# Patient Record
Sex: FEMALE | Race: WHITE | Employment: UNEMPLOYED | URBAN - METROPOLITAN AREA
[De-identification: names, ages, dates, MRNs, and addresses within clinical notes are randomized per-mention and may not be internally consistent; named-entity substitution may affect disease eponyms.]

---

## 2017-02-17 ENCOUNTER — HOSPITAL ENCOUNTER (EMERGENCY)
Age: 37
Discharge: HOME | End: 2017-02-18
Payer: COMMERCIAL

## 2017-02-17 DIAGNOSIS — F10.920 ACUTE ALCOHOLIC INTOXICATION, UNCOMPLICATED (HCC): Primary | ICD-10-CM

## 2017-02-17 LAB
ALBUMIN SERPL-MCNC: 4.8 G/DL (ref 3.9–4.9)
ALP BLD-CCNC: 68 U/L (ref 40–130)
ALT SERPL-CCNC: 64 U/L (ref 0–33)
AMPHETAMINE SCREEN, URINE: NORMAL
ANION GAP SERPL CALCULATED.3IONS-SCNC: 17 MEQ/L (ref 7–13)
AST SERPL-CCNC: 70 U/L (ref 0–35)
BACTERIA: ABNORMAL /HPF
BARBITURATE SCREEN URINE: NORMAL
BASOPHILS ABSOLUTE: 0.1 K/UL (ref 0–0.2)
BASOPHILS RELATIVE PERCENT: 1 %
BENZODIAZEPINE SCREEN, URINE: NORMAL
BILIRUB SERPL-MCNC: 0.4 MG/DL (ref 0–1.2)
BILIRUBIN URINE: NEGATIVE
BLOOD, URINE: ABNORMAL
BUN BLDV-MCNC: 6 MG/DL (ref 6–20)
CALCIUM SERPL-MCNC: 9 MG/DL (ref 8.6–10.2)
CANNABINOID SCREEN URINE: NORMAL
CHLORIDE BLD-SCNC: 98 MEQ/L (ref 98–107)
CLARITY: ABNORMAL
CO2: 24 MEQ/L (ref 22–29)
COCAINE METABOLITE SCREEN URINE: NORMAL
COLOR: YELLOW
CREAT SERPL-MCNC: 0.61 MG/DL (ref 0.5–0.9)
EOSINOPHILS ABSOLUTE: 0.1 K/UL (ref 0–0.7)
EOSINOPHILS RELATIVE PERCENT: 1.2 %
EPITHELIAL CELLS, UA: ABNORMAL /HPF
ETHANOL PERCENT: 0.31 G/DL
ETHANOL: 358 MG/DL (ref 0–0.08)
GFR AFRICAN AMERICAN: >60
GFR NON-AFRICAN AMERICAN: >60
GLOBULIN: 2.8 G/DL (ref 2.3–3.5)
GLUCOSE BLD-MCNC: 90 MG/DL (ref 74–109)
GLUCOSE URINE: NEGATIVE MG/DL
HCT VFR BLD CALC: 47.5 % (ref 37–47)
HEMOGLOBIN: 16.6 G/DL (ref 12–16)
KETONES, URINE: NEGATIVE MG/DL
LEUKOCYTE ESTERASE, URINE: ABNORMAL
LYMPHOCYTES ABSOLUTE: 2.2 K/UL (ref 1–4.8)
LYMPHOCYTES RELATIVE PERCENT: 32 %
Lab: NORMAL
MCH RBC QN AUTO: 32.4 PG (ref 27–31.3)
MCHC RBC AUTO-ENTMCNC: 34.9 % (ref 33–37)
MCV RBC AUTO: 93 FL (ref 82–100)
MONOCYTES ABSOLUTE: 0.4 K/UL (ref 0.2–0.8)
MONOCYTES RELATIVE PERCENT: 5.9 %
NEUTROPHILS ABSOLUTE: 4.1 K/UL (ref 1.4–6.5)
NEUTROPHILS RELATIVE PERCENT: 59.9 %
NITRITE, URINE: NEGATIVE
OPIATE SCREEN URINE: NORMAL
PDW BLD-RTO: 13.6 % (ref 11.5–14.5)
PH UA: 6.5 (ref 5–9)
PHENCYCLIDINE SCREEN URINE: NORMAL
PLATELET # BLD: 163 K/UL (ref 130–400)
POTASSIUM SERPL-SCNC: 4.6 MEQ/L (ref 3.5–5.1)
PROTEIN UA: 30 MG/DL
RBC # BLD: 5.11 M/UL (ref 4.2–5.4)
RBC UA: ABNORMAL /HPF (ref 0–2)
SODIUM BLD-SCNC: 139 MEQ/L (ref 132–144)
SPECIFIC GRAVITY UA: 1.01 (ref 1–1.03)
TOTAL CK: 102 U/L (ref 0–170)
TOTAL PROTEIN: 7.6 G/DL (ref 6.4–8.1)
TSH SERPL DL<=0.05 MIU/L-ACNC: 1.65 UIU/ML (ref 0.27–4.2)
URINE REFLEX TO CULTURE: YES
UROBILINOGEN, URINE: 1 E.U./DL
WBC # BLD: 6.8 K/UL (ref 4.8–10.8)
WBC UA: ABNORMAL /HPF (ref 0–5)

## 2017-02-17 PROCEDURE — 81001 URINALYSIS AUTO W/SCOPE: CPT

## 2017-02-17 PROCEDURE — 6360000002 HC RX W HCPCS: Performed by: PHYSICIAN ASSISTANT

## 2017-02-17 PROCEDURE — 85025 COMPLETE CBC W/AUTO DIFF WBC: CPT

## 2017-02-17 PROCEDURE — 99285 EMERGENCY DEPT VISIT HI MDM: CPT

## 2017-02-17 PROCEDURE — 96372 THER/PROPH/DIAG INJ SC/IM: CPT

## 2017-02-17 PROCEDURE — 80053 COMPREHEN METABOLIC PANEL: CPT

## 2017-02-17 PROCEDURE — 80307 DRUG TEST PRSMV CHEM ANLYZR: CPT

## 2017-02-17 PROCEDURE — 84443 ASSAY THYROID STIM HORMONE: CPT

## 2017-02-17 PROCEDURE — 6370000000 HC RX 637 (ALT 250 FOR IP): Performed by: PHYSICIAN ASSISTANT

## 2017-02-17 PROCEDURE — 36415 COLL VENOUS BLD VENIPUNCTURE: CPT

## 2017-02-17 PROCEDURE — G0480 DRUG TEST DEF 1-7 CLASSES: HCPCS

## 2017-02-17 PROCEDURE — 82550 ASSAY OF CK (CPK): CPT

## 2017-02-17 PROCEDURE — 80164 ASSAY DIPROPYLACETIC ACD TOT: CPT

## 2017-02-17 PROCEDURE — 87086 URINE CULTURE/COLONY COUNT: CPT

## 2017-02-17 RX ORDER — LORAZEPAM 2 MG/ML
2 INJECTION INTRAMUSCULAR ONCE
Status: COMPLETED | OUTPATIENT
Start: 2017-02-17 | End: 2017-02-17

## 2017-02-17 RX ORDER — ZIPRASIDONE HYDROCHLORIDE 20 MG/1
20 CAPSULE ORAL ONCE
Status: COMPLETED | OUTPATIENT
Start: 2017-02-17 | End: 2017-02-17

## 2017-02-17 RX ORDER — HALOPERIDOL 5 MG/ML
5 INJECTION INTRAMUSCULAR ONCE
Status: COMPLETED | OUTPATIENT
Start: 2017-02-17 | End: 2017-02-17

## 2017-02-17 RX ORDER — ONDANSETRON 4 MG/1
4 TABLET, ORALLY DISINTEGRATING ORAL ONCE
Status: COMPLETED | OUTPATIENT
Start: 2017-02-17 | End: 2017-02-17

## 2017-02-17 RX ORDER — LORAZEPAM 2 MG/ML
2 INJECTION INTRAMUSCULAR ONCE
Status: DISCONTINUED | OUTPATIENT
Start: 2017-02-17 | End: 2017-02-17

## 2017-02-17 RX ORDER — QUETIAPINE FUMARATE 50 MG/1
400 TABLET, EXTENDED RELEASE ORAL NIGHTLY
Status: ON HOLD | COMMUNITY
End: 2017-06-15 | Stop reason: HOSPADM

## 2017-02-17 RX ORDER — HYDROXYZINE PAMOATE 25 MG/1
25 CAPSULE ORAL ONCE
Status: COMPLETED | OUTPATIENT
Start: 2017-02-17 | End: 2017-02-17

## 2017-02-17 RX ADMIN — HYDROXYZINE PAMOATE 25 MG: 25 CAPSULE ORAL at 21:26

## 2017-02-17 RX ADMIN — LORAZEPAM 2 MG: 2 INJECTION INTRAMUSCULAR; INTRAVENOUS at 22:42

## 2017-02-17 RX ADMIN — ZIPRASIDONE HYDROCHLORIDE 20 MG: 20 CAPSULE ORAL at 21:26

## 2017-02-17 RX ADMIN — HALOPERIDOL LACTATE 5 MG: 5 INJECTION, SOLUTION INTRAMUSCULAR at 22:42

## 2017-02-17 RX ADMIN — ONDANSETRON 4 MG: 4 TABLET, ORALLY DISINTEGRATING ORAL at 21:26

## 2017-02-17 ASSESSMENT — ENCOUNTER SYMPTOMS
VOMITING: 0
PHOTOPHOBIA: 0
EYE DISCHARGE: 0
EYE PAIN: 0
ANAL BLEEDING: 0
ABDOMINAL PAIN: 0
APNEA: 0
ABDOMINAL DISTENTION: 0
VOICE CHANGE: 0
NAUSEA: 0
BACK PAIN: 0

## 2017-02-18 VITALS
BODY MASS INDEX: 23.92 KG/M2 | TEMPERATURE: 98.3 F | HEIGHT: 62 IN | WEIGHT: 130 LBS | HEART RATE: 98 BPM | OXYGEN SATURATION: 93 % | RESPIRATION RATE: 20 BRPM | DIASTOLIC BLOOD PRESSURE: 77 MMHG | SYSTOLIC BLOOD PRESSURE: 122 MMHG

## 2017-02-18 LAB
ETHANOL PERCENT: 0.05 G/DL
ETHANOL: 62 MG/DL (ref 0–0.08)
VALPROIC ACID LEVEL: <2.8 UG/ML (ref 50–100)

## 2017-02-18 PROCEDURE — 6370000000 HC RX 637 (ALT 250 FOR IP): Performed by: PHYSICIAN ASSISTANT

## 2017-02-18 PROCEDURE — G0480 DRUG TEST DEF 1-7 CLASSES: HCPCS

## 2017-02-18 PROCEDURE — 36415 COLL VENOUS BLD VENIPUNCTURE: CPT

## 2017-02-18 PROCEDURE — 6370000000 HC RX 637 (ALT 250 FOR IP): Performed by: EMERGENCY MEDICINE

## 2017-02-18 RX ORDER — SULFAMETHOXAZOLE AND TRIMETHOPRIM 800; 160 MG/1; MG/1
1 TABLET ORAL ONCE
Status: COMPLETED | OUTPATIENT
Start: 2017-02-18 | End: 2017-02-18

## 2017-02-18 RX ORDER — CHLORDIAZEPOXIDE HYDROCHLORIDE 25 MG/1
50 CAPSULE, GELATIN COATED ORAL ONCE
Status: COMPLETED | OUTPATIENT
Start: 2017-02-18 | End: 2017-02-18

## 2017-02-18 RX ORDER — CHLORDIAZEPOXIDE HYDROCHLORIDE 25 MG/1
25 CAPSULE, GELATIN COATED ORAL ONCE
Status: COMPLETED | OUTPATIENT
Start: 2017-02-18 | End: 2017-02-18

## 2017-02-18 RX ORDER — CHLORDIAZEPOXIDE HYDROCHLORIDE 25 MG/1
25 CAPSULE, GELATIN COATED ORAL 3 TIMES DAILY PRN
Qty: 6 CAPSULE | Refills: 0 | Status: SHIPPED | OUTPATIENT
Start: 2017-02-18 | End: 2017-03-20

## 2017-02-18 RX ADMIN — SULFAMETHOXAZOLE AND TRIMETHOPRIM 1 TABLET: 800; 160 TABLET ORAL at 16:01

## 2017-02-18 RX ADMIN — CHLORDIAZEPOXIDE HYDROCHLORIDE 25 MG: 25 CAPSULE ORAL at 16:00

## 2017-02-18 RX ADMIN — SULFAMETHOXAZOLE AND TRIMETHOPRIM 1 TABLET: 800; 160 TABLET ORAL at 01:38

## 2017-02-18 RX ADMIN — CHLORDIAZEPOXIDE HYDROCHLORIDE 50 MG: 25 CAPSULE ORAL at 10:06

## 2017-02-19 LAB — URINE CULTURE, ROUTINE: NORMAL

## 2017-04-30 ENCOUNTER — HOSPITAL ENCOUNTER (EMERGENCY)
Age: 37
Discharge: HOME | End: 2017-05-01
Attending: EMERGENCY MEDICINE
Payer: MEDICAID

## 2017-04-30 DIAGNOSIS — F10.920 ACUTE ALCOHOLIC INTOXICATION, UNCOMPLICATED (HCC): Primary | ICD-10-CM

## 2017-04-30 LAB
ANION GAP SERPL CALCULATED.3IONS-SCNC: 30 MEQ/L (ref 7–13)
BASOPHILS ABSOLUTE: 0 K/UL (ref 0–0.2)
BASOPHILS RELATIVE PERCENT: 0.4 %
BUN BLDV-MCNC: 6 MG/DL (ref 6–20)
CALCIUM SERPL-MCNC: 9.4 MG/DL (ref 8.6–10.2)
CHLORIDE BLD-SCNC: 93 MEQ/L (ref 98–107)
CO2: 18 MEQ/L (ref 22–29)
CREAT SERPL-MCNC: 0.51 MG/DL (ref 0.5–0.9)
EOSINOPHILS ABSOLUTE: 0 K/UL (ref 0–0.7)
EOSINOPHILS RELATIVE PERCENT: 0.7 %
ETHANOL PERCENT: 0.34 G/DL
ETHANOL: 392 MG/DL (ref 0–0.08)
GFR AFRICAN AMERICAN: >60
GFR NON-AFRICAN AMERICAN: >60
GLUCOSE BLD-MCNC: 72 MG/DL (ref 74–109)
HCT VFR BLD CALC: 47.6 % (ref 37–47)
HEMOGLOBIN: 16.4 G/DL (ref 12–16)
LYMPHOCYTES ABSOLUTE: 1.8 K/UL (ref 1–4.8)
LYMPHOCYTES RELATIVE PERCENT: 39.3 %
MCH RBC QN AUTO: 33 PG (ref 27–31.3)
MCHC RBC AUTO-ENTMCNC: 34.4 % (ref 33–37)
MCV RBC AUTO: 95.8 FL (ref 82–100)
MONOCYTES ABSOLUTE: 0.3 K/UL (ref 0.2–0.8)
MONOCYTES RELATIVE PERCENT: 5.7 %
NEUTROPHILS ABSOLUTE: 2.4 K/UL (ref 1.4–6.5)
NEUTROPHILS RELATIVE PERCENT: 53.9 %
PDW BLD-RTO: 15.3 % (ref 11.5–14.5)
PLATELET # BLD: 68 K/UL (ref 130–400)
POTASSIUM SERPL-SCNC: 4.1 MEQ/L (ref 3.5–5.1)
RBC # BLD: 4.97 M/UL (ref 4.2–5.4)
SODIUM BLD-SCNC: 141 MEQ/L (ref 132–144)
WBC # BLD: 4.5 K/UL (ref 4.8–10.8)

## 2017-04-30 PROCEDURE — 99284 EMERGENCY DEPT VISIT MOD MDM: CPT

## 2017-04-30 PROCEDURE — 85025 COMPLETE CBC W/AUTO DIFF WBC: CPT

## 2017-04-30 PROCEDURE — 6360000002 HC RX W HCPCS: Performed by: EMERGENCY MEDICINE

## 2017-04-30 PROCEDURE — 80048 BASIC METABOLIC PNL TOTAL CA: CPT

## 2017-04-30 PROCEDURE — 36415 COLL VENOUS BLD VENIPUNCTURE: CPT

## 2017-04-30 PROCEDURE — 96374 THER/PROPH/DIAG INJ IV PUSH: CPT

## 2017-04-30 PROCEDURE — 2580000003 HC RX 258: Performed by: EMERGENCY MEDICINE

## 2017-04-30 PROCEDURE — G0480 DRUG TEST DEF 1-7 CLASSES: HCPCS

## 2017-04-30 RX ORDER — 0.9 % SODIUM CHLORIDE 0.9 %
1000 INTRAVENOUS SOLUTION INTRAVENOUS ONCE
Status: COMPLETED | OUTPATIENT
Start: 2017-04-30 | End: 2017-05-01

## 2017-04-30 RX ORDER — ONDANSETRON 2 MG/ML
4 INJECTION INTRAMUSCULAR; INTRAVENOUS ONCE
Status: COMPLETED | OUTPATIENT
Start: 2017-04-30 | End: 2017-04-30

## 2017-04-30 RX ADMIN — SODIUM CHLORIDE 1000 ML: 9 INJECTION, SOLUTION INTRAVENOUS at 21:41

## 2017-04-30 RX ADMIN — ONDANSETRON 4 MG: 2 INJECTION INTRAMUSCULAR; INTRAVENOUS at 21:41

## 2017-04-30 ASSESSMENT — ENCOUNTER SYMPTOMS
ABDOMINAL DISTENTION: 0
SINUS PRESSURE: 0
COLOR CHANGE: 0
COUGH: 0
DIARRHEA: 0
NAUSEA: 0
WHEEZING: 0
SHORTNESS OF BREATH: 0
EYE PAIN: 0
SORE THROAT: 0
RHINORRHEA: 0
CONSTIPATION: 0
PHOTOPHOBIA: 0
ABDOMINAL PAIN: 0
BACK PAIN: 0
VOMITING: 0
APNEA: 0

## 2017-05-01 VITALS
RESPIRATION RATE: 18 BRPM | WEIGHT: 125 LBS | BODY MASS INDEX: 23 KG/M2 | TEMPERATURE: 98.6 F | HEIGHT: 62 IN | OXYGEN SATURATION: 94 % | DIASTOLIC BLOOD PRESSURE: 68 MMHG | SYSTOLIC BLOOD PRESSURE: 116 MMHG | HEART RATE: 79 BPM

## 2017-05-01 LAB
ETHANOL PERCENT: 0.13 G/DL
ETHANOL: 149 MG/DL (ref 0–0.08)

## 2017-05-01 PROCEDURE — 6360000002 HC RX W HCPCS: Performed by: EMERGENCY MEDICINE

## 2017-05-01 PROCEDURE — 36415 COLL VENOUS BLD VENIPUNCTURE: CPT

## 2017-05-01 PROCEDURE — G0480 DRUG TEST DEF 1-7 CLASSES: HCPCS

## 2017-05-01 RX ORDER — ONDANSETRON 4 MG/1
4 TABLET, ORALLY DISINTEGRATING ORAL ONCE
Status: COMPLETED | OUTPATIENT
Start: 2017-05-01 | End: 2017-05-01

## 2017-05-01 RX ADMIN — ONDANSETRON 4 MG: 4 TABLET, ORALLY DISINTEGRATING ORAL at 05:55

## 2017-05-19 ENCOUNTER — HOSPITAL ENCOUNTER (EMERGENCY)
Age: 37
Discharge: HOME | End: 2017-05-19
Attending: STUDENT IN AN ORGANIZED HEALTH CARE EDUCATION/TRAINING PROGRAM

## 2017-05-19 VITALS
TEMPERATURE: 98.5 F | OXYGEN SATURATION: 95 % | WEIGHT: 125 LBS | SYSTOLIC BLOOD PRESSURE: 136 MMHG | BODY MASS INDEX: 23 KG/M2 | RESPIRATION RATE: 18 BRPM | HEART RATE: 73 BPM | HEIGHT: 62 IN | DIASTOLIC BLOOD PRESSURE: 72 MMHG

## 2017-05-19 DIAGNOSIS — F10.20 UNCOMPLICATED ALCOHOL DEPENDENCE (HCC): Primary | ICD-10-CM

## 2017-05-19 DIAGNOSIS — N30.00 ACUTE CYSTITIS WITHOUT HEMATURIA: ICD-10-CM

## 2017-05-19 LAB
ALBUMIN SERPL-MCNC: 4.4 G/DL (ref 3.9–4.9)
ALP BLD-CCNC: 55 U/L (ref 40–130)
ALT SERPL-CCNC: 61 U/L (ref 0–33)
AMPHETAMINE SCREEN, URINE: NORMAL
ANION GAP SERPL CALCULATED.3IONS-SCNC: 27 MEQ/L (ref 7–13)
AST SERPL-CCNC: 74 U/L (ref 0–35)
BACTERIA: NORMAL /HPF
BARBITURATE SCREEN URINE: NORMAL
BASOPHILS ABSOLUTE: 0.2 K/UL (ref 0–0.2)
BASOPHILS RELATIVE PERCENT: 3.4 %
BENZODIAZEPINE SCREEN, URINE: NORMAL
BILIRUB SERPL-MCNC: 0.4 MG/DL (ref 0–1.2)
BILIRUBIN URINE: NEGATIVE
BLOOD, URINE: NEGATIVE
BUN BLDV-MCNC: 9 MG/DL (ref 6–20)
CALCIUM SERPL-MCNC: 8.7 MG/DL (ref 8.6–10.2)
CANNABINOID SCREEN URINE: NORMAL
CHLORIDE BLD-SCNC: 95 MEQ/L (ref 98–107)
CLARITY: ABNORMAL
CO2: 18 MEQ/L (ref 22–29)
COCAINE METABOLITE SCREEN URINE: NORMAL
COLOR: YELLOW
CREAT SERPL-MCNC: 0.43 MG/DL (ref 0.5–0.9)
EOSINOPHILS ABSOLUTE: 0.1 K/UL (ref 0–0.7)
EOSINOPHILS RELATIVE PERCENT: 1.8 %
EPITHELIAL CELLS, UA: NORMAL /HPF
ETHANOL PERCENT: 0.02 G/DL
ETHANOL PERCENT: 0.27 G/DL
ETHANOL: 26 MG/DL (ref 0–0.08)
ETHANOL: 302 MG/DL (ref 0–0.08)
GFR AFRICAN AMERICAN: >60
GFR NON-AFRICAN AMERICAN: >60
GLOBULIN: 2.7 G/DL (ref 2.3–3.5)
GLUCOSE BLD-MCNC: 67 MG/DL (ref 74–109)
GLUCOSE URINE: NEGATIVE MG/DL
HCG(URINE) PREGNANCY TEST: NEGATIVE
HCT VFR BLD CALC: 47.7 % (ref 37–47)
HEMOGLOBIN: 16.5 G/DL (ref 12–16)
KETONES, URINE: 40 MG/DL
LEUKOCYTE ESTERASE, URINE: ABNORMAL
LYMPHOCYTES ABSOLUTE: 2.8 K/UL (ref 1–4.8)
LYMPHOCYTES RELATIVE PERCENT: 44.6 %
Lab: NORMAL
MCH RBC QN AUTO: 33.2 PG (ref 27–31.3)
MCHC RBC AUTO-ENTMCNC: 34.5 % (ref 33–37)
MCV RBC AUTO: 96.2 FL (ref 82–100)
MONOCYTES ABSOLUTE: 0.6 K/UL (ref 0.2–0.8)
MONOCYTES RELATIVE PERCENT: 9.2 %
NEUTROPHILS ABSOLUTE: 2.6 K/UL (ref 1.4–6.5)
NEUTROPHILS RELATIVE PERCENT: 41 %
NITRITE, URINE: POSITIVE
OPIATE SCREEN URINE: NORMAL
PDW BLD-RTO: 14.9 % (ref 11.5–14.5)
PH UA: 6 (ref 5–9)
PHENCYCLIDINE SCREEN URINE: NORMAL
PLATELET # BLD: 254 K/UL (ref 130–400)
POTASSIUM SERPL-SCNC: 3.6 MEQ/L (ref 3.5–5.1)
PROTEIN UA: ABNORMAL MG/DL
RBC # BLD: 4.96 M/UL (ref 4.2–5.4)
RBC UA: NORMAL /HPF (ref 0–2)
SODIUM BLD-SCNC: 140 MEQ/L (ref 132–144)
SPECIFIC GRAVITY UA: 1.01 (ref 1–1.03)
TOTAL CK: 102 U/L (ref 0–170)
TOTAL PROTEIN: 7.1 G/DL (ref 6.4–8.1)
TSH SERPL DL<=0.05 MIU/L-ACNC: 2.68 UIU/ML (ref 0.27–4.2)
UROBILINOGEN, URINE: 1 E.U./DL
VALPROIC ACID LEVEL: <2.8 UG/ML (ref 50–100)
WBC # BLD: 6.2 K/UL (ref 4.8–10.8)
WBC UA: NORMAL /HPF (ref 0–5)

## 2017-05-19 PROCEDURE — 6370000000 HC RX 637 (ALT 250 FOR IP): Performed by: PHYSICIAN ASSISTANT

## 2017-05-19 PROCEDURE — 2580000003 HC RX 258: Performed by: STUDENT IN AN ORGANIZED HEALTH CARE EDUCATION/TRAINING PROGRAM

## 2017-05-19 PROCEDURE — 36415 COLL VENOUS BLD VENIPUNCTURE: CPT

## 2017-05-19 PROCEDURE — 6360000002 HC RX W HCPCS: Performed by: STUDENT IN AN ORGANIZED HEALTH CARE EDUCATION/TRAINING PROGRAM

## 2017-05-19 PROCEDURE — 85025 COMPLETE CBC W/AUTO DIFF WBC: CPT

## 2017-05-19 PROCEDURE — 80164 ASSAY DIPROPYLACETIC ACD TOT: CPT

## 2017-05-19 PROCEDURE — 81001 URINALYSIS AUTO W/SCOPE: CPT

## 2017-05-19 PROCEDURE — 2500000003 HC RX 250 WO HCPCS: Performed by: STUDENT IN AN ORGANIZED HEALTH CARE EDUCATION/TRAINING PROGRAM

## 2017-05-19 PROCEDURE — G0480 DRUG TEST DEF 1-7 CLASSES: HCPCS

## 2017-05-19 PROCEDURE — 80307 DRUG TEST PRSMV CHEM ANLYZR: CPT

## 2017-05-19 PROCEDURE — 84443 ASSAY THYROID STIM HORMONE: CPT

## 2017-05-19 PROCEDURE — 6360000002 HC RX W HCPCS

## 2017-05-19 PROCEDURE — 96374 THER/PROPH/DIAG INJ IV PUSH: CPT

## 2017-05-19 PROCEDURE — 84703 CHORIONIC GONADOTROPIN ASSAY: CPT

## 2017-05-19 PROCEDURE — 80053 COMPREHEN METABOLIC PANEL: CPT

## 2017-05-19 PROCEDURE — 99285 EMERGENCY DEPT VISIT HI MDM: CPT

## 2017-05-19 PROCEDURE — 6370000000 HC RX 637 (ALT 250 FOR IP): Performed by: STUDENT IN AN ORGANIZED HEALTH CARE EDUCATION/TRAINING PROGRAM

## 2017-05-19 PROCEDURE — 82550 ASSAY OF CK (CPK): CPT

## 2017-05-19 RX ORDER — DIPHENHYDRAMINE HYDROCHLORIDE 50 MG/ML
50 INJECTION INTRAMUSCULAR; INTRAVENOUS ONCE
Status: COMPLETED | OUTPATIENT
Start: 2017-05-19 | End: 2017-05-19

## 2017-05-19 RX ORDER — SULFAMETHOXAZOLE AND TRIMETHOPRIM 800; 160 MG/1; MG/1
1 TABLET ORAL ONCE
Status: COMPLETED | OUTPATIENT
Start: 2017-05-19 | End: 2017-05-19

## 2017-05-19 RX ORDER — SULFAMETHOXAZOLE AND TRIMETHOPRIM 800; 160 MG/1; MG/1
1 TABLET ORAL 2 TIMES DAILY
Qty: 14 TABLET | Refills: 0 | Status: SHIPPED | OUTPATIENT
Start: 2017-05-19 | End: 2017-05-26

## 2017-05-19 RX ORDER — DIPHENHYDRAMINE HYDROCHLORIDE 50 MG/ML
INJECTION INTRAMUSCULAR; INTRAVENOUS
Status: COMPLETED
Start: 2017-05-19 | End: 2017-05-19

## 2017-05-19 RX ORDER — LORAZEPAM 1 MG/1
1 TABLET ORAL ONCE
Status: COMPLETED | OUTPATIENT
Start: 2017-05-19 | End: 2017-05-19

## 2017-05-19 RX ADMIN — THIAMINE HYDROCHLORIDE: 100 INJECTION, SOLUTION INTRAMUSCULAR; INTRAVENOUS at 13:03

## 2017-05-19 RX ADMIN — LORAZEPAM 1 MG: 1 TABLET ORAL at 16:38

## 2017-05-19 RX ADMIN — DIPHENHYDRAMINE HYDROCHLORIDE 50 MG: 50 INJECTION INTRAMUSCULAR; INTRAVENOUS at 13:07

## 2017-05-19 RX ADMIN — SULFAMETHOXAZOLE AND TRIMETHOPRIM 1 TABLET: 800; 160 TABLET ORAL at 12:43

## 2017-05-19 ASSESSMENT — ENCOUNTER SYMPTOMS
CHEST TIGHTNESS: 0
VOMITING: 0
SINUS PRESSURE: 0
SHORTNESS OF BREATH: 0
COUGH: 0
ABDOMINAL PAIN: 0
TROUBLE SWALLOWING: 0
DIARRHEA: 0
BACK PAIN: 0

## 2017-05-30 ENCOUNTER — HOSPITAL ENCOUNTER (EMERGENCY)
Age: 37
Discharge: HOME | End: 2017-05-30
Attending: EMERGENCY MEDICINE

## 2017-05-30 VITALS
HEART RATE: 100 BPM | DIASTOLIC BLOOD PRESSURE: 68 MMHG | RESPIRATION RATE: 20 BRPM | WEIGHT: 120 LBS | TEMPERATURE: 97.8 F | OXYGEN SATURATION: 99 % | SYSTOLIC BLOOD PRESSURE: 110 MMHG | HEIGHT: 62 IN | BODY MASS INDEX: 22.08 KG/M2

## 2017-05-30 DIAGNOSIS — F10.20 ALCOHOLISM (HCC): Primary | ICD-10-CM

## 2017-05-30 PROCEDURE — 6360000002 HC RX W HCPCS: Performed by: EMERGENCY MEDICINE

## 2017-05-30 PROCEDURE — 96374 THER/PROPH/DIAG INJ IV PUSH: CPT

## 2017-05-30 PROCEDURE — 99284 EMERGENCY DEPT VISIT MOD MDM: CPT

## 2017-05-30 PROCEDURE — 2580000003 HC RX 258: Performed by: EMERGENCY MEDICINE

## 2017-05-30 PROCEDURE — 96361 HYDRATE IV INFUSION ADD-ON: CPT

## 2017-05-30 RX ORDER — LORAZEPAM 2 MG/ML
2 INJECTION INTRAMUSCULAR ONCE
Status: COMPLETED | OUTPATIENT
Start: 2017-05-30 | End: 2017-05-30

## 2017-05-30 RX ORDER — CHLORDIAZEPOXIDE HYDROCHLORIDE 25 MG/1
25 CAPSULE, GELATIN COATED ORAL 3 TIMES DAILY PRN
Qty: 12 CAPSULE | Refills: 0 | Status: ON HOLD | OUTPATIENT
Start: 2017-05-30 | End: 2017-06-15 | Stop reason: HOSPADM

## 2017-05-30 RX ORDER — 0.9 % SODIUM CHLORIDE 0.9 %
1000 INTRAVENOUS SOLUTION INTRAVENOUS ONCE
Status: COMPLETED | OUTPATIENT
Start: 2017-05-30 | End: 2017-05-30

## 2017-05-30 RX ADMIN — SODIUM CHLORIDE 1000 ML: 9 INJECTION, SOLUTION INTRAVENOUS at 20:51

## 2017-05-30 RX ADMIN — LORAZEPAM 2 MG: 2 INJECTION INTRAMUSCULAR; INTRAVENOUS at 20:52

## 2017-05-30 ASSESSMENT — ENCOUNTER SYMPTOMS
CHEST TIGHTNESS: 0
VOMITING: 0
SORE THROAT: 0
PHOTOPHOBIA: 0
WHEEZING: 0
ABDOMINAL DISTENTION: 0
COUGH: 0
ABDOMINAL PAIN: 0
EYE DISCHARGE: 0
SHORTNESS OF BREATH: 0

## 2017-05-30 ASSESSMENT — PAIN DESCRIPTION - FREQUENCY: FREQUENCY: CONTINUOUS

## 2017-05-30 ASSESSMENT — PAIN SCALES - GENERAL: PAINLEVEL_OUTOF10: 8

## 2017-05-30 ASSESSMENT — PAIN DESCRIPTION - LOCATION: LOCATION: ARM;SHOULDER

## 2017-05-30 ASSESSMENT — PAIN DESCRIPTION - ORIENTATION: ORIENTATION: LEFT

## 2017-06-08 ENCOUNTER — HOSPITAL ENCOUNTER (INPATIENT)
Age: 37
LOS: 6 days | Discharge: HOME | DRG: 885 | End: 2017-06-15
Attending: STUDENT IN AN ORGANIZED HEALTH CARE EDUCATION/TRAINING PROGRAM | Admitting: PSYCHIATRY & NEUROLOGY
Payer: COMMERCIAL

## 2017-06-08 ENCOUNTER — APPOINTMENT (OUTPATIENT)
Dept: GENERAL RADIOLOGY | Age: 37
DRG: 885 | End: 2017-06-08
Payer: COMMERCIAL

## 2017-06-08 DIAGNOSIS — R45.89 SUICIDAL BEHAVIOR: ICD-10-CM

## 2017-06-08 DIAGNOSIS — F32.A DEPRESSION, UNSPECIFIED DEPRESSION TYPE: Primary | ICD-10-CM

## 2017-06-08 DIAGNOSIS — M67.919 DISORDER OF BURSAE AND TENDONS IN SHOULDER REGION: ICD-10-CM

## 2017-06-08 DIAGNOSIS — F31.9 BIPOLAR 1 DISORDER (HCC): ICD-10-CM

## 2017-06-08 DIAGNOSIS — F10.920 ACUTE ALCOHOLIC INTOXICATION, UNCOMPLICATED (HCC): ICD-10-CM

## 2017-06-08 DIAGNOSIS — M71.9 DISORDER OF BURSAE AND TENDONS IN SHOULDER REGION: ICD-10-CM

## 2017-06-08 LAB
ACANTHOCYTES: 0
ALBUMIN SERPL-MCNC: 4.6 G/DL (ref 3.9–4.9)
ALP BLD-CCNC: 69 U/L (ref 40–130)
ALT SERPL-CCNC: 61 U/L (ref 0–33)
AMPHETAMINE SCREEN, URINE: NORMAL
ANION GAP SERPL CALCULATED.3IONS-SCNC: 19 MEQ/L (ref 7–13)
ANISOCYTOSIS: 0
AST SERPL-CCNC: 63 U/L (ref 0–35)
AUER RODS: 0
BACTERIA: NORMAL /HPF
BARBITURATE SCREEN URINE: NORMAL
BASOPHILIC STIPPLING: 0
BASOPHILS ABSOLUTE: 0.2 K/UL (ref 0–0.2)
BASOPHILS RELATIVE PERCENT: 6 %
BENZODIAZEPINE SCREEN, URINE: NORMAL
BILIRUB SERPL-MCNC: 0.4 MG/DL (ref 0–1.2)
BILIRUBIN URINE: NEGATIVE
BLOOD, URINE: NEGATIVE
BUN BLDV-MCNC: 9 MG/DL (ref 6–20)
BURR CELLS: 0
CABOT RINGS: 0
CALCIUM SERPL-MCNC: 8.7 MG/DL (ref 8.6–10.2)
CANNABINOID SCREEN URINE: NORMAL
CHLORIDE BLD-SCNC: 93 MEQ/L (ref 98–107)
CLARITY: ABNORMAL
CO2: 22 MEQ/L (ref 22–29)
COCAINE METABOLITE SCREEN URINE: NORMAL
COLOR: YELLOW
CREAT SERPL-MCNC: 0.45 MG/DL (ref 0.5–0.9)
DOHLE BODIES: 0
EOSINOPHILS ABSOLUTE: 0 K/UL (ref 0–0.7)
EOSINOPHILS RELATIVE PERCENT: 1 %
EPITHELIAL CELLS, UA: NORMAL /HPF
ETHANOL PERCENT: 0.01 G/DL
ETHANOL PERCENT: 0.25 G/DL
ETHANOL: 17 MG/DL (ref 0–0.08)
ETHANOL: 281 MG/DL (ref 0–0.08)
GFR AFRICAN AMERICAN: >60
GFR NON-AFRICAN AMERICAN: >60
GLOBULIN: 2.5 G/DL (ref 2.3–3.5)
GLUCOSE BLD-MCNC: 199 MG/DL (ref 74–109)
GLUCOSE URINE: 500 MG/DL
HAIRY CELLS: 0
HCG(URINE) PREGNANCY TEST: NEGATIVE
HCT VFR BLD CALC: 44.7 % (ref 37–47)
HEMOGLOBIN: 15.5 G/DL (ref 12–16)
HOWELL-JOLLY BODIES: 0
HYPERSEGMENTED NEUTROPHILS: 0
HYPOCHROMIA: 0
KETONES, URINE: 15 MG/DL
LEUKOCYTE ESTERASE, URINE: ABNORMAL
LYMPHOCYTES ABSOLUTE: 1.1 K/UL (ref 1–4.8)
LYMPHOCYTES RELATIVE PERCENT: 30 %
Lab: NORMAL
MACROCYTES: 0
MCH RBC QN AUTO: 33.3 PG (ref 27–31.3)
MCHC RBC AUTO-ENTMCNC: 34.6 % (ref 33–37)
MCV RBC AUTO: 96.3 FL (ref 82–100)
MICROCYTES: 0
MONOCYTES ABSOLUTE: 0.2 K/UL (ref 0.2–0.8)
MONOCYTES RELATIVE PERCENT: 6.3 %
NEUTROPHILS ABSOLUTE: 2.2 K/UL (ref 1.4–6.5)
NEUTROPHILS RELATIVE PERCENT: 57 %
NITRITE, URINE: NEGATIVE
OPIATE SCREEN URINE: NORMAL
OVALOCYTES: 0
PAPPENHEIMER BODIES: 0
PDW BLD-RTO: 14.4 % (ref 11.5–14.5)
PELGER HUET CELLS: 0 %
PH UA: 7 (ref 5–9)
PHENCYCLIDINE SCREEN URINE: NORMAL
PLATELET # BLD: 141 K/UL (ref 130–400)
PLATELET SLIDE REVIEW: ADEQUATE
POIKILOCYTES: 0
POLYCHROMASIA: 0
POTASSIUM SERPL-SCNC: 4.2 MEQ/L (ref 3.5–5.1)
PROTEIN UA: 30 MG/DL
RBC # BLD: 4.64 M/UL (ref 4.2–5.4)
RBC # BLD: NORMAL 10*6/UL
RBC UA: NORMAL /HPF (ref 0–2)
SCHISTOCYTES: 0
SICKLE CELLS: 0
SMUDGE CELLS: 1.8
SODIUM BLD-SCNC: 134 MEQ/L (ref 132–144)
SPECIFIC GRAVITY UA: 1.02 (ref 1–1.03)
SPHEROCYTES: 0
STOMATOCYTES: 0
TARGET CELLS: 0
TEAR DROP CELLS: 0
TOTAL CK: 71 U/L (ref 0–170)
TOTAL PROTEIN: 7.1 G/DL (ref 6.4–8.1)
TOXIC GRANULATION: 0
TSH SERPL DL<=0.05 MIU/L-ACNC: 3.36 UIU/ML (ref 0.27–4.2)
UROBILINOGEN, URINE: 1 E.U./DL
VACUOLATED NEUTROPHILS: 0
VALPROIC ACID LEVEL: 4.1 UG/ML (ref 50–100)
WBC # BLD: 3.8 K/UL (ref 4.8–10.8)
WBC UA: NORMAL /HPF (ref 0–5)

## 2017-06-08 PROCEDURE — 6370000000 HC RX 637 (ALT 250 FOR IP): Performed by: PHYSICIAN ASSISTANT

## 2017-06-08 PROCEDURE — G0480 DRUG TEST DEF 1-7 CLASSES: HCPCS

## 2017-06-08 PROCEDURE — 85025 COMPLETE CBC W/AUTO DIFF WBC: CPT

## 2017-06-08 PROCEDURE — 84703 CHORIONIC GONADOTROPIN ASSAY: CPT

## 2017-06-08 PROCEDURE — 99285 EMERGENCY DEPT VISIT HI MDM: CPT

## 2017-06-08 PROCEDURE — 36415 COLL VENOUS BLD VENIPUNCTURE: CPT

## 2017-06-08 PROCEDURE — 71100 X-RAY EXAM RIBS UNI 2 VIEWS: CPT

## 2017-06-08 PROCEDURE — 84443 ASSAY THYROID STIM HORMONE: CPT

## 2017-06-08 PROCEDURE — 82550 ASSAY OF CK (CPK): CPT

## 2017-06-08 PROCEDURE — 73030 X-RAY EXAM OF SHOULDER: CPT

## 2017-06-08 PROCEDURE — 80053 COMPREHEN METABOLIC PANEL: CPT

## 2017-06-08 PROCEDURE — 81001 URINALYSIS AUTO W/SCOPE: CPT

## 2017-06-08 PROCEDURE — 6370000000 HC RX 637 (ALT 250 FOR IP): Performed by: PERSONAL EMERGENCY RESPONSE ATTENDANT

## 2017-06-08 PROCEDURE — 80307 DRUG TEST PRSMV CHEM ANLYZR: CPT

## 2017-06-08 PROCEDURE — 80164 ASSAY DIPROPYLACETIC ACD TOT: CPT

## 2017-06-08 RX ORDER — IBUPROFEN 800 MG/1
800 TABLET ORAL ONCE
Status: COMPLETED | OUTPATIENT
Start: 2017-06-08 | End: 2017-06-08

## 2017-06-08 RX ORDER — LORAZEPAM 1 MG/1
2 TABLET ORAL ONCE
Status: COMPLETED | OUTPATIENT
Start: 2017-06-08 | End: 2017-06-08

## 2017-06-08 RX ORDER — NICOTINE 21 MG/24HR
1 PATCH, TRANSDERMAL 24 HOURS TRANSDERMAL ONCE
Status: COMPLETED | OUTPATIENT
Start: 2017-06-08 | End: 2017-06-09

## 2017-06-08 RX ADMIN — LORAZEPAM 2 MG: 1 TABLET ORAL at 14:25

## 2017-06-08 RX ADMIN — LORAZEPAM 2 MG: 1 TABLET ORAL at 21:06

## 2017-06-08 RX ADMIN — IBUPROFEN 800 MG: 800 TABLET, FILM COATED ORAL at 14:25

## 2017-06-08 ASSESSMENT — ENCOUNTER SYMPTOMS
STRIDOR: 0
SHORTNESS OF BREATH: 0
VOMITING: 0
EYE DISCHARGE: 0
ABDOMINAL PAIN: 0
BACK PAIN: 0
WHEEZING: 0
DIARRHEA: 0
CONSTIPATION: 0
ABDOMINAL DISTENTION: 0
COLOR CHANGE: 0
NAUSEA: 0
SORE THROAT: 0
RHINORRHEA: 0

## 2017-06-08 ASSESSMENT — PAIN DESCRIPTION - ORIENTATION: ORIENTATION: LEFT

## 2017-06-08 ASSESSMENT — PAIN DESCRIPTION - DESCRIPTORS: DESCRIPTORS: DISCOMFORT;SHARP

## 2017-06-08 ASSESSMENT — PAIN SCALES - GENERAL
PAINLEVEL_OUTOF10: 8
PAINLEVEL_OUTOF10: 8

## 2017-06-08 ASSESSMENT — PAIN DESCRIPTION - ONSET: ONSET: ON-GOING

## 2017-06-08 ASSESSMENT — PAIN DESCRIPTION - LOCATION: LOCATION: ARM

## 2017-06-08 ASSESSMENT — PATIENT HEALTH QUESTIONNAIRE - PHQ9: SUM OF ALL RESPONSES TO PHQ QUESTIONS 1-9: 20

## 2017-06-08 ASSESSMENT — PAIN DESCRIPTION - PROGRESSION: CLINICAL_PROGRESSION: NOT CHANGED

## 2017-06-08 ASSESSMENT — PAIN DESCRIPTION - PAIN TYPE: TYPE: ACUTE PAIN

## 2017-06-08 ASSESSMENT — PAIN DESCRIPTION - FREQUENCY: FREQUENCY: INTERMITTENT

## 2017-06-09 PROCEDURE — 1240000000 HC EMOTIONAL WELLNESS R&B

## 2017-06-09 PROCEDURE — 6370000000 HC RX 637 (ALT 250 FOR IP): Performed by: PHYSICIAN ASSISTANT

## 2017-06-09 PROCEDURE — 6370000000 HC RX 637 (ALT 250 FOR IP): Performed by: EMERGENCY MEDICINE

## 2017-06-09 PROCEDURE — 6370000000 HC RX 637 (ALT 250 FOR IP): Performed by: PSYCHIATRY & NEUROLOGY

## 2017-06-09 PROCEDURE — 6370000000 HC RX 637 (ALT 250 FOR IP): Performed by: PERSONAL EMERGENCY RESPONSE ATTENDANT

## 2017-06-09 PROCEDURE — 6360000002 HC RX W HCPCS: Performed by: PERSONAL EMERGENCY RESPONSE ATTENDANT

## 2017-06-09 RX ORDER — LORAZEPAM 1 MG/1
1 TABLET ORAL
Status: DISCONTINUED | OUTPATIENT
Start: 2017-06-09 | End: 2017-06-11

## 2017-06-09 RX ORDER — ACETAMINOPHEN 325 MG/1
650 TABLET ORAL EVERY 6 HOURS PRN
Status: DISCONTINUED | OUTPATIENT
Start: 2017-06-09 | End: 2017-06-15 | Stop reason: HOSPADM

## 2017-06-09 RX ORDER — MULTIVITAMIN WITH FOLIC ACID 400 MCG
1 TABLET ORAL DAILY
Status: DISCONTINUED | OUTPATIENT
Start: 2017-06-09 | End: 2017-06-15 | Stop reason: HOSPADM

## 2017-06-09 RX ORDER — SODIUM CHLORIDE 0.9 % (FLUSH) 0.9 %
10 SYRINGE (ML) INJECTION PRN
Status: DISCONTINUED | OUTPATIENT
Start: 2017-06-09 | End: 2017-06-15 | Stop reason: HOSPADM

## 2017-06-09 RX ORDER — NICOTINE 21 MG/24HR
1 PATCH, TRANSDERMAL 24 HOURS TRANSDERMAL DAILY
Status: DISCONTINUED | OUTPATIENT
Start: 2017-06-09 | End: 2017-06-15 | Stop reason: HOSPADM

## 2017-06-09 RX ORDER — LORAZEPAM 2 MG/ML
3 INJECTION INTRAMUSCULAR
Status: DISCONTINUED | OUTPATIENT
Start: 2017-06-09 | End: 2017-06-11

## 2017-06-09 RX ORDER — QUETIAPINE 200 MG/1
200 TABLET, FILM COATED, EXTENDED RELEASE ORAL NIGHTLY
Status: DISCONTINUED | OUTPATIENT
Start: 2017-06-09 | End: 2017-06-10

## 2017-06-09 RX ORDER — ONDANSETRON 4 MG/1
4 TABLET, ORALLY DISINTEGRATING ORAL ONCE
Status: COMPLETED | OUTPATIENT
Start: 2017-06-09 | End: 2017-06-09

## 2017-06-09 RX ORDER — CHLORDIAZEPOXIDE HYDROCHLORIDE 25 MG/1
50 CAPSULE, GELATIN COATED ORAL ONCE
Status: COMPLETED | OUTPATIENT
Start: 2017-06-09 | End: 2017-06-09

## 2017-06-09 RX ORDER — TRAZODONE HYDROCHLORIDE 50 MG/1
50 TABLET ORAL NIGHTLY PRN
Status: DISCONTINUED | OUTPATIENT
Start: 2017-06-09 | End: 2017-06-10

## 2017-06-09 RX ORDER — LORAZEPAM 1 MG/1
3 TABLET ORAL
Status: DISCONTINUED | OUTPATIENT
Start: 2017-06-09 | End: 2017-06-11

## 2017-06-09 RX ORDER — LEVOTHYROXINE SODIUM 0.05 MG/1
50 TABLET ORAL
Status: DISCONTINUED | OUTPATIENT
Start: 2017-06-10 | End: 2017-06-15 | Stop reason: HOSPADM

## 2017-06-09 RX ORDER — LORAZEPAM 2 MG/ML
4 INJECTION INTRAMUSCULAR
Status: DISCONTINUED | OUTPATIENT
Start: 2017-06-09 | End: 2017-06-11

## 2017-06-09 RX ORDER — FOLIC ACID 1 MG/1
1 TABLET ORAL DAILY
Status: DISCONTINUED | OUTPATIENT
Start: 2017-06-09 | End: 2017-06-15 | Stop reason: HOSPADM

## 2017-06-09 RX ORDER — LORAZEPAM 1 MG/1
4 TABLET ORAL
Status: DISCONTINUED | OUTPATIENT
Start: 2017-06-09 | End: 2017-06-11

## 2017-06-09 RX ORDER — ONDANSETRON 4 MG/1
4 TABLET, ORALLY DISINTEGRATING ORAL EVERY 6 HOURS PRN
Status: DISCONTINUED | OUTPATIENT
Start: 2017-06-09 | End: 2017-06-15 | Stop reason: HOSPADM

## 2017-06-09 RX ORDER — LORAZEPAM 2 MG/ML
1 INJECTION INTRAMUSCULAR
Status: DISCONTINUED | OUTPATIENT
Start: 2017-06-09 | End: 2017-06-11

## 2017-06-09 RX ORDER — LORAZEPAM 1 MG/1
2 TABLET ORAL ONCE
Status: COMPLETED | OUTPATIENT
Start: 2017-06-09 | End: 2017-06-09

## 2017-06-09 RX ORDER — LORAZEPAM 2 MG/ML
2 INJECTION INTRAMUSCULAR
Status: DISCONTINUED | OUTPATIENT
Start: 2017-06-09 | End: 2017-06-11

## 2017-06-09 RX ORDER — GABAPENTIN 300 MG/1
300 CAPSULE ORAL 3 TIMES DAILY
Status: DISCONTINUED | OUTPATIENT
Start: 2017-06-09 | End: 2017-06-15 | Stop reason: HOSPADM

## 2017-06-09 RX ORDER — ACETAMINOPHEN 500 MG
1000 TABLET ORAL ONCE
Status: COMPLETED | OUTPATIENT
Start: 2017-06-09 | End: 2017-06-09

## 2017-06-09 RX ORDER — LORAZEPAM 1 MG/1
2 TABLET ORAL
Status: DISCONTINUED | OUTPATIENT
Start: 2017-06-09 | End: 2017-06-11

## 2017-06-09 RX ADMIN — QUETIAPINE FUMARATE 200 MG: 200 TABLET, EXTENDED RELEASE ORAL at 21:44

## 2017-06-09 RX ADMIN — CHLORDIAZEPOXIDE HYDROCHLORIDE 50 MG: 25 CAPSULE ORAL at 01:03

## 2017-06-09 RX ADMIN — THERA TABS 1 TABLET: TAB at 13:42

## 2017-06-09 RX ADMIN — LORAZEPAM 1 MG: 1 TABLET ORAL at 11:30

## 2017-06-09 RX ADMIN — GABAPENTIN 300 MG: 300 CAPSULE ORAL at 13:42

## 2017-06-09 RX ADMIN — LORAZEPAM 2 MG: 1 TABLET ORAL at 06:56

## 2017-06-09 RX ADMIN — ACETAMINOPHEN 1000 MG: 500 TABLET ORAL at 06:56

## 2017-06-09 RX ADMIN — FOLIC ACID 1 MG: 1 TABLET ORAL at 11:30

## 2017-06-09 RX ADMIN — DIVALPROEX SODIUM 750 MG: 250 TABLET, FILM COATED, EXTENDED RELEASE ORAL at 21:43

## 2017-06-09 RX ADMIN — ONDANSETRON 4 MG: 4 TABLET, ORALLY DISINTEGRATING ORAL at 01:04

## 2017-06-09 RX ADMIN — GABAPENTIN 300 MG: 300 CAPSULE ORAL at 21:44

## 2017-06-09 RX ADMIN — LORAZEPAM 2 MG: 1 TABLET ORAL at 04:07

## 2017-06-09 RX ADMIN — TRAZODONE HYDROCHLORIDE 50 MG: 50 TABLET ORAL at 21:58

## 2017-06-09 ASSESSMENT — LIFESTYLE VARIABLES: HISTORY_ALCOHOL_USE: YES

## 2017-06-09 ASSESSMENT — PATIENT HEALTH QUESTIONNAIRE - PHQ9
SUM OF ALL RESPONSES TO PHQ QUESTIONS 1-9: 27
SUM OF ALL RESPONSES TO PHQ QUESTIONS 1-9: 21

## 2017-06-09 ASSESSMENT — SLEEP AND FATIGUE QUESTIONNAIRES
SLEEP PATTERN: DIFFICULTY FALLING ASLEEP;INSOMNIA
DIFFICULTY STAYING ASLEEP: NO
DO YOU USE A SLEEP AID: YES
DIFFICULTY ARISING: NO
RESTFUL SLEEP: NO
DIFFICULTY FALLING ASLEEP: YES
DO YOU HAVE DIFFICULTY SLEEPING: YES

## 2017-06-09 ASSESSMENT — PAIN SCALES - GENERAL
PAINLEVEL_OUTOF10: 7
PAINLEVEL_OUTOF10: 0

## 2017-06-10 PROCEDURE — 1240000000 HC EMOTIONAL WELLNESS R&B

## 2017-06-10 PROCEDURE — 6370000000 HC RX 637 (ALT 250 FOR IP): Performed by: PSYCHIATRY & NEUROLOGY

## 2017-06-10 RX ORDER — TOPIRAMATE 25 MG/1
25 TABLET ORAL DAILY
Status: DISCONTINUED | OUTPATIENT
Start: 2017-06-10 | End: 2017-06-15 | Stop reason: HOSPADM

## 2017-06-10 RX ORDER — TRAZODONE HYDROCHLORIDE 150 MG/1
150 TABLET ORAL NIGHTLY PRN
Status: DISCONTINUED | OUTPATIENT
Start: 2017-06-10 | End: 2017-06-15 | Stop reason: HOSPADM

## 2017-06-10 RX ORDER — QUETIAPINE 400 MG/1
400 TABLET, FILM COATED, EXTENDED RELEASE ORAL NIGHTLY
Status: DISCONTINUED | OUTPATIENT
Start: 2017-06-10 | End: 2017-06-15 | Stop reason: HOSPADM

## 2017-06-10 RX ADMIN — FOLIC ACID 1 MG: 1 TABLET ORAL at 08:26

## 2017-06-10 RX ADMIN — TRAZODONE HYDROCHLORIDE 150 MG: 150 TABLET ORAL at 20:44

## 2017-06-10 RX ADMIN — QUETIAPINE FUMARATE 400 MG: 400 TABLET, EXTENDED RELEASE ORAL at 20:42

## 2017-06-10 RX ADMIN — GABAPENTIN 300 MG: 300 CAPSULE ORAL at 12:48

## 2017-06-10 RX ADMIN — TOPIRAMATE 25 MG: 25 TABLET, FILM COATED ORAL at 12:48

## 2017-06-10 RX ADMIN — GABAPENTIN 300 MG: 300 CAPSULE ORAL at 08:26

## 2017-06-10 RX ADMIN — DIVALPROEX SODIUM 750 MG: 250 TABLET, FILM COATED, EXTENDED RELEASE ORAL at 20:42

## 2017-06-10 RX ADMIN — THERA TABS 1 TABLET: TAB at 08:26

## 2017-06-10 RX ADMIN — GABAPENTIN 300 MG: 300 CAPSULE ORAL at 20:42

## 2017-06-10 RX ADMIN — LEVOTHYROXINE SODIUM 50 MCG: 50 TABLET ORAL at 06:07

## 2017-06-11 PROBLEM — F31.30 BIPOLAR AFFECTIVE DISORDER, CURRENT EPISODE DEPRESSED (HCC): Status: ACTIVE | Noted: 2017-06-11

## 2017-06-11 PROBLEM — F10.20 ALCOHOL DEPENDENCE (HCC): Status: ACTIVE | Noted: 2017-06-11

## 2017-06-11 LAB
AMPHETAMINE SCREEN, URINE: ABNORMAL
BARBITURATE SCREEN URINE: ABNORMAL
BENZODIAZEPINE SCREEN, URINE: POSITIVE
CANNABINOID SCREEN URINE: ABNORMAL
COCAINE METABOLITE SCREEN URINE: ABNORMAL
Lab: ABNORMAL
OPIATE SCREEN URINE: ABNORMAL
PHENCYCLIDINE SCREEN URINE: ABNORMAL

## 2017-06-11 PROCEDURE — 1240000000 HC EMOTIONAL WELLNESS R&B

## 2017-06-11 PROCEDURE — 6370000000 HC RX 637 (ALT 250 FOR IP): Performed by: PSYCHIATRY & NEUROLOGY

## 2017-06-11 PROCEDURE — 80307 DRUG TEST PRSMV CHEM ANLYZR: CPT

## 2017-06-11 RX ADMIN — GABAPENTIN 300 MG: 300 CAPSULE ORAL at 09:47

## 2017-06-11 RX ADMIN — DIVALPROEX SODIUM 750 MG: 250 TABLET, FILM COATED, EXTENDED RELEASE ORAL at 20:59

## 2017-06-11 RX ADMIN — FOLIC ACID 1 MG: 1 TABLET ORAL at 09:47

## 2017-06-11 RX ADMIN — TOPIRAMATE 25 MG: 25 TABLET, FILM COATED ORAL at 09:46

## 2017-06-11 RX ADMIN — GABAPENTIN 300 MG: 300 CAPSULE ORAL at 13:56

## 2017-06-11 RX ADMIN — GABAPENTIN 300 MG: 300 CAPSULE ORAL at 20:59

## 2017-06-11 RX ADMIN — LEVOTHYROXINE SODIUM 50 MCG: 50 TABLET ORAL at 06:25

## 2017-06-11 RX ADMIN — QUETIAPINE FUMARATE 400 MG: 400 TABLET, EXTENDED RELEASE ORAL at 20:59

## 2017-06-11 RX ADMIN — TRAZODONE HYDROCHLORIDE 150 MG: 150 TABLET ORAL at 20:59

## 2017-06-11 RX ADMIN — THERA TABS 1 TABLET: TAB at 09:47

## 2017-06-12 LAB
ALBUMIN SERPL-MCNC: 4.3 G/DL (ref 3.9–4.9)
ALP BLD-CCNC: 54 U/L (ref 40–130)
ALT SERPL-CCNC: 93 U/L (ref 0–33)
AMMONIA: 51 UMOL/L (ref 11–51)
ANION GAP SERPL CALCULATED.3IONS-SCNC: 10 MEQ/L (ref 7–13)
AST SERPL-CCNC: 119 U/L (ref 0–35)
BILIRUB SERPL-MCNC: 0.7 MG/DL (ref 0–1.2)
BUN BLDV-MCNC: 13 MG/DL (ref 6–20)
CALCIUM SERPL-MCNC: 8.9 MG/DL (ref 8.6–10.2)
CHLORIDE BLD-SCNC: 103 MEQ/L (ref 98–107)
CO2: 24 MEQ/L (ref 22–29)
CREAT SERPL-MCNC: 0.58 MG/DL (ref 0.5–0.9)
GFR AFRICAN AMERICAN: >60
GFR NON-AFRICAN AMERICAN: >60
GLOBULIN: 2.2 G/DL (ref 2.3–3.5)
GLUCOSE BLD-MCNC: 73 MG/DL (ref 74–109)
POTASSIUM SERPL-SCNC: 3.9 MEQ/L (ref 3.5–5.1)
SODIUM BLD-SCNC: 137 MEQ/L (ref 132–144)
TOTAL PROTEIN: 6.5 G/DL (ref 6.4–8.1)

## 2017-06-12 PROCEDURE — 82140 ASSAY OF AMMONIA: CPT

## 2017-06-12 PROCEDURE — 1240000000 HC EMOTIONAL WELLNESS R&B

## 2017-06-12 PROCEDURE — 80053 COMPREHEN METABOLIC PANEL: CPT

## 2017-06-12 PROCEDURE — 99232 SBSQ HOSP IP/OBS MODERATE 35: CPT | Performed by: PSYCHIATRY & NEUROLOGY

## 2017-06-12 PROCEDURE — 6370000000 HC RX 637 (ALT 250 FOR IP): Performed by: PSYCHIATRY & NEUROLOGY

## 2017-06-12 PROCEDURE — 6370000000 HC RX 637 (ALT 250 FOR IP): Performed by: NURSE PRACTITIONER

## 2017-06-12 PROCEDURE — 36415 COLL VENOUS BLD VENIPUNCTURE: CPT

## 2017-06-12 RX ORDER — IBUPROFEN 400 MG/1
400 TABLET ORAL EVERY 6 HOURS PRN
Status: DISCONTINUED | OUTPATIENT
Start: 2017-06-12 | End: 2017-06-15 | Stop reason: HOSPADM

## 2017-06-12 RX ADMIN — FOLIC ACID 1 MG: 1 TABLET ORAL at 09:25

## 2017-06-12 RX ADMIN — GABAPENTIN 300 MG: 300 CAPSULE ORAL at 09:24

## 2017-06-12 RX ADMIN — DIVALPROEX SODIUM 750 MG: 250 TABLET, FILM COATED, EXTENDED RELEASE ORAL at 22:39

## 2017-06-12 RX ADMIN — ACETAMINOPHEN 650 MG: 325 TABLET ORAL at 18:06

## 2017-06-12 RX ADMIN — GABAPENTIN 300 MG: 300 CAPSULE ORAL at 13:40

## 2017-06-12 RX ADMIN — LEVOTHYROXINE SODIUM 50 MCG: 50 TABLET ORAL at 06:08

## 2017-06-12 RX ADMIN — IBUPROFEN 400 MG: 400 TABLET, FILM COATED ORAL at 22:40

## 2017-06-12 RX ADMIN — QUETIAPINE FUMARATE 400 MG: 400 TABLET, EXTENDED RELEASE ORAL at 22:39

## 2017-06-12 RX ADMIN — THERA TABS 1 TABLET: TAB at 09:24

## 2017-06-12 RX ADMIN — GABAPENTIN 300 MG: 300 CAPSULE ORAL at 22:41

## 2017-06-12 RX ADMIN — TRAZODONE HYDROCHLORIDE 150 MG: 150 TABLET ORAL at 22:39

## 2017-06-12 RX ADMIN — TOPIRAMATE 25 MG: 25 TABLET, FILM COATED ORAL at 09:25

## 2017-06-12 ASSESSMENT — PAIN SCALES - GENERAL
PAINLEVEL_OUTOF10: 7
PAINLEVEL_OUTOF10: 7

## 2017-06-13 LAB — VALPROIC ACID LEVEL: 35.1 UG/ML (ref 50–100)

## 2017-06-13 PROCEDURE — 36415 COLL VENOUS BLD VENIPUNCTURE: CPT

## 2017-06-13 PROCEDURE — 99232 SBSQ HOSP IP/OBS MODERATE 35: CPT | Performed by: PSYCHIATRY & NEUROLOGY

## 2017-06-13 PROCEDURE — 1240000000 HC EMOTIONAL WELLNESS R&B

## 2017-06-13 PROCEDURE — 6370000000 HC RX 637 (ALT 250 FOR IP): Performed by: PSYCHIATRY & NEUROLOGY

## 2017-06-13 PROCEDURE — 6370000000 HC RX 637 (ALT 250 FOR IP): Performed by: NURSE PRACTITIONER

## 2017-06-13 PROCEDURE — 80164 ASSAY DIPROPYLACETIC ACD TOT: CPT

## 2017-06-13 RX ORDER — HYDROXYZINE PAMOATE 50 MG/1
50 CAPSULE ORAL EVERY 6 HOURS PRN
Status: DISCONTINUED | OUTPATIENT
Start: 2017-06-13 | End: 2017-06-15 | Stop reason: HOSPADM

## 2017-06-13 RX ADMIN — GABAPENTIN 300 MG: 300 CAPSULE ORAL at 13:29

## 2017-06-13 RX ADMIN — THERA TABS 1 TABLET: TAB at 09:42

## 2017-06-13 RX ADMIN — IBUPROFEN 400 MG: 400 TABLET, FILM COATED ORAL at 22:28

## 2017-06-13 RX ADMIN — LEVOTHYROXINE SODIUM 50 MCG: 50 TABLET ORAL at 06:13

## 2017-06-13 RX ADMIN — FOLIC ACID 1 MG: 1 TABLET ORAL at 09:42

## 2017-06-13 RX ADMIN — TOPIRAMATE 25 MG: 25 TABLET, FILM COATED ORAL at 09:42

## 2017-06-13 RX ADMIN — DIVALPROEX SODIUM 750 MG: 250 TABLET, FILM COATED, EXTENDED RELEASE ORAL at 22:26

## 2017-06-13 RX ADMIN — GABAPENTIN 300 MG: 300 CAPSULE ORAL at 09:42

## 2017-06-13 RX ADMIN — QUETIAPINE FUMARATE 400 MG: 400 TABLET, EXTENDED RELEASE ORAL at 22:26

## 2017-06-13 RX ADMIN — GABAPENTIN 300 MG: 300 CAPSULE ORAL at 22:26

## 2017-06-13 RX ADMIN — TRAZODONE HYDROCHLORIDE 150 MG: 150 TABLET ORAL at 22:26

## 2017-06-13 ASSESSMENT — PAIN SCALES - GENERAL: PAINLEVEL_OUTOF10: 6

## 2017-06-14 PROCEDURE — 90833 PSYTX W PT W E/M 30 MIN: CPT | Performed by: PSYCHIATRY & NEUROLOGY

## 2017-06-14 PROCEDURE — 6370000000 HC RX 637 (ALT 250 FOR IP): Performed by: PSYCHIATRY & NEUROLOGY

## 2017-06-14 PROCEDURE — 6370000000 HC RX 637 (ALT 250 FOR IP): Performed by: NURSE PRACTITIONER

## 2017-06-14 PROCEDURE — 1240000000 HC EMOTIONAL WELLNESS R&B

## 2017-06-14 PROCEDURE — 99232 SBSQ HOSP IP/OBS MODERATE 35: CPT | Performed by: PSYCHIATRY & NEUROLOGY

## 2017-06-14 RX ADMIN — FOLIC ACID 1 MG: 1 TABLET ORAL at 08:28

## 2017-06-14 RX ADMIN — IBUPROFEN 400 MG: 400 TABLET, FILM COATED ORAL at 22:01

## 2017-06-14 RX ADMIN — GABAPENTIN 300 MG: 300 CAPSULE ORAL at 22:01

## 2017-06-14 RX ADMIN — LEVOTHYROXINE SODIUM 50 MCG: 50 TABLET ORAL at 06:17

## 2017-06-14 RX ADMIN — DIVALPROEX SODIUM 750 MG: 250 TABLET, FILM COATED, EXTENDED RELEASE ORAL at 22:01

## 2017-06-14 RX ADMIN — TRAZODONE HYDROCHLORIDE 150 MG: 150 TABLET ORAL at 22:01

## 2017-06-14 RX ADMIN — GABAPENTIN 300 MG: 300 CAPSULE ORAL at 14:23

## 2017-06-14 RX ADMIN — TOPIRAMATE 25 MG: 25 TABLET, FILM COATED ORAL at 08:28

## 2017-06-14 RX ADMIN — QUETIAPINE FUMARATE 400 MG: 400 TABLET, EXTENDED RELEASE ORAL at 22:01

## 2017-06-14 RX ADMIN — GABAPENTIN 300 MG: 300 CAPSULE ORAL at 08:28

## 2017-06-14 RX ADMIN — THERA TABS 1 TABLET: TAB at 08:28

## 2017-06-14 ASSESSMENT — PAIN SCALES - GENERAL: PAINLEVEL_OUTOF10: 6

## 2017-06-15 VITALS
BODY MASS INDEX: 23 KG/M2 | SYSTOLIC BLOOD PRESSURE: 99 MMHG | OXYGEN SATURATION: 99 % | HEART RATE: 97 BPM | TEMPERATURE: 98 F | DIASTOLIC BLOOD PRESSURE: 56 MMHG | HEIGHT: 62 IN | WEIGHT: 125 LBS | RESPIRATION RATE: 20 BRPM

## 2017-06-15 PROCEDURE — 99238 HOSP IP/OBS DSCHRG MGMT 30/<: CPT | Performed by: PSYCHIATRY & NEUROLOGY

## 2017-06-15 PROCEDURE — 6370000000 HC RX 637 (ALT 250 FOR IP): Performed by: PSYCHIATRY & NEUROLOGY

## 2017-06-15 RX ORDER — TRAZODONE HYDROCHLORIDE 150 MG/1
150 TABLET ORAL NIGHTLY PRN
Qty: 7 TABLET | Refills: 0 | Status: ON HOLD | OUTPATIENT
Start: 2017-06-15 | End: 2017-06-30 | Stop reason: HOSPADM

## 2017-06-15 RX ORDER — TRAZODONE HYDROCHLORIDE 150 MG/1
150 TABLET ORAL NIGHTLY PRN
Qty: 15 TABLET | Refills: 2 | Status: SHIPPED | OUTPATIENT
Start: 2017-06-15 | End: 2017-06-15

## 2017-06-15 RX ORDER — QUETIAPINE 400 MG/1
400 TABLET, FILM COATED, EXTENDED RELEASE ORAL NIGHTLY
Qty: 7 TABLET | Refills: 0 | Status: ON HOLD | OUTPATIENT
Start: 2017-06-15 | End: 2017-06-30 | Stop reason: HOSPADM

## 2017-06-15 RX ORDER — TOPIRAMATE 25 MG/1
25 TABLET ORAL DAILY
Qty: 7 TABLET | Refills: 0 | Status: ON HOLD | OUTPATIENT
Start: 2017-06-15 | End: 2017-06-23

## 2017-06-15 RX ORDER — DIVALPROEX SODIUM 250 MG/1
750 TABLET, EXTENDED RELEASE ORAL NIGHTLY
Qty: 21 TABLET | Refills: 0 | Status: ON HOLD | OUTPATIENT
Start: 2017-06-15 | End: 2017-06-23

## 2017-06-15 RX ORDER — QUETIAPINE 400 MG/1
400 TABLET, FILM COATED, EXTENDED RELEASE ORAL NIGHTLY
Qty: 15 TABLET | Refills: 2 | Status: SHIPPED | OUTPATIENT
Start: 2017-06-15 | End: 2017-06-15

## 2017-06-15 RX ORDER — TOPIRAMATE 25 MG/1
25 TABLET ORAL DAILY
Qty: 15 TABLET | Refills: 2 | Status: SHIPPED | OUTPATIENT
Start: 2017-06-15 | End: 2017-06-15

## 2017-06-15 RX ORDER — DIVALPROEX SODIUM 250 MG/1
750 TABLET, EXTENDED RELEASE ORAL NIGHTLY
Qty: 45 TABLET | Refills: 2 | Status: SHIPPED | OUTPATIENT
Start: 2017-06-15 | End: 2017-06-15

## 2017-06-15 RX ADMIN — FOLIC ACID 1 MG: 1 TABLET ORAL at 08:47

## 2017-06-15 RX ADMIN — GABAPENTIN 300 MG: 300 CAPSULE ORAL at 08:47

## 2017-06-15 RX ADMIN — THERA TABS 1 TABLET: TAB at 08:47

## 2017-06-15 RX ADMIN — LEVOTHYROXINE SODIUM 50 MCG: 50 TABLET ORAL at 06:38

## 2017-06-15 RX ADMIN — TOPIRAMATE 25 MG: 25 TABLET, FILM COATED ORAL at 08:47

## 2017-06-21 ENCOUNTER — HOSPITAL ENCOUNTER (EMERGENCY)
Age: 37
Discharge: HOME | End: 2017-06-21
Attending: EMERGENCY MEDICINE
Payer: MEDICAID

## 2017-06-21 VITALS
TEMPERATURE: 97.9 F | DIASTOLIC BLOOD PRESSURE: 64 MMHG | SYSTOLIC BLOOD PRESSURE: 97 MMHG | HEART RATE: 85 BPM | OXYGEN SATURATION: 96 % | RESPIRATION RATE: 16 BRPM

## 2017-06-21 DIAGNOSIS — F10.920 ACUTE ALCOHOLIC INTOXICATION, UNCOMPLICATED (HCC): Primary | ICD-10-CM

## 2017-06-21 LAB
ALBUMIN SERPL-MCNC: 4.2 G/DL (ref 3.9–4.9)
ALP BLD-CCNC: 79 U/L (ref 40–130)
ALT SERPL-CCNC: 37 U/L (ref 0–33)
AMMONIA: 46 UMOL/L (ref 11–51)
ANION GAP SERPL CALCULATED.3IONS-SCNC: 24 MEQ/L (ref 7–13)
AST SERPL-CCNC: 35 U/L (ref 0–35)
BASOPHILS ABSOLUTE: 0.1 K/UL (ref 0–0.2)
BASOPHILS RELATIVE PERCENT: 1.2 %
BILIRUB SERPL-MCNC: 0.1 MG/DL (ref 0–1.2)
BILIRUBIN DIRECT: 0 MG/DL (ref 0–0.3)
BILIRUBIN, INDIRECT: 0.1 MG/DL (ref 0–0.6)
BUN BLDV-MCNC: 9 MG/DL (ref 6–20)
CALCIUM SERPL-MCNC: 8.4 MG/DL (ref 8.6–10.2)
CHLORIDE BLD-SCNC: 103 MEQ/L (ref 98–107)
CO2: 17 MEQ/L (ref 22–29)
CREAT SERPL-MCNC: 0.36 MG/DL (ref 0.5–0.9)
EOSINOPHILS ABSOLUTE: 0.1 K/UL (ref 0–0.7)
EOSINOPHILS RELATIVE PERCENT: 1.1 %
ETHANOL PERCENT: 0.23 G/DL
ETHANOL PERCENT: 0.38 G/DL
ETHANOL: 266 MG/DL (ref 0–0.08)
ETHANOL: 432 MG/DL (ref 0–0.08)
GFR AFRICAN AMERICAN: >60
GFR NON-AFRICAN AMERICAN: >60
GLUCOSE BLD-MCNC: 70 MG/DL (ref 74–109)
HCT VFR BLD CALC: 40.7 % (ref 37–47)
HEMOGLOBIN: 13.8 G/DL (ref 12–16)
LYMPHOCYTES ABSOLUTE: 3.7 K/UL (ref 1–4.8)
LYMPHOCYTES RELATIVE PERCENT: 36 %
MAGNESIUM: 2.1 MG/DL (ref 1.7–2.3)
MCH RBC QN AUTO: 33.5 PG (ref 27–31.3)
MCHC RBC AUTO-ENTMCNC: 34 % (ref 33–37)
MCV RBC AUTO: 98.5 FL (ref 82–100)
MONOCYTES ABSOLUTE: 0.7 K/UL (ref 0.2–0.8)
MONOCYTES RELATIVE PERCENT: 6.6 %
NEUTROPHILS ABSOLUTE: 5.6 K/UL (ref 1.4–6.5)
NEUTROPHILS RELATIVE PERCENT: 55.1 %
PDW BLD-RTO: 12.7 % (ref 11.5–14.5)
PLATELET # BLD: 298 K/UL (ref 130–400)
POTASSIUM SERPL-SCNC: 3.6 MEQ/L (ref 3.5–5.1)
RBC # BLD: 4.13 M/UL (ref 4.2–5.4)
SODIUM BLD-SCNC: 144 MEQ/L (ref 132–144)
TOTAL PROTEIN: 7 G/DL (ref 6.4–8.1)
VALPROIC ACID LEVEL: 7.3 UG/ML (ref 50–100)
WBC # BLD: 10.2 K/UL (ref 4.8–10.8)

## 2017-06-21 PROCEDURE — 36415 COLL VENOUS BLD VENIPUNCTURE: CPT

## 2017-06-21 PROCEDURE — 80076 HEPATIC FUNCTION PANEL: CPT

## 2017-06-21 PROCEDURE — 85025 COMPLETE CBC W/AUTO DIFF WBC: CPT

## 2017-06-21 PROCEDURE — 80164 ASSAY DIPROPYLACETIC ACD TOT: CPT

## 2017-06-21 PROCEDURE — 80048 BASIC METABOLIC PNL TOTAL CA: CPT

## 2017-06-21 PROCEDURE — 6360000002 HC RX W HCPCS: Performed by: EMERGENCY MEDICINE

## 2017-06-21 PROCEDURE — 82140 ASSAY OF AMMONIA: CPT

## 2017-06-21 PROCEDURE — 96372 THER/PROPH/DIAG INJ SC/IM: CPT

## 2017-06-21 PROCEDURE — G0480 DRUG TEST DEF 1-7 CLASSES: HCPCS

## 2017-06-21 PROCEDURE — 83735 ASSAY OF MAGNESIUM: CPT

## 2017-06-21 PROCEDURE — 99284 EMERGENCY DEPT VISIT MOD MDM: CPT

## 2017-06-21 RX ORDER — LORAZEPAM 2 MG/ML
1 INJECTION INTRAMUSCULAR ONCE
Status: COMPLETED | OUTPATIENT
Start: 2017-06-21 | End: 2017-06-21

## 2017-06-21 RX ADMIN — LORAZEPAM 1 MG: 2 INJECTION INTRAMUSCULAR; INTRAVENOUS at 08:45

## 2017-06-21 ASSESSMENT — ENCOUNTER SYMPTOMS
ABDOMINAL PAIN: 0
WHEEZING: 0
ABDOMINAL DISTENTION: 0
SHORTNESS OF BREATH: 0
SORE THROAT: 0
CHEST TIGHTNESS: 0
VOMITING: 0
EYE DISCHARGE: 0
PHOTOPHOBIA: 0
COUGH: 0

## 2017-06-22 ENCOUNTER — HOSPITAL ENCOUNTER (OUTPATIENT)
Age: 37
Setting detail: OBSERVATION
Discharge: HOME | End: 2017-06-23
Attending: STUDENT IN AN ORGANIZED HEALTH CARE EDUCATION/TRAINING PROGRAM | Admitting: INTERNAL MEDICINE
Payer: MEDICAID

## 2017-06-22 DIAGNOSIS — R45.851 SUICIDAL IDEATION: ICD-10-CM

## 2017-06-22 DIAGNOSIS — F32.1 MODERATE SINGLE CURRENT EPISODE OF MAJOR DEPRESSIVE DISORDER (HCC): ICD-10-CM

## 2017-06-22 DIAGNOSIS — T50.902A: Primary | ICD-10-CM

## 2017-06-22 DIAGNOSIS — F10.929 ACUTE ALCOHOLIC INTOXICATION, WITH UNSPECIFIED COMPLICATION (HCC): ICD-10-CM

## 2017-06-22 LAB
ACETAMINOPHEN LEVEL: <15 UG/ML (ref 10–30)
ALBUMIN SERPL-MCNC: 4.1 G/DL (ref 3.9–4.9)
ALP BLD-CCNC: 81 U/L (ref 40–130)
ALT SERPL-CCNC: 36 U/L (ref 0–33)
ANION GAP SERPL CALCULATED.3IONS-SCNC: 21 MEQ/L (ref 7–13)
AST SERPL-CCNC: 39 U/L (ref 0–35)
BASOPHILS ABSOLUTE: 0.1 K/UL (ref 0–0.2)
BASOPHILS RELATIVE PERCENT: 1.3 %
BILIRUB SERPL-MCNC: 0.2 MG/DL (ref 0–1.2)
BUN BLDV-MCNC: 7 MG/DL (ref 6–20)
CALCIUM SERPL-MCNC: 8.6 MG/DL (ref 8.6–10.2)
CHLORIDE BLD-SCNC: 99 MEQ/L (ref 98–107)
CK MB: 1.4 NG/ML (ref 0–3.8)
CO2: 23 MEQ/L (ref 22–29)
CREAT SERPL-MCNC: 0.28 MG/DL (ref 0.5–0.9)
CREATINE KINASE-MB INDEX: 0.8 % (ref 0–3.5)
EOSINOPHILS ABSOLUTE: 0.1 K/UL (ref 0–0.7)
EOSINOPHILS RELATIVE PERCENT: 0.7 %
ETHANOL PERCENT: 0.26 G/DL
ETHANOL: 301 MG/DL (ref 0–0.08)
GFR AFRICAN AMERICAN: >60
GFR NON-AFRICAN AMERICAN: >60
GLOBULIN: 2.8 G/DL (ref 2.3–3.5)
GLUCOSE BLD-MCNC: 76 MG/DL (ref 74–109)
HCT VFR BLD CALC: 42.3 % (ref 37–47)
HEMOGLOBIN: 14.6 G/DL (ref 12–16)
LYMPHOCYTES ABSOLUTE: 2.2 K/UL (ref 1–4.8)
LYMPHOCYTES RELATIVE PERCENT: 28.7 %
MAGNESIUM: 1.9 MG/DL (ref 1.7–2.3)
MCH RBC QN AUTO: 33.6 PG (ref 27–31.3)
MCHC RBC AUTO-ENTMCNC: 34.5 % (ref 33–37)
MCV RBC AUTO: 97.4 FL (ref 82–100)
MONOCYTES ABSOLUTE: 0.5 K/UL (ref 0.2–0.8)
MONOCYTES RELATIVE PERCENT: 6.4 %
NEUTROPHILS ABSOLUTE: 4.8 K/UL (ref 1.4–6.5)
NEUTROPHILS RELATIVE PERCENT: 62.9 %
PDW BLD-RTO: 13.3 % (ref 11.5–14.5)
PLATELET # BLD: 318 K/UL (ref 130–400)
POTASSIUM SERPL-SCNC: 3.1 MEQ/L (ref 3.5–5.1)
RBC # BLD: 4.35 M/UL (ref 4.2–5.4)
SALICYLATE, SERUM: <0.3 MG/DL (ref 15–30)
SODIUM BLD-SCNC: 143 MEQ/L (ref 132–144)
TOTAL CK: 171 U/L (ref 0–170)
TOTAL PROTEIN: 6.9 G/DL (ref 6.4–8.1)
TSH SERPL DL<=0.05 MIU/L-ACNC: 2.43 UIU/ML (ref 0.27–4.2)
WBC # BLD: 7.5 K/UL (ref 4.8–10.8)

## 2017-06-22 PROCEDURE — 2580000003 HC RX 258: Performed by: INTERNAL MEDICINE

## 2017-06-22 PROCEDURE — 6370000000 HC RX 637 (ALT 250 FOR IP): Performed by: NURSE PRACTITIONER

## 2017-06-22 PROCEDURE — 6360000002 HC RX W HCPCS: Performed by: INTERNAL MEDICINE

## 2017-06-22 PROCEDURE — 93005 ELECTROCARDIOGRAM TRACING: CPT

## 2017-06-22 PROCEDURE — G0480 DRUG TEST DEF 1-7 CLASSES: HCPCS

## 2017-06-22 PROCEDURE — 2500000003 HC RX 250 WO HCPCS: Performed by: STUDENT IN AN ORGANIZED HEALTH CARE EDUCATION/TRAINING PROGRAM

## 2017-06-22 PROCEDURE — 96367 TX/PROPH/DG ADDL SEQ IV INF: CPT

## 2017-06-22 PROCEDURE — G0378 HOSPITAL OBSERVATION PER HR: HCPCS

## 2017-06-22 PROCEDURE — 83735 ASSAY OF MAGNESIUM: CPT

## 2017-06-22 PROCEDURE — 85025 COMPLETE CBC W/AUTO DIFF WBC: CPT

## 2017-06-22 PROCEDURE — 99285 EMERGENCY DEPT VISIT HI MDM: CPT

## 2017-06-22 PROCEDURE — 82550 ASSAY OF CK (CPK): CPT

## 2017-06-22 PROCEDURE — 2580000003 HC RX 258: Performed by: STUDENT IN AN ORGANIZED HEALTH CARE EDUCATION/TRAINING PROGRAM

## 2017-06-22 PROCEDURE — 84443 ASSAY THYROID STIM HORMONE: CPT

## 2017-06-22 PROCEDURE — 1210000000 HC MED SURG R&B

## 2017-06-22 PROCEDURE — 82553 CREATINE MB FRACTION: CPT

## 2017-06-22 PROCEDURE — 96365 THER/PROPH/DIAG IV INF INIT: CPT

## 2017-06-22 PROCEDURE — 36415 COLL VENOUS BLD VENIPUNCTURE: CPT

## 2017-06-22 PROCEDURE — 80053 COMPREHEN METABOLIC PANEL: CPT

## 2017-06-22 PROCEDURE — 6360000002 HC RX W HCPCS: Performed by: STUDENT IN AN ORGANIZED HEALTH CARE EDUCATION/TRAINING PROGRAM

## 2017-06-22 PROCEDURE — 2580000003 HC RX 258: Performed by: NURSE PRACTITIONER

## 2017-06-22 RX ORDER — ONDANSETRON 2 MG/ML
4 INJECTION INTRAMUSCULAR; INTRAVENOUS EVERY 6 HOURS PRN
Status: DISCONTINUED | OUTPATIENT
Start: 2017-06-22 | End: 2017-06-23 | Stop reason: HOSPADM

## 2017-06-22 RX ORDER — DIAZEPAM 5 MG/ML
5 INJECTION, SOLUTION INTRAMUSCULAR; INTRAVENOUS
Status: DISCONTINUED | OUTPATIENT
Start: 2017-06-22 | End: 2017-06-23 | Stop reason: HOSPADM

## 2017-06-22 RX ORDER — THIAMINE MONONITRATE (VIT B1) 100 MG
100 TABLET ORAL DAILY
Status: DISCONTINUED | OUTPATIENT
Start: 2017-06-25 | End: 2017-06-23 | Stop reason: HOSPADM

## 2017-06-22 RX ORDER — SODIUM CHLORIDE 0.9 % (FLUSH) 0.9 %
10 SYRINGE (ML) INJECTION EVERY 12 HOURS SCHEDULED
Status: DISCONTINUED | OUTPATIENT
Start: 2017-06-22 | End: 2017-06-23 | Stop reason: HOSPADM

## 2017-06-22 RX ORDER — DIAZEPAM 5 MG/1
5 TABLET ORAL
Status: DISCONTINUED | OUTPATIENT
Start: 2017-06-22 | End: 2017-06-23 | Stop reason: HOSPADM

## 2017-06-22 RX ORDER — NICOTINE 21 MG/24HR
1 PATCH, TRANSDERMAL 24 HOURS TRANSDERMAL DAILY
Status: DISCONTINUED | OUTPATIENT
Start: 2017-06-23 | End: 2017-06-23 | Stop reason: HOSPADM

## 2017-06-22 RX ORDER — POTASSIUM CHLORIDE 1.5 G/1.77G
20 POWDER, FOR SOLUTION ORAL ONCE
Status: COMPLETED | OUTPATIENT
Start: 2017-06-22 | End: 2017-06-22

## 2017-06-22 RX ORDER — FOLIC ACID 1 MG/1
1 TABLET ORAL DAILY
Status: DISCONTINUED | OUTPATIENT
Start: 2017-06-25 | End: 2017-06-23 | Stop reason: HOSPADM

## 2017-06-22 RX ORDER — ZIPRASIDONE MESYLATE 20 MG/ML
20 INJECTION, POWDER, LYOPHILIZED, FOR SOLUTION INTRAMUSCULAR ONCE
Status: DISCONTINUED | OUTPATIENT
Start: 2017-06-22 | End: 2017-06-22

## 2017-06-22 RX ORDER — SODIUM CHLORIDE 0.9 % (FLUSH) 0.9 %
10 SYRINGE (ML) INJECTION PRN
Status: DISCONTINUED | OUTPATIENT
Start: 2017-06-22 | End: 2017-06-23 | Stop reason: HOSPADM

## 2017-06-22 RX ADMIN — POTASSIUM CHLORIDE 20 MEQ: 1.5 POWDER, FOR SOLUTION ORAL at 21:54

## 2017-06-22 RX ADMIN — FOLIC ACID: 5 INJECTION, SOLUTION INTRAMUSCULAR; INTRAVENOUS; SUBCUTANEOUS at 19:01

## 2017-06-22 RX ADMIN — SODIUM CHLORIDE, PRESERVATIVE FREE 10 ML: 5 INJECTION INTRAVENOUS at 21:54

## 2017-06-22 RX ADMIN — MAGNESIUM SULFATE HEPTAHYDRATE 1 G: 500 INJECTION, SOLUTION INTRAMUSCULAR; INTRAVENOUS at 22:51

## 2017-06-22 ASSESSMENT — ENCOUNTER SYMPTOMS
ABDOMINAL PAIN: 0
BACK PAIN: 0
VOMITING: 0
SINUS PRESSURE: 0
TROUBLE SWALLOWING: 0
COUGH: 0
DIARRHEA: 0
CHEST TIGHTNESS: 0
SHORTNESS OF BREATH: 0

## 2017-06-22 ASSESSMENT — PAIN DESCRIPTION - PAIN TYPE
TYPE: ACUTE PAIN
TYPE: ACUTE PAIN

## 2017-06-22 ASSESSMENT — PAIN DESCRIPTION - ORIENTATION: ORIENTATION: LEFT

## 2017-06-22 ASSESSMENT — PAIN DESCRIPTION - LOCATION
LOCATION: ARM
LOCATION: HEAD

## 2017-06-22 ASSESSMENT — PAIN DESCRIPTION - DESCRIPTORS
DESCRIPTORS: ACHING
DESCRIPTORS: ACHING;RADIATING

## 2017-06-22 ASSESSMENT — PAIN DESCRIPTION - FREQUENCY: FREQUENCY: CONTINUOUS

## 2017-06-22 ASSESSMENT — PAIN SCALES - GENERAL
PAINLEVEL_OUTOF10: 6
PAINLEVEL_OUTOF10: 6

## 2017-06-23 VITALS
HEART RATE: 78 BPM | BODY MASS INDEX: 24.46 KG/M2 | DIASTOLIC BLOOD PRESSURE: 63 MMHG | SYSTOLIC BLOOD PRESSURE: 115 MMHG | HEIGHT: 62 IN | WEIGHT: 132.94 LBS | TEMPERATURE: 97.7 F | OXYGEN SATURATION: 95 % | RESPIRATION RATE: 18 BRPM

## 2017-06-23 PROBLEM — F31.9 BIPOLAR 1 DISORDER (HCC): Status: ACTIVE | Noted: 2017-06-23

## 2017-06-23 PROBLEM — T50.901A OVERDOSE: Status: ACTIVE | Noted: 2017-06-23

## 2017-06-23 LAB
ALBUMIN SERPL-MCNC: 3.4 G/DL (ref 3.9–4.9)
ALP BLD-CCNC: 58 U/L (ref 40–130)
ALT SERPL-CCNC: 30 U/L (ref 0–33)
AMPHETAMINE SCREEN, URINE: NORMAL
ANION GAP SERPL CALCULATED.3IONS-SCNC: 11 MEQ/L (ref 7–13)
AST SERPL-CCNC: 34 U/L (ref 0–35)
BARBITURATE SCREEN URINE: NORMAL
BASOPHILS ABSOLUTE: 0.1 K/UL (ref 0–0.2)
BASOPHILS RELATIVE PERCENT: 1.2 %
BENZODIAZEPINE SCREEN, URINE: NORMAL
BILIRUB SERPL-MCNC: 0.4 MG/DL (ref 0–1.2)
BILIRUBIN URINE: NEGATIVE
BLOOD, URINE: NEGATIVE
BUN BLDV-MCNC: 7 MG/DL (ref 6–20)
CALCIUM SERPL-MCNC: 8.4 MG/DL (ref 8.6–10.2)
CANNABINOID SCREEN URINE: NORMAL
CHLORIDE BLD-SCNC: 103 MEQ/L (ref 98–107)
CLARITY: CLEAR
CO2: 25 MEQ/L (ref 22–29)
COCAINE METABOLITE SCREEN URINE: NORMAL
COLOR: YELLOW
CREAT SERPL-MCNC: 0.33 MG/DL (ref 0.5–0.9)
EOSINOPHILS ABSOLUTE: 0.1 K/UL (ref 0–0.7)
EOSINOPHILS RELATIVE PERCENT: 1.3 %
ETHANOL PERCENT: NORMAL G/DL
ETHANOL: <10 MG/DL (ref 0–0.08)
GFR AFRICAN AMERICAN: >60
GFR NON-AFRICAN AMERICAN: >60
GLOBULIN: 2.4 G/DL (ref 2.3–3.5)
GLUCOSE BLD-MCNC: 77 MG/DL (ref 74–109)
GLUCOSE URINE: 500 MG/DL
HCG(URINE) PREGNANCY TEST: NEGATIVE
HCT VFR BLD CALC: 36.6 % (ref 37–47)
HEMOGLOBIN: 12.4 G/DL (ref 12–16)
KETONES, URINE: ABNORMAL MG/DL
LEUKOCYTE ESTERASE, URINE: NEGATIVE
LYMPHOCYTES ABSOLUTE: 1.9 K/UL (ref 1–4.8)
LYMPHOCYTES RELATIVE PERCENT: 23.6 %
Lab: NORMAL
MCH RBC QN AUTO: 32.8 PG (ref 27–31.3)
MCHC RBC AUTO-ENTMCNC: 33.9 % (ref 33–37)
MCV RBC AUTO: 96.6 FL (ref 82–100)
MONOCYTES ABSOLUTE: 1 K/UL (ref 0.2–0.8)
MONOCYTES RELATIVE PERCENT: 11.8 %
NEUTROPHILS ABSOLUTE: 5 K/UL (ref 1.4–6.5)
NEUTROPHILS RELATIVE PERCENT: 62.1 %
NITRITE, URINE: NEGATIVE
OPIATE SCREEN URINE: NORMAL
PDW BLD-RTO: 13 % (ref 11.5–14.5)
PH UA: 6.5 (ref 5–9)
PHENCYCLIDINE SCREEN URINE: NORMAL
PLATELET # BLD: 247 K/UL (ref 130–400)
POTASSIUM SERPL-SCNC: 3.7 MEQ/L (ref 3.5–5.1)
PROTEIN UA: NEGATIVE MG/DL
RBC # BLD: 3.79 M/UL (ref 4.2–5.4)
SODIUM BLD-SCNC: 139 MEQ/L (ref 132–144)
SPECIFIC GRAVITY UA: 1.02 (ref 1–1.03)
TOTAL PROTEIN: 5.8 G/DL (ref 6.4–8.1)
URINE REFLEX TO CULTURE: ABNORMAL
UROBILINOGEN, URINE: 1 E.U./DL
WBC # BLD: 8.1 K/UL (ref 4.8–10.8)

## 2017-06-23 PROCEDURE — 36415 COLL VENOUS BLD VENIPUNCTURE: CPT

## 2017-06-23 PROCEDURE — G0480 DRUG TEST DEF 1-7 CLASSES: HCPCS

## 2017-06-23 PROCEDURE — 85025 COMPLETE CBC W/AUTO DIFF WBC: CPT

## 2017-06-23 PROCEDURE — 80307 DRUG TEST PRSMV CHEM ANLYZR: CPT

## 2017-06-23 PROCEDURE — 6370000000 HC RX 637 (ALT 250 FOR IP): Performed by: NURSE PRACTITIONER

## 2017-06-23 PROCEDURE — 99253 IP/OBS CNSLTJ NEW/EST LOW 45: CPT | Performed by: CLINICAL NURSE SPECIALIST

## 2017-06-23 PROCEDURE — G0378 HOSPITAL OBSERVATION PER HR: HCPCS

## 2017-06-23 PROCEDURE — 81003 URINALYSIS AUTO W/O SCOPE: CPT

## 2017-06-23 PROCEDURE — 6360000002 HC RX W HCPCS: Performed by: NURSE PRACTITIONER

## 2017-06-23 PROCEDURE — 80053 COMPREHEN METABOLIC PANEL: CPT

## 2017-06-23 PROCEDURE — 84703 CHORIONIC GONADOTROPIN ASSAY: CPT

## 2017-06-23 RX ORDER — QUETIAPINE 200 MG/1
400 TABLET, FILM COATED, EXTENDED RELEASE ORAL NIGHTLY
Status: DISCONTINUED | OUTPATIENT
Start: 2017-06-23 | End: 2017-06-23 | Stop reason: HOSPADM

## 2017-06-23 RX ORDER — DIVALPROEX SODIUM 250 MG/1
750 TABLET, EXTENDED RELEASE ORAL NIGHTLY
Qty: 21 TABLET | Refills: 0 | Status: ON HOLD | OUTPATIENT
Start: 2017-06-23 | End: 2017-06-30 | Stop reason: HOSPADM

## 2017-06-23 RX ORDER — GABAPENTIN 300 MG/1
300 CAPSULE ORAL 3 TIMES DAILY
Status: DISCONTINUED | OUTPATIENT
Start: 2017-06-23 | End: 2017-06-23 | Stop reason: HOSPADM

## 2017-06-23 RX ORDER — TOPIRAMATE 25 MG/1
25 TABLET ORAL DAILY
Qty: 30 TABLET | Refills: 0 | Status: ON HOLD | OUTPATIENT
Start: 2017-06-23 | End: 2017-06-30 | Stop reason: HOSPADM

## 2017-06-23 RX ORDER — GABAPENTIN 300 MG/1
300 CAPSULE ORAL 3 TIMES DAILY
Qty: 90 CAPSULE | Refills: 0 | Status: ON HOLD | OUTPATIENT
Start: 2017-06-23 | End: 2017-06-30 | Stop reason: HOSPADM

## 2017-06-23 RX ORDER — LEVOTHYROXINE SODIUM 0.05 MG/1
50 TABLET ORAL DAILY
Qty: 30 TABLET | Refills: 3 | Status: ON HOLD | OUTPATIENT
Start: 2017-06-23 | End: 2018-06-06 | Stop reason: HOSPADM

## 2017-06-23 RX ORDER — ACAMPROSATE CALCIUM 333 MG/1
333 TABLET, DELAYED RELEASE ORAL 3 TIMES DAILY
Qty: 45 TABLET | Refills: 1 | Status: ON HOLD | OUTPATIENT
Start: 2017-06-23 | End: 2017-06-30 | Stop reason: HOSPADM

## 2017-06-23 RX ADMIN — DIAZEPAM 5 MG: 5 TABLET ORAL at 18:22

## 2017-06-23 RX ADMIN — DIAZEPAM 5 MG: 5 TABLET ORAL at 04:29

## 2017-06-23 RX ADMIN — DIAZEPAM 5 MG: 5 TABLET ORAL at 08:57

## 2017-06-23 ASSESSMENT — ENCOUNTER SYMPTOMS
VOMITING: 0
SHORTNESS OF BREATH: 0
COUGH: 0

## 2017-06-23 ASSESSMENT — PAIN SCALES - GENERAL: PAINLEVEL_OUTOF10: 0

## 2017-06-26 LAB
EKG ATRIAL RATE: 74 BPM
EKG P AXIS: 52 DEGREES
EKG P-R INTERVAL: 146 MS
EKG Q-T INTERVAL: 436 MS
EKG QRS DURATION: 78 MS
EKG QTC CALCULATION (BAZETT): 483 MS
EKG R AXIS: 58 DEGREES
EKG T AXIS: 57 DEGREES
EKG VENTRICULAR RATE: 74 BPM

## 2017-06-28 ENCOUNTER — HOSPITAL ENCOUNTER (INPATIENT)
Age: 37
LOS: 2 days | Discharge: PSYCHIATRIC OR SUBSTANCE USE FACILITY | DRG: 753 | End: 2017-06-30
Attending: EMERGENCY MEDICINE | Admitting: INTERNAL MEDICINE
Payer: MEDICAID

## 2017-06-28 DIAGNOSIS — F10.931 ALCOHOL WITHDRAWAL, WITH DELIRIUM (HCC): ICD-10-CM

## 2017-06-28 DIAGNOSIS — F10.931 DELIRIUM TREMENS (HCC): Primary | ICD-10-CM

## 2017-06-28 PROBLEM — T50.902A DELIBERATE MEDICATION OVERDOSE (HCC): Status: ACTIVE | Noted: 2017-06-23

## 2017-06-28 LAB
ALBUMIN SERPL-MCNC: 4.2 G/DL (ref 3.9–4.9)
ALP BLD-CCNC: 71 U/L (ref 40–130)
ALT SERPL-CCNC: 38 U/L (ref 0–33)
ANION GAP SERPL CALCULATED.3IONS-SCNC: 22 MEQ/L (ref 7–13)
AST SERPL-CCNC: 60 U/L (ref 0–35)
BASOPHILS ABSOLUTE: 0.1 K/UL (ref 0–0.2)
BASOPHILS RELATIVE PERCENT: 0.9 %
BILIRUB SERPL-MCNC: 0.4 MG/DL (ref 0–1.2)
BUN BLDV-MCNC: 7 MG/DL (ref 6–20)
CALCIUM SERPL-MCNC: 8.7 MG/DL (ref 8.6–10.2)
CHLORIDE BLD-SCNC: 94 MEQ/L (ref 98–107)
CO2: 20 MEQ/L (ref 22–29)
CREAT SERPL-MCNC: 0.34 MG/DL (ref 0.5–0.9)
EOSINOPHILS ABSOLUTE: 0 K/UL (ref 0–0.7)
EOSINOPHILS RELATIVE PERCENT: 0.2 %
ETHANOL PERCENT: 0.02 G/DL
ETHANOL: 19 MG/DL (ref 0–0.08)
GFR AFRICAN AMERICAN: >60
GFR NON-AFRICAN AMERICAN: >60
GLOBULIN: 3 G/DL (ref 2.3–3.5)
GLUCOSE BLD-MCNC: 93 MG/DL (ref 74–109)
HCG QUALITATIVE: NEGATIVE
HCT VFR BLD CALC: 40.9 % (ref 37–47)
HEMOGLOBIN: 14.1 G/DL (ref 12–16)
LYMPHOCYTES ABSOLUTE: 1.3 K/UL (ref 1–4.8)
LYMPHOCYTES RELATIVE PERCENT: 13 %
MCH RBC QN AUTO: 33.2 PG (ref 27–31.3)
MCHC RBC AUTO-ENTMCNC: 34.5 % (ref 33–37)
MCV RBC AUTO: 96.3 FL (ref 82–100)
MONOCYTES ABSOLUTE: 0.6 K/UL (ref 0.2–0.8)
MONOCYTES RELATIVE PERCENT: 5.6 %
NEUTROPHILS ABSOLUTE: 8 K/UL (ref 1.4–6.5)
NEUTROPHILS RELATIVE PERCENT: 80.3 %
PDW BLD-RTO: 13.5 % (ref 11.5–14.5)
PLATELET # BLD: 205 K/UL (ref 130–400)
POTASSIUM SERPL-SCNC: 3.5 MEQ/L (ref 3.5–5.1)
RBC # BLD: 4.25 M/UL (ref 4.2–5.4)
SODIUM BLD-SCNC: 136 MEQ/L (ref 132–144)
TOTAL PROTEIN: 7.2 G/DL (ref 6.4–8.1)
WBC # BLD: 10 K/UL (ref 4.8–10.8)

## 2017-06-28 PROCEDURE — 99285 EMERGENCY DEPT VISIT HI MDM: CPT

## 2017-06-28 PROCEDURE — 6370000000 HC RX 637 (ALT 250 FOR IP): Performed by: NURSE PRACTITIONER

## 2017-06-28 PROCEDURE — 36415 COLL VENOUS BLD VENIPUNCTURE: CPT

## 2017-06-28 PROCEDURE — 85025 COMPLETE CBC W/AUTO DIFF WBC: CPT

## 2017-06-28 PROCEDURE — 2500000003 HC RX 250 WO HCPCS: Performed by: PHYSICIAN ASSISTANT

## 2017-06-28 PROCEDURE — 6360000002 HC RX W HCPCS: Performed by: PHYSICIAN ASSISTANT

## 2017-06-28 PROCEDURE — 1210000000 HC MED SURG R&B

## 2017-06-28 PROCEDURE — 80053 COMPREHEN METABOLIC PANEL: CPT

## 2017-06-28 PROCEDURE — 84703 CHORIONIC GONADOTROPIN ASSAY: CPT

## 2017-06-28 PROCEDURE — G0480 DRUG TEST DEF 1-7 CLASSES: HCPCS

## 2017-06-28 PROCEDURE — 2580000003 HC RX 258: Performed by: NURSE PRACTITIONER

## 2017-06-28 PROCEDURE — 2580000003 HC RX 258: Performed by: PHYSICIAN ASSISTANT

## 2017-06-28 RX ORDER — QUETIAPINE 200 MG/1
400 TABLET, FILM COATED, EXTENDED RELEASE ORAL NIGHTLY
Status: DISCONTINUED | OUTPATIENT
Start: 2017-06-28 | End: 2017-06-30 | Stop reason: HOSPADM

## 2017-06-28 RX ORDER — LEVOTHYROXINE SODIUM 0.05 MG/1
50 TABLET ORAL DAILY
Status: DISCONTINUED | OUTPATIENT
Start: 2017-06-28 | End: 2017-06-30 | Stop reason: HOSPADM

## 2017-06-28 RX ORDER — DIAZEPAM 5 MG/ML
20 INJECTION, SOLUTION INTRAMUSCULAR; INTRAVENOUS
Status: DISCONTINUED | OUTPATIENT
Start: 2017-06-28 | End: 2017-06-30 | Stop reason: HOSPADM

## 2017-06-28 RX ORDER — DIAZEPAM 5 MG/ML
5 INJECTION, SOLUTION INTRAMUSCULAR; INTRAVENOUS ONCE
Status: COMPLETED | OUTPATIENT
Start: 2017-06-28 | End: 2017-06-28

## 2017-06-28 RX ORDER — DIAZEPAM 5 MG/1
5 TABLET ORAL
Status: DISCONTINUED | OUTPATIENT
Start: 2017-06-28 | End: 2017-06-30 | Stop reason: HOSPADM

## 2017-06-28 RX ORDER — GABAPENTIN 300 MG/1
300 CAPSULE ORAL 3 TIMES DAILY
Status: DISCONTINUED | OUTPATIENT
Start: 2017-06-28 | End: 2017-06-30 | Stop reason: HOSPADM

## 2017-06-28 RX ORDER — LORAZEPAM 2 MG/ML
1 INJECTION INTRAMUSCULAR ONCE
Status: COMPLETED | OUTPATIENT
Start: 2017-06-28 | End: 2017-06-28

## 2017-06-28 RX ORDER — NICOTINE 21 MG/24HR
1 PATCH, TRANSDERMAL 24 HOURS TRANSDERMAL DAILY
Status: DISCONTINUED | OUTPATIENT
Start: 2017-06-28 | End: 2017-06-30 | Stop reason: HOSPADM

## 2017-06-28 RX ORDER — DIAZEPAM 5 MG/ML
5 INJECTION, SOLUTION INTRAMUSCULAR; INTRAVENOUS
Status: DISCONTINUED | OUTPATIENT
Start: 2017-06-28 | End: 2017-06-30 | Stop reason: HOSPADM

## 2017-06-28 RX ORDER — DIAZEPAM 5 MG/ML
10 INJECTION, SOLUTION INTRAMUSCULAR; INTRAVENOUS
Status: DISCONTINUED | OUTPATIENT
Start: 2017-06-28 | End: 2017-06-30 | Stop reason: HOSPADM

## 2017-06-28 RX ORDER — DIAZEPAM 5 MG/1
15 TABLET ORAL
Status: DISCONTINUED | OUTPATIENT
Start: 2017-06-28 | End: 2017-06-30 | Stop reason: HOSPADM

## 2017-06-28 RX ORDER — ONDANSETRON 2 MG/ML
4 INJECTION INTRAMUSCULAR; INTRAVENOUS ONCE
Status: COMPLETED | OUTPATIENT
Start: 2017-06-28 | End: 2017-06-28

## 2017-06-28 RX ORDER — DIAZEPAM 5 MG/1
20 TABLET ORAL
Status: DISCONTINUED | OUTPATIENT
Start: 2017-06-28 | End: 2017-06-30 | Stop reason: HOSPADM

## 2017-06-28 RX ORDER — ONDANSETRON 2 MG/ML
4 INJECTION INTRAMUSCULAR; INTRAVENOUS EVERY 6 HOURS PRN
Status: DISCONTINUED | OUTPATIENT
Start: 2017-06-28 | End: 2017-06-30 | Stop reason: HOSPADM

## 2017-06-28 RX ORDER — TRAZODONE HYDROCHLORIDE 150 MG/1
150 TABLET ORAL NIGHTLY PRN
Status: DISCONTINUED | OUTPATIENT
Start: 2017-06-28 | End: 2017-06-30 | Stop reason: HOSPADM

## 2017-06-28 RX ORDER — SODIUM CHLORIDE 9 MG/ML
INJECTION, SOLUTION INTRAVENOUS CONTINUOUS
Status: DISCONTINUED | OUTPATIENT
Start: 2017-06-28 | End: 2017-06-30

## 2017-06-28 RX ORDER — SODIUM CHLORIDE 0.9 % (FLUSH) 0.9 %
10 SYRINGE (ML) INJECTION PRN
Status: DISCONTINUED | OUTPATIENT
Start: 2017-06-28 | End: 2017-06-30 | Stop reason: HOSPADM

## 2017-06-28 RX ORDER — DIAZEPAM 5 MG/1
10 TABLET ORAL
Status: DISCONTINUED | OUTPATIENT
Start: 2017-06-28 | End: 2017-06-30 | Stop reason: HOSPADM

## 2017-06-28 RX ORDER — DIAZEPAM 5 MG/ML
15 INJECTION, SOLUTION INTRAMUSCULAR; INTRAVENOUS
Status: DISCONTINUED | OUTPATIENT
Start: 2017-06-28 | End: 2017-06-30 | Stop reason: HOSPADM

## 2017-06-28 RX ORDER — TOPIRAMATE 25 MG/1
25 TABLET ORAL DAILY
Status: DISCONTINUED | OUTPATIENT
Start: 2017-06-28 | End: 2017-06-30 | Stop reason: HOSPADM

## 2017-06-28 RX ORDER — ACAMPROSATE CALCIUM 333 MG/1
666 TABLET, DELAYED RELEASE ORAL 3 TIMES DAILY
Status: DISCONTINUED | OUTPATIENT
Start: 2017-06-28 | End: 2017-06-30 | Stop reason: HOSPADM

## 2017-06-28 RX ORDER — SODIUM CHLORIDE 0.9 % (FLUSH) 0.9 %
10 SYRINGE (ML) INJECTION EVERY 12 HOURS SCHEDULED
Status: DISCONTINUED | OUTPATIENT
Start: 2017-06-28 | End: 2017-06-30 | Stop reason: HOSPADM

## 2017-06-28 RX ADMIN — SODIUM CHLORIDE: 9 INJECTION, SOLUTION INTRAVENOUS at 18:44

## 2017-06-28 RX ADMIN — LORAZEPAM 1 MG: 2 INJECTION INTRAMUSCULAR; INTRAVENOUS at 16:24

## 2017-06-28 RX ADMIN — TRAZODONE HYDROCHLORIDE 150 MG: 150 TABLET ORAL at 21:34

## 2017-06-28 RX ADMIN — DIAZEPAM 5 MG: 5 INJECTION, SOLUTION INTRAMUSCULAR; INTRAVENOUS at 15:18

## 2017-06-28 RX ADMIN — ONDANSETRON 4 MG: 2 INJECTION INTRAMUSCULAR; INTRAVENOUS at 15:17

## 2017-06-28 RX ADMIN — QUETIAPINE FUMARATE 400 MG: 200 TABLET, FILM COATED, EXTENDED RELEASE ORAL at 21:19

## 2017-06-28 RX ADMIN — ONDANSETRON 4 MG: 2 INJECTION INTRAMUSCULAR; INTRAVENOUS at 16:23

## 2017-06-28 RX ADMIN — GABAPENTIN 300 MG: 300 CAPSULE ORAL at 21:19

## 2017-06-28 RX ADMIN — DIVALPROEX SODIUM 750 MG: 500 TABLET, FILM COATED, EXTENDED RELEASE ORAL at 21:19

## 2017-06-28 RX ADMIN — SODIUM CHLORIDE, PRESERVATIVE FREE 10 ML: 5 INJECTION INTRAVENOUS at 21:51

## 2017-06-28 RX ADMIN — FOLIC ACID: 5 INJECTION, SOLUTION INTRAMUSCULAR; INTRAVENOUS; SUBCUTANEOUS at 15:38

## 2017-06-28 ASSESSMENT — PAIN DESCRIPTION - PAIN TYPE: TYPE: ACUTE PAIN

## 2017-06-28 ASSESSMENT — PAIN SCALES - GENERAL: PAINLEVEL_OUTOF10: 7

## 2017-06-28 ASSESSMENT — PAIN DESCRIPTION - LOCATION: LOCATION: ABDOMEN

## 2017-06-28 NOTE — H&P
Hospital Medicine History & Physical      PCP: Kasia Shaw MD    Date of Admission: 6/28/2017    Date of Service: Pt seen/examined on 6/28/17 and Admitted to Inpatient with expected LOS greater than two midnights due to medical therapy. Chief Complaint:  ETOH abuse and withdrawal      History Of Present Illness:   30 y.o. female who presented to [facility] ED after drinking ETOH rum 1/5. Last drink was 0100. Nausea without vomiting. History of depression, ETOH abuse and SI. Patient states no harm to self or others. She just wants to get help. Unable to afford medications for her mental health conditions. Denies CP, SOB, fever or chills, N/V    Past Medical History:          Diagnosis Date    Anxiety     Bipolar 1 disorder ([address]     Depression     Seizures (HCC)     x1 only related to alcohol     Thyroid disease        Past Surgical History:          Procedure Laterality Date    BREAST ENHANCEMENT SURGERY      BREAST SURGERY      TONSILLECTOMY         Medications Prior to Admission:      Prior to Admission medications    Medication Sig Start Date End Date Taking?  Authorizing Provider   levothyroxine (SYNTHROID) 50 MCG tablet Take 1 tablet by mouth Daily 6/23/17  Yes Andrei Taylor MD   gabapentin (NEURONTIN) 300 MG capsule Take 1 capsule by mouth 3 times daily 6/23/17  Yes Andrei Taylor MD   divalproex (DEPAKOTE ER) 250 MG extended release tablet Take 3 tablets by mouth nightly 6/23/17  Yes Andrei Taylor MD   traZODone (DESYREL) 150 MG tablet Take 1 tablet by mouth nightly as needed for Sleep 6/15/17  Yes Dianne Mendoza MD   QUEtiapine (SEROQUEL XR) 400 MG extended release tablet Take 1 tablet by mouth nightly 6/15/17  Yes Dianne Mendoza MD   topiramate (TOPAMAX) 25 MG tablet Take 1 tablet by mouth daily 6/23/17 7/23/17  Andrei Tayolr MD   acamprosate (CAMPRAL) 333 MG tablet Take 1 tablet by mouth 3 times daily 6/23/17   Andrei Taylor MD       Allergies:  Review of patient's allergies indicates no known allergies. Social History:      The patient currently lives home    TOBACCO:   reports that she has been smoking Cigarettes. She has a 15.00 pack-year smoking history. She has never used smokeless tobacco.  ETOH:   reports that she drinks alcohol. Family History:       Reviewed in detail and negative for DM, CAD, Cancer, CVA. Positive as follows:    History reviewed. No pertinent family history. REVIEW OF SYSTEMS:   Pertinent positives as noted in the HPI. All other systems reviewed and negative. PHYSICAL EXAM:    /80  Pulse 90  Temp 99.2 °F (37.3 °C)  Resp 14  Wt 125 lb (56.7 kg)  LMP 06/01/2017 (Approximate)  SpO2 96%  BMI 22.86 kg/m2    General appearance:  No apparent distress, appears stated age and cooperative. HEENT:  Normal cephalic, atraumatic without obvious deformity. Pupils equal, round, and reactive to light. Extra ocular muscles intact. Conjunctivae/corneas clear. Neck: Supple, with full range of motion. No jugular venous distention. Trachea midline. Respiratory:  Normal respiratory effort. Clear to auscultation, bilaterally without Rales/Wheezes/Rhonchi. Cardiovascular:  Regular rate and rhythm with normal S1/S2 without murmurs, rubs or gallops. Abdomen: Soft, non-tender, non-distended with normal bowel sounds. Musculoskeletal:  No clubbing, cyanosis or edema bilaterally. Full range of motion without deformity. Skin: Skin color, texture, turgor normal.  No rashes or lesions. Neurologic:  Neurovascularly intact without any focal sensory/motor deficits. Psychiatric:  Flat affect Alert and oriented, thought content appropriate, normal insight  Capillary Refill: Brisk,< 3 seconds   Peripheral Pulses: +2 palpable, equal bilaterally       Labs:     Recent Labs      06/28/17   1515   WBC  10.0   HGB  14.1   HCT  40.9   PLT  205     Recent Labs      06/28/17   1515   NA  136   K  3.5   CL  94*   CO2  20*   BUN  7   CREATININE  0.34*   CALCIUM  8.7 Recent Labs      06/28/17   1515   AST  60*   ALT  38*   BILITOT  0.4   ALKPHOS  71     ASSESSMENT:PLAN:    1. ETOH intoxication: CIWA withdrawal protocol with valium, IV NS continuous  2. Bipolar disorder/anixiety/depression/seizure: non-medication compliant, psych consult, resumed psych medications,  consult to help with getting financial assistance for medications  3. Hypothyroid: synthroid                DVT Prophylaxis: lovenox  Diet:    Code Status: Prior    Dispo - independent       Flaco Lester CNP    Thank you Ladan Mcmanus MD for the opportunity to be involved in this patient's care. If you have any questions or concerns please feel free to contact me.

## 2017-06-29 PROBLEM — F10.230 ALCOHOL DEPENDENCE WITH UNCOMPLICATED WITHDRAWAL (HCC): Status: ACTIVE | Noted: 2017-06-11

## 2017-06-29 PROBLEM — F31.9 BIPOLAR DEPRESSION (HCC): Status: ACTIVE | Noted: 2017-06-11

## 2017-06-29 LAB
ALBUMIN SERPL-MCNC: 3.4 G/DL (ref 3.9–4.9)
ALP BLD-CCNC: 57 U/L (ref 40–130)
ALT SERPL-CCNC: 30 U/L (ref 0–33)
ANION GAP SERPL CALCULATED.3IONS-SCNC: 16 MEQ/L (ref 7–13)
AST SERPL-CCNC: 41 U/L (ref 0–35)
BASOPHILS ABSOLUTE: 0.1 K/UL (ref 0–0.2)
BASOPHILS RELATIVE PERCENT: 1.8 %
BILIRUB SERPL-MCNC: 0.5 MG/DL (ref 0–1.2)
BUN BLDV-MCNC: 5 MG/DL (ref 6–20)
CALCIUM SERPL-MCNC: 7.8 MG/DL (ref 8.6–10.2)
CHLORIDE BLD-SCNC: 102 MEQ/L (ref 98–107)
CO2: 21 MEQ/L (ref 22–29)
CREAT SERPL-MCNC: 0.37 MG/DL (ref 0.5–0.9)
EOSINOPHILS ABSOLUTE: 0.1 K/UL (ref 0–0.7)
EOSINOPHILS RELATIVE PERCENT: 2.5 %
GFR AFRICAN AMERICAN: >60
GFR NON-AFRICAN AMERICAN: >60
GLOBULIN: 2.4 G/DL (ref 2.3–3.5)
GLUCOSE BLD-MCNC: 75 MG/DL (ref 74–109)
HCT VFR BLD CALC: 35.7 % (ref 37–47)
HEMOGLOBIN: 12.1 G/DL (ref 12–16)
LYMPHOCYTES ABSOLUTE: 1.8 K/UL (ref 1–4.8)
LYMPHOCYTES RELATIVE PERCENT: 41.4 %
MCH RBC QN AUTO: 32.9 PG (ref 27–31.3)
MCHC RBC AUTO-ENTMCNC: 34 % (ref 33–37)
MCV RBC AUTO: 96.7 FL (ref 82–100)
MONOCYTES ABSOLUTE: 0.5 K/UL (ref 0.2–0.8)
MONOCYTES RELATIVE PERCENT: 11.3 %
NEUTROPHILS ABSOLUTE: 1.9 K/UL (ref 1.4–6.5)
NEUTROPHILS RELATIVE PERCENT: 43 %
PDW BLD-RTO: 13.5 % (ref 11.5–14.5)
PLATELET # BLD: 150 K/UL (ref 130–400)
POTASSIUM SERPL-SCNC: 3.1 MEQ/L (ref 3.5–5.1)
RBC # BLD: 3.69 M/UL (ref 4.2–5.4)
SODIUM BLD-SCNC: 139 MEQ/L (ref 132–144)
TOTAL PROTEIN: 5.8 G/DL (ref 6.4–8.1)
WBC # BLD: 4.4 K/UL (ref 4.8–10.8)

## 2017-06-29 PROCEDURE — 2580000003 HC RX 258: Performed by: NURSE PRACTITIONER

## 2017-06-29 PROCEDURE — 6370000000 HC RX 637 (ALT 250 FOR IP): Performed by: NURSE PRACTITIONER

## 2017-06-29 PROCEDURE — 80053 COMPREHEN METABOLIC PANEL: CPT

## 2017-06-29 PROCEDURE — 1210000000 HC MED SURG R&B

## 2017-06-29 PROCEDURE — 85025 COMPLETE CBC W/AUTO DIFF WBC: CPT

## 2017-06-29 PROCEDURE — 36415 COLL VENOUS BLD VENIPUNCTURE: CPT

## 2017-06-29 PROCEDURE — 99253 IP/OBS CNSLTJ NEW/EST LOW 45: CPT | Performed by: CLINICAL NURSE SPECIALIST

## 2017-06-29 RX ADMIN — SODIUM CHLORIDE: 9 INJECTION, SOLUTION INTRAVENOUS at 12:55

## 2017-06-29 RX ADMIN — QUETIAPINE FUMARATE 400 MG: 200 TABLET, FILM COATED, EXTENDED RELEASE ORAL at 20:46

## 2017-06-29 RX ADMIN — ACAMPROSATE CALCIUM 666 MG: 333 TABLET, DELAYED RELEASE ORAL at 09:23

## 2017-06-29 RX ADMIN — TOPIRAMATE 25 MG: 25 TABLET, FILM COATED ORAL at 09:23

## 2017-06-29 RX ADMIN — DIVALPROEX SODIUM 750 MG: 500 TABLET, FILM COATED, EXTENDED RELEASE ORAL at 20:46

## 2017-06-29 RX ADMIN — TRAZODONE HYDROCHLORIDE 150 MG: 150 TABLET ORAL at 20:47

## 2017-06-29 RX ADMIN — GABAPENTIN 300 MG: 300 CAPSULE ORAL at 09:23

## 2017-06-29 RX ADMIN — SODIUM CHLORIDE: 9 INJECTION, SOLUTION INTRAVENOUS at 20:46

## 2017-06-29 RX ADMIN — LEVOTHYROXINE SODIUM 50 MCG: 50 TABLET ORAL at 06:04

## 2017-06-29 RX ADMIN — GABAPENTIN 300 MG: 300 CAPSULE ORAL at 20:46

## 2017-06-29 RX ADMIN — ACAMPROSATE CALCIUM 666 MG: 333 TABLET, DELAYED RELEASE ORAL at 14:07

## 2017-06-29 RX ADMIN — GABAPENTIN 300 MG: 300 CAPSULE ORAL at 14:07

## 2017-06-29 RX ADMIN — SODIUM CHLORIDE: 9 INJECTION, SOLUTION INTRAVENOUS at 02:49

## 2017-06-29 NOTE — CONSULTS
Hx: none  Access to weapons?:  No  Family History  Patient is uncertain of psychiatric family history    REVIEW OF SYSTEMS    Constitutional:  fever   chills   weight loss  weakness  Other:  Eyes:   photophobia   discharge  acuity change    Diplopia    Other:  HENT:   sore throat   ear pain  Tinnitus    Other  Respiratory:   Cough   Shortness of breath    Sputum    Other:   Cardiac: Chest pain   Palpitations Edema  PND   Other:  GI:  Abdominal pain   Nausea  Vomiting  Diarrhea   Other:  :   Dysuria   Frequency  Hematuria  Discharge   Other:  Possible Pregnancy: Yes   No   LMP:   Musculoskeletal:  Back pain  Neck pain  Recent Injury   Skin:  Rash   Itching   Other:  Neurologic:   Headache   Focal weakness   Sensory changes Other:  Endocrine:   Polyuria   Polydipsia   Hair Loss   Other:  Lymphatic:    Swollen glands   Psychiatric:  As per HPI    All other systems negative except as marked or mentioned/indicated in the HPI. Akilah Yadav      PHYSICAL EXAM:  Vitals:  /69  Pulse 98  Temp 98.6 °F (37 °C) (Oral)   Resp 16  Ht 5' 2\" (1.575 m)  Wt 132 lb 1.6 oz (59.9 kg)  LMP 06/01/2017 (Approximate)  SpO2 96%  BMI 24.16 kg/m2   Neuro Exam:   Muscle Strength & Tone: full ROM, normal  Gait: normal gait   Involuntary Movements: Zackery hand tremors noted    Mental Status Examination:    Level of consciousness:  awake   Appearance:  hospital attire and lying in bed  Behavior/Motor:  no abnormalities noted  Attitude toward examiner:  cooperative, attentive and poor eye contact  Speech:  spontaneous, normal rate, normal volume and well articulated   Mood: depressed  Affect:  mood congruent  Thought processes:  goal directed and coherent   Thought content:  Preoccupied with feelings of hopelessness/unalbe to go back to boyfriends house he is a trigger for her to drink  Cognition:  oriented to person, place, and time   Concentration intact  Memory intact  Mini Mental Status not completed   Insight poor regarding CD treatment Judgement poor   Fund of Knowledge adequate     DIAGNOSIS:  Bipolar Depression  Alcohol Use Disorder severe  Homeless? RECOMMENDATIONS Discussed case with Dr Murray Kohler. [facility]. Pt signed voluntary. Risk Management:  Pt is high risk for suicide/ Pt impulsive with current alcohol use/ homeless unless returns to boyfriend's house but he is trigger for alcohol use being an alcohol drinker himself. Pt feelings of hopelessness/depression/futurelessness    Medications:  Medications were reordered from 6/8/17 3 [facility] admission/ pt is unable to afford them/ defer medication management to [facility] has been off the medications since 7 day supply ran out/ this writer attempted to participate pt in program recommended by  on 6/23/17 psych consult and pt reports was unable to afford the medications at that attempt as well. Consider switching pt to alternate treatment that she can afford or that samples can be provided. Please contact [facility] to evaluate patient for indigent bed/pt has pending [employer].

## 2017-06-29 NOTE — PROGRESS NOTES
Department of Internal Medicine  General Internal Medicine  Attending Progress Note      SUBJECTIVE:  She says that she feels better. She wants to go to . She does not have insurance. SW working on it.     OBJECTIVE      Medications    Current Facility-Administered Medications: topiramate (TOPAMAX) tablet 25 mg, 25 mg, Oral, Daily  acamprosate (CAMPRAL) tablet 666 mg, 666 mg, Oral, TID  divalproex (DEPAKOTE ER) extended release tablet 750 mg, 750 mg, Oral, Nightly  gabapentin (NEURONTIN) capsule 300 mg, 300 mg, Oral, TID  levothyroxine (SYNTHROID) tablet 50 mcg, 50 mcg, Oral, Daily  QUEtiapine (SEROQUEL XR) extended release tablet 400 mg, 400 mg, Oral, Nightly  traZODone (DESYREL) tablet 150 mg, 150 mg, Oral, Nightly PRN  sodium chloride flush 0.9 % injection 10 mL, 10 mL, Intravenous, 2 times per day  sodium chloride flush 0.9 % injection 10 mL, 10 mL, Intravenous, PRN  ondansetron (ZOFRAN) injection 4 mg, 4 mg, Intravenous, Q6H PRN  enoxaparin (LOVENOX) injection 40 mg, 40 mg, Subcutaneous, Daily  0.9 % sodium chloride infusion, , Intravenous, Continuous  diazepam (VALIUM) tablet 5 mg, 5 mg, Oral, Q1H PRN **OR** diazepam (VALIUM) injection 5 mg, 5 mg, Intravenous, Q1H PRN **OR** diazepam (VALIUM) tablet 10 mg, 10 mg, Oral, Q1H PRN **OR** diazepam (VALIUM) injection 10 mg, 10 mg, Intravenous, Q1H PRN **OR** diazepam (VALIUM) tablet 15 mg, 15 mg, Oral, Q1H PRN **OR** diazepam (VALIUM) injection 15 mg, 15 mg, Intravenous, Q1H PRN **OR** diazepam (VALIUM) tablet 20 mg, 20 mg, Oral, Q1H PRN **OR** diazepam (VALIUM) injection 20 mg, 20 mg, Intravenous, Q1H PRN  nicotine (NICODERM CQ) 21 MG/24HR 1 patch, 1 patch, Transdermal, Daily  Physical    VITALS:  /69  Pulse 98  Temp 98.6 °F (37 °C) (Oral)   Resp 16  Ht 5' 2\" (1.575 m)  Wt 132 lb 1.6 oz (59.9 kg)  LMP 06/01/2017 (Approximate)  SpO2 96%  BMI 24.16 kg/m2    Gen - A & O x 3  HEENT - PERRLA, EOMI  CV  - RRR no M/G/R, normal s1, s2  Lungs - CTA

## 2017-06-29 NOTE — CARE COORDINATION
LSW met with pt who reports she is from home with her boyfriend. She states she could return there, but it is not a good environment for her due to alcohol use. Pt states she would like to go to inpatient [facility] if indicated. When she recently left the hospital HELP indicated 2 weeks of new meds. She was not able to obtain her Psych meds as they were not new and she is unable to afford them. Medicaid is pending. Pt states she has no Behavioral Health specialist to f/up with at this time. She was at [facility] earlier in the year where she was being treated. Discussed possible CD Rehab. Pt did not seem to feel this was very helpful, but she will discuss with CD. Pt stated she is also considering going to stay with her dad in [place] as he will not tolerate drinking and it may help her to get a new start. Psych and CD eval pending.

## 2017-06-29 NOTE — PLAN OF CARE
Problem: Health Behavior:  Goal: Ability to verbalize adaptive coping strategies to use when suicidal feelings occur will improve  Ability to verbalize adaptive coping strategies to use when suicidal feelings occur will improve   Outcome: Ongoing  Goal: Ability to verbalize adaptive coping strategies to use when the urge to self-mutilate occurs will improve  Ability to verbalize adaptive coping strategies to use when the urge to self-mutilate occurs will improve   Outcome: Ongoing    Problem: Safety:  Goal: Ability to contract for his/her safety will improve  Ability to contract for his/her safety will improve   Outcome: Ongoing    Problem: Pain:  Goal: Control of acute pain  Control of acute pain   Outcome: Ongoing  Goal: Control of chronic pain  Control of chronic pain   Outcome: Ongoing    Problem: Falls - Risk of  Goal: Absence of falls  Outcome: Ongoing

## 2017-06-30 ENCOUNTER — HOSPITAL ENCOUNTER (INPATIENT)
Age: 37
LOS: 7 days | Discharge: HOME | DRG: 753 | End: 2017-07-07
Attending: PSYCHIATRY & NEUROLOGY | Admitting: PSYCHIATRY & NEUROLOGY
Payer: MEDICAID

## 2017-06-30 VITALS
HEART RATE: 88 BPM | BODY MASS INDEX: 24.31 KG/M2 | OXYGEN SATURATION: 97 % | SYSTOLIC BLOOD PRESSURE: 125 MMHG | RESPIRATION RATE: 16 BRPM | TEMPERATURE: 97.9 F | WEIGHT: 132.1 LBS | HEIGHT: 62 IN | DIASTOLIC BLOOD PRESSURE: 68 MMHG

## 2017-06-30 LAB — POTASSIUM SERPL-SCNC: 5.4 MEQ/L (ref 3.5–5.1)

## 2017-06-30 PROCEDURE — 6370000000 HC RX 637 (ALT 250 FOR IP): Performed by: PSYCHIATRY & NEUROLOGY

## 2017-06-30 PROCEDURE — 93005 ELECTROCARDIOGRAM TRACING: CPT

## 2017-06-30 PROCEDURE — 84132 ASSAY OF SERUM POTASSIUM: CPT

## 2017-06-30 PROCEDURE — 2580000003 HC RX 258: Performed by: NURSE PRACTITIONER

## 2017-06-30 PROCEDURE — 1240000000 HC EMOTIONAL WELLNESS R&B

## 2017-06-30 PROCEDURE — 6370000000 HC RX 637 (ALT 250 FOR IP): Performed by: NURSE PRACTITIONER

## 2017-06-30 RX ORDER — ALBUTEROL SULFATE 2.5 MG/3ML
2.5 SOLUTION RESPIRATORY (INHALATION) EVERY 6 HOURS PRN
Status: DISCONTINUED | OUTPATIENT
Start: 2017-06-30 | End: 2017-07-07 | Stop reason: HOSPADM

## 2017-06-30 RX ORDER — ACAMPROSATE CALCIUM 333 MG/1
666 TABLET, DELAYED RELEASE ORAL 3 TIMES DAILY
Status: DISCONTINUED | OUTPATIENT
Start: 2017-06-30 | End: 2017-07-07 | Stop reason: HOSPADM

## 2017-06-30 RX ORDER — LEVOTHYROXINE SODIUM 0.05 MG/1
50 TABLET ORAL DAILY
Status: CANCELLED | OUTPATIENT
Start: 2017-07-01

## 2017-06-30 RX ORDER — CHLORDIAZEPOXIDE HYDROCHLORIDE 25 MG/1
75 CAPSULE, GELATIN COATED ORAL
Status: DISCONTINUED | OUTPATIENT
Start: 2017-06-30 | End: 2017-07-04

## 2017-06-30 RX ORDER — TOPIRAMATE 25 MG/1
25 TABLET ORAL DAILY
Status: DISCONTINUED | OUTPATIENT
Start: 2017-07-01 | End: 2017-07-02

## 2017-06-30 RX ORDER — QUETIAPINE 400 MG/1
400 TABLET, FILM COATED, EXTENDED RELEASE ORAL NIGHTLY
Status: DISCONTINUED | OUTPATIENT
Start: 2017-06-30 | End: 2017-07-07 | Stop reason: HOSPADM

## 2017-06-30 RX ORDER — ACETAMINOPHEN 325 MG/1
650 TABLET ORAL EVERY 4 HOURS PRN
Status: DISCONTINUED | OUTPATIENT
Start: 2017-06-30 | End: 2017-07-07 | Stop reason: HOSPADM

## 2017-06-30 RX ORDER — SODIUM POLYSTYRENE SULFONATE 15 G/60ML
15 SUSPENSION ORAL; RECTAL EVERY 6 HOURS
Status: COMPLETED | OUTPATIENT
Start: 2017-06-30 | End: 2017-07-01

## 2017-06-30 RX ORDER — BENZTROPINE MESYLATE 1 MG/ML
2 INJECTION INTRAMUSCULAR; INTRAVENOUS 2 TIMES DAILY PRN
Status: DISCONTINUED | OUTPATIENT
Start: 2017-06-30 | End: 2017-07-07 | Stop reason: HOSPADM

## 2017-06-30 RX ORDER — SODIUM POLYSTYRENE SULFONATE 15 G/60ML
15 SUSPENSION ORAL; RECTAL ONCE
Status: DISCONTINUED | OUTPATIENT
Start: 2017-06-30 | End: 2017-06-30

## 2017-06-30 RX ORDER — ONDANSETRON 4 MG/1
4 TABLET, FILM COATED ORAL EVERY 6 HOURS PRN
Status: DISCONTINUED | OUTPATIENT
Start: 2017-06-30 | End: 2017-07-05

## 2017-06-30 RX ORDER — CHLORDIAZEPOXIDE HYDROCHLORIDE 25 MG/1
50 CAPSULE, GELATIN COATED ORAL
Status: DISCONTINUED | OUTPATIENT
Start: 2017-06-30 | End: 2017-07-04

## 2017-06-30 RX ORDER — MAGNESIUM HYDROXIDE/ALUMINUM HYDROXICE/SIMETHICONE 120; 1200; 1200 MG/30ML; MG/30ML; MG/30ML
30 SUSPENSION ORAL PRN
Status: CANCELLED | OUTPATIENT
Start: 2017-06-30

## 2017-06-30 RX ORDER — ACETAMINOPHEN 325 MG/1
650 TABLET ORAL EVERY 4 HOURS PRN
Status: CANCELLED | OUTPATIENT
Start: 2017-06-30

## 2017-06-30 RX ORDER — HALOPERIDOL 5 MG/ML
5 INJECTION INTRAMUSCULAR EVERY 4 HOURS PRN
Status: DISCONTINUED | OUTPATIENT
Start: 2017-06-30 | End: 2017-07-07 | Stop reason: HOSPADM

## 2017-06-30 RX ORDER — DEXTROSE MONOHYDRATE 25 G/50ML
25 INJECTION, SOLUTION INTRAVENOUS ONCE
Status: DISCONTINUED | OUTPATIENT
Start: 2017-06-30 | End: 2017-06-30

## 2017-06-30 RX ORDER — CHLORDIAZEPOXIDE HYDROCHLORIDE 25 MG/1
25 CAPSULE, GELATIN COATED ORAL EVERY 4 HOURS PRN
Status: DISCONTINUED | OUTPATIENT
Start: 2017-06-30 | End: 2017-07-04

## 2017-06-30 RX ORDER — HYDROXYZINE PAMOATE 25 MG/1
50 CAPSULE ORAL EVERY 6 HOURS PRN
Status: CANCELLED | OUTPATIENT
Start: 2017-06-30

## 2017-06-30 RX ORDER — INSULIN GLARGINE 100 [IU]/ML
10 INJECTION, SOLUTION SUBCUTANEOUS NIGHTLY
Status: DISCONTINUED | OUTPATIENT
Start: 2017-06-30 | End: 2017-06-30

## 2017-06-30 RX ORDER — HALOPERIDOL 5 MG/ML
5 INJECTION INTRAMUSCULAR EVERY 4 HOURS PRN
Status: CANCELLED | OUTPATIENT
Start: 2017-06-30

## 2017-06-30 RX ORDER — CHLORDIAZEPOXIDE HYDROCHLORIDE 10 MG/1
10 CAPSULE, GELATIN COATED ORAL EVERY 4 HOURS PRN
Status: DISCONTINUED | OUTPATIENT
Start: 2017-06-30 | End: 2017-07-04

## 2017-06-30 RX ORDER — GABAPENTIN 300 MG/1
300 CAPSULE ORAL 3 TIMES DAILY
Status: DISCONTINUED | OUTPATIENT
Start: 2017-06-30 | End: 2017-07-07 | Stop reason: HOSPADM

## 2017-06-30 RX ORDER — MAGNESIUM HYDROXIDE/ALUMINUM HYDROXICE/SIMETHICONE 120; 1200; 1200 MG/30ML; MG/30ML; MG/30ML
30 SUSPENSION ORAL PRN
Status: DISCONTINUED | OUTPATIENT
Start: 2017-06-30 | End: 2017-07-07 | Stop reason: HOSPADM

## 2017-06-30 RX ORDER — HYDROXYZINE PAMOATE 50 MG/1
50 CAPSULE ORAL EVERY 6 HOURS PRN
Status: DISCONTINUED | OUTPATIENT
Start: 2017-06-30 | End: 2017-07-07 | Stop reason: HOSPADM

## 2017-06-30 RX ORDER — LEVOTHYROXINE SODIUM 0.05 MG/1
50 TABLET ORAL DAILY
Status: DISCONTINUED | OUTPATIENT
Start: 2017-07-01 | End: 2017-07-07 | Stop reason: HOSPADM

## 2017-06-30 RX ORDER — BENZTROPINE MESYLATE 1 MG/ML
2 INJECTION INTRAMUSCULAR; INTRAVENOUS 2 TIMES DAILY PRN
Status: CANCELLED | OUTPATIENT
Start: 2017-06-30

## 2017-06-30 RX ADMIN — GABAPENTIN 300 MG: 300 CAPSULE ORAL at 18:10

## 2017-06-30 RX ADMIN — GABAPENTIN 300 MG: 300 CAPSULE ORAL at 20:34

## 2017-06-30 RX ADMIN — GABAPENTIN 300 MG: 300 CAPSULE ORAL at 09:33

## 2017-06-30 RX ADMIN — DIAZEPAM 5 MG: 5 TABLET ORAL at 09:42

## 2017-06-30 RX ADMIN — QUETIAPINE FUMARATE 400 MG: 400 TABLET, EXTENDED RELEASE ORAL at 20:34

## 2017-06-30 RX ADMIN — CHLORDIAZEPOXIDE HYDROCHLORIDE 25 MG: 25 CAPSULE ORAL at 20:42

## 2017-06-30 RX ADMIN — TOPIRAMATE 25 MG: 25 TABLET, FILM COATED ORAL at 09:33

## 2017-06-30 RX ADMIN — ACAMPROSATE CALCIUM 666 MG: 333 TABLET, DELAYED RELEASE ORAL at 20:35

## 2017-06-30 RX ADMIN — LEVOTHYROXINE SODIUM 50 MCG: 50 TABLET ORAL at 06:14

## 2017-06-30 RX ADMIN — ACAMPROSATE CALCIUM 666 MG: 333 TABLET, DELAYED RELEASE ORAL at 09:33

## 2017-06-30 RX ADMIN — SODIUM CHLORIDE, PRESERVATIVE FREE 10 ML: 5 INJECTION INTRAVENOUS at 09:33

## 2017-06-30 RX ADMIN — SODIUM CHLORIDE: 9 INJECTION, SOLUTION INTRAVENOUS at 04:14

## 2017-06-30 RX ADMIN — DIVALPROEX SODIUM 750 MG: 500 TABLET, FILM COATED, EXTENDED RELEASE ORAL at 20:35

## 2017-06-30 ASSESSMENT — ENCOUNTER SYMPTOMS
SORE THROAT: 0
BACK PAIN: 0
DIARRHEA: 0
SHORTNESS OF BREATH: 0
COUGH: 0
VOMITING: 0
NAUSEA: 1
TROUBLE SWALLOWING: 0
PHOTOPHOBIA: 0
ABDOMINAL PAIN: 0

## 2017-06-30 ASSESSMENT — SLEEP AND FATIGUE QUESTIONNAIRES
DO YOU HAVE DIFFICULTY SLEEPING: NO
DIFFICULTY STAYING ASLEEP: NO
DO YOU USE A SLEEP AID: YES
DIFFICULTY FALLING ASLEEP: YES
SLEEP PATTERN: DIFFICULTY FALLING ASLEEP;INSOMNIA
RESTFUL SLEEP: NO
DIFFICULTY ARISING: NO
AVERAGE NUMBER OF SLEEP HOURS: 3

## 2017-06-30 ASSESSMENT — PATIENT HEALTH QUESTIONNAIRE - PHQ9: SUM OF ALL RESPONSES TO PHQ QUESTIONS 1-9: 27

## 2017-06-30 NOTE — ED PROVIDER NOTES
[facility] [address]  eMERGENCY dEPARTMENT eNCOUnter      Pt Name: Bentley Conte  MRN: [id number]  Alberto 1980  Date of evaluation: 6/28/2017  Provider: Bry Viera PA-C      HISTORY OF PRESENT ILLNESS    HPI  Bentley Conte is a 30 y.o. female, who resents emergency department for tremors after alcohol withdrawal.  She states her last drink was approximately 2 days ago, she's been drinking approximately 1 L of liquor daily. There is a friend in the room present and he states that she was confused last night. The patient reports a history of DTs, and seizures following alcohol withdrawal.  Friend and patient deny seizures in the past few days. She complains of nausea and generalized abdominal pain. REVIEW OF SYSTEMS       Review of Systems   Constitutional: Negative for chills and fever. HENT: Negative for congestion, ear pain, sore throat and trouble swallowing. Eyes: Negative for photophobia and visual disturbance. Respiratory: Negative for cough and shortness of breath. Cardiovascular: Negative for chest pain, palpitations and leg swelling. Gastrointestinal: Positive for nausea. Negative for abdominal pain, diarrhea and vomiting. Genitourinary: Negative for difficulty urinating, dysuria, flank pain and hematuria. Musculoskeletal: Negative for arthralgias, back pain and neck pain. Neurological: Positive for tremors. Negative for seizures, syncope, speech difficulty, numbness and headaches. Psychiatric/Behavioral: Negative for agitation, confusion and hallucinations.          PAST MEDICAL HISTORY     Past Medical History:   Diagnosis Date    Anxiety     Bipolar 1 disorder [address] [address]     Depression     Seizures ([facility] [address]     x1 only related to alcohol     Thyroid disease          SURGICAL HISTORY       Past Surgical History:   Procedure Laterality Date    BREAST ENHANCEMENT SURGERY      BREAST SURGERY      TONSILLECTOMY           CURRENT MEDICATIONS       Discharge Medication List as of 6/30/2017  2:46 PM      CONTINUE these medications which have NOT CHANGED    Details   levothyroxine (SYNTHROID) 50 MCG tablet Take 1 tablet by mouth Daily, Disp-30 tablet, R-3Print             ALLERGIES     Review of patient's allergies indicates no known allergies. FAMILY HISTORY     History reviewed. No pertinent family history. SOCIAL HISTORY       Social History     Social History    Marital status:      Spouse name: N/A    Number of children: N/A    Years of education: N/A     Social History Main Topics    Smoking status: Current Every Day Smoker     Packs/day: 1.00     Years: 15.00     Types: Cigarettes    Smokeless tobacco: Never Used    Alcohol use Yes      Comment: daily drinker, liquor daily    Drug use: No    Sexual activity: Yes     Partners: Male     Other Topics Concern    None     Social History Narrative         PHYSICAL EXAM     ED Triage Vitals   BP Temp Temp Source Pulse Resp SpO2 Height Weight   06/28/17 1451 06/28/17 1451 06/28/17 1700 06/28/17 1451 06/28/17 1451 06/28/17 1451 06/28/17 1700 06/28/17 1451   139/79 99.2 °F (37.3 °C) Oral 115 18 96 % 5' 2\" (1.575 m) 125 lb (56.7 kg)       Physical Exam   Constitutional: She is oriented to person, place, and time. No distress. HENT:   Head: Normocephalic and atraumatic. Eyes: EOM are normal. Pupils are equal, round, and reactive to light. Neck: Neck supple. Cardiovascular: Normal rate and regular rhythm. Pulmonary/Chest: Breath sounds normal. No respiratory distress. Abdominal: Soft. Bowel sounds are normal. There is no tenderness. Musculoskeletal: Normal range of motion. She exhibits no edema. Neurological: She is alert and oriented to person, place, and time. She is not disoriented. She displays tremor. GCS eye subscore is 4. GCS verbal subscore is 5. GCS motor subscore is 6. Skin: Skin is warm and dry. No rash noted. Nursing note and vitals reviewed.         LABS:  Labs Reviewed   COMPREHENSIVE

## 2017-06-30 NOTE — PROGRESS NOTES
Pt arrived to unit. Pt oriented to unit surroundings, policies and services. Pt shown to room and advised of the need to complete admission assessment and sign consents. Pt denies any needs at this time.

## 2017-06-30 NOTE — DISCHARGE SUMMARY
Physician Discharge Summary     Patient ID:  Jad Barroso  [id number]  30 y.o.  1980    Admit date: 6/28/2017    Discharge date and time: No discharge date for patient encounter. Admitting Physician: Maureen Canas DO     Discharge Physician: Dr. Argueta [place]    Admission Diagnoses: Alcohol withdrawal delirium, acute, mixed level of activity ([facility] [address] [F10.231]    Discharge Diagnoses: alcohol abuse    Admission Condition: good    Discharged Condition: good    Indication for Admission: alcohol withdrawal    Hospital Course:        Ms. Ana Guzman is a 31 yo F who presents to the hospital for drinking alcohol rum a fifth of rum. She had nausea without vomiting. She was admitted for depression and alcohol abuse. She was on a [facility] protocol. She was evaluated by [facility]. She will be discharged to [facility] floor.         Consults: psychiatry    Significant Diagnostic Studies: labs: none    Treatments: none    Discharge Exam:  /68  Pulse 88  Temp 97.9 °F (36.6 °C) (Oral)   Resp 16  Ht 5' 2\" (1.575 m)  Wt 132 lb 1.6 oz (59.9 kg)  LMP 06/01/2017 (Approximate)  SpO2 97%  BMI 24.16 kg/m2    General Appearance:    Alert, cooperative, no distress, appears stated age   Head:    Normocephalic, without obvious abnormality, atraumatic   Eyes:    PERRL, conjunctiva/corneas clear, EOM's intact, fundi     benign, both eyes   Ears:    Normal TM's and external ear canals, both ears   Nose:   Nares normal, septum midline, mucosa normal, no drainage    or sinus tenderness   Throat:   Lips, mucosa, and tongue normal; teeth and gums normal   Neck:   Supple, symmetrical, trachea midline, no adenopathy;     thyroid:  no enlargement/tenderness/nodules; no carotid    bruit or JVD   Back:     Symmetric, no curvature, ROM normal, no CVA tenderness   Lungs:     Clear to auscultation bilaterally, respirations unlabored   Chest Wall:    No tenderness or deformity    Heart:    Regular rate and rhythm, S1 and S2 normal, no murmur, rub   or gallop   Breast Exam:    No tenderness, masses, or nipple abnormality   Abdomen:     Soft, non-tender, bowel sounds active all four quadrants,     no masses, no organomegaly   Genitalia:    Normal female without lesion, discharge or tenderness   Rectal:    Normal tone ;guaiac negative stool   Extremities:   Extremities normal, atraumatic, no cyanosis or edema   Pulses:   2+ and symmetric all extremities   Skin:   Skin color, texture, turgor normal, no rashes or lesions   Lymph nodes:   Cervical, supraclavicular, and axillary nodes normal   Neurologic:   CNII-XII intact, normal strength, sensation and reflexes     throughout       Disposition: psychiatry    Patient Instructions:   [unfilled]  Activity: activity as tolerated  Diet: regular diet  Wound Care: none needed        Signed:  Guerline Miller  6/30/2017  10:43 AM

## 2017-06-30 NOTE — IP AVS SNAPSHOT
After Visit Summary    This summary was created for you. Thank you for entrusting your care to us. The following information includes details about your hospital/visit stay along with steps you should take to help with your recovery once you leave the hospital.  In this packet, you will find information about the topics listed below:    · Instructions about your medications including a list of your home medications  · A summary of your hospital visit  · Follow-up appointments once you have left the hospital  · Your care plan at home      You may receive a survey regarding the care you received during your stay. Your input is valuable to us. We encourage you to complete and return your survey in the envelope provided. We hope you will choose us in the future for your healthcare needs. Basic Information     Date Of Birth Sex Race Ethnicity Preferred Language    1980 Female White Non-/Non  English      Vital Signs     Blood Pressure Pulse Temperature Respirations Last Menstrual Period Oxygen Saturation    105/67 85 97 °F (36.1 °C) (Oral) 22 06/01/2017 (Approximate) 98%    Smoking Status                   Current Every Day Smoker           Care Provided at:     Name Address Phone       [address] [address] [phone]       Psychiatric Advance Directive          Most Recent Value    Psychiatric Advance Directive  No    Power of  for Health Care             Your Visit    Here you will find information about your visit, including the reason for your visit. Please take this sheet with you when you visit your doctor or other health care provider in the future. It will help determine the best possible medical care for you at that time. If you have any questions once you leave the hospital, please call the department phone number listed below.         Why you were here     Your primary diagnosis was:  Bipolar Depression (Hcc) purchased at [employer], and [employer]. ? If you have any questions about your diet or nutrition, call the hospital and ask for the dietitian. as tolerated           Activity Instructions     As tolerated             Discharge Instructions        Be sure to follow up and keep ALL appointments. Take all medications as instructed by provider. Avoid drugs and alcohol. Use positive support. If in crisis call [facility] hotline [phone], or 911 or go to the nearest emergency room. Important Information    If your condition worsens or if you have any concerns, call your doctor or seek emergency medical services (dial 9-1-1) as needed. If you have any of the following symptoms/conditions, call your doctor. Call your primary care physician to obtain results of outstanding lab tests, cultures, x-rays, or other tests. Important Information if you smoke or are exposed to smoking       SMOKING: QUIT SMOKING. THIS IS THE MOST IMPORTANT ACTION YOU CAN TAKE TO IMPROVE YOUR CURRENT AND FUTURE HEALTH. Call the [address] [place] [address] at [place] [facility] ([phone])    Smoking harms nonsmokers. When nonsmokers are around people who smoke, they absorb nicotine, carbon monoxide, and other ingredients of tobacco smoke. DO NOT SMOKE AROUND CHILDREN     Children exposed to secondhand smoke are at an increased risk of:  Sudden Infant Death Syndrome (SIDS), acute respiratory infections, inflammation of the middle ear, and severe asthma. Over a longer time, it causes heart disease and lung cancer. There is no safe level of exposure to secondhand smoke. The information on all pages of the After Visit Summary has been reviewed with me, the patient and/or responsible adult, by my health care provider(s). I had the opportunity to ask questions regarding this information.  I understand I should dispose of my armband safely at home to

## 2017-07-01 LAB
AMMONIA: 67 UMOL/L (ref 11–51)
ANION GAP SERPL CALCULATED.3IONS-SCNC: 13 MEQ/L (ref 7–13)
BUN BLDV-MCNC: 8 MG/DL (ref 6–20)
CALCIUM SERPL-MCNC: 9.1 MG/DL (ref 8.6–10.2)
CHLORIDE BLD-SCNC: 105 MEQ/L (ref 98–107)
CO2: 22 MEQ/L (ref 22–29)
CREAT SERPL-MCNC: 0.37 MG/DL (ref 0.5–0.9)
GFR AFRICAN AMERICAN: >60
GFR NON-AFRICAN AMERICAN: >60
GLUCOSE BLD-MCNC: 95 MG/DL (ref 74–109)
POTASSIUM SERPL-SCNC: 3.5 MEQ/L (ref 3.5–5.1)
SODIUM BLD-SCNC: 140 MEQ/L (ref 132–144)

## 2017-07-01 PROCEDURE — 1240000000 HC EMOTIONAL WELLNESS R&B

## 2017-07-01 PROCEDURE — 6370000000 HC RX 637 (ALT 250 FOR IP): Performed by: INTERNAL MEDICINE

## 2017-07-01 PROCEDURE — 6370000000 HC RX 637 (ALT 250 FOR IP): Performed by: PHYSICIAN ASSISTANT

## 2017-07-01 PROCEDURE — 36415 COLL VENOUS BLD VENIPUNCTURE: CPT

## 2017-07-01 PROCEDURE — 82140 ASSAY OF AMMONIA: CPT

## 2017-07-01 PROCEDURE — 6370000000 HC RX 637 (ALT 250 FOR IP): Performed by: PSYCHIATRY & NEUROLOGY

## 2017-07-01 PROCEDURE — 80048 BASIC METABOLIC PNL TOTAL CA: CPT

## 2017-07-01 PROCEDURE — 94664 DEMO&/EVAL PT USE INHALER: CPT

## 2017-07-01 RX ORDER — NICOTINE 21 MG/24HR
1 PATCH, TRANSDERMAL 24 HOURS TRANSDERMAL DAILY
Status: DISCONTINUED | OUTPATIENT
Start: 2017-07-01 | End: 2017-07-07 | Stop reason: HOSPADM

## 2017-07-01 RX ORDER — IBUPROFEN 400 MG/1
400 TABLET ORAL EVERY 6 HOURS PRN
Status: COMPLETED | OUTPATIENT
Start: 2017-07-01 | End: 2017-07-06

## 2017-07-01 RX ORDER — LACTULOSE 20 G/30ML
20 SOLUTION ORAL 3 TIMES DAILY
Status: DISCONTINUED | OUTPATIENT
Start: 2017-07-01 | End: 2017-07-05

## 2017-07-01 RX ADMIN — QUETIAPINE FUMARATE 400 MG: 400 TABLET, EXTENDED RELEASE ORAL at 20:49

## 2017-07-01 RX ADMIN — ACAMPROSATE CALCIUM 666 MG: 333 TABLET, DELAYED RELEASE ORAL at 20:49

## 2017-07-01 RX ADMIN — TOPIRAMATE 25 MG: 25 TABLET ORAL at 08:36

## 2017-07-01 RX ADMIN — ACAMPROSATE CALCIUM 666 MG: 333 TABLET, DELAYED RELEASE ORAL at 14:32

## 2017-07-01 RX ADMIN — DIVALPROEX SODIUM 750 MG: 500 TABLET, FILM COATED, EXTENDED RELEASE ORAL at 20:49

## 2017-07-01 RX ADMIN — HYDROXYZINE PAMOATE 50 MG: 50 CAPSULE ORAL at 21:04

## 2017-07-01 RX ADMIN — SODIUM POLYSTYRENE SULFONATE 15 G: 15 SUSPENSION ORAL; RECTAL at 06:11

## 2017-07-01 RX ADMIN — GABAPENTIN 300 MG: 300 CAPSULE ORAL at 14:33

## 2017-07-01 RX ADMIN — GABAPENTIN 300 MG: 300 CAPSULE ORAL at 08:35

## 2017-07-01 RX ADMIN — CHLORDIAZEPOXIDE HYDROCHLORIDE 10 MG: 10 CAPSULE ORAL at 17:10

## 2017-07-01 RX ADMIN — ACAMPROSATE CALCIUM 666 MG: 333 TABLET, DELAYED RELEASE ORAL at 08:36

## 2017-07-01 RX ADMIN — ACETAMINOPHEN 650 MG: 325 TABLET ORAL at 13:23

## 2017-07-01 RX ADMIN — LACTULOSE 20 G: 10 SOLUTION ORAL at 20:55

## 2017-07-01 RX ADMIN — CHLORDIAZEPOXIDE HYDROCHLORIDE 10 MG: 10 CAPSULE ORAL at 10:46

## 2017-07-01 RX ADMIN — GABAPENTIN 300 MG: 300 CAPSULE ORAL at 20:50

## 2017-07-01 RX ADMIN — IBUPROFEN 400 MG: 400 TABLET, FILM COATED ORAL at 18:21

## 2017-07-01 RX ADMIN — LEVOTHYROXINE SODIUM 50 MCG: 50 TABLET ORAL at 06:11

## 2017-07-01 RX ADMIN — SODIUM POLYSTYRENE SULFONATE 15 G: 15 SUSPENSION ORAL; RECTAL at 00:02

## 2017-07-01 ASSESSMENT — PAIN SCALES - GENERAL
PAINLEVEL_OUTOF10: 8
PAINLEVEL_OUTOF10: 8

## 2017-07-01 ASSESSMENT — LIFESTYLE VARIABLES: HISTORY_ALCOHOL_USE: YES

## 2017-07-01 NOTE — H&P
from out patient providers  Will consult the hospitalist for a physical exam to rule out any co-morbid physical condition. Medications:    See orders    Prn Haldol 5mg and Vistaril 50mg q6hr for extreme agitation. Discussed with the patient risk, benefit, alternative and common side effects for the  proposed medication treatment. Patient is consenting to the treatment. Psychotherapy:   Encourage participation in milieu and group therapy  Individual therapy as needed      GENERAL PATIENT/FAMILY EDUCATION    Goals:    Patient will understand basic signs and symptoms  Patient will understand their role in recovery          Behavioral Services  Medicare Certification      Admission Day 1  I certify that this patient's inpatient psychiatric hospital admission is medically necessary for:     (1) treatment which could reasonably be expected to improve this patient's condition, or     (2) diagnostic study or its equivalent.        Electronically signed by Gabriele Rogers MD on 7/1/2017 at 5:45 PM Admission note dictated # [id number]

## 2017-07-01 NOTE — PLAN OF CARE
Problem: Depressive Behavior with or without Suicide precautions  Goal: LTG-Able to verbalize acceptance of life and situations over which he or she has no control  Outcome: Ongoing  Goal: LTG-Able to verbalize and/or display a decrease in depressive symptoms  Outcome: Ongoing  Goal: STG-Knowledge of positive coping patterns  Outcome: Ongoing  Goal: STG-Participation in care planning  Outcome: Ongoing    Problem: Substance Abuse  Goal: LTG-Absence of acute withdrawl symptoms  Outcome: Ongoing  Pt on CIWA protocol  Goal: STG-Knowledge of community resources  Outcome: Ongoing

## 2017-07-01 NOTE — PROGRESS NOTES
Pt denies SI. HI and A/V hallucinations. Pt reports depression and anxiety r/t her alcoholism and the negative effects that it has had on her and her family. Pt discussed desire to stay sober and would like to move to [place] with her Father to stay sober. Pt visiting with her boyfriend throughout the shift. Pt states that her boyfriend is \"an enabler\" and does not want to stay at his apartment d/t his alcoholism. Pt reports good sleep and appetite. Pt tearful when discussing that her 25year old daughter is having a graduation party today and she wasn't invited. Pt given information on Sober Living houses and states she would \"keep that in mind if I cant go stay with my dad. \" Electronically signed by Olvin Green RN on 7/1/2017 at 3:37 PM

## 2017-07-01 NOTE — PROGRESS NOTES
[facility] Respiratory Therapy Evaluation   Current Order:  Alb 2.5 Q6 PRN      Home Regimen: none      Ordering Physician: Marquis Rivera  Re-evaluation Date:  7/4     Diagnosis: bipolar/ Alcohol withdrawal      Patient Status: Stable / Unstable + Physician notified    The following MDI Criteria must be met in order to convert aerosol to MDI with spacer. If unable to meet, MDI will be converted to aerosol:  []  Patient able to demonstrate the ability to use MDI effectively  []  Patient alert and cooperative  []  Patient able to take deep breath with 5-10 second hold  []  Medication(s) available in this delivery method   []  Peak flow greater than or equal to 200 ml/min            Current Order Substituted To  (same drug, same frequency)   Aerosol to MDI [] Albuterol Sulfate 0.083% unit dose by aerosol Albuterol Sulfate MDI 2 puffs by inhalation with spacer    [] Levalbuterol 1.25 mg unit dose by aerosol Levalbuterol MDI 2 puffs by inhalation with spacer    [] Levalbuterol 0.63 mg unit dose by aerosol Levalbuterol MDI 2 puffs by inhalation with spacer    [] Ipratropium Bromide 0.02% unit dose by aerosol Ipratropium Bromide MDI 2 puffs by inhalation with spacer    [] Duoneb (Ipratropium + Albuterol) unit dose by aerosol Ipratropium MDI + Albuterol MDI 2 puffs by inhalation w/spacer   MDI to Aerosol [] Albuterol Sulfate MDI Albuterol Sulfate 0.083% unit dose by aerosol    [] Levalbuterol MDI 2 puffs by inhalation Levalbuterol 1.25 mg unit dose by aerosol    [] Ipratropium Bromide MDI by inhalation Ipratropium Bromide 0.02% unit dose by aerosol    [] Combivent (Ipratropium + Albuterol) MDI by inhalation Duoneb (Ipratropium + Albuterol) unit dose by aerosol   Treatment Assessment [Frequency/Schedule]:  Change frequency to: _________no change_________________________________________per Protocol, P&T, MEC      Points 0 1 2 3 4   Pulmonary Status  Non-Smoker  []   Smoking history   < 20 pack years  [x]   Smoking history  ?  21 pack years  []   Pulmonary Disorder  (acute or chronic)  []   Severe or Chronic w/ Exacerbation  []     Surgical Status No [x]   Surgeries     General []   Surgery Lower []   Abdominal Thoracic or []   Upper Abdominal Thoracic with  PulmonaryDisorder  []     Chest X-ray Clear/Not  Ordered     [x]  Chronic Changes  Results Pending  []  Infiltrates, atelectasis, pleural effusion, or edema  []  Infiltrates in more than one lobe []  Infiltrate + Atelectasis, &/or pleural effusion  []    Respiratory Pattern Regular,  RR = 12-20 [x]  Increased,  RR = 21-25 []  WHITE, irregular,  or RR = 26-30 []  Decreased FEV1  or RR = 31-35 []  Severe SOB, use  of accessory muscles, or RR ? 35  []    Mental Status Alert, oriented,  Cooperative [x]  Confused but Follows commands []  Lethargic or unable to follow commands []  Obtunded  []  Comatose  []    Breath Sounds Clear to  auscultation  [x]  Decreased unilaterally or  in bases only []  Decreased  bilaterally  []  Crackles or intermittent wheezes []  Wheezes []    Cough Strong, Spontan., & nonproductive [x]  Strong,  spontaneous, &  productive []  Weak,  Nonproductive []  Weak, productive or  with wheezes []  No spontaneous  cough or may require suctioning []    Level of Activity Ambulatory [x]  Ambulatory w/ Assist  []  Non-ambulatory []  Paraplegic []  Quadriplegic []    Total    Score:__1_____     Triage Score:____5____      Tri       Triage:     1. (>20) Freq: Q3    2. (16-20) Freq: Q4   3. (11-15) Freq: QID & Albuterol Q2 PRN    4. (6-10) Freq: TID & Albuterol Q2 PRN    5. (0-5) Freq Q4prn

## 2017-07-01 NOTE — PLAN OF CARE
Problem: Depressive Behavior with or without Suicide precautions  Goal: LTG-Able to verbalize acceptance of life and situations over which he or she has no control  Outcome: Ongoing  Goal: LTG-Able to verbalize and/or display a decrease in depressive symptoms  Outcome: Ongoing  Goal: STG-Knowledge of positive coping patterns  Outcome: Ongoing  Goal: STG-Participation in care planning  Outcome: Ongoing    Problem: Substance Abuse  Goal: LTG-Absence of acute withdrawl symptoms  Outcome: Ongoing  Goal: STG-Knowledge of community resources  Outcome: Ongoing

## 2017-07-01 NOTE — PROGRESS NOTES
Pt calm and cooperative, signed all consents, participated with admission assessment and states no questions or issues at this time. Pt verbalized an understanding of unit orientation. Pt denies any current suicidal ideation, homicidal ideation or hallucinations. Pt reports she is eager to get and stay sober. Pt reports she cannot continue as she is or she will die from drinking. Pt expressed stressors as financial, inability to hold a job d/t drinking, ETOH abuse, children not wanting to see her, divorce in February. Pt feels if she can get stabilized during this admission she can be dc's to live with her father in [place] where she will have a greater chance of maintaining sobriety. Pt states this is the firs time she feels she is eager and asking for help to stop drinking and change her life. Pt has no insurance and has concerns of how to get medications upon DC. Pt was non compliant with medication after last DC as she could not afford to have the medications filled.

## 2017-07-01 NOTE — PROGRESS NOTES
Patient had a flat affect and had high anxiety but she was cooperative during the leisure assessment. Patient shared openly, talk about her issues with alcohol and how she needs to get help. She has been off her medicine due to unable to afford to buy them. She is depressed but denies feeling suicidal.  She did take an overdose of Seroquel but stated this was not a suicide attempt but just wanted to sleep and not think. Patient does not have a home, she was staying at her boyfriend's place but want to go stay with her father. Denies any audio or visual hallucinations. Enjoys watching TV.  Electronically signed by Kamlesh Rivas, [address] on 7/1/2017 at 12:50 PM

## 2017-07-01 NOTE — PROGRESS NOTES
Pt up to desk. Alert and oriented X3. Pt requesting water. Denies any complaints. States she feels fine and that she feels confused when she first wakes up after taking Seroquel and that's what she was feeling earlier. Tremor noted in bilateral arms/hands. No other s/s of withdrawal observed by staff or verbalized by pt. Will continue to monitor.  Electronically signed by Melissa Lai RN on 6/30/17 at 11:41 PM

## 2017-07-02 LAB
ALBUMIN SERPL-MCNC: 3.6 G/DL (ref 3.9–4.9)
ALP BLD-CCNC: 57 U/L (ref 40–130)
ALT SERPL-CCNC: 21 U/L (ref 0–33)
AMMONIA: 62 UMOL/L (ref 11–51)
ANION GAP SERPL CALCULATED.3IONS-SCNC: 12 MEQ/L (ref 7–13)
AST SERPL-CCNC: 15 U/L (ref 0–35)
BILIRUB SERPL-MCNC: 0.3 MG/DL (ref 0–1.2)
BUN BLDV-MCNC: 13 MG/DL (ref 6–20)
CALCIUM SERPL-MCNC: 8.9 MG/DL (ref 8.6–10.2)
CHLORIDE BLD-SCNC: 103 MEQ/L (ref 98–107)
CO2: 20 MEQ/L (ref 22–29)
CREAT SERPL-MCNC: 0.44 MG/DL (ref 0.5–0.9)
GFR AFRICAN AMERICAN: >60
GFR NON-AFRICAN AMERICAN: >60
GLOBULIN: 2.5 G/DL (ref 2.3–3.5)
GLUCOSE BLD-MCNC: 86 MG/DL (ref 74–109)
POTASSIUM SERPL-SCNC: 3.7 MEQ/L (ref 3.5–5.1)
SODIUM BLD-SCNC: 135 MEQ/L (ref 132–144)
TOTAL PROTEIN: 6.1 G/DL (ref 6.4–8.1)

## 2017-07-02 PROCEDURE — 1240000000 HC EMOTIONAL WELLNESS R&B

## 2017-07-02 PROCEDURE — 6370000000 HC RX 637 (ALT 250 FOR IP): Performed by: INTERNAL MEDICINE

## 2017-07-02 PROCEDURE — 6370000000 HC RX 637 (ALT 250 FOR IP): Performed by: PSYCHIATRY & NEUROLOGY

## 2017-07-02 PROCEDURE — 36415 COLL VENOUS BLD VENIPUNCTURE: CPT

## 2017-07-02 PROCEDURE — 80053 COMPREHEN METABOLIC PANEL: CPT

## 2017-07-02 PROCEDURE — 82140 ASSAY OF AMMONIA: CPT

## 2017-07-02 RX ORDER — DESVENLAFAXINE 50 MG/1
50 TABLET, EXTENDED RELEASE ORAL DAILY
Status: DISCONTINUED | OUTPATIENT
Start: 2017-07-02 | End: 2017-07-07 | Stop reason: HOSPADM

## 2017-07-02 RX ORDER — THIAMINE MONONITRATE (VIT B1) 100 MG
100 TABLET ORAL DAILY
Status: DISCONTINUED | OUTPATIENT
Start: 2017-07-02 | End: 2017-07-07 | Stop reason: HOSPADM

## 2017-07-02 RX ADMIN — GABAPENTIN 300 MG: 300 CAPSULE ORAL at 20:45

## 2017-07-02 RX ADMIN — ACAMPROSATE CALCIUM 666 MG: 333 TABLET, DELAYED RELEASE ORAL at 08:30

## 2017-07-02 RX ADMIN — DIVALPROEX SODIUM 750 MG: 500 TABLET, FILM COATED, EXTENDED RELEASE ORAL at 20:45

## 2017-07-02 RX ADMIN — HYDROXYZINE PAMOATE 50 MG: 50 CAPSULE ORAL at 20:45

## 2017-07-02 RX ADMIN — QUETIAPINE FUMARATE 400 MG: 400 TABLET, EXTENDED RELEASE ORAL at 20:44

## 2017-07-02 RX ADMIN — Medication 100 MG: at 20:44

## 2017-07-02 RX ADMIN — GABAPENTIN 300 MG: 300 CAPSULE ORAL at 08:30

## 2017-07-02 RX ADMIN — TOPIRAMATE 25 MG: 25 TABLET ORAL at 08:30

## 2017-07-02 RX ADMIN — DESVENLAFAXINE SUCCINATE 50 MG: 50 TABLET, EXTENDED RELEASE ORAL at 17:43

## 2017-07-02 RX ADMIN — CHLORDIAZEPOXIDE HYDROCHLORIDE 10 MG: 10 CAPSULE ORAL at 10:38

## 2017-07-02 RX ADMIN — GABAPENTIN 300 MG: 300 CAPSULE ORAL at 14:36

## 2017-07-02 RX ADMIN — LEVOTHYROXINE SODIUM 50 MCG: 50 TABLET ORAL at 05:22

## 2017-07-02 RX ADMIN — ACAMPROSATE CALCIUM 666 MG: 333 TABLET, DELAYED RELEASE ORAL at 20:45

## 2017-07-02 RX ADMIN — ACAMPROSATE CALCIUM 666 MG: 333 TABLET, DELAYED RELEASE ORAL at 14:36

## 2017-07-02 NOTE — PROGRESS NOTES
Pt denies SI, HI and A/V hallucinations. Pt reports depression and anxiety. Pt states that she is \"always anxious\". Reports good sleep and and appetite. She continues to report desire to go to her father's house in [place] after D/C. Pt visible on the unit, mild hand tremors noted. Pt states that it is r/t her alcohol abuse and has been noticeable up to 30 days after drinking. Pt looking forward to visiting with her boyfriend this afternoon.  Electronically signed by Kavita Florence RN on 7/2/2017 at 11:48 AM

## 2017-07-02 NOTE — PROGRESS NOTES
Patient denies SI, HI, and AVH. Patient states that depression and anxiety are unchanged since yesterday. Depression continues to be 8/10 and anxiety is 5/10. Patient has concerns about discharge. Patient would like to live with father in [facility], but her father remains hesitant. Patient interested in inpatient alcohol rehab center. Patient is worried/sad in affect. Cooperative with staff.  Electronically signed by Dax Deluca LPN on 9/8/3414 at 2:48 PM

## 2017-07-03 LAB
ALBUMIN SERPL-MCNC: 3.8 G/DL (ref 3.9–4.9)
ALP BLD-CCNC: 55 U/L (ref 40–130)
ALT SERPL-CCNC: 21 U/L (ref 0–33)
AMMONIA: 53 UMOL/L (ref 11–51)
AST SERPL-CCNC: 14 U/L (ref 0–35)
BILIRUB SERPL-MCNC: 0.2 MG/DL (ref 0–1.2)
BILIRUBIN DIRECT: 0 MG/DL (ref 0–0.3)
BILIRUBIN, INDIRECT: 0.2 MG/DL (ref 0–0.6)
EKG ATRIAL RATE: 80 BPM
EKG P AXIS: 47 DEGREES
EKG P-R INTERVAL: 168 MS
EKG Q-T INTERVAL: 412 MS
EKG QRS DURATION: 82 MS
EKG QTC CALCULATION (BAZETT): 475 MS
EKG R AXIS: 49 DEGREES
EKG T AXIS: 39 DEGREES
EKG VENTRICULAR RATE: 80 BPM
FOLATE: 6.8 NG/ML (ref 7.3–26.1)
TOTAL PROTEIN: 6.2 G/DL (ref 6.4–8.1)
VITAMIN B-12: 694 PG/ML (ref 211–946)

## 2017-07-03 PROCEDURE — 6370000000 HC RX 637 (ALT 250 FOR IP): Performed by: PHYSICIAN ASSISTANT

## 2017-07-03 PROCEDURE — 80076 HEPATIC FUNCTION PANEL: CPT

## 2017-07-03 PROCEDURE — 82746 ASSAY OF FOLIC ACID SERUM: CPT

## 2017-07-03 PROCEDURE — 1240000000 HC EMOTIONAL WELLNESS R&B

## 2017-07-03 PROCEDURE — 99232 SBSQ HOSP IP/OBS MODERATE 35: CPT | Performed by: PSYCHIATRY & NEUROLOGY

## 2017-07-03 PROCEDURE — 6370000000 HC RX 637 (ALT 250 FOR IP): Performed by: PSYCHIATRY & NEUROLOGY

## 2017-07-03 PROCEDURE — 82140 ASSAY OF AMMONIA: CPT

## 2017-07-03 PROCEDURE — 82607 VITAMIN B-12: CPT

## 2017-07-03 PROCEDURE — 6370000000 HC RX 637 (ALT 250 FOR IP): Performed by: INTERNAL MEDICINE

## 2017-07-03 PROCEDURE — 36415 COLL VENOUS BLD VENIPUNCTURE: CPT

## 2017-07-03 RX ORDER — TRAZODONE HYDROCHLORIDE 50 MG/1
50 TABLET ORAL NIGHTLY PRN
Status: DISCONTINUED | OUTPATIENT
Start: 2017-07-03 | End: 2017-07-05

## 2017-07-03 RX ADMIN — QUETIAPINE FUMARATE 400 MG: 400 TABLET, EXTENDED RELEASE ORAL at 21:09

## 2017-07-03 RX ADMIN — ACAMPROSATE CALCIUM 666 MG: 333 TABLET, DELAYED RELEASE ORAL at 10:01

## 2017-07-03 RX ADMIN — GABAPENTIN 300 MG: 300 CAPSULE ORAL at 10:01

## 2017-07-03 RX ADMIN — LEVOTHYROXINE SODIUM 50 MCG: 50 TABLET ORAL at 05:55

## 2017-07-03 RX ADMIN — DIVALPROEX SODIUM 750 MG: 500 TABLET, FILM COATED, EXTENDED RELEASE ORAL at 21:09

## 2017-07-03 RX ADMIN — Medication 100 MG: at 11:02

## 2017-07-03 RX ADMIN — HYDROXYZINE PAMOATE 50 MG: 50 CAPSULE ORAL at 10:00

## 2017-07-03 RX ADMIN — DESVENLAFAXINE SUCCINATE 50 MG: 50 TABLET, EXTENDED RELEASE ORAL at 11:03

## 2017-07-03 RX ADMIN — ACAMPROSATE CALCIUM 666 MG: 333 TABLET, DELAYED RELEASE ORAL at 14:35

## 2017-07-03 RX ADMIN — GABAPENTIN 300 MG: 300 CAPSULE ORAL at 21:09

## 2017-07-03 RX ADMIN — IBUPROFEN 400 MG: 400 TABLET, FILM COATED ORAL at 07:59

## 2017-07-03 RX ADMIN — GABAPENTIN 300 MG: 300 CAPSULE ORAL at 14:35

## 2017-07-03 RX ADMIN — ACAMPROSATE CALCIUM 666 MG: 333 TABLET, DELAYED RELEASE ORAL at 21:10

## 2017-07-03 RX ADMIN — TRAZODONE HYDROCHLORIDE 50 MG: 50 TABLET ORAL at 21:20

## 2017-07-03 RX ADMIN — HYDROXYZINE PAMOATE 50 MG: 50 CAPSULE ORAL at 22:52

## 2017-07-03 RX ADMIN — HYDROXYZINE PAMOATE 50 MG: 50 CAPSULE ORAL at 16:49

## 2017-07-03 ASSESSMENT — PAIN SCALES - GENERAL: PAINLEVEL_OUTOF10: 8

## 2017-07-03 NOTE — PROGRESS NOTES
Pt. attended the 0900 community meeting.  Electronically signed by Shilpi Byrd on 7/3/2017 at 9:30 AM

## 2017-07-03 NOTE — PROGRESS NOTES
BEHAVIORAL HEALTH FOLLOW-UP NOTE     7/3/2017     Patient was seen and examined in person, Chart reviewed   Patient's case discussed with staff/team    Chief Complaint: depression    Interim History:     Report feeling depressed  Hopeless and worthless  No agitation  Anxious    Appetite:   [x] Normal/Unchanged  [] Increased  [] Decreased      Sleep:       [] Normal/Unchanged  [x] Fair       [] Poor              Energy:    [x] Normal/Unchanged  [] Increased  [] Decreased        SI [] Present  [x] Absent    HI  []Present  [x] Absent     Aggression:  [] yes  [] no    Patient is [] able  [] unable to CONTRACT FOR SAFETY     PAST MEDICAL/PSYCHIATRIC HISTORY:   Past Medical History:   Diagnosis Date    Anxiety     [facility]     Depression     [facility]     x1 only related to alcohol     Thyroid disease        FAMILY/SOCIAL HISTORY:  No family history on file. Social History     Social History    Marital status:      Spouse name: N/A    Number of children: N/A    Years of education: N/A     Occupational History    Not on file. Social History Main Topics    Smoking status: Current Every Day Smoker     Packs/day: 1.00     Years: 15.00     Types: Cigarettes    Smokeless tobacco: Never Used    Alcohol use Yes      Comment: daily drinker, liquor daily    Drug use: No    Sexual activity: Yes     Partners: Male     Other Topics Concern    Not on file     Social History Narrative           ROS:  [x] All negative/unchanged except if checked.  Explain positive(checked items) below:  [] Constitutional  [] Eyes  [] Ear/Nose/Mouth/Throat  [] Respiratory  [] CV  [] GI  []   [] Musculoskeletal  [] Skin/Breast  [] Neurological  [] Endocrine  [] Heme/Lymph  [] Allergic/Immunologic    Explanation:     MEDICATIONS:    Current Facility-Administered Medications:     traZODone (DESYREL) tablet 50 mg, 50 mg, Oral, Nightly PRN, Rutherford Primrose, MD    desvenlafaxine succinate (PRISTIQ) extended 07/02/17 1038 **OR** chlordiazePOXIDE (LIBRIUM) capsule 25 mg, 25 mg, Oral, Q4H PRN, 25 mg at 06/30/17 2042 **OR** chlordiazePOXIDE (LIBRIUM) capsule 50 mg, 50 mg, Oral, Q2H PRN **OR** chlordiazePOXIDE (LIBRIUM) capsule 75 mg, 75 mg, Oral, Q1H PRN, Raissa Johnson MD    albuterol (PROVENTIL) nebulizer solution 2.5 mg, 2.5 mg, Nebulization, Q6H PRN, Bren Ortiz PA-C      Examination:  BP (!) 105/57  Pulse 104  Temp 97 °F (36.1 °C) (Oral)   Resp 20  LMP 06/01/2017 (Approximate)  SpO2 96%  Gait - steady  Medication side effects(SE): no    Mental Status Examination:    Level of consciousness:  within normal limits   Appearance:  fair grooming and fair hygiene  Behavior/Motor:  psychomotor retardation  Attitude toward examiner:  cooperative  Speech:  slow   Mood: depressed  Affect:  anxious  Thought processes:  slow   Thought content:  Delusions:  no evidence of delusions  Cognition:  oriented to person, place, and time   Concentration poor  Insight poor   Judgement poor     ASSESSMENT:   Patient symptoms are:  [] Well controlled  [] Improving  [] Worsening  [x] No change      Diagnosis:   MDD recurrent severe    LABS:    No results for input(s): WBC, HGB, PLT in the last 72 hours.   Recent Labs      06/30/17 2037 07/01/17   0702  07/02/17   0733   NA   --   140  135   K  5.4*  3.5  3.7   CL   --   105  103   CO2   --   22  20*   BUN   --   8  13   CREATININE   --   0.37*  0.44*   GLUCOSE   --   95  86     Recent Labs      07/02/17   0733  07/03/17   0721   BILITOT  0.3  0.2   ALKPHOS  57  55   AST  15  14   ALT  21  21     Lab Results   Component Value Date    LABAMPH Neg 06/23/2017    BARBSCNU Neg 06/23/2017    LABBENZ Neg 06/23/2017    OPIATESCREENURINE Neg 06/23/2017    PHENCYCLIDINESCREENURINE Neg 06/23/2017    ETOH 19 06/28/2017     Lab Results   Component Value Date    TSH 2.430 06/22/2017     No results found for: LITHIUM  Lab Results   Component Value Date    VALPROATE 7.3 (L) 06/21/2017

## 2017-07-03 NOTE — PROGRESS NOTES
Group Therapy Note    Date: 7/03/2017  Start Time: 1100  End Time:  8917  Number of Participants: 8  Type of Group: Psychotherapy    Patient's Goal:  To obtain sobriety    Notes:  Pt vocalized wanting to get into [facility] and obtain 6mo of sobriety. Good eye contact, smiling, engages appropriately with peers. Status After Intervention:  Improved    Participation Level:  Active Listener and Interactive    Participation Quality: Appropriate, Attentive, Sharing and Supportive      Speech:  normal      Thought Process/Content: Logical  Linear      Affective Functioning: Congruent      Mood: euthymic      Level of consciousness:  Alert, Oriented x4 and Attentive      Response to Learning: Able to verbalize current knowledge/experience, Able to verbalize/acknowledge new learning, Able to retain information and Capable of insight      Endings: None Reported    Modes of Intervention: Support, Exploration and Problem-solving      Discipline Responsible: /Counselor      Signature:  Cassandra Loredo, [facility]

## 2017-07-03 NOTE — PROGRESS NOTES
Department of Psychiatry  Attending Progress Note      SUBJECTIVE:  State she feels better and is sleeping ok without trazodone  Feels depressed  Stats too many stessors to handle anything    OBJECTIVE calm, nonpressured, tears up when tallkig about losses    Physical  VITALS:  /81  Pulse 90  Temp 97 °F (36.1 °C) (Oral)   Resp 18  LMP 06/01/2017 (Approximate)  SpO2 96%  CONSTITUTIONAL:  awake, alert, cooperative, no apparent distress, and appears stated age  Mental Status Examination:  Level of consciousness:  within normal limits  Appearance:  street clothes  Behavior/Motor:  psychomotor retardation  Attitude toward examiner:  attentive and guarded  Speech:  spontaneous, normal rate and normal volume  Mood:  depressed  Affect:  blunted  Thought processes:  linear, goal directed and coherent  Thought content:  Homocidal ideation denies  Suicidal Ideation:  denies suicidal ideation  Delusions:  no evidence of delusions  Perceptual Disturbance:  denies any perceptual disturbance  Cognition:  oriented to person, place, and time    Data  Labs:    CBC:   Lab Results   Component Value Date    WBC 4.4 06/29/2017    RBC 3.69 06/29/2017    HGB 12.1 06/29/2017    HCT 35.7 06/29/2017    MCV 96.7 06/29/2017    MCH 32.9 06/29/2017    MCHC 34.0 06/29/2017    RDW 13.5 06/29/2017     06/29/2017     CMP:    Lab Results   Component Value Date     07/02/2017    K 3.7 07/02/2017     07/02/2017    CO2 20 07/02/2017    BUN 13 07/02/2017    CREATININE 0.44 07/02/2017    GFRAA >60.0 07/02/2017    LABGLOM >60.0 07/02/2017    GLUCOSE 86 07/02/2017    PROT 6.1 07/02/2017    LABALBU 3.6 07/02/2017    CALCIUM 8.9 07/02/2017    BILITOT 0.3 07/02/2017    ALKPHOS 57 07/02/2017    AST 15 07/02/2017    ALT 21 07/02/2017     Hepatic Function Panel:    Lab Results   Component Value Date    ALKPHOS 57 07/02/2017    ALT 21 07/02/2017    AST 15 07/02/2017    PROT 6.1 07/02/2017    BILITOT 0.3 07/02/2017    BILIDIR 0.0

## 2017-07-03 NOTE — PLAN OF CARE
Problem: Depressive Behavior with or without Suicide precautions  Goal: LTG-Able to verbalize acceptance of life and situations over which he or she has no control  Outcome: Ongoing  Goal: LTG-Able to verbalize and/or display a decrease in depressive symptoms  Outcome: Ongoing  Goal: STG-Knowledge of positive coping patterns  Outcome: Met This Shift  Goal: STG-Participation in care planning  Outcome: Met This Shift

## 2017-07-03 NOTE — PLAN OF CARE
Problem: Depressive Behavior with or without Suicide precautions  Goal: LTG-Able to verbalize acceptance of life and situations over which he or she has no control  Outcome: Ongoing  Goal: LTG-Able to verbalize and/or display a decrease in depressive symptoms  Outcome: Ongoing  Goal: STG-Knowledge of positive coping patterns  Outcome: Ongoing  Goal: STG-Participation in care planning  Outcome: Ongoing    Problem: Substance Abuse  Goal: LTG-Absence of acute withdrawl symptoms  Outcome: Met This Shift  Goal: STG-Knowledge of community resources  Outcome: Ongoing

## 2017-07-04 LAB — AMMONIA: 52 UMOL/L (ref 11–51)

## 2017-07-04 PROCEDURE — 36415 COLL VENOUS BLD VENIPUNCTURE: CPT

## 2017-07-04 PROCEDURE — 6370000000 HC RX 637 (ALT 250 FOR IP): Performed by: PSYCHIATRY & NEUROLOGY

## 2017-07-04 PROCEDURE — 6370000000 HC RX 637 (ALT 250 FOR IP): Performed by: INTERNAL MEDICINE

## 2017-07-04 PROCEDURE — 6370000000 HC RX 637 (ALT 250 FOR IP): Performed by: PHYSICIAN ASSISTANT

## 2017-07-04 PROCEDURE — 1240000000 HC EMOTIONAL WELLNESS R&B

## 2017-07-04 PROCEDURE — 82140 ASSAY OF AMMONIA: CPT

## 2017-07-04 RX ADMIN — ACAMPROSATE CALCIUM 666 MG: 333 TABLET, DELAYED RELEASE ORAL at 21:59

## 2017-07-04 RX ADMIN — GABAPENTIN 300 MG: 300 CAPSULE ORAL at 09:02

## 2017-07-04 RX ADMIN — TRAZODONE HYDROCHLORIDE 50 MG: 50 TABLET ORAL at 22:00

## 2017-07-04 RX ADMIN — ACAMPROSATE CALCIUM 666 MG: 333 TABLET, DELAYED RELEASE ORAL at 14:02

## 2017-07-04 RX ADMIN — IBUPROFEN 400 MG: 400 TABLET, FILM COATED ORAL at 22:00

## 2017-07-04 RX ADMIN — DESVENLAFAXINE SUCCINATE 50 MG: 50 TABLET, EXTENDED RELEASE ORAL at 09:02

## 2017-07-04 RX ADMIN — GABAPENTIN 300 MG: 300 CAPSULE ORAL at 14:02

## 2017-07-04 RX ADMIN — GABAPENTIN 300 MG: 300 CAPSULE ORAL at 22:00

## 2017-07-04 RX ADMIN — Medication 100 MG: at 09:02

## 2017-07-04 RX ADMIN — QUETIAPINE FUMARATE 400 MG: 400 TABLET, EXTENDED RELEASE ORAL at 22:00

## 2017-07-04 RX ADMIN — ACAMPROSATE CALCIUM 666 MG: 333 TABLET, DELAYED RELEASE ORAL at 09:01

## 2017-07-04 RX ADMIN — DIVALPROEX SODIUM 750 MG: 500 TABLET, FILM COATED, EXTENDED RELEASE ORAL at 22:00

## 2017-07-04 RX ADMIN — LEVOTHYROXINE SODIUM 50 MCG: 50 TABLET ORAL at 06:11

## 2017-07-04 RX ADMIN — HYDROXYZINE PAMOATE 50 MG: 50 CAPSULE ORAL at 22:00

## 2017-07-04 RX ADMIN — HYDROXYZINE PAMOATE 50 MG: 50 CAPSULE ORAL at 09:40

## 2017-07-04 ASSESSMENT — PAIN SCALES - GENERAL: PAINLEVEL_OUTOF10: 4

## 2017-07-04 NOTE — PROGRESS NOTES
Pt asks for Vistaril with am meds. Vistaril given.  Electronically signed by Domingo Tim LPN on 1/0/2731 at 94:97 AM

## 2017-07-04 NOTE — PROGRESS NOTES
Pt. refused to attend the 1000 skills group, despite staff encouragement. Electronically signed by Tani Topete [address] on 7/4/2017 at 11:46 AM

## 2017-07-05 PROCEDURE — 6370000000 HC RX 637 (ALT 250 FOR IP): Performed by: INTERNAL MEDICINE

## 2017-07-05 PROCEDURE — 6370000000 HC RX 637 (ALT 250 FOR IP): Performed by: PSYCHIATRY & NEUROLOGY

## 2017-07-05 PROCEDURE — 6370000000 HC RX 637 (ALT 250 FOR IP): Performed by: PHYSICIAN ASSISTANT

## 2017-07-05 PROCEDURE — 99232 SBSQ HOSP IP/OBS MODERATE 35: CPT | Performed by: PSYCHIATRY & NEUROLOGY

## 2017-07-05 PROCEDURE — 1240000000 HC EMOTIONAL WELLNESS R&B

## 2017-07-05 RX ORDER — TRAZODONE HYDROCHLORIDE 100 MG/1
100 TABLET ORAL NIGHTLY PRN
Status: DISCONTINUED | OUTPATIENT
Start: 2017-07-05 | End: 2017-07-07 | Stop reason: HOSPADM

## 2017-07-05 RX ADMIN — HYDROXYZINE PAMOATE 50 MG: 50 CAPSULE ORAL at 21:42

## 2017-07-05 RX ADMIN — ACETAMINOPHEN 650 MG: 325 TABLET ORAL at 09:18

## 2017-07-05 RX ADMIN — QUETIAPINE FUMARATE 400 MG: 400 TABLET, EXTENDED RELEASE ORAL at 21:42

## 2017-07-05 RX ADMIN — ACAMPROSATE CALCIUM 666 MG: 333 TABLET, DELAYED RELEASE ORAL at 21:42

## 2017-07-05 RX ADMIN — DESVENLAFAXINE SUCCINATE 50 MG: 50 TABLET, EXTENDED RELEASE ORAL at 09:18

## 2017-07-05 RX ADMIN — TRAZODONE HYDROCHLORIDE 100 MG: 100 TABLET ORAL at 21:43

## 2017-07-05 RX ADMIN — GABAPENTIN 300 MG: 300 CAPSULE ORAL at 14:42

## 2017-07-05 RX ADMIN — ACAMPROSATE CALCIUM 666 MG: 333 TABLET, DELAYED RELEASE ORAL at 14:42

## 2017-07-05 RX ADMIN — GABAPENTIN 300 MG: 300 CAPSULE ORAL at 21:42

## 2017-07-05 RX ADMIN — IBUPROFEN 400 MG: 400 TABLET, FILM COATED ORAL at 09:17

## 2017-07-05 RX ADMIN — LEVOTHYROXINE SODIUM 50 MCG: 50 TABLET ORAL at 06:36

## 2017-07-05 RX ADMIN — DIVALPROEX SODIUM 750 MG: 500 TABLET, FILM COATED, EXTENDED RELEASE ORAL at 21:42

## 2017-07-05 RX ADMIN — HYDROXYZINE PAMOATE 50 MG: 50 CAPSULE ORAL at 09:17

## 2017-07-05 RX ADMIN — GABAPENTIN 300 MG: 300 CAPSULE ORAL at 09:18

## 2017-07-05 RX ADMIN — ACAMPROSATE CALCIUM 666 MG: 333 TABLET, DELAYED RELEASE ORAL at 09:17

## 2017-07-05 RX ADMIN — Medication 100 MG: at 09:18

## 2017-07-05 RX ADMIN — IBUPROFEN 400 MG: 400 TABLET, FILM COATED ORAL at 21:43

## 2017-07-05 ASSESSMENT — PAIN SCALES - GENERAL
PAINLEVEL_OUTOF10: 8
PAINLEVEL_OUTOF10: 6
PAINLEVEL_OUTOF10: 6

## 2017-07-05 NOTE — PROGRESS NOTES
Pt requested and given vistaril 50 mg PO, trazodone 50 mg PO, and ibuprofen 400 mg PO for c/o anxiety/restlessness, insomnia, generalized aches.

## 2017-07-05 NOTE — PROGRESS NOTES
Group Therapy Note    Date: 7/5/2017  Start Time: 1000  End Time:  1705  Number of Participants: 7    Type of Group: Psychoeducation    Wellness Binder Information  Module Name:   Session Number:     Patient's Goal:  \"Work on going home. \"    Notes:  Patient was calm, more talkative and work fairly on her project. Status After Intervention:  Improved    Participation Level:  Active Listener    Participation Quality: Appropriate and Attentive      Speech:  normal      Thought Process/Content: Linear      Affective Functioning: Congruent      Mood: calm      Level of consciousness:  Alert      Response to Learning: Able to verbalize current knowledge/experience and Progressing to goal      Endings: None Reported    Modes of Intervention: Education, Socialization and Activity      Discipline Responsible: Psychoeducational Specialist      Signature:  Eva Lyons

## 2017-07-05 NOTE — PROGRESS NOTES
BEHAVIORAL HEALTH FOLLOW-UP NOTE     7/5/2017     Patient was seen and examined in person, Chart reviewed   Patient's case discussed with staff/team    Chief Complaint: depression    Interim History:     Pt report that her dad will not have her back. Want her to go to rehab. 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   Pt is positive about getting a job on discharge  Pt prefer to return back to her BF  Not showing poor motivation to make phone calls  Pt is drug seeking regarding her sleeping meds  Appetite:   [x] Normal/Unchanged  [] Increased  [] Decreased      Sleep:       [] Normal/Unchanged  [x] Fair       [] Poor              Energy:    [x] Normal/Unchanged  [] Increased  [] Decreased        SI [] Present  [x] Absent    HI  []Present  [x] Absent     Aggression:  [] yes  [] no    Patient is [] able  [] unable to CONTRACT FOR SAFETY     PAST MEDICAL/PSYCHIATRIC HISTORY:   Past Medical History:   Diagnosis Date    Anxiety     [facility]     Depression     Seizures ([facility]     x1 only related to alcohol     Thyroid disease        FAMILY/SOCIAL HISTORY:  No family history on file. Social History     Social History    Marital status:      Spouse name: N/A    Number of children: N/A    Years of education: N/A     Occupational History    Not on file. Social History Main Topics    Smoking status: Current Every Day Smoker     Packs/day: 1.00     Years: 15.00     Types: Cigarettes    Smokeless tobacco: Never Used    Alcohol use Yes      Comment: daily drinker, liquor daily    Drug use: No    Sexual activity: Yes     Partners: Male     Other Topics Concern    Not on file     Social History Narrative           ROS:  [x] All negative/unchanged except if checked.  Explain positive(checked items) below:  [] Constitutional  [] Eyes  [] Ear/Nose/Mouth/Throat  [] Respiratory  [] CV  [] GI  []   [] Musculoskeletal  [] Skin/Breast  [] Neurological  [] Endocrine  [] Heme/Lymph  [] Allergic/Immunologic    Explanation:     MEDICATIONS:    Current Facility-Administered Medications:     traZODone (DESYREL) tablet 50 mg, 50 mg, Oral, Nightly PRN, Tim Rausch MD, 50 mg at 07/04/17 2200    desvenlafaxine succinate (PRISTIQ) extended release tablet 50 mg, 50 mg, Oral, Daily, Elijah Vila MD, 50 mg at 07/05/17 0918    vitamin B-1 (THIAMINE) tablet 100 mg, 100 mg, Oral, Daily, Elijah Vila MD, 100 mg at 07/05/17 0918    nicotine (NICODERM CQ) 21 MG/24HR 1 patch, 1 patch, Transdermal, Daily, Elijah Vila MD, 1 patch at 07/05/17 0919    lactulose SOLN 20 g, 20 g, Oral, TID, Valentina Harding PA-C, 20 g at 07/01/17 2055    ibuprofen (ADVIL;MOTRIN) tablet 400 mg, 400 mg, Oral, Q6H PRN, John Mcclendon PA-C, 400 mg at 07/05/17 0917    acetaminophen (TYLENOL) tablet 650 mg, 650 mg, Oral, Q4H PRN, Tim Rausch MD, 650 mg at 07/05/17 0918    aluminum & magnesium hydroxide-simethicone (MAALOX) 200-200-20 MG/5ML suspension 30 mL, 30 mL, Oral, PRN, Tim Rausch MD    benztropine mesylate (COGENTIN) injection 2 mg, 2 mg, Intramuscular, BID PRN, Tmi Rausch MD    haloperidol lactate (HALDOL) injection 5 mg, 5 mg, Intramuscular, Q4H PRN, Tim Rausch MD    hydrOXYzine (VISTARIL) capsule 50 mg, 50 mg, Oral, Q6H PRN, Tim Rausch MD, 50 mg at 07/05/17 0917    magnesium hydroxide (MILK OF MAGNESIA) 400 MG/5ML suspension 30 mL, 30 mL, Oral, Daily PRN, Tim Rausch MD    levothyroxine (SYNTHROID) tablet 50 mcg, 50 mcg, Oral, Daily, Rosemary Mcdowell MD, 50 mcg at 07/05/17 0636    acamprosate (CAMPRAL) tablet 666 mg, 666 mg, Oral, TID, Tim Rausch MD, 666 mg at 07/05/17 0917    gabapentin (NEURONTIN) capsule 300 mg, 300 mg, Oral, TID, Tim Rausch MD, 300 mg at 07/05/17 0918    QUEtiapine (SEROQUEL XR) extended release tablet 400 mg, 400 mg, Oral, normal  Increase trazodone  VPA level tomorrow  Risks, benefits, side effects, drug-to-drug interactions and alternatives to treatment were discussed. Collateral information  CD evaluation  Encourage patient to attend group and other milieu activities.   Discharge planning discussed with the patient and treatment team.    PSYCHOTHERAPY/COUNSELING:  [x] Therapeutic interview  [x] Supportive  [] CBT  [] Ongoing  [] Other    [x] Patient continues to need, on a daily basis, active treatment furnished directly by or requiring the supervision of inpatient psychiatric personnel      Anticipated Length of stay:    Reason for more than one antipsychotic:  [x] N/A  [] 3 failed monotherapy(drugs tried):  [] Cross over to a new antipsychotic  [] Taper to monotherapy from polypharmacy  [] Augmentation of Clozapine therapy due to treatment resistance to single therapy          Electronically signed by Blu Ruiz MD on 7/5/2017 at 10:59 AM

## 2017-07-05 NOTE — PROGRESS NOTES
Pt observed laughing and social with peers on unit throughout evening. During assessment, pt appears sad and reporting depression and anxiety. Pt states she would like her Seroquel XR changed back to her previous Seroquel (immediate release)  \"because it knocks me out faster\". Pt denies SI, HI, or AV hallucinations.

## 2017-07-05 NOTE — PROGRESS NOTES
Pt. attended the 0900 community meeting. Electronically signed by Thee Lane, [facility] on 7/5/2017 at 9:41 AM

## 2017-07-05 NOTE — PROGRESS NOTES
Patient denies SI, HI, and AVH. Depression and anxiety are 8/10. Patient has concerns about discharge because her father informed her that she would not be able to stay with him in [place]. The alternative is going back to live with her boyfriend that drinks. Boyfriend promises that he will not drink when she returns. Patient also has concerns about prescriptions. Patient does not have insurance and has concerns as to how she will get these medications once discharged. Patient has been visible on unit throughout shift. Social with select peers. Patient polite and cooperative with staff.  Electronically signed by Esther Bedoya LPN on 5/1/6145 at 8:92 PM

## 2017-07-05 NOTE — PROGRESS NOTES
Group Therapy Note    Date: 7/05/2017  Start Time: 1100  End Time:  2296  Number of Participants: 7  Type of Group: Psychotherapy    Patient's Goal:  To be sober    Notes:  Pt shared barriers to follow-up tx and was confronted and encouraged to seek sober environments by MHP and peers. Education provided on community resources. Status After Intervention:  Improved    Participation Level:  Active Listener and Interactive    Participation Quality: Appropriate, Attentive, Sharing and Supportive      Speech:  normal      Thought Process/Content: Logical  Linear      Affective Functioning: Congruent      Mood: euthymic      Level of consciousness:  Alert, Oriented x4 and Attentive      Response to Learning: Able to verbalize current knowledge/experience, Able to verbalize/acknowledge new learning, Able to retain information, Capable of insight and Resistant      Endings: None Reported    Modes of Intervention: Support, Exploration, Problem-solving and Confrontation      Discipline Responsible: /Counselor      Signature:  Sis Nava 54

## 2017-07-06 LAB — VALPROIC ACID LEVEL: 48 UG/ML (ref 50–100)

## 2017-07-06 PROCEDURE — 90833 PSYTX W PT W E/M 30 MIN: CPT | Performed by: PSYCHIATRY & NEUROLOGY

## 2017-07-06 PROCEDURE — 80164 ASSAY DIPROPYLACETIC ACD TOT: CPT

## 2017-07-06 PROCEDURE — 6370000000 HC RX 637 (ALT 250 FOR IP): Performed by: PHYSICIAN ASSISTANT

## 2017-07-06 PROCEDURE — 6370000000 HC RX 637 (ALT 250 FOR IP): Performed by: INTERNAL MEDICINE

## 2017-07-06 PROCEDURE — 6370000000 HC RX 637 (ALT 250 FOR IP): Performed by: PSYCHIATRY & NEUROLOGY

## 2017-07-06 PROCEDURE — 36415 COLL VENOUS BLD VENIPUNCTURE: CPT

## 2017-07-06 PROCEDURE — 1240000000 HC EMOTIONAL WELLNESS R&B

## 2017-07-06 PROCEDURE — 99231 SBSQ HOSP IP/OBS SF/LOW 25: CPT | Performed by: PSYCHIATRY & NEUROLOGY

## 2017-07-06 RX ADMIN — DIVALPROEX SODIUM 750 MG: 500 TABLET, FILM COATED, EXTENDED RELEASE ORAL at 21:25

## 2017-07-06 RX ADMIN — IBUPROFEN 400 MG: 400 TABLET, FILM COATED ORAL at 08:23

## 2017-07-06 RX ADMIN — ACAMPROSATE CALCIUM 666 MG: 333 TABLET, DELAYED RELEASE ORAL at 14:38

## 2017-07-06 RX ADMIN — GABAPENTIN 300 MG: 300 CAPSULE ORAL at 21:26

## 2017-07-06 RX ADMIN — ACAMPROSATE CALCIUM 666 MG: 333 TABLET, DELAYED RELEASE ORAL at 08:23

## 2017-07-06 RX ADMIN — QUETIAPINE FUMARATE 400 MG: 400 TABLET, EXTENDED RELEASE ORAL at 21:25

## 2017-07-06 RX ADMIN — DESVENLAFAXINE SUCCINATE 50 MG: 50 TABLET, EXTENDED RELEASE ORAL at 08:23

## 2017-07-06 RX ADMIN — GABAPENTIN 300 MG: 300 CAPSULE ORAL at 08:23

## 2017-07-06 RX ADMIN — TRAZODONE HYDROCHLORIDE 100 MG: 100 TABLET ORAL at 21:25

## 2017-07-06 RX ADMIN — HYDROXYZINE PAMOATE 50 MG: 50 CAPSULE ORAL at 21:25

## 2017-07-06 RX ADMIN — GABAPENTIN 300 MG: 300 CAPSULE ORAL at 14:35

## 2017-07-06 RX ADMIN — HYDROXYZINE PAMOATE 50 MG: 50 CAPSULE ORAL at 08:24

## 2017-07-06 RX ADMIN — LEVOTHYROXINE SODIUM 50 MCG: 50 TABLET ORAL at 05:57

## 2017-07-06 RX ADMIN — Medication 100 MG: at 08:23

## 2017-07-06 RX ADMIN — ACETAMINOPHEN 650 MG: 325 TABLET ORAL at 21:25

## 2017-07-06 ASSESSMENT — PAIN SCALES - GENERAL
PAINLEVEL_OUTOF10: 6
PAINLEVEL_OUTOF10: 6

## 2017-07-06 NOTE — PROGRESS NOTES
BEHAVIORAL HEALTH FOLLOW-UP NOTE     7/6/2017     Patient was seen and examined in person, Chart reviewed   Patient's case discussed with staff/team    Chief Complaint: depression, addiction    Interim History:     Pt report that she is focused on getting job than getting to rehab  Mood getting better  Less anxious  Less hopeless and worthless  Not suicidal thoughts  BF has been visiting her   Planning to stay with him on discharge  Don't want us to call her dad. Appetite:   [x] Normal/Unchanged  [] Increased  [] Decreased      Sleep:       [] Normal/Unchanged  [x] Fair       [] Poor              Energy:    [x] Normal/Unchanged  [] Increased  [] Decreased        SI [] Present  [x] Absent    HI  []Present  [x] Absent     Aggression:  [] yes  [x] no    Patient is [x] able  [] unable to CONTRACT FOR SAFETY     PAST MEDICAL/PSYCHIATRIC HISTORY:   Past Medical History:   Diagnosis Date    Anxiety     Bipolar 1 disorder ([facility]     Depression     Seizures ([facility]     x1 only related to alcohol     Thyroid disease        FAMILY/SOCIAL HISTORY:  No family history on file. Social History     Social History    Marital status:      Spouse name: N/A    Number of children: N/A    Years of education: N/A     Occupational History    Not on file. Social History Main Topics    Smoking status: Current Every Day Smoker     Packs/day: 1.00     Years: 15.00     Types: Cigarettes    Smokeless tobacco: Never Used    Alcohol use Yes      Comment: daily drinker, liquor daily    Drug use: No    Sexual activity: Yes     Partners: Male     Other Topics Concern    Not on file     Social History Narrative           ROS:  [x] All negative/unchanged except if checked.  Explain positive(checked items) below:  [] Constitutional  [] Eyes  [] Ear/Nose/Mouth/Throat  [] Respiratory  [] CV  [] GI  []   [] Musculoskeletal  [] Skin/Breast  [] Neurological  [] Endocrine  [] Heme/Lymph  [] Allergic/Immunologic    Explanation: MEDICATIONS:    Current Facility-Administered Medications:     traZODone (DESYREL) tablet 100 mg, 100 mg, Oral, Nightly PRN, Yovany Miguel MD, 100 mg at 07/05/17 2143    desvenlafaxine succinate (PRISTIQ) extended release tablet 50 mg, 50 mg, Oral, Daily, Elijah Vila MD, 50 mg at 07/06/17 0823    vitamin B-1 (THIAMINE) tablet 100 mg, 100 mg, Oral, Daily, Elijah Vila MD, 100 mg at 07/06/17 0823    nicotine (NICODERM CQ) 21 MG/24HR 1 patch, 1 patch, Transdermal, Daily, Major Valera MD, 1 patch at 07/05/17 0919    acetaminophen (TYLENOL) tablet 650 mg, 650 mg, Oral, Q4H PRN, Yovany Miguel MD, 650 mg at 07/05/17 0918    aluminum & magnesium hydroxide-simethicone (MAALOX) 200-200-20 MG/5ML suspension 30 mL, 30 mL, Oral, PRN, Yovany Miguel MD    benztropine mesylate (COGENTIN) injection 2 mg, 2 mg, Intramuscular, BID PRN, Yovany Miguel MD    haloperidol lactate (HALDOL) injection 5 mg, 5 mg, Intramuscular, Q4H PRN, Yovany Miguel MD    hydrOXYzine (VISTARIL) capsule 50 mg, 50 mg, Oral, Q6H PRN, Yovany Miguel MD, 50 mg at 07/06/17 0824    magnesium hydroxide (MILK OF MAGNESIA) 400 MG/5ML suspension 30 mL, 30 mL, Oral, Daily PRN, Yovany Miguel MD    levothyroxine (SYNTHROID) tablet 50 mcg, 50 mcg, Oral, Daily, Marysol Diaz MD, 50 mcg at 07/06/17 0557    acamprosate (CAMPRAL) tablet 666 mg, 666 mg, Oral, TID, Yovany Miguel MD, 666 mg at 07/06/17 2851    gabapentin (NEURONTIN) capsule 300 mg, 300 mg, Oral, TID, Yovany Miguel MD, 300 mg at 07/06/17 0823    QUEtiapine (SEROQUEL XR) extended release tablet 400 mg, 400 mg, Oral, Nightly, Yovany Miguel MD, 400 mg at 07/05/17 2142    divalproex (DEPAKOTE ER) extended release tablet 750 mg, 750 mg, Oral, Nightly, Yovany Miguel MD, 750 mg at 07/05/17 2142    albuterol (PROVENTIL) nebulizer solution 2.5 mg, 2.5 mg, Nebulization, Q6H PRN, Zenaida Steinberg PA-C      Examination:  BP (!)

## 2017-07-06 NOTE — PROGRESS NOTES
Pt. declined to attend the 0900 community meeting, despite staff encouragement. Electronically signed by Karma Ramos [address] on 7/6/2017 at 9:28 AM

## 2017-07-06 NOTE — PROGRESS NOTES
Group Therapy Note    Date: 7/06/2017  Start Time: 1100  End Time:  7945  Number of Participants:   Type of Group: Psychotherapy    Patient's Goal:  To discharge home    Notes:  Pt shared excuses to follow-up tx, does not appear committed to sobriety or follow-up tx. Engaged with prompting. Status After Intervention:  Unchanged    Participation Level:  Active Listener    Participation Quality: Appropriate, Attentive and Sharing      Speech:  normal      Thought Process/Content: Linear      Affective Functioning: Congruent      Mood: dysphoric      Level of consciousness:  Alert, Oriented x4 and Attentive      Response to Learning: Able to verbalize current knowledge/experience, Able to verbalize/acknowledge new learning, Able to retain information and Capable of insight      Endings: None Reported    Modes of Intervention: Support and Exploration      Discipline Responsible: /Counselor      Signature:  Manuel Michel, [facility]

## 2017-07-07 VITALS
OXYGEN SATURATION: 98 % | TEMPERATURE: 97 F | RESPIRATION RATE: 22 BRPM | HEART RATE: 85 BPM | SYSTOLIC BLOOD PRESSURE: 105 MMHG | DIASTOLIC BLOOD PRESSURE: 67 MMHG

## 2017-07-07 PROCEDURE — 6370000000 HC RX 637 (ALT 250 FOR IP): Performed by: PSYCHIATRY & NEUROLOGY

## 2017-07-07 PROCEDURE — 6370000000 HC RX 637 (ALT 250 FOR IP): Performed by: INTERNAL MEDICINE

## 2017-07-07 PROCEDURE — 99238 HOSP IP/OBS DSCHRG MGMT 30/<: CPT | Performed by: PSYCHIATRY & NEUROLOGY

## 2017-07-07 RX ORDER — QUETIAPINE 400 MG/1
400 TABLET, FILM COATED, EXTENDED RELEASE ORAL NIGHTLY
Qty: 15 TABLET | Refills: 1 | Status: SHIPPED | OUTPATIENT
Start: 2017-07-07 | End: 2017-07-07

## 2017-07-07 RX ORDER — DIVALPROEX SODIUM 250 MG/1
750 TABLET, EXTENDED RELEASE ORAL NIGHTLY
Qty: 45 TABLET | Refills: 1 | Status: SHIPPED | OUTPATIENT
Start: 2017-07-07 | End: 2017-07-07

## 2017-07-07 RX ORDER — DESVENLAFAXINE 50 MG/1
50 TABLET, EXTENDED RELEASE ORAL DAILY
Qty: 7 TABLET | Refills: 0 | Status: SHIPPED | OUTPATIENT
Start: 2017-07-07 | End: 2018-03-03 | Stop reason: SDDI

## 2017-07-07 RX ORDER — GABAPENTIN 300 MG/1
300 CAPSULE ORAL 3 TIMES DAILY
Qty: 45 CAPSULE | Refills: 1 | Status: SHIPPED | OUTPATIENT
Start: 2017-07-07 | End: 2017-07-07

## 2017-07-07 RX ORDER — GABAPENTIN 300 MG/1
300 CAPSULE ORAL 3 TIMES DAILY
Qty: 21 CAPSULE | Refills: 0 | Status: SHIPPED | OUTPATIENT
Start: 2017-07-07 | End: 2018-03-03 | Stop reason: SDDI

## 2017-07-07 RX ORDER — DESVENLAFAXINE 50 MG/1
50 TABLET, EXTENDED RELEASE ORAL DAILY
Qty: 15 TABLET | Refills: 1 | Status: SHIPPED | OUTPATIENT
Start: 2017-07-07 | End: 2017-07-07

## 2017-07-07 RX ORDER — TRAZODONE HYDROCHLORIDE 100 MG/1
100 TABLET ORAL NIGHTLY PRN
Qty: 7 TABLET | Refills: 0 | Status: SHIPPED | OUTPATIENT
Start: 2017-07-07 | End: 2018-03-03 | Stop reason: SDDI

## 2017-07-07 RX ORDER — DIVALPROEX SODIUM 250 MG/1
750 TABLET, EXTENDED RELEASE ORAL NIGHTLY
Qty: 21 TABLET | Refills: 0 | Status: SHIPPED | OUTPATIENT
Start: 2017-07-07 | End: 2018-03-03 | Stop reason: SDDI

## 2017-07-07 RX ORDER — QUETIAPINE 400 MG/1
400 TABLET, FILM COATED, EXTENDED RELEASE ORAL NIGHTLY
Qty: 7 TABLET | Refills: 0 | Status: SHIPPED | OUTPATIENT
Start: 2017-07-07 | End: 2018-03-03 | Stop reason: SDDI

## 2017-07-07 RX ORDER — ALBUTEROL SULFATE 2.5 MG/3ML
2.5 SOLUTION RESPIRATORY (INHALATION) EVERY 6 HOURS PRN
COMMUNITY
Start: 2017-07-07 | End: 2018-03-03 | Stop reason: ALTCHOICE

## 2017-07-07 RX ORDER — TRAZODONE HYDROCHLORIDE 100 MG/1
100 TABLET ORAL NIGHTLY PRN
Qty: 15 TABLET | Refills: 1 | Status: SHIPPED | OUTPATIENT
Start: 2017-07-07 | End: 2017-07-07

## 2017-07-07 RX ORDER — ACAMPROSATE CALCIUM 333 MG/1
666 TABLET, DELAYED RELEASE ORAL 3 TIMES DAILY
Qty: 90 TABLET | Refills: 3 | COMMUNITY
Start: 2017-07-07 | End: 2018-03-03 | Stop reason: SDDI

## 2017-07-07 RX ADMIN — HYDROXYZINE PAMOATE 50 MG: 50 CAPSULE ORAL at 08:05

## 2017-07-07 RX ADMIN — GABAPENTIN 300 MG: 300 CAPSULE ORAL at 08:05

## 2017-07-07 RX ADMIN — LEVOTHYROXINE SODIUM 50 MCG: 50 TABLET ORAL at 06:06

## 2017-07-07 RX ADMIN — DESVENLAFAXINE SUCCINATE 50 MG: 50 TABLET, EXTENDED RELEASE ORAL at 08:05

## 2017-07-07 RX ADMIN — Medication 100 MG: at 08:05

## 2017-07-07 NOTE — PROGRESS NOTES
Activity Group Note-  Allyssa participated in 10 am activity group. She engaged with other group members. She spoke of pending discharge.

## 2017-07-07 NOTE — DISCHARGE INSTR - DIET

## 2017-07-07 NOTE — PLAN OF CARE
Problem: Depressive Behavior with or without Suicide precautions  Goal: LTG-Able to verbalize acceptance of life and situations over which he or she has no control  Outcome: Ongoing  Goal: LTG-Able to verbalize and/or display a decrease in depressive symptoms  Outcome: Ongoing  Goal: STG-Knowledge of positive coping patterns  Outcome: Ongoing  Goal: STG-Participation in care planning  Outcome: Ongoing

## 2017-07-07 NOTE — PROGRESS NOTES
Pt requests Vistaril with morning meds, vistaril given.  Electronically signed by Penny Najjar, LPN on 7/6/6688 at 8:17 AM

## 2017-07-07 NOTE — PROGRESS NOTES
BEHAVIORAL HEALTH FOLLOW-UP NOTE     7/7/2017     Patient was seen and examined in person, Chart reviewed   Patient's case discussed with staff/team    Chief Complaint: depression, Addiction    Interim History:     Patient has been feeling better. Significant progress in the symptoms since admission. Mood better, with the score of 2/10  No AVH or paranoid thoughts  No Hopeless or worthless feeling  No active SI/HI  Planning to look for a job      Appetite:  [x] Normal  [] Increased  [] Decreased      Sleep:       [x] Normal  [] Fair       [] Poor              Energy:    [x] Normal  [] Increased  [] Decreased        SI [] Present  [x] Absent    HI  []Present  [x] Absent     Aggression:  [] yes  [] no    Patient is [x] able  [] unable to CONTRACT FOR SAFETY     Medication side effects(SE):  [x] None(Psych. Meds.) [] Other      PAST MEDICAL/PSYCHIATRIC HISTORY:   Past Medical History:   Diagnosis Date    Anxiety     Bipolar 1 disorder ([facility]     Depression     Seizures ([facility]     x1 only related to alcohol     Thyroid disease        FAMILY/SOCIAL HISTORY:  No family history on file. Social History     Social History    Marital status:      Spouse name: N/A    Number of children: N/A    Years of education: N/A     Occupational History    Not on file. Social History Main Topics    Smoking status: Current Every Day Smoker     Packs/day: 1.00     Years: 15.00     Types: Cigarettes    Smokeless tobacco: Never Used    Alcohol use Yes      Comment: daily drinker, liquor daily    Drug use: No    Sexual activity: Yes     Partners: Male     Other Topics Concern    Not on file     Social History Narrative           ROS:  [x] All negative/unchanged except if checked.  Explain positive(checked items) below:  [] Constitutional  [] Eyes  [] Ear/Nose/Mouth/Throat  [] Respiratory  [] CV  [] GI  []   [] Musculoskeletal  [] Skin/Breast  [] Neurological  [] Endocrine  [] Heme/Lymph  [] Harvinder Vargas PA-C      Examination:  /67  Pulse 85  Temp 97 °F (36.1 °C) (Oral)   Resp 22  LMP 06/01/2017 (Approximate)  SpO2 98%  Gait - steady    Mental Status Examination:    Level of consciousness:  within normal limits   Appearance:  well-appearing  Behavior/Motor:  no abnormalities noted  Attitude toward examiner:  attentive and good eye contact  Speech:  spontaneous, normal rate and normal volume   Mood: euthymic  Affect:  mood congruent  Thought processes:  linear and goal directed   Thought content:  Suicidal Ideation:  denies suicidal ideation  Delusions:  no evidence of delusions  Perceptual Disturbance:  denies any perceptual disturbance  Cognition:  oriented to person, place, and time   Concentration intact  Memory intact  Insight good   Judgement fair   Fund of Knowledge adequate      ASSESSMENT:  Patient symptoms are:  [] Well controlled  [] Improving  [] Worsening  [] No change      Diagnosis:  Principal Problem:    Bipolar depression ([facility]  Active Problems:    Alcohol dependence with uncomplicated withdrawal ([facility]      LABS:    No results for input(s): WBC, HGB, PLT in the last 72 hours. No results for input(s): NA, K, CL, CO2, BUN, CREATININE, GLUCOSE in the last 72 hours. No results for input(s): BILITOT, ALKPHOS, AST, ALT in the last 72 hours. Lab Results   Component Value Date    LABAMPH Neg 06/23/2017    BARBSCNU Neg 06/23/2017    LABBENZ Neg 06/23/2017    OPIATESCREENURINE Neg 06/23/2017    PHENCYCLIDINESCREENURINE Neg 06/23/2017    ETOH 19 06/28/2017     Lab Results   Component Value Date    TSH 2.430 06/22/2017     No results found for: LITHIUM  Lab Results   Component Value Date    VALPROATE 48.0 (L) 07/06/2017       RISK ASSESSMENT: Low risk for suicide and homicide. Treatment Plan:  Reviewed current Medications with the patient. Education provided on the complaince with treatment.   Risks, benefits, side effects, drug-to-drug interactions and alternatives to treatment were discussed. Encourage patient to attend outpatient follow up appointment and therapy.   Discharge planning discussed with the patient and treatment team.      PSYCHOTHERAPY/COUNSELING:  [x] Therapeutic interview  [] Supportive  [] CBT  [] Ongoing  [] Other      Anticipated Length of stay:Patient will be discharged home today       Electronically signed by Ronaldo Campbell MD on 7/7/2017 at 9:14 AM

## 2017-07-07 NOTE — CARE COORDINATION
Pt gave [facility] permission to call her boyfriend, Anuj Coppola, who she plans to live with. [facility] spoke with Anuj Coppola over the phone @[phone]. Anuj Coppola confirms that the pt will be living with him at [place]. Anuj Coppola is aware the pt is being discharged today and expresses no concerns with DC. [facility] reviewed the crisis hotline and calling 911 in case of emergency or concern. Anuj Coppola confirms that the pt has no access to weapons in his home and he commits to monitoring and securing medication.  Electronically signed by Charmayne Durie, LPC on 7/7/2017 at 9:39 AM

## 2017-07-12 ENCOUNTER — HOSPITAL ENCOUNTER (EMERGENCY)
Age: 37
Discharge: HOME | End: 2017-07-12
Payer: MEDICAID

## 2017-07-12 VITALS
HEIGHT: 62 IN | SYSTOLIC BLOOD PRESSURE: 116 MMHG | HEART RATE: 70 BPM | BODY MASS INDEX: 23.92 KG/M2 | TEMPERATURE: 98.2 F | WEIGHT: 130 LBS | OXYGEN SATURATION: 97 % | RESPIRATION RATE: 18 BRPM | DIASTOLIC BLOOD PRESSURE: 76 MMHG

## 2017-07-12 DIAGNOSIS — F10.10 ALCOHOL ABUSE: Primary | ICD-10-CM

## 2017-07-12 DIAGNOSIS — F10.230 ALCOHOL DEPENDENCE WITH UNCOMPLICATED WITHDRAWAL (HCC): ICD-10-CM

## 2017-07-12 DIAGNOSIS — F32.A DEPRESSIVE DISORDER: ICD-10-CM

## 2017-07-12 LAB
ALBUMIN SERPL-MCNC: 4.9 G/DL (ref 3.9–4.9)
ALP BLD-CCNC: 68 U/L (ref 40–130)
ALT SERPL-CCNC: 49 U/L (ref 0–33)
AMMONIA: 36 UMOL/L (ref 11–51)
AMPHETAMINE SCREEN, URINE: NORMAL
ANION GAP SERPL CALCULATED.3IONS-SCNC: 27 MEQ/L (ref 7–13)
AST SERPL-CCNC: 57 U/L (ref 0–35)
BARBITURATE SCREEN URINE: NORMAL
BENZODIAZEPINE SCREEN, URINE: NORMAL
BILIRUB SERPL-MCNC: 0.4 MG/DL (ref 0–1.2)
BILIRUBIN URINE: NEGATIVE
BLOOD, URINE: NEGATIVE
BUN BLDV-MCNC: 7 MG/DL (ref 6–20)
CALCIUM SERPL-MCNC: 9.4 MG/DL (ref 8.6–10.2)
CANNABINOID SCREEN URINE: NORMAL
CHLORIDE BLD-SCNC: 95 MEQ/L (ref 98–107)
CLARITY: CLEAR
CO2: 17 MEQ/L (ref 22–29)
COCAINE METABOLITE SCREEN URINE: NORMAL
COLOR: YELLOW
CREAT SERPL-MCNC: 0.4 MG/DL (ref 0.5–0.9)
EKG ATRIAL RATE: 93 BPM
EKG P AXIS: 50 DEGREES
EKG P-R INTERVAL: 138 MS
EKG Q-T INTERVAL: 376 MS
EKG QRS DURATION: 70 MS
EKG QTC CALCULATION (BAZETT): 467 MS
EKG R AXIS: 43 DEGREES
EKG T AXIS: 46 DEGREES
EKG VENTRICULAR RATE: 93 BPM
ETHANOL PERCENT: 0.05 G/DL
ETHANOL: 62 MG/DL (ref 0–0.08)
GFR AFRICAN AMERICAN: >60
GFR NON-AFRICAN AMERICAN: >60
GLOBULIN: 3 G/DL (ref 2.3–3.5)
GLUCOSE BLD-MCNC: 78 MG/DL (ref 74–109)
GLUCOSE URINE: NEGATIVE MG/DL
HCG QUALITATIVE: NEGATIVE
HCT VFR BLD CALC: 40.8 % (ref 37–47)
HEMOGLOBIN: 14 G/DL (ref 12–16)
KETONES, URINE: 40 MG/DL
LEUKOCYTE ESTERASE, URINE: NEGATIVE
LIPASE: 17 U/L (ref 13–60)
Lab: NORMAL
MCH RBC QN AUTO: 33.2 PG (ref 27–31.3)
MCHC RBC AUTO-ENTMCNC: 34.4 % (ref 33–37)
MCV RBC AUTO: 96.4 FL (ref 82–100)
NITRITE, URINE: NEGATIVE
OPIATE SCREEN URINE: NORMAL
PDW BLD-RTO: 12.9 % (ref 11.5–14.5)
PH UA: 6.5 (ref 5–9)
PHENCYCLIDINE SCREEN URINE: NORMAL
PLATELET # BLD: 353 K/UL (ref 130–400)
POTASSIUM SERPL-SCNC: 4.1 MEQ/L (ref 3.5–5.1)
PROTEIN UA: NEGATIVE MG/DL
RBC # BLD: 4.23 M/UL (ref 4.2–5.4)
SODIUM BLD-SCNC: 139 MEQ/L (ref 132–144)
SPECIFIC GRAVITY UA: 1.02 (ref 1–1.03)
TOTAL PROTEIN: 7.9 G/DL (ref 6.4–8.1)
UROBILINOGEN, URINE: 1 E.U./DL
WBC # BLD: 9 K/UL (ref 4.8–10.8)

## 2017-07-12 PROCEDURE — 85027 COMPLETE CBC AUTOMATED: CPT

## 2017-07-12 PROCEDURE — 80053 COMPREHEN METABOLIC PANEL: CPT

## 2017-07-12 PROCEDURE — 2500000003 HC RX 250 WO HCPCS: Performed by: PHYSICIAN ASSISTANT

## 2017-07-12 PROCEDURE — 2580000003 HC RX 258: Performed by: PHYSICIAN ASSISTANT

## 2017-07-12 PROCEDURE — 80307 DRUG TEST PRSMV CHEM ANLYZR: CPT

## 2017-07-12 PROCEDURE — 93005 ELECTROCARDIOGRAM TRACING: CPT

## 2017-07-12 PROCEDURE — 96375 TX/PRO/DX INJ NEW DRUG ADDON: CPT

## 2017-07-12 PROCEDURE — 99285 EMERGENCY DEPT VISIT HI MDM: CPT

## 2017-07-12 PROCEDURE — 6360000002 HC RX W HCPCS: Performed by: PHYSICIAN ASSISTANT

## 2017-07-12 PROCEDURE — 36415 COLL VENOUS BLD VENIPUNCTURE: CPT

## 2017-07-12 PROCEDURE — 96374 THER/PROPH/DIAG INJ IV PUSH: CPT

## 2017-07-12 PROCEDURE — 6370000000 HC RX 637 (ALT 250 FOR IP): Performed by: EMERGENCY MEDICINE

## 2017-07-12 PROCEDURE — 84703 CHORIONIC GONADOTROPIN ASSAY: CPT

## 2017-07-12 PROCEDURE — G0480 DRUG TEST DEF 1-7 CLASSES: HCPCS

## 2017-07-12 PROCEDURE — 81003 URINALYSIS AUTO W/O SCOPE: CPT

## 2017-07-12 PROCEDURE — 82140 ASSAY OF AMMONIA: CPT

## 2017-07-12 PROCEDURE — 83690 ASSAY OF LIPASE: CPT

## 2017-07-12 RX ORDER — LORAZEPAM 2 MG/ML
2 INJECTION INTRAMUSCULAR ONCE
Status: COMPLETED | OUTPATIENT
Start: 2017-07-12 | End: 2017-07-12

## 2017-07-12 RX ORDER — CHLORDIAZEPOXIDE HYDROCHLORIDE 25 MG/1
25 CAPSULE, GELATIN COATED ORAL 3 TIMES DAILY PRN
Qty: 12 CAPSULE | Refills: 0 | Status: SHIPPED | OUTPATIENT
Start: 2017-07-12 | End: 2018-03-03 | Stop reason: ALTCHOICE

## 2017-07-12 RX ORDER — CHLORDIAZEPOXIDE HYDROCHLORIDE 25 MG/1
50 CAPSULE, GELATIN COATED ORAL ONCE
Status: COMPLETED | OUTPATIENT
Start: 2017-07-12 | End: 2017-07-12

## 2017-07-12 RX ORDER — ONDANSETRON 2 MG/ML
4 INJECTION INTRAMUSCULAR; INTRAVENOUS ONCE
Status: COMPLETED | OUTPATIENT
Start: 2017-07-12 | End: 2017-07-12

## 2017-07-12 RX ADMIN — ONDANSETRON 4 MG: 2 INJECTION INTRAMUSCULAR; INTRAVENOUS at 09:49

## 2017-07-12 RX ADMIN — FOLIC ACID: 5 INJECTION, SOLUTION INTRAMUSCULAR; INTRAVENOUS; SUBCUTANEOUS at 10:33

## 2017-07-12 RX ADMIN — CHLORDIAZEPOXIDE HYDROCHLORIDE 50 MG: 25 CAPSULE ORAL at 17:26

## 2017-07-12 RX ADMIN — LORAZEPAM 2 MG: 2 INJECTION INTRAMUSCULAR; INTRAVENOUS at 09:49

## 2017-07-12 ASSESSMENT — ENCOUNTER SYMPTOMS
ABDOMINAL DISTENTION: 0
COLOR CHANGE: 0
EYE DISCHARGE: 0
ABDOMINAL PAIN: 0
BACK PAIN: 0
RHINORRHEA: 0
CHOKING: 0
SORE THROAT: 0
COUGH: 0
SHORTNESS OF BREATH: 0
NAUSEA: 1
VOMITING: 0
CONSTIPATION: 0

## 2017-07-12 ASSESSMENT — PAIN DESCRIPTION - LOCATION: LOCATION: GENERALIZED

## 2017-07-23 ENCOUNTER — HOSPITAL ENCOUNTER (EMERGENCY)
Age: 37
Discharge: HOME | End: 2017-07-23
Attending: EMERGENCY MEDICINE
Payer: MEDICAID

## 2017-07-23 VITALS
TEMPERATURE: 98 F | RESPIRATION RATE: 18 BRPM | HEART RATE: 77 BPM | DIASTOLIC BLOOD PRESSURE: 77 MMHG | OXYGEN SATURATION: 97 % | SYSTOLIC BLOOD PRESSURE: 114 MMHG | BODY MASS INDEX: 23.92 KG/M2 | WEIGHT: 130 LBS | HEIGHT: 62 IN

## 2017-07-23 DIAGNOSIS — F10.20 CHRONIC ALCOHOLISM (HCC): Primary | ICD-10-CM

## 2017-07-23 LAB
ALBUMIN SERPL-MCNC: 4.7 G/DL (ref 3.9–4.9)
ALP BLD-CCNC: 63 U/L (ref 40–130)
ALT SERPL-CCNC: 42 U/L (ref 0–33)
AMPHETAMINE SCREEN, URINE: NORMAL
ANION GAP SERPL CALCULATED.3IONS-SCNC: 23 MEQ/L (ref 7–13)
AST SERPL-CCNC: 47 U/L (ref 0–35)
BACTERIA: ABNORMAL /HPF
BARBITURATE SCREEN URINE: NORMAL
BASOPHILS ABSOLUTE: 0.1 K/UL (ref 0–0.2)
BASOPHILS RELATIVE PERCENT: 1.1 %
BENZODIAZEPINE SCREEN, URINE: NORMAL
BILIRUB SERPL-MCNC: 0.9 MG/DL (ref 0–1.2)
BILIRUBIN DIRECT: 0.2 MG/DL (ref 0–0.3)
BILIRUBIN URINE: NEGATIVE
BILIRUBIN, INDIRECT: 0.7 MG/DL (ref 0–0.6)
BLOOD, URINE: NEGATIVE
BUN BLDV-MCNC: 6 MG/DL (ref 6–20)
CALCIUM SERPL-MCNC: 9.8 MG/DL (ref 8.6–10.2)
CANNABINOID SCREEN URINE: NORMAL
CHLORIDE BLD-SCNC: 93 MEQ/L (ref 98–107)
CLARITY: CLEAR
CO2: 17 MEQ/L (ref 22–29)
COCAINE METABOLITE SCREEN URINE: NORMAL
COLOR: YELLOW
CREAT SERPL-MCNC: 0.34 MG/DL (ref 0.5–0.9)
EOSINOPHILS ABSOLUTE: 0 K/UL (ref 0–0.7)
EOSINOPHILS RELATIVE PERCENT: 0.5 %
ETHANOL PERCENT: 0.03 G/DL
ETHANOL: 35 MG/DL (ref 0–0.08)
GFR AFRICAN AMERICAN: >60
GFR NON-AFRICAN AMERICAN: >60
GLUCOSE BLD-MCNC: 90 MG/DL (ref 74–109)
GLUCOSE URINE: NEGATIVE MG/DL
HCG(URINE) PREGNANCY TEST: NEGATIVE
HCT VFR BLD CALC: 43.9 % (ref 37–47)
HEMOGLOBIN: 15 G/DL (ref 12–16)
KETONES, URINE: 40 MG/DL
LEUKOCYTE ESTERASE, URINE: ABNORMAL
LYMPHOCYTES ABSOLUTE: 1.2 K/UL (ref 1–4.8)
LYMPHOCYTES RELATIVE PERCENT: 17.2 %
Lab: NORMAL
MCH RBC QN AUTO: 32.8 PG (ref 27–31.3)
MCHC RBC AUTO-ENTMCNC: 34.2 % (ref 33–37)
MCV RBC AUTO: 95.9 FL (ref 82–100)
MONOCYTES ABSOLUTE: 0.4 K/UL (ref 0.2–0.8)
MONOCYTES RELATIVE PERCENT: 5.9 %
MUCUS: PRESENT
NEUTROPHILS ABSOLUTE: 5.2 K/UL (ref 1.4–6.5)
NEUTROPHILS RELATIVE PERCENT: 75.3 %
NITRITE, URINE: NEGATIVE
OPIATE SCREEN URINE: NORMAL
PDW BLD-RTO: 13.1 % (ref 11.5–14.5)
PH UA: 8.5 (ref 5–9)
PHENCYCLIDINE SCREEN URINE: NORMAL
PLATELET # BLD: 208 K/UL (ref 130–400)
POTASSIUM SERPL-SCNC: 4.2 MEQ/L (ref 3.5–5.1)
PROTEIN UA: 30 MG/DL
RBC # BLD: 4.58 M/UL (ref 4.2–5.4)
RBC UA: ABNORMAL /HPF (ref 0–2)
SODIUM BLD-SCNC: 133 MEQ/L (ref 132–144)
SPECIFIC GRAVITY UA: 1.02 (ref 1–1.03)
TOTAL PROTEIN: 8.1 G/DL (ref 6.4–8.1)
UROBILINOGEN, URINE: 1 E.U./DL
VALPROIC ACID LEVEL: <2.8 UG/ML (ref 50–100)
WBC # BLD: 7 K/UL (ref 4.8–10.8)
WBC UA: ABNORMAL /HPF (ref 0–5)

## 2017-07-23 PROCEDURE — 80164 ASSAY DIPROPYLACETIC ACD TOT: CPT

## 2017-07-23 PROCEDURE — 80076 HEPATIC FUNCTION PANEL: CPT

## 2017-07-23 PROCEDURE — 85025 COMPLETE CBC W/AUTO DIFF WBC: CPT

## 2017-07-23 PROCEDURE — 80048 BASIC METABOLIC PNL TOTAL CA: CPT

## 2017-07-23 PROCEDURE — G0480 DRUG TEST DEF 1-7 CLASSES: HCPCS

## 2017-07-23 PROCEDURE — 6360000002 HC RX W HCPCS: Performed by: EMERGENCY MEDICINE

## 2017-07-23 PROCEDURE — 2580000003 HC RX 258: Performed by: EMERGENCY MEDICINE

## 2017-07-23 PROCEDURE — 81001 URINALYSIS AUTO W/SCOPE: CPT

## 2017-07-23 PROCEDURE — 80307 DRUG TEST PRSMV CHEM ANLYZR: CPT

## 2017-07-23 PROCEDURE — 2500000003 HC RX 250 WO HCPCS: Performed by: EMERGENCY MEDICINE

## 2017-07-23 PROCEDURE — 96365 THER/PROPH/DIAG IV INF INIT: CPT

## 2017-07-23 PROCEDURE — 99283 EMERGENCY DEPT VISIT LOW MDM: CPT

## 2017-07-23 PROCEDURE — 36415 COLL VENOUS BLD VENIPUNCTURE: CPT

## 2017-07-23 PROCEDURE — 96375 TX/PRO/DX INJ NEW DRUG ADDON: CPT

## 2017-07-23 PROCEDURE — 84703 CHORIONIC GONADOTROPIN ASSAY: CPT

## 2017-07-23 PROCEDURE — 96366 THER/PROPH/DIAG IV INF ADDON: CPT

## 2017-07-23 RX ORDER — LORAZEPAM 2 MG/ML
2 INJECTION INTRAMUSCULAR ONCE
Status: DISCONTINUED | OUTPATIENT
Start: 2017-07-23 | End: 2017-07-23

## 2017-07-23 RX ORDER — PROMETHAZINE HYDROCHLORIDE 25 MG/ML
12.5 INJECTION, SOLUTION INTRAMUSCULAR; INTRAVENOUS ONCE
Status: COMPLETED | OUTPATIENT
Start: 2017-07-23 | End: 2017-07-23

## 2017-07-23 RX ORDER — THIAMINE HYDROCHLORIDE 100 MG/ML
100 INJECTION, SOLUTION INTRAMUSCULAR; INTRAVENOUS ONCE
Status: COMPLETED | OUTPATIENT
Start: 2017-07-23 | End: 2017-07-23

## 2017-07-23 RX ORDER — LORAZEPAM 2 MG/ML
1 INJECTION INTRAMUSCULAR ONCE
Status: DISCONTINUED | OUTPATIENT
Start: 2017-07-23 | End: 2017-07-23 | Stop reason: HOSPADM

## 2017-07-23 RX ORDER — LORAZEPAM 2 MG/ML
2 INJECTION INTRAMUSCULAR ONCE
Status: COMPLETED | OUTPATIENT
Start: 2017-07-23 | End: 2017-07-23

## 2017-07-23 RX ADMIN — FOLIC ACID 1 MG: 5 INJECTION, SOLUTION INTRAMUSCULAR; INTRAVENOUS; SUBCUTANEOUS at 12:25

## 2017-07-23 RX ADMIN — LORAZEPAM 2 MG: 2 INJECTION INTRAMUSCULAR; INTRAVENOUS at 11:53

## 2017-07-23 RX ADMIN — THIAMINE HYDROCHLORIDE 100 MG: 100 INJECTION, SOLUTION INTRAMUSCULAR; INTRAVENOUS at 12:24

## 2017-07-23 RX ADMIN — PROMETHAZINE HYDROCHLORIDE 12.5 MG: 25 INJECTION INTRAMUSCULAR; INTRAVENOUS at 14:44

## 2017-07-23 ASSESSMENT — ENCOUNTER SYMPTOMS
VOMITING: 0
BACK PAIN: 0
SHORTNESS OF BREATH: 0
SORE THROAT: 0
NAUSEA: 0
DIARRHEA: 0
ABDOMINAL PAIN: 0

## 2017-08-01 NOTE — PROGRESS NOTES
Patient denies SI, HI, and AVH. Patient states depression is 5/10 and anxiety fluctuates. Patient has concerns about discharge. Father told her she could not live with him in [address] [address] so she will have to live with boyfriend who drinks. Patient has concerns about treatment and medications after discharge. Patient has no insurance. Patient has been visible on unit. Social with select peers. Polite and cooperative with staff.  Electronically signed by Elizabeth Briscoe LPN on 9/0/2756 at 5:89 PM 60

## 2017-09-17 ENCOUNTER — HOSPITAL ENCOUNTER (EMERGENCY)
Age: 37
Discharge: OTHER INSTITUTION | End: 2017-09-17
Payer: MEDICAID

## 2017-09-17 VITALS
SYSTOLIC BLOOD PRESSURE: 118 MMHG | DIASTOLIC BLOOD PRESSURE: 78 MMHG | TEMPERATURE: 98.1 F | HEIGHT: 62 IN | HEART RATE: 118 BPM | RESPIRATION RATE: 18 BRPM | BODY MASS INDEX: 23.92 KG/M2 | OXYGEN SATURATION: 96 % | WEIGHT: 130 LBS

## 2017-09-17 DIAGNOSIS — F10.10 ALCOHOL ABUSE: Primary | ICD-10-CM

## 2017-09-17 LAB
ALBUMIN SERPL-MCNC: 4.4 G/DL (ref 3.9–4.9)
ALP BLD-CCNC: 66 U/L (ref 40–130)
ALT SERPL-CCNC: 151 U/L (ref 0–33)
ANION GAP SERPL CALCULATED.3IONS-SCNC: 29 MEQ/L (ref 7–13)
AST SERPL-CCNC: 152 U/L (ref 0–35)
BILIRUB SERPL-MCNC: 0.6 MG/DL (ref 0–1.2)
BUN BLDV-MCNC: 8 MG/DL (ref 6–20)
CALCIUM SERPL-MCNC: 8.8 MG/DL (ref 8.6–10.2)
CHLORIDE BLD-SCNC: 90 MEQ/L (ref 98–107)
CO2: 15 MEQ/L (ref 22–29)
CREAT SERPL-MCNC: 0.51 MG/DL (ref 0.5–0.9)
ETHANOL PERCENT: 0.1 G/DL
ETHANOL: 110 MG/DL (ref 0–0.08)
GFR AFRICAN AMERICAN: >60
GFR NON-AFRICAN AMERICAN: >60
GLOBULIN: 3 G/DL (ref 2.3–3.5)
GLUCOSE BLD-MCNC: 121 MG/DL (ref 74–109)
HCT VFR BLD CALC: 44.7 % (ref 37–47)
HEMOGLOBIN: 15 G/DL (ref 12–16)
MAGNESIUM: 1.5 MG/DL (ref 1.7–2.3)
MCH RBC QN AUTO: 31.7 PG (ref 27–31.3)
MCHC RBC AUTO-ENTMCNC: 33.5 % (ref 33–37)
MCV RBC AUTO: 94.6 FL (ref 82–100)
PDW BLD-RTO: 13.3 % (ref 11.5–14.5)
PLATELET # BLD: 139 K/UL (ref 130–400)
POTASSIUM SERPL-SCNC: 4.5 MEQ/L (ref 3.5–5.1)
RBC # BLD: 4.73 M/UL (ref 4.2–5.4)
SODIUM BLD-SCNC: 134 MEQ/L (ref 132–144)
TOTAL PROTEIN: 7.4 G/DL (ref 6.4–8.1)
TSH SERPL DL<=0.05 MIU/L-ACNC: 2.35 UIU/ML (ref 0.27–4.2)
WBC # BLD: 4.8 K/UL (ref 4.8–10.8)

## 2017-09-17 PROCEDURE — 96375 TX/PRO/DX INJ NEW DRUG ADDON: CPT

## 2017-09-17 PROCEDURE — 6360000002 HC RX W HCPCS: Performed by: PHYSICIAN ASSISTANT

## 2017-09-17 PROCEDURE — 96374 THER/PROPH/DIAG INJ IV PUSH: CPT

## 2017-09-17 PROCEDURE — 80053 COMPREHEN METABOLIC PANEL: CPT

## 2017-09-17 PROCEDURE — G0480 DRUG TEST DEF 1-7 CLASSES: HCPCS

## 2017-09-17 PROCEDURE — 84443 ASSAY THYROID STIM HORMONE: CPT

## 2017-09-17 PROCEDURE — 99284 EMERGENCY DEPT VISIT MOD MDM: CPT

## 2017-09-17 PROCEDURE — 36415 COLL VENOUS BLD VENIPUNCTURE: CPT

## 2017-09-17 PROCEDURE — 93005 ELECTROCARDIOGRAM TRACING: CPT

## 2017-09-17 PROCEDURE — 85027 COMPLETE CBC AUTOMATED: CPT

## 2017-09-17 PROCEDURE — 83735 ASSAY OF MAGNESIUM: CPT

## 2017-09-17 RX ORDER — LORAZEPAM 2 MG/ML
1 INJECTION INTRAMUSCULAR ONCE
Status: COMPLETED | OUTPATIENT
Start: 2017-09-17 | End: 2017-09-17

## 2017-09-17 RX ORDER — ONDANSETRON 2 MG/ML
4 INJECTION INTRAMUSCULAR; INTRAVENOUS ONCE
Status: COMPLETED | OUTPATIENT
Start: 2017-09-17 | End: 2017-09-17

## 2017-09-17 RX ADMIN — ONDANSETRON 4 MG: 2 INJECTION INTRAMUSCULAR; INTRAVENOUS at 16:34

## 2017-09-17 RX ADMIN — LORAZEPAM 1 MG: 2 INJECTION INTRAMUSCULAR; INTRAVENOUS at 16:25

## 2017-09-17 ASSESSMENT — ENCOUNTER SYMPTOMS
EYE DISCHARGE: 0
NAUSEA: 0
DIARRHEA: 0
ABDOMINAL PAIN: 0
WHEEZING: 0
COLOR CHANGE: 0
BACK PAIN: 0
RHINORRHEA: 0
STRIDOR: 0
ABDOMINAL DISTENTION: 0
CONSTIPATION: 0
VOMITING: 0
SHORTNESS OF BREATH: 0
SORE THROAT: 0
CHOKING: 0
COUGH: 0

## 2017-09-18 LAB
EKG ATRIAL RATE: 104 BPM
EKG P AXIS: 60 DEGREES
EKG P-R INTERVAL: 144 MS
EKG Q-T INTERVAL: 356 MS
EKG QRS DURATION: 72 MS
EKG QTC CALCULATION (BAZETT): 468 MS
EKG R AXIS: 47 DEGREES
EKG T AXIS: 46 DEGREES
EKG VENTRICULAR RATE: 104 BPM

## 2017-09-18 PROCEDURE — 93010 ELECTROCARDIOGRAM REPORT: CPT | Performed by: INTERNAL MEDICINE

## 2018-03-03 ENCOUNTER — APPOINTMENT (OUTPATIENT)
Dept: GENERAL RADIOLOGY | Age: 38
DRG: 753 | End: 2018-03-03
Payer: MEDICAID

## 2018-03-03 ENCOUNTER — HOSPITAL ENCOUNTER (INPATIENT)
Age: 38
LOS: 5 days | Discharge: HOME | DRG: 753 | End: 2018-03-08
Attending: PSYCHIATRY & NEUROLOGY | Admitting: PSYCHIATRY & NEUROLOGY
Payer: MEDICAID

## 2018-03-03 DIAGNOSIS — T50.902A INTENTIONAL DRUG OVERDOSE, INITIAL ENCOUNTER (HCC): Primary | ICD-10-CM

## 2018-03-03 DIAGNOSIS — E03.9 HYPOTHYROIDISM, UNSPECIFIED TYPE: ICD-10-CM

## 2018-03-03 DIAGNOSIS — F31.9 BIPOLAR 1 DISORDER (HCC): ICD-10-CM

## 2018-03-03 DIAGNOSIS — S42.401A ELBOW FRACTURE, RIGHT, CLOSED, INITIAL ENCOUNTER: ICD-10-CM

## 2018-03-03 DIAGNOSIS — F10.10 ALCOHOL ABUSE: ICD-10-CM

## 2018-03-03 LAB
ACETAMINOPHEN LEVEL: <15 UG/ML (ref 10–30)
ALBUMIN SERPL-MCNC: 4.3 G/DL (ref 3.9–4.9)
ALP BLD-CCNC: 83 U/L (ref 40–130)
ALT SERPL-CCNC: 32 U/L (ref 0–33)
AMPHETAMINE SCREEN, URINE: NORMAL
ANION GAP SERPL CALCULATED.3IONS-SCNC: 24 MEQ/L (ref 7–13)
AST SERPL-CCNC: 42 U/L (ref 0–35)
BARBITURATE SCREEN URINE: NORMAL
BASOPHILS ABSOLUTE: 0.1 K/UL (ref 0–0.2)
BASOPHILS RELATIVE PERCENT: 0.9 %
BENZODIAZEPINE SCREEN, URINE: NORMAL
BILIRUB SERPL-MCNC: 0.8 MG/DL (ref 0–1.2)
BILIRUBIN URINE: NEGATIVE
BLOOD, URINE: NEGATIVE
BUN BLDV-MCNC: 11 MG/DL (ref 6–20)
CALCIUM SERPL-MCNC: 9 MG/DL (ref 8.6–10.2)
CANNABINOID SCREEN URINE: NORMAL
CHLORIDE BLD-SCNC: 91 MEQ/L (ref 98–107)
CHP ED QC CHECK: YES
CK MB: 4.4 NG/ML (ref 0–3.8)
CLARITY: ABNORMAL
CO2: 19 MEQ/L (ref 22–29)
COCAINE METABOLITE SCREEN URINE: NORMAL
COLOR: YELLOW
CREAT SERPL-MCNC: 0.46 MG/DL (ref 0.5–0.9)
CREATINE KINASE-MB INDEX: 1.5 % (ref 0–3.5)
EKG ATRIAL RATE: 84 BPM
EKG P AXIS: 50 DEGREES
EKG P-R INTERVAL: 150 MS
EKG Q-T INTERVAL: 416 MS
EKG QRS DURATION: 76 MS
EKG QTC CALCULATION (BAZETT): 491 MS
EKG R AXIS: 41 DEGREES
EKG T AXIS: 31 DEGREES
EKG VENTRICULAR RATE: 84 BPM
EOSINOPHILS ABSOLUTE: 0 K/UL (ref 0–0.7)
EOSINOPHILS RELATIVE PERCENT: 0.3 %
ETHANOL PERCENT: 0.04 G/DL
ETHANOL: 47 MG/DL (ref 0–0.08)
GFR AFRICAN AMERICAN: >60
GFR NON-AFRICAN AMERICAN: >60
GLOBULIN: 2.7 G/DL (ref 2.3–3.5)
GLUCOSE BLD-MCNC: 82 MG/DL (ref 74–109)
GLUCOSE URINE: NEGATIVE MG/DL
HCT VFR BLD CALC: 41.5 % (ref 37–47)
HEMOGLOBIN: 14.3 G/DL (ref 12–16)
KETONES, URINE: >=80 MG/DL
LEUKOCYTE ESTERASE, URINE: NEGATIVE
LYMPHOCYTES ABSOLUTE: 1.3 K/UL (ref 1–4.8)
LYMPHOCYTES RELATIVE PERCENT: 17.9 %
Lab: NORMAL
MCH RBC QN AUTO: 29.1 PG (ref 27–31.3)
MCHC RBC AUTO-ENTMCNC: 34.3 % (ref 33–37)
MCV RBC AUTO: 84.7 FL (ref 82–100)
MONOCYTES ABSOLUTE: 0.6 K/UL (ref 0.2–0.8)
MONOCYTES RELATIVE PERCENT: 7.7 %
NEUTROPHILS ABSOLUTE: 5.4 K/UL (ref 1.4–6.5)
NEUTROPHILS RELATIVE PERCENT: 73.2 %
NITRITE, URINE: NEGATIVE
OPIATE SCREEN URINE: NORMAL
PDW BLD-RTO: 13.3 % (ref 11.5–14.5)
PH UA: 6 (ref 5–9)
PHENCYCLIDINE SCREEN URINE: NORMAL
PLATELET # BLD: 207 K/UL (ref 130–400)
POTASSIUM SERPL-SCNC: 3.9 MEQ/L (ref 3.5–5.1)
PREGNANCY TEST URINE, POC: NEGATIVE
PROTEIN UA: ABNORMAL MG/DL
RBC # BLD: 4.9 M/UL (ref 4.2–5.4)
SALICYLATE, SERUM: <0.3 MG/DL (ref 15–30)
SODIUM BLD-SCNC: 134 MEQ/L (ref 132–144)
SPECIFIC GRAVITY UA: 1.02 (ref 1–1.03)
TOTAL CK: 301 U/L (ref 0–170)
TOTAL PROTEIN: 7 G/DL (ref 6.4–8.1)
TSH SERPL DL<=0.05 MIU/L-ACNC: 5.15 UIU/ML (ref 0.27–4.2)
URINE REFLEX TO CULTURE: ABNORMAL
UROBILINOGEN, URINE: 1 E.U./DL
VALPROIC ACID LEVEL: <2.8 UG/ML (ref 50–100)
WBC # BLD: 7.3 K/UL (ref 4.8–10.8)

## 2018-03-03 PROCEDURE — 36415 COLL VENOUS BLD VENIPUNCTURE: CPT

## 2018-03-03 PROCEDURE — 85025 COMPLETE CBC W/AUTO DIFF WBC: CPT

## 2018-03-03 PROCEDURE — 73080 X-RAY EXAM OF ELBOW: CPT

## 2018-03-03 PROCEDURE — 82550 ASSAY OF CK (CPK): CPT

## 2018-03-03 PROCEDURE — 93005 ELECTROCARDIOGRAM TRACING: CPT

## 2018-03-03 PROCEDURE — 84443 ASSAY THYROID STIM HORMONE: CPT

## 2018-03-03 PROCEDURE — 1240000000 HC EMOTIONAL WELLNESS R&B

## 2018-03-03 PROCEDURE — 29105 APPLICATION LONG ARM SPLINT: CPT

## 2018-03-03 PROCEDURE — 80164 ASSAY DIPROPYLACETIC ACD TOT: CPT

## 2018-03-03 PROCEDURE — 81003 URINALYSIS AUTO W/O SCOPE: CPT

## 2018-03-03 PROCEDURE — 80307 DRUG TEST PRSMV CHEM ANLYZR: CPT

## 2018-03-03 PROCEDURE — 6370000000 HC RX 637 (ALT 250 FOR IP): Performed by: PSYCHIATRY & NEUROLOGY

## 2018-03-03 PROCEDURE — 2580000003 HC RX 258: Performed by: PHYSICIAN ASSISTANT

## 2018-03-03 PROCEDURE — 99285 EMERGENCY DEPT VISIT HI MDM: CPT

## 2018-03-03 PROCEDURE — 80053 COMPREHEN METABOLIC PANEL: CPT

## 2018-03-03 PROCEDURE — G0480 DRUG TEST DEF 1-7 CLASSES: HCPCS

## 2018-03-03 PROCEDURE — 2W38X1Z IMMOBILIZATION OF RIGHT UPPER EXTREMITY USING SPLINT: ICD-10-PCS | Performed by: EMERGENCY MEDICINE

## 2018-03-03 PROCEDURE — 82553 CREATINE MB FRACTION: CPT

## 2018-03-03 PROCEDURE — 2500000003 HC RX 250 WO HCPCS: Performed by: PHYSICIAN ASSISTANT

## 2018-03-03 PROCEDURE — 6360000002 HC RX W HCPCS: Performed by: PHYSICIAN ASSISTANT

## 2018-03-03 RX ORDER — LEVOTHYROXINE SODIUM 0.05 MG/1
50 TABLET ORAL DAILY
Status: DISCONTINUED | OUTPATIENT
Start: 2018-03-04 | End: 2018-03-08 | Stop reason: HOSPADM

## 2018-03-03 RX ORDER — BENZTROPINE MESYLATE 1 MG/ML
2 INJECTION INTRAMUSCULAR; INTRAVENOUS 2 TIMES DAILY PRN
Status: DISCONTINUED | OUTPATIENT
Start: 2018-03-03 | End: 2018-03-08 | Stop reason: HOSPADM

## 2018-03-03 RX ORDER — ACETAMINOPHEN 325 MG/1
650 TABLET ORAL EVERY 4 HOURS PRN
Status: DISCONTINUED | OUTPATIENT
Start: 2018-03-03 | End: 2018-03-08 | Stop reason: HOSPADM

## 2018-03-03 RX ORDER — HYDROXYZINE HYDROCHLORIDE 50 MG/ML
50 INJECTION, SOLUTION INTRAMUSCULAR EVERY 6 HOURS PRN
Status: DISCONTINUED | OUTPATIENT
Start: 2018-03-03 | End: 2018-03-08 | Stop reason: HOSPADM

## 2018-03-03 RX ORDER — TRAZODONE HYDROCHLORIDE 50 MG/1
50 TABLET ORAL NIGHTLY PRN
Status: DISCONTINUED | OUTPATIENT
Start: 2018-03-03 | End: 2018-03-08 | Stop reason: HOSPADM

## 2018-03-03 RX ORDER — MAGNESIUM HYDROXIDE/ALUMINUM HYDROXICE/SIMETHICONE 120; 1200; 1200 MG/30ML; MG/30ML; MG/30ML
30 SUSPENSION ORAL EVERY 6 HOURS PRN
Status: DISCONTINUED | OUTPATIENT
Start: 2018-03-03 | End: 2018-03-08 | Stop reason: HOSPADM

## 2018-03-03 RX ORDER — NICOTINE 21 MG/24HR
1 PATCH, TRANSDERMAL 24 HOURS TRANSDERMAL DAILY
Status: DISCONTINUED | OUTPATIENT
Start: 2018-03-04 | End: 2018-03-08 | Stop reason: HOSPADM

## 2018-03-03 RX ORDER — HYDROXYZINE PAMOATE 50 MG/1
50 CAPSULE ORAL EVERY 6 HOURS PRN
Status: DISCONTINUED | OUTPATIENT
Start: 2018-03-03 | End: 2018-03-08 | Stop reason: HOSPADM

## 2018-03-03 RX ADMIN — TRAZODONE HYDROCHLORIDE 50 MG: 50 TABLET ORAL at 22:45

## 2018-03-03 RX ADMIN — HYDROXYZINE PAMOATE 50 MG: 50 CAPSULE ORAL at 22:45

## 2018-03-03 RX ADMIN — FOLIC ACID: 5 INJECTION, SOLUTION INTRAMUSCULAR; INTRAVENOUS; SUBCUTANEOUS at 11:24

## 2018-03-03 ASSESSMENT — PAIN DESCRIPTION - ORIENTATION
ORIENTATION: RIGHT
ORIENTATION: RIGHT

## 2018-03-03 ASSESSMENT — PAIN DESCRIPTION - FREQUENCY
FREQUENCY: CONTINUOUS
FREQUENCY: CONTINUOUS

## 2018-03-03 ASSESSMENT — ENCOUNTER SYMPTOMS
NAUSEA: 0
EYE DISCHARGE: 0
COLOR CHANGE: 0
DIARRHEA: 0
ABDOMINAL DISTENTION: 0
SORE THROAT: 0
VOMITING: 0
ABDOMINAL PAIN: 0
RHINORRHEA: 0
SHORTNESS OF BREATH: 0
COUGH: 0
CHOKING: 0
CONSTIPATION: 0

## 2018-03-03 ASSESSMENT — PAIN DESCRIPTION - PROGRESSION
CLINICAL_PROGRESSION: NOT CHANGED
CLINICAL_PROGRESSION: NOT CHANGED

## 2018-03-03 ASSESSMENT — PAIN DESCRIPTION - ONSET
ONSET: ON-GOING
ONSET: ON-GOING

## 2018-03-03 ASSESSMENT — PAIN DESCRIPTION - DESCRIPTORS
DESCRIPTORS: DISCOMFORT
DESCRIPTORS: DISCOMFORT

## 2018-03-03 ASSESSMENT — PAIN DESCRIPTION - PAIN TYPE
TYPE: ACUTE PAIN
TYPE: ACUTE PAIN

## 2018-03-03 ASSESSMENT — SLEEP AND FATIGUE QUESTIONNAIRES
DO YOU HAVE DIFFICULTY SLEEPING: NO
DO YOU USE A SLEEP AID: NO
SLEEP PATTERN: NORMAL
AVERAGE NUMBER OF SLEEP HOURS: 6

## 2018-03-03 ASSESSMENT — PATIENT HEALTH QUESTIONNAIRE - PHQ9: SUM OF ALL RESPONSES TO PHQ QUESTIONS 1-9: 3

## 2018-03-03 ASSESSMENT — PAIN SCALES - GENERAL
PAINLEVEL_OUTOF10: 0
PAINLEVEL_OUTOF10: 6

## 2018-03-03 ASSESSMENT — PAIN DESCRIPTION - LOCATION
LOCATION: ARM
LOCATION: ARM

## 2018-03-03 ASSESSMENT — PAIN SCALES - WONG BAKER: WONGBAKER_NUMERICALRESPONSE: 2

## 2018-03-03 NOTE — ED PROVIDER NOTES
lb (68 kg)       Physical Exam   Constitutional: She is oriented to person, place, and time. She appears well-developed and well-nourished. HENT:   Head: Normocephalic. Eyes: Pupils are equal, round, and reactive to light. Neck: Normal range of motion. Neck supple. No JVD present. No tracheal deviation present. Cardiovascular: Normal rate. Pulmonary/Chest: Effort normal and breath sounds normal. No respiratory distress. She has no wheezes. She has no rales. She exhibits no tenderness. Abdominal: Soft. Bowel sounds are normal. She exhibits no distension and no mass. There is no tenderness. There is no guarding. Musculoskeletal: Normal range of motion. She exhibits no edema or deformity. Neurological: She is alert and oriented to person, place, and time. Coordination normal.   Patient has some minor tremors noted to the upper extremities   Skin: Skin is warm and dry. Psychiatric: She has a normal mood and affect. DIAGNOSTIC RESULTS     EKG: All EKG's are interpreted by the Emergency Department Physician who either signs or Co-signs this chart in the absence of a cardiologist.    EKG shows normal sinus rhythm at 84 bpm T-wave inversion in V1 there is no acute ectopic beats keep T is 416 ms and QTc is 491 ms. RADIOLOGY:   Non-plain film images such as CT, Ultrasound and MRI are read by the radiologist. Melina Yadav radiographic images are visualized and preliminarily interpreted by the emergency physician with the below findings:    X-ray right elbow shows nondisplaced olecranon fracture    Interpretation per the Radiologist below, if available at the time of this note:    XR ELBOW RIGHT (MIN 3 VIEWS)   Final Result   NONDISPLACED OLECRANON FRACTURE WHICH EXTENDS TO THE OLECRANON GROOVE. JOINT SPACE EFFUSION.                   ED BEDSIDE ULTRASOUND:   Performed by ED Physician - none    LABS:  Labs Reviewed   COMPREHENSIVE METABOLIC PANEL - Abnormal; Notable for the following:        Result

## 2018-03-03 NOTE — ED NOTES
Cece Toledo LPN @ bedside applying splint to right forearm with sling. IV infusing well with approx. 150cc left in bag. Awaiting assessment per report from [address] [employer].      Latonya Sherman RN  03/03/18 8417

## 2018-03-04 PROCEDURE — 6370000000 HC RX 637 (ALT 250 FOR IP): Performed by: PSYCHIATRY & NEUROLOGY

## 2018-03-04 PROCEDURE — 1240000000 HC EMOTIONAL WELLNESS R&B

## 2018-03-04 RX ORDER — DESVENLAFAXINE 50 MG/1
50 TABLET, EXTENDED RELEASE ORAL DAILY
Status: DISCONTINUED | OUTPATIENT
Start: 2018-03-04 | End: 2018-03-08 | Stop reason: HOSPADM

## 2018-03-04 RX ADMIN — DESVENLAFAXINE SUCCINATE 50 MG: 50 TABLET, FILM COATED, EXTENDED RELEASE ORAL at 16:40

## 2018-03-04 RX ADMIN — LEVOTHYROXINE SODIUM 50 MCG: 50 TABLET ORAL at 06:28

## 2018-03-04 ASSESSMENT — LIFESTYLE VARIABLES: HISTORY_ALCOHOL_USE: YES

## 2018-03-04 NOTE — CARE COORDINATION
3/4/18 @ 1359 - Patient believes that no family member would wish to provide collateral as they are disappointed about her relapse. As a result, patient did not provide any collateral contact information and no collateral contact was completed.     Carolyn ALEXANDER

## 2018-03-04 NOTE — PROGRESS NOTES
Pt. declined to attend the 0900 community meeting, despite staff encouragement.  Electronically signed by Lidia Aponte on 3/4/2018 at 9:52 AM

## 2018-03-05 PROCEDURE — 6370000000 HC RX 637 (ALT 250 FOR IP): Performed by: PSYCHIATRY & NEUROLOGY

## 2018-03-05 PROCEDURE — 1240000000 HC EMOTIONAL WELLNESS R&B

## 2018-03-05 PROCEDURE — 99232 SBSQ HOSP IP/OBS MODERATE 35: CPT | Performed by: PSYCHIATRY & NEUROLOGY

## 2018-03-05 RX ADMIN — DESVENLAFAXINE SUCCINATE 50 MG: 50 TABLET, FILM COATED, EXTENDED RELEASE ORAL at 08:32

## 2018-03-05 RX ADMIN — LEVOTHYROXINE SODIUM 50 MCG: 50 TABLET ORAL at 06:30

## 2018-03-05 NOTE — H&P
H and P  Presented to the ED after taking boyfriend's medications, about 10 pills of haldol  Relapsed after a visit to her boyfriend. Was sober for 6 months. She was living in [facility] [address] [place] [address] and was sober and had a job. Now back to square one. Does not know what to do. Regrets and feels guilty about the drinking relapse. Does not see herself as an alcoholic. Stressors:  She recently  in February and had lost custody of her children. She said she was very upset because of everything she has lost due to healcohol use.  She also reported that she had fallen due to being intoxicateand broke the right elbow. PAST PSYCHIATRIC HISTORY:  The patient reported that she had been having  mental health problems since she was 27. She stated it started with increased anxiety which prevented her from attending school. Over time, diagnosis of bipolar disorder emerged, but all in the shadows of brutal alcohol addiction. She has had numerous admissions [facility] inpatient [facility], [facility] for Alcohol use disorder   PAST MEDICAL HISTORY:  Hypothyroidism. 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 SUBSTANCE/ALC:  Severe drinking about a fifth of rum per sitting  Patient drinks to blackout intoxication.     MSE:  Well groomed and cooperative  Mood confused  Affect constricted  Speech normal rate and rhythm  Thought process linear  Thought content:  Denies suicidal ideations  Denies homicidal ideations  No evidence of delusions  No evidence of internal stimulation  Patient denies auditory or visual hallucinations  Alert and oriented to self place and time  Memory intact  Insight and judgement poor    A/P  Alcohol use disorder severe continuos  Depressive disorder   Will start pristiq  Will not start depakote as patient has been off of depakote for 6 months. Poor prognosis. Soham Brock

## 2018-03-05 NOTE — CARE COORDINATION
Group Therapy Note    Date: 3/5/2018  Start Time: 314  End Time:  015  Number of Participants: 11    Type of Group: Recreational    Wellness Binder Information  Module Name:  x  Session Number:  x    Patient's Goal:  To feel better    Notes:   Worked on activity    Status After Intervention:  Improved    Participation Level: Minimal    Participation Quality: Appropriate      Speech:  normal      Thought Process/Content: Logical      Affective Functioning: Flat      Mood: anxious      Level of consciousness:  Alert      Response to Learning: Progressing to goal      Endings: None Reported    Modes of Intervention: Support      Discipline Responsible: /Counselor   Electronically signed by YEIMY Barajas on 3/5/2018 at 3:32 PM      Signature:  Sis Barajas 54

## 2018-03-05 NOTE — PROGRESS NOTES
Pt denies all no SI/HI or AVH. Patient attends morning group. Pt does not attend Recovery group. Patients appetite is ok, but down from normal.  Sleep is broken. Patient woke up several times through the night and is tired during the early afternoon and lays down to nap. Patient is polite and cooperative with meds. Pt cannot explain her relapse. Pt states she had no complaints with her life it was just coming around this area she knew she could drink. Pt is not anxious but is depressed and very upset with herself.  Electronically signed by Trever Barrow LPN on 9/3/2256 at 7:29 PM

## 2018-03-05 NOTE — PROGRESS NOTES
 Sexual activity: Yes     Partners: Male      Comment: Jennifer Mario one person. \"     Other Topics Concern    Not on file     Social History Narrative    No narrative on file           ROS:  [x] All negative/unchanged except if checked. Explain positive(checked items) below:  [] Constitutional  [] Eyes  [] Ear/Nose/Mouth/Throat  [] Respiratory  [] CV  [] GI  []   [] Musculoskeletal  [] Skin/Breast  [] Neurological  [] Endocrine  [] Heme/Lymph  [] Allergic/Immunologic    Explanation:     MEDICATIONS:    Current Facility-Administered Medications:     desvenlafaxine succinate (PRISTIQ) extended release tablet 50 mg, 50 mg, Oral, Daily, Elijah Vila MD, 50 mg at 03/05/18 1217    levothyroxine (SYNTHROID) tablet 50 mcg, 50 mcg, Oral, Daily, Elijah Vila MD, 50 mcg at 03/05/18 0630    acetaminophen (TYLENOL) tablet 650 mg, 650 mg, Oral, Q4H PRN, Kendrick Ba MD    magnesium hydroxide (MILK OF MAGNESIA) 400 MG/5ML suspension 30 mL, 30 mL, Oral, Daily PRN, Kendrick Ba MD    traZODone (DESYREL) tablet 50 mg, 50 mg, Oral, Nightly PRN, Kendrick Ba MD, 50 mg at 03/03/18 2245    benztropine mesylate (COGENTIN) injection 2 mg, 2 mg, Intramuscular, BID PRN, Kendrick Ba MD    aluminum & magnesium hydroxide-simethicone (MAALOX) 200-200-20 MG/5ML suspension 30 mL, 30 mL, Oral, Q6H PRN, Eldon Vila MD    nicotine (NICODERM CQ) 14 MG/24HR 1 patch, 1 patch, Transdermal, Daily, Kendrick Ba MD, 1 patch at 03/05/18 0833    hydrOXYzine (VISTARIL) capsule 50 mg, 50 mg, Oral, Q6H PRN, 50 mg at 03/03/18 2245 **OR** hydrOXYzine (VISTARIL) injection 50 mg, 50 mg, Intramuscular, Q6H PRN, Lendita Haxhiu-Erhardt, MD      Examination:  /78   Pulse 95   Temp 98 °F (36.7 °C) (Oral)   Resp 18   Ht 5' 4\" (1.626 m)   Wt 150 lb (68 kg)   LMP 02/21/2018 (Approximate)   SpO2 99%   Breastfeeding?  No   BMI 25.75 kg/m²   Gait - steady  Medication patient and treatment team.    PSYCHOTHERAPY/COUNSELING:  [x] Therapeutic interview  [x] Supportive  [] CBT  [] Ongoing  [] Other    [x] Patient continues to need, on a daily basis, active treatment furnished directly by or requiring the supervision of inpatient psychiatric personnel      Anticipated Length of stay:            Electronically signed by Petr Barrios MD on 3/5/2018 at 10:51 AM

## 2018-03-05 NOTE — PROGRESS NOTES
Group Therapy Note    Date: 3/5/2018  Start Time: 1000  End Time:  7359  Number of Participants: 10    Type of Group: Psychoeducation    Wellness Binder Information  Module Name:    Session Number:      Patient's Goal:  \"Stay positive\"    Notes:  Patient was quiet and work fairly on her task in group. Status After Intervention:  Unchanged    Participation Level:  Active Listener    Participation Quality: Appropriate      Speech:  normal      Thought Process/Content: Linear      Affective Functioning: Flat      Mood: calm      Level of consciousness:  Alert      Response to Learning: Progressing to goal      Endings: None Reported    Modes of Intervention: Education, Socialization and Activity      Discipline Responsible: Psychoeducational Specialist      Signature:  Judith Nguyễn

## 2018-03-05 NOTE — PROGRESS NOTES
Group Therapy Note    Date: 3/5/2018  Start Time: 1430  End Time:  1500    Number of Participants: 10    Type of Group: Psychoeducation    Patient's Goal:  To participate in mood management group. Notes:  Patient learned about the emotional mind. Status After Intervention:  Unchanged    Participation Level: Minimal    Participation Quality: Attentive      Speech:  hesitant      Thought Process/Content: Perseverating      Affective Functioning: Flat      Mood: anxious      Level of consciousness:  Attentive      Response to Learning: Resistant      Endings: None Reported    Modes of Intervention: Education      Discipline Responsible: /Counselor      Signature:   PATRICIA Rain

## 2018-03-06 PROCEDURE — 6370000000 HC RX 637 (ALT 250 FOR IP): Performed by: PSYCHIATRY & NEUROLOGY

## 2018-03-06 PROCEDURE — 1240000000 HC EMOTIONAL WELLNESS R&B

## 2018-03-06 PROCEDURE — 99232 SBSQ HOSP IP/OBS MODERATE 35: CPT | Performed by: PSYCHIATRY & NEUROLOGY

## 2018-03-06 RX ORDER — NALTREXONE HYDROCHLORIDE 50 MG/1
50 TABLET, FILM COATED ORAL
Status: DISCONTINUED | OUTPATIENT
Start: 2018-03-06 | End: 2018-03-08 | Stop reason: HOSPADM

## 2018-03-06 RX ADMIN — DESVENLAFAXINE SUCCINATE 50 MG: 50 TABLET, FILM COATED, EXTENDED RELEASE ORAL at 09:51

## 2018-03-06 RX ADMIN — LEVOTHYROXINE SODIUM 50 MCG: 50 TABLET ORAL at 06:19

## 2018-03-06 RX ADMIN — ACETAMINOPHEN 650 MG: 325 TABLET, FILM COATED ORAL at 09:48

## 2018-03-06 RX ADMIN — NALTREXONE HYDROCHLORIDE 50 MG: 50 TABLET, FILM COATED ORAL at 10:37

## 2018-03-06 ASSESSMENT — PAIN SCALES - GENERAL
PAINLEVEL_OUTOF10: 4
PAINLEVEL_OUTOF10: 4

## 2018-03-06 NOTE — PROGRESS NOTES
Group Therapy Note    Date: 3/6/2018  Start Time: 1000  End Time:  3451  Number of Participants: 7    Type of Group: Psychoeducation    Wellness Binder Information  Module Name:    Session Number:      Patient's Goal:  \"Stay positive\"    Notes:  Patient was more sociable and work fairly on her task in group. Status After Intervention:  Unchanged    Participation Level:  Active Listener    Participation Quality: Appropriate and Attentive      Speech:  normal      Thought Process/Content: Logical      Affective Functioning: Congruent      Mood: calm      Level of consciousness:  Alert      Response to Learning: Able to verbalize current knowledge/experience      Endings: None Reported    Modes of Intervention: Education, Socialization and Activity      Discipline Responsible: Psychoeducational Specialist      Signature:  Suleiman Driscoll

## 2018-03-06 NOTE — PLAN OF CARE
Problem: Anxiety:  Goal: Level of anxiety will decrease  Level of anxiety will decrease   Outcome: Ongoing

## 2018-03-06 NOTE — PROGRESS NOTES
Pt. declined to attend the 0900 community meeting, despite staff encouragement. Electronically signed by Ronaldo Berkowitz[address] on 3/6/2018 at 2:18 PM

## 2018-03-06 NOTE — CONSULTS
[address]                       [address], [address] [facility]                                   CONSULTATION    PATIENT NAME: Yvrose Kraft                         :        1980  MED REC NO:   [id number]                            ROOM:       Jessica Doyle  ACCOUNT NO:   [de-identified]                           ADMIT DATE: 2018  PROVIDER:     Jessica Barrios MD    CONSULT DATE:  2018    HISTORY OF PRESENT ILLNESS:  This is a 53-year-old female seen in the  psychiatric center after apparent overdose on pills. Has had issue with  alcohol and previous drug use. Subsequently fell on her elbow four to five  days ago. Bruising ecchymosis noted. Swelling and tenderness. X-ray  showed a nondisplaced olecranon fracture. At this time, past medical  history is evaluated. FAMILY HISTORY:  Strong family history of alcohol use and abuse. SOCIAL HISTORY:  The patient is working as an LPN, subsequently last two  jobs due to absenteeism most likely related from alcohol use. PAST MEDICAL HISTORY:  Significant for hypothyroidism, now with an elbow  fracture. MEDICATIONS:  Medication list is all reviewed per EMR. PHYSICAL EXAMINATION:  HEENT:  Normocephalic, atraumatic head. HEART:  Regular rate and rhythm. ABDOMEN:  Soft, nontender, nondistended. EXTREMITIES:  Right elbow ecchymotic bruising and swelling noted. X-ray showed nondisplaced _____ fracture. We recommended splint, no range  of motion, sling, not weightbearing and follow x-rays in two weeks. The  patient expressed the interest in having the splint off due to pain. We  talked about the risks of displacement, the need for close followup. She  can certainly have the splint off when she is in bed but when she is up and  out of bed, we recommended to make sure that her splint is at bedside. Followup x-rays will be done locally per the patient.     IMPRESSION:  Olecranon fracture, now for followup

## 2018-03-06 NOTE — PLAN OF CARE
Problem: Substance Abuse:  Goal: Participates in care planning  Participates in care planning   Outcome: Ongoing

## 2018-03-06 NOTE — PLAN OF CARE
Problem: Altered Mood, Depressive Behavior:  Goal: Able to verbalize and/or display a decrease in depressive symptoms  Able to verbalize and/or display a decrease in depressive symptoms   Outcome: Ongoing    Goal: Able to verbalize support systems  Able to verbalize support systems   Outcome: Ongoing

## 2018-03-06 NOTE — PROGRESS NOTES
[facility] FOLLOW-UP NOTE     3/6/2018     Patient was seen and examined in person, Chart reviewed   Patient's case discussed with staff/team    Chief Complaint: depression    Interim History:   Pt denies having any withdrawal symptoms  Pt is not compliant with the ortho rec of splint for her fractured elbow  Has been talking to her BF who was an alcoholic, 8 month sober  Pt cannot return back to her dad  Collateral from mom reviewed    Appetite:   [x] Normal/Unchanged  [] Increased  [] Decreased      Sleep:       [] Normal/Unchanged  [x] Fair       [] Poor              Energy:    [] Normal/Unchanged  [] Increased  [x] Decreased        SI [] Present  [x] Absent    HI  []Present  [x] Absent     Aggression:  [] yes  [x] no    Patient is [] able  [x] unable to CONTRACT FOR SAFETY     PAST MEDICAL/PSYCHIATRIC HISTORY:   Past Medical History:   Diagnosis Date    Alcohol abuse     Anxiety     Bipolar [facility] disorder ([facility]     Depression     Seizures ([facility]     x1 only related to alcohol     Thyroid disease        FAMILY/SOCIAL HISTORY:  Family History   Problem Relation Age of Onset    Depression Mother      Social History     Social History    Marital status:      Spouse name: N/A    Number of children: 11    Years of education: 15     Occupational History    nurse      Social History Main Topics    Smoking status: Current Every Day Smoker     Packs/day: 1.00     Years: 15.00     Types: Cigarettes    Smokeless tobacco: Never Used    Alcohol use Yes      Comment: \"stopped drinking for 6 months, started drinking a couple days ago-2 pints of bacardi dailt for lasy few days. \"    Drug use: No    Sexual activity: Yes     Partners: Male      Comment: Nevin Arce one person. \"     Other Topics Concern    Not on file     Social History Narrative    No narrative on file           ROS:  [x] All negative/unchanged except if checked.  Explain positive(checked items) below:  [] Constitutional  [] Eyes  []

## 2018-03-06 NOTE — PLAN OF CARE
Problem: Altered Mood, Depressive Behavior:  Goal: Able to verbalize and/or display a decrease in depressive symptoms  Able to verbalize and/or display a decrease in depressive symptoms   Outcome: Ongoing    Goal: Able to verbalize support systems  Able to verbalize support systems   Outcome: Ongoing      Problem: Substance Abuse:  Goal: Participates in care planning  Participates in care planning   Outcome: Ongoing      Problem: Anxiety:  Goal: Level of anxiety will decrease  Level of anxiety will decrease   Outcome: Ongoing      Problem: Activity:  Goal: Sleeping patterns will improve  Sleeping patterns will improve   Outcome: Ongoing      Problem: Risk for Impaired Skin Integrity  Goal: Tissue integrity - skin and mucous membranes  Structural intactness and normal physiological function of skin and  mucous membranes.    Outcome: Ongoing

## 2018-03-07 PROCEDURE — 99231 SBSQ HOSP IP/OBS SF/LOW 25: CPT | Performed by: PSYCHIATRY & NEUROLOGY

## 2018-03-07 PROCEDURE — 1240000000 HC EMOTIONAL WELLNESS R&B

## 2018-03-07 PROCEDURE — 6370000000 HC RX 637 (ALT 250 FOR IP): Performed by: PSYCHIATRY & NEUROLOGY

## 2018-03-07 PROCEDURE — 90833 PSYTX W PT W E/M 30 MIN: CPT | Performed by: PSYCHIATRY & NEUROLOGY

## 2018-03-07 RX ADMIN — ACETAMINOPHEN 650 MG: 325 TABLET, FILM COATED ORAL at 09:45

## 2018-03-07 RX ADMIN — LEVOTHYROXINE SODIUM 50 MCG: 50 TABLET ORAL at 06:19

## 2018-03-07 RX ADMIN — DESVENLAFAXINE SUCCINATE 50 MG: 50 TABLET, FILM COATED, EXTENDED RELEASE ORAL at 09:49

## 2018-03-07 RX ADMIN — NALTREXONE HYDROCHLORIDE 50 MG: 50 TABLET, FILM COATED ORAL at 09:45

## 2018-03-07 ASSESSMENT — PAIN SCALES - GENERAL
PAINLEVEL_OUTOF10: 6
PAINLEVEL_OUTOF10: 7

## 2018-03-07 NOTE — CARE COORDINATION
Group Therapy Note    Date: 3/7/2018  Start Time: 1110  End Time:  5129  Number of Participants: 6    Type of Group: Psychotherapy    Wellness Binder Information  Module Name:  x  Session Number:  x    Patient's Goal:  Going home    Notes:  Patient states she has a plan    Status After Intervention:  Improved    Participation Level: Interactive    Participation Quality: Appropriate      Speech:  normal      Thought Process/Content: Logical      Affective Functioning: Congruent      Mood: good      Level of consciousness:  Alert      Response to Learning: Able to verbalize current knowledge/experience      Endings: None Reported    Modes of Intervention: Support      Discipline Responsible: Psychoeducational Specialist   Electronically signed by Sis Baum on 3/7/2018 at 12:31 PM      Signature:  Sis Baum

## 2018-03-07 NOTE — PROGRESS NOTES
Group Therapy Note    Date: 3/7/2018  Start Time: 1440  End Time:  1510    Number of Participants: 6    Type of Group: Psychoeducation    Patient's Goal:  To participate in mood management group. Notes:  Patient learned to challenge her negative thinking. Status After Intervention:  Improved    Participation Level: Active Listener    Participation Quality: Appropriate      Speech:  normal      Thought Process/Content: Logical      Affective Functioning: Congruent      Mood: elevated      Level of consciousness:  Alert      Response to Learning: Able to verbalize current knowledge/experience      Endings: None Reported    Modes of Intervention: Education      Discipline Responsible: /Counselor      Signature:   PATRICIA Swanson

## 2018-03-07 NOTE — PLAN OF CARE
Problem:  Activity:  Goal: Sleeping patterns will improve  Sleeping patterns will improve   Outcome: Ongoing

## 2018-03-07 NOTE — PROGRESS NOTES
Eyes  [] Ear/Nose/Mouth/Throat  [] Respiratory  [] CV  [] GI  []   [] Musculoskeletal  [] Skin/Breast  [] Neurological  [] Endocrine  [] Heme/Lymph  [] Allergic/Immunologic    Explanation:     MEDICATIONS:    Current Facility-Administered Medications:     naltrexone (DEPADE) tablet 50 mg, 50 mg, Oral, Daily with breakfast, Alex Bernabe MD, 50 mg at 03/06/18 1037    desvenlafaxine succinate (PRISTIQ) extended release tablet 50 mg, 50 mg, Oral, Daily, Elijah Vila MD, 50 mg at 03/06/18 0951    levothyroxine (SYNTHROID) tablet 50 mcg, 50 mcg, Oral, Daily, Elijah Vila MD, 50 mcg at 03/07/18 0619    acetaminophen (TYLENOL) tablet 650 mg, 650 mg, Oral, Q4H PRN, Kendrick Ba MD, 650 mg at 03/06/18 0948    magnesium hydroxide (MILK OF MAGNESIA) 400 MG/5ML suspension 30 mL, 30 mL, Oral, Daily PRN, Kendrick Ba MD    traZODone (DESYREL) tablet 50 mg, 50 mg, Oral, Nightly PRN, Kendrick Ba MD, 50 mg at 03/03/18 2245    benztropine mesylate (COGENTIN) injection 2 mg, 2 mg, Intramuscular, BID PRN, Kendrick Ba MD    aluminum & magnesium hydroxide-simethicone (MAALOX) 200-200-20 MG/5ML suspension 30 mL, 30 mL, Oral, Q6H PRN, Eldon Vila MD    nicotine (NICODERM CQ) 14 MG/24HR 1 patch, 1 patch, Transdermal, Daily, Elijah Vila MD, 1 patch at 03/06/18 0948    hydrOXYzine (VISTARIL) capsule 50 mg, 50 mg, Oral, Q6H PRN, 50 mg at 03/03/18 2245 **OR** hydrOXYzine (VISTARIL) injection 50 mg, 50 mg, Intramuscular, Q6H PRN, Elijah Vila MD      Examination:  /79   Pulse 88   Temp 97 °F (36.1 °C) (Oral)   Resp 16   Ht 5' 4\" (1.626 m)   Wt 150 lb (68 kg)   LMP 02/21/2018 (Approximate)   SpO2 96%   Breastfeeding?  No   BMI 25.75 kg/m²   Gait - steady  Medication side effects(SE): no    Mental Status Examination:    Level of consciousness:  within normal limits   Appearance:  fair grooming and fair hygiene  Behavior/Motor:  no abnormalities noted  Attitude toward examiner:  cooperative  Speech:  normal rate   Mood: anxious and less depressed  Affect:  mood congruent  Thought processes:  linear and goal directed   Thought content:  Suicidal Ideation: denies  Delusions:  no evidence of delusions  Perceptual Disturbance:  denies any perceptual disturbance  Cognition:  oriented to person, place, and time   Concentration intact  Insight fair   Judgement fair     ASSESSMENT:   Patient symptoms are:  [] Well controlled  [x] Improving  [] Worsening  [] No change      Diagnosis:   Alcohol use disorder  Active Problems:    [facility] ([facility]  Resolved Problems:    * No resolved hospital problems. *      LABS:    No results for input(s): WBC, HGB, PLT in the last 72 hours. No results for input(s): NA, K, CL, CO2, BUN, CREATININE, GLUCOSE in the last 72 hours. No results for input(s): BILITOT, ALKPHOS, AST, ALT in the last 72 hours. Lab Results   Component Value Date    LABAMPH Neg 03/03/2018    BARBSCNU Neg 03/03/2018    LABBENZ Neg 03/03/2018    OPIATESCREENURINE Neg 03/03/2018    PHENCYCLIDINESCREENURINE Neg 03/03/2018    ETOH 47 03/03/2018     Lab Results   Component Value Date    TSH 5.150 03/03/2018     No results found for: LITHIUM  Lab Results   Component Value Date    VALPROATE <2.8 (L) 03/03/2018       RISK ASSESSMENT:     Treatment Plan:  Reviewed current Medications with the patient. Naltrexone 50 mg po q daily  Pt does not want any mood stabilizer although it was rcommended for her  Risks, benefits, side effects, drug-to-drug interactions and alternatives to treatment were discussed. Collateral information  CD evaluation  Encourage patient to attend group and other milieu activities.   Discharge planning discussed with the patient and treatment team.    PSYCHOTHERAPY/COUNSELING:  [x] Therapeutic interview  [x] Supportive  [] CBT  [] Ongoing  [] Other    Patient was seen 1:1 for 20 minutes, other than

## 2018-03-08 VITALS
DIASTOLIC BLOOD PRESSURE: 81 MMHG | OXYGEN SATURATION: 97 % | TEMPERATURE: 98 F | BODY MASS INDEX: 25.61 KG/M2 | RESPIRATION RATE: 18 BRPM | SYSTOLIC BLOOD PRESSURE: 115 MMHG | WEIGHT: 150 LBS | HEIGHT: 64 IN | HEART RATE: 72 BPM

## 2018-03-08 PROBLEM — F10.90 ALCOHOL USE DISORDER: Status: ACTIVE | Noted: 2018-03-08

## 2018-03-08 PROCEDURE — 6370000000 HC RX 637 (ALT 250 FOR IP): Performed by: PSYCHIATRY & NEUROLOGY

## 2018-03-08 PROCEDURE — 99239 HOSP IP/OBS DSCHRG MGMT >30: CPT | Performed by: PSYCHIATRY & NEUROLOGY

## 2018-03-08 RX ORDER — DESVENLAFAXINE 50 MG/1
50 TABLET, EXTENDED RELEASE ORAL DAILY
Qty: 15 TABLET | Refills: 2 | Status: ON HOLD | OUTPATIENT
Start: 2018-03-08 | End: 2018-06-06

## 2018-03-08 RX ORDER — NALTREXONE HYDROCHLORIDE 50 MG/1
50 TABLET, FILM COATED ORAL
Qty: 15 TABLET | Refills: 2 | Status: ON HOLD | OUTPATIENT
Start: 2018-03-08 | End: 2018-06-06

## 2018-03-08 RX ORDER — TRAZODONE HYDROCHLORIDE 50 MG/1
50 TABLET ORAL NIGHTLY PRN
Qty: 15 TABLET | Refills: 2 | Status: ON HOLD | OUTPATIENT
Start: 2018-03-08 | End: 2018-06-06 | Stop reason: HOSPADM

## 2018-03-08 RX ADMIN — NALTREXONE HYDROCHLORIDE 50 MG: 50 TABLET, FILM COATED ORAL at 09:48

## 2018-03-08 RX ADMIN — DESVENLAFAXINE SUCCINATE 50 MG: 50 TABLET, FILM COATED, EXTENDED RELEASE ORAL at 09:49

## 2018-03-08 RX ADMIN — ACETAMINOPHEN 650 MG: 325 TABLET, FILM COATED ORAL at 09:48

## 2018-03-08 RX ADMIN — LEVOTHYROXINE SODIUM 50 MCG: 50 TABLET ORAL at 05:58

## 2018-03-08 ASSESSMENT — PAIN SCALES - GENERAL
PAINLEVEL_OUTOF10: 5
PAINLEVEL_OUTOF10: 0

## 2018-03-08 NOTE — BH NOTE
Group Therapy Note    Date: 3/7/2018  Start Time: 2030  End Time:  2100  Number of Participants: 9    Type of Group: Wrap-Up    Patient's Goal:  To not beat self up about what happened to cause this admission    Notes:  Pt reports feeling better    Status After Intervention:  Improved    Participation Level: Interactive    Participation Quality: Appropriate      Speech:  normal      Thought Process/Content: Logical      Affective Functioning: Congruent      Mood: euthymic      Level of consciousness:  Alert      Response to Learning: Progressing to goal      Endings: None Reported    Modes of Intervention: Support and Socialization      Discipline Responsible: Behavorial Health Tech    Electronically signed by Norma Monroe on 3/7/2018 at 11:15 PM

## 2018-03-08 NOTE — DISCHARGE SUMMARY
DISCHARGE SUMMARY      Patient ID:  Jessica Marin  [id number]  30 y.o.  1980    Admit date: 3/3/2018    Discharge date and time: 3/8/18    Admitting Physician: Rosario Madera MD     Discharge Physician: Dr Brittaney Mcmillan MD    Admission Diagnoses: Bipolar disorder [facility]) [F31.9]    Admission Condition: poor    Discharged Condition: stable    Admission Circumstance:     Presented to the ED after taking boyfriend's medications, about 10 pills of haldol  Relapsed after a visit to her boyfriend. Was sober for 6 months. She was living in [address] and was sober and had a job. Now back to square one. Does not know what to do. Regrets and feels guilty about the drinking relapse. Does not see herself as an alcoholic. Stressors:  She recently  in February and had lost custody of her children. She said she was very upset because of everything she has lost due to healcohol use.  She also reported that she had fallen due to being intoxicateand broke the right elbow. PAST PSYCHIATRIC HISTORY:  The patient reported that she had been having  mental health problems since she was 27.  She stated it started with increased anxiety which prevented her from attending school.   Over time, diagnosis of bipolar disorder emerged, but all in the shadows of brutal alcohol addiction. She has had numerous admissions [facility] inpatient unit, [facility] for Alcohol use disorder   PAST MEDICAL HISTORY:  Hypothyroidism.   Right elbow fracture     FAMILY HISTORY:  Mother alcoholic      PAST MEDICAL/PSYCHIATRIC HISTORY:   Past Medical History:   Diagnosis Date    Alcohol abuse     Anxiety     Bipolar 1 disorder ([facility]     Depression     Seizures ([facility]     x1 only related to alcohol     Thyroid disease        FAMILY/SOCIAL HISTORY:  Family History   Problem Relation Age of Onset    Depression Mother      Social History     Social History    Marital status:  Claudia JOHNSON MD    Examination:  /81   Pulse 72   Temp 98 °F (36.7 °C) (Oral)   Resp 18   Ht 5' 4\" (1.626 m)   Wt 150 lb (68 kg)   LMP 02/21/2018 (Approximate)   SpO2 97%   Breastfeeding? No   BMI 25.75 kg/m²   Gait - steady    HOSPITAL COURSE[de-identified]  Following admission to the hospital, patient had a complete physical exam and blood work up  Patient was monitored closely with suicide precaution  Patient was started on pristiq and naltrexone  Was encouraged to participate in group and other milieu activity  Patient started to feel better with this combination of treatment. Significant progress in the symptoms since admission. Mood better, with the score of 2/10 - bad  No AVH or paranoid thoughts  No Hopeless or worthless feeling  No active SI/HI  Appetite:  [x] Normal  [] Increased  [] Decreased    Sleep:       [x] Normal  [] Fair       [] Poor            Energy:    [x] Normal  [] Increased  [] Decreased     SI [] Present  [x] Absent  HI  []Present  [x] Absent   Aggression:  [] yes  [] no  Patient is [x] able  [] unable to CONTRACT FOR SAFETY   Medication side effects(SE):  [x] None(Psych.  Meds.) [] Other      Mental Status Examination on discharge:    Level of consciousness:  within normal limits   Appearance:  well-appearing  Behavior/Motor:  no abnormalities noted  Attitude toward examiner:  attentive and good eye contact  Speech:  spontaneous, normal rate and normal volume   Mood: anxious  Affect:  mood congruent  Thought processes:  linear, goal directed and coherent   Thought content:  Suicidal Ideation:  denies suicidal ideation  Delusions:  no evidence of delusions  Perceptual Disturbance:  denies any perceptual disturbance  Cognition:  oriented to person, place, and time   Concentration intact  Memory intact  Insight good   Judgement fair   Fund of Knowledge adequate      ASSESSMENT:  Patient symptoms are:  [x] Well controlled  [x] Improving  [] Worsening  [] No

## 2018-03-08 NOTE — CARE COORDINATION
Group Therapy Note    Date: 3/8/2018  Start Time: 1105  End Time:  1200  Number of Participants: 8    Type of Group: Psychotherapy    Wellness Binder Information  Module Name:  x  Session Number:  x    Patient's Goal:  To go home    Notes:  Patient is going home today    Status After Intervention:  Improved    Participation Level: Interactive    Participation Quality: Appropriate      Speech:  normal      Thought Process/Content: Logical      Affective Functioning: Congruent      Mood: good      Level of consciousness:  Alert      Response to Learning: Able to verbalize current knowledge/experience      Endings: None Reported    Modes of Intervention: Support      Discipline Responsible: /Counselor   Electronically signed by YEIMY Hong on 3/8/2018 at 1:41 PM      Signature:  Giacomo Vieira [facility]

## 2018-03-08 NOTE — PROGRESS NOTES
Pt. attended the 0900 community meeting.  Electronically signed by Wang Napier on 3/8/2018 at 11:05 AM

## 2018-06-04 ENCOUNTER — APPOINTMENT (OUTPATIENT)
Dept: GENERAL RADIOLOGY | Age: 38
DRG: 053 | End: 2018-06-04
Payer: MEDICAID

## 2018-06-04 ENCOUNTER — HOSPITAL ENCOUNTER (INPATIENT)
Age: 38
LOS: 1 days | Discharge: HOME | DRG: 053 | End: 2018-06-06
Attending: INTERNAL MEDICINE | Admitting: INTERNAL MEDICINE
Payer: MEDICAID

## 2018-06-04 DIAGNOSIS — F90.0 ADHD (ATTENTION DEFICIT HYPERACTIVITY DISORDER), INATTENTIVE TYPE: ICD-10-CM

## 2018-06-04 DIAGNOSIS — F10.10 ALCOHOL ABUSE: ICD-10-CM

## 2018-06-04 DIAGNOSIS — F10.930 ALCOHOL WITHDRAWAL SEIZURE WITHOUT COMPLICATION (HCC): Primary | ICD-10-CM

## 2018-06-04 DIAGNOSIS — F31.9 BIPOLAR DEPRESSION (HCC): ICD-10-CM

## 2018-06-04 DIAGNOSIS — R56.9 ALCOHOL WITHDRAWAL SEIZURE WITHOUT COMPLICATION (HCC): Primary | ICD-10-CM

## 2018-06-04 LAB
ABO/RH: NORMAL
ALBUMIN SERPL-MCNC: 4.5 G/DL (ref 3.9–4.9)
ALP BLD-CCNC: 70 U/L (ref 40–130)
ALT SERPL-CCNC: 71 U/L (ref 0–33)
ANION GAP SERPL CALCULATED.3IONS-SCNC: 36 MEQ/L (ref 7–13)
ANTIBODY SCREEN: NORMAL
AST SERPL-CCNC: 85 U/L (ref 0–35)
BASOPHILS ABSOLUTE: 0.1 K/UL (ref 0–0.2)
BASOPHILS RELATIVE PERCENT: 1.5 %
BILIRUB SERPL-MCNC: 0.9 MG/DL (ref 0–1.2)
BUN BLDV-MCNC: 7 MG/DL (ref 6–20)
CALCIUM SERPL-MCNC: 8.9 MG/DL (ref 8.6–10.2)
CHLORIDE BLD-SCNC: 89 MEQ/L (ref 98–107)
CO2: 10 MEQ/L (ref 22–29)
CREAT SERPL-MCNC: 0.51 MG/DL (ref 0.5–0.9)
EOSINOPHILS ABSOLUTE: 0 K/UL (ref 0–0.7)
EOSINOPHILS RELATIVE PERCENT: 0.3 %
ETHANOL PERCENT: NORMAL G/DL
ETHANOL: <10 MG/DL (ref 0–0.08)
GFR AFRICAN AMERICAN: >60
GFR NON-AFRICAN AMERICAN: >60
GLOBULIN: 2.8 G/DL (ref 2.3–3.5)
GLUCOSE BLD-MCNC: 104 MG/DL (ref 74–109)
HCT VFR BLD CALC: 49.5 % (ref 37–47)
HEMOGLOBIN: 16.5 G/DL (ref 12–16)
LYMPHOCYTES ABSOLUTE: 2.9 K/UL (ref 1–4.8)
LYMPHOCYTES RELATIVE PERCENT: 42.4 %
MCH RBC QN AUTO: 31.4 PG (ref 27–31.3)
MCHC RBC AUTO-ENTMCNC: 33.4 % (ref 33–37)
MCV RBC AUTO: 94.1 FL (ref 82–100)
MONOCYTES ABSOLUTE: 0.6 K/UL (ref 0.2–0.8)
MONOCYTES RELATIVE PERCENT: 8.9 %
NEUTROPHILS ABSOLUTE: 3.2 K/UL (ref 1.4–6.5)
NEUTROPHILS RELATIVE PERCENT: 46.9 %
PDW BLD-RTO: 16.3 % (ref 11.5–14.5)
PLATELET # BLD: 152 K/UL (ref 130–400)
POTASSIUM SERPL-SCNC: 3.2 MEQ/L (ref 3.5–5.1)
RBC # BLD: 5.26 M/UL (ref 4.2–5.4)
SODIUM BLD-SCNC: 135 MEQ/L (ref 132–144)
TOTAL PROTEIN: 7.3 G/DL (ref 6.4–8.1)
WBC # BLD: 6.8 K/UL (ref 4.8–10.8)

## 2018-06-04 PROCEDURE — 86901 BLOOD TYPING SEROLOGIC RH(D): CPT

## 2018-06-04 PROCEDURE — 96374 THER/PROPH/DIAG INJ IV PUSH: CPT

## 2018-06-04 PROCEDURE — 2500000003 HC RX 250 WO HCPCS: Performed by: PHYSICIAN ASSISTANT

## 2018-06-04 PROCEDURE — 80053 COMPREHEN METABOLIC PANEL: CPT

## 2018-06-04 PROCEDURE — 86900 BLOOD TYPING SEROLOGIC ABO: CPT

## 2018-06-04 PROCEDURE — 86850 RBC ANTIBODY SCREEN: CPT

## 2018-06-04 PROCEDURE — 80164 ASSAY DIPROPYLACETIC ACD TOT: CPT

## 2018-06-04 PROCEDURE — 99285 EMERGENCY DEPT VISIT HI MDM: CPT

## 2018-06-04 PROCEDURE — 6360000002 HC RX W HCPCS: Performed by: PHYSICIAN ASSISTANT

## 2018-06-04 PROCEDURE — 96375 TX/PRO/DX INJ NEW DRUG ADDON: CPT

## 2018-06-04 PROCEDURE — 71045 X-RAY EXAM CHEST 1 VIEW: CPT

## 2018-06-04 PROCEDURE — G0480 DRUG TEST DEF 1-7 CLASSES: HCPCS

## 2018-06-04 PROCEDURE — 36415 COLL VENOUS BLD VENIPUNCTURE: CPT

## 2018-06-04 PROCEDURE — 85025 COMPLETE CBC W/AUTO DIFF WBC: CPT

## 2018-06-04 PROCEDURE — 2580000003 HC RX 258: Performed by: PHYSICIAN ASSISTANT

## 2018-06-04 RX ORDER — ONDANSETRON 2 MG/ML
4 INJECTION INTRAMUSCULAR; INTRAVENOUS ONCE
Status: COMPLETED | OUTPATIENT
Start: 2018-06-04 | End: 2018-06-04

## 2018-06-04 RX ORDER — KETOROLAC TROMETHAMINE 30 MG/ML
30 INJECTION, SOLUTION INTRAMUSCULAR; INTRAVENOUS ONCE
Status: COMPLETED | OUTPATIENT
Start: 2018-06-04 | End: 2018-06-04

## 2018-06-04 RX ORDER — LORAZEPAM 2 MG/ML
1 INJECTION INTRAMUSCULAR ONCE
Status: COMPLETED | OUTPATIENT
Start: 2018-06-04 | End: 2018-06-04

## 2018-06-04 RX ORDER — 0.9 % SODIUM CHLORIDE 0.9 %
1000 INTRAVENOUS SOLUTION INTRAVENOUS ONCE
Status: COMPLETED | OUTPATIENT
Start: 2018-06-04 | End: 2018-06-04

## 2018-06-04 RX ADMIN — ONDANSETRON 4 MG: 2 INJECTION INTRAMUSCULAR; INTRAVENOUS at 21:28

## 2018-06-04 RX ADMIN — KETOROLAC TROMETHAMINE 30 MG: 30 INJECTION, SOLUTION INTRAMUSCULAR at 23:38

## 2018-06-04 RX ADMIN — THIAMINE HYDROCHLORIDE: 100 INJECTION, SOLUTION INTRAMUSCULAR; INTRAVENOUS at 22:50

## 2018-06-04 RX ADMIN — LORAZEPAM 1 MG: 2 INJECTION INTRAMUSCULAR; INTRAVENOUS at 21:29

## 2018-06-04 RX ADMIN — SODIUM CHLORIDE 1000 ML: 9 INJECTION, SOLUTION INTRAVENOUS at 21:28

## 2018-06-04 ASSESSMENT — ENCOUNTER SYMPTOMS
COUGH: 0
VOMITING: 1
SHORTNESS OF BREATH: 0
DIARRHEA: 0
NAUSEA: 1

## 2018-06-04 ASSESSMENT — PAIN DESCRIPTION - DESCRIPTORS: DESCRIPTORS: ACHING

## 2018-06-04 ASSESSMENT — PAIN DESCRIPTION - PAIN TYPE: TYPE: ACUTE PAIN

## 2018-06-04 ASSESSMENT — PAIN DESCRIPTION - ONSET: ONSET: SUDDEN

## 2018-06-04 ASSESSMENT — PAIN SCALES - GENERAL
PAINLEVEL_OUTOF10: 6
PAINLEVEL_OUTOF10: 6

## 2018-06-04 ASSESSMENT — PAIN DESCRIPTION - LOCATION: LOCATION: GENERALIZED

## 2018-06-04 ASSESSMENT — PATIENT HEALTH QUESTIONNAIRE - PHQ9: SUM OF ALL RESPONSES TO PHQ QUESTIONS 1-9: 16

## 2018-06-05 PROBLEM — R56.9 ALCOHOL WITHDRAWAL SEIZURE, UNCOMPLICATED (HCC): Status: ACTIVE | Noted: 2018-06-05

## 2018-06-05 PROBLEM — F10.930 ALCOHOL WITHDRAWAL SEIZURE, UNCOMPLICATED (HCC): Status: ACTIVE | Noted: 2018-06-05

## 2018-06-05 LAB
ALBUMIN SERPL-MCNC: 3.6 G/DL (ref 3.9–4.9)
ALP BLD-CCNC: 49 U/L (ref 40–130)
ALT SERPL-CCNC: 50 U/L (ref 0–33)
ANION GAP SERPL CALCULATED.3IONS-SCNC: 14 MEQ/L (ref 7–13)
ANION GAP SERPL CALCULATED.3IONS-SCNC: 19 MEQ/L (ref 7–13)
AST SERPL-CCNC: 53 U/L (ref 0–35)
BASOPHILS ABSOLUTE: 0.1 K/UL (ref 0–0.2)
BASOPHILS RELATIVE PERCENT: 1.3 %
BETA-HYDROXYBUTYRATE: 9 MG/DL (ref 0.2–2.8)
BILIRUB SERPL-MCNC: 0.8 MG/DL (ref 0–1.2)
BUN BLDV-MCNC: 3 MG/DL (ref 6–20)
BUN BLDV-MCNC: 5 MG/DL (ref 6–20)
CALCIUM SERPL-MCNC: 7.7 MG/DL (ref 8.6–10.2)
CALCIUM SERPL-MCNC: 7.9 MG/DL (ref 8.6–10.2)
CHLORIDE BLD-SCNC: 97 MEQ/L (ref 98–107)
CHLORIDE BLD-SCNC: 97 MEQ/L (ref 98–107)
CO2: 20 MEQ/L (ref 22–29)
CO2: 20 MEQ/L (ref 22–29)
CREAT SERPL-MCNC: 0.4 MG/DL (ref 0.5–0.9)
CREAT SERPL-MCNC: 0.43 MG/DL (ref 0.5–0.9)
EOSINOPHILS ABSOLUTE: 0 K/UL (ref 0–0.7)
EOSINOPHILS RELATIVE PERCENT: 0.9 %
GFR AFRICAN AMERICAN: >60
GFR AFRICAN AMERICAN: >60
GFR NON-AFRICAN AMERICAN: >60
GFR NON-AFRICAN AMERICAN: >60
GLOBULIN: 2 G/DL (ref 2.3–3.5)
GLUCOSE BLD-MCNC: 101 MG/DL (ref 74–109)
GLUCOSE BLD-MCNC: 65 MG/DL (ref 74–109)
GLUCOSE BLD-MCNC: 69 MG/DL (ref 60–115)
HBA1C MFR BLD: 5 % (ref 4.8–5.9)
HCT VFR BLD CALC: 40.2 % (ref 37–47)
HCT VFR BLD CALC: 42.7 % (ref 37–47)
HCT VFR BLD CALC: 43.2 % (ref 37–47)
HEMOGLOBIN: 13.4 G/DL (ref 12–16)
HEMOGLOBIN: 14.4 G/DL (ref 12–16)
HEMOGLOBIN: 14.6 G/DL (ref 12–16)
LACTIC ACID: 0.6 MMOL/L (ref 0.5–2.2)
LYMPHOCYTES ABSOLUTE: 1.2 K/UL (ref 1–4.8)
LYMPHOCYTES RELATIVE PERCENT: 24.9 %
MAGNESIUM: 1.4 MG/DL (ref 1.7–2.3)
MAGNESIUM: 2.1 MG/DL (ref 1.7–2.3)
MCH RBC QN AUTO: 31.2 PG (ref 27–31.3)
MCHC RBC AUTO-ENTMCNC: 33.4 % (ref 33–37)
MCV RBC AUTO: 93.3 FL (ref 82–100)
MONOCYTES ABSOLUTE: 0.4 K/UL (ref 0.2–0.8)
MONOCYTES RELATIVE PERCENT: 9 %
NEUTROPHILS ABSOLUTE: 3.1 K/UL (ref 1.4–6.5)
NEUTROPHILS RELATIVE PERCENT: 63.9 %
OSMOLALITY: 275 MOSM/KG (ref 280–303)
PDW BLD-RTO: 16 % (ref 11.5–14.5)
PERFORMED ON: NORMAL
PLATELET # BLD: 97 K/UL (ref 130–400)
PLATELET SLIDE REVIEW: ABNORMAL
POTASSIUM SERPL-SCNC: 3.3 MEQ/L (ref 3.5–5.1)
POTASSIUM SERPL-SCNC: 3.8 MEQ/L (ref 3.5–5.1)
RBC # BLD: 4.31 M/UL (ref 4.2–5.4)
SODIUM BLD-SCNC: 131 MEQ/L (ref 132–144)
SODIUM BLD-SCNC: 136 MEQ/L (ref 132–144)
TOTAL PROTEIN: 5.6 G/DL (ref 6.4–8.1)
WBC # BLD: 4.9 K/UL (ref 4.8–10.8)

## 2018-06-05 PROCEDURE — 2500000003 HC RX 250 WO HCPCS: Performed by: INTERNAL MEDICINE

## 2018-06-05 PROCEDURE — 6370000000 HC RX 637 (ALT 250 FOR IP): Performed by: INTERNAL MEDICINE

## 2018-06-05 PROCEDURE — 6360000002 HC RX W HCPCS: Performed by: PHYSICIAN ASSISTANT

## 2018-06-05 PROCEDURE — 83036 HEMOGLOBIN GLYCOSYLATED A1C: CPT

## 2018-06-05 PROCEDURE — 83930 ASSAY OF BLOOD OSMOLALITY: CPT

## 2018-06-05 PROCEDURE — 6370000000 HC RX 637 (ALT 250 FOR IP): Performed by: CLINICAL NURSE SPECIALIST

## 2018-06-05 PROCEDURE — 6370000000 HC RX 637 (ALT 250 FOR IP): Performed by: PHYSICIAN ASSISTANT

## 2018-06-05 PROCEDURE — 85014 HEMATOCRIT: CPT

## 2018-06-05 PROCEDURE — 85025 COMPLETE CBC W/AUTO DIFF WBC: CPT

## 2018-06-05 PROCEDURE — 99253 IP/OBS CNSLTJ NEW/EST LOW 45: CPT | Performed by: CLINICAL NURSE SPECIALIST

## 2018-06-05 PROCEDURE — 2580000003 HC RX 258: Performed by: INTERNAL MEDICINE

## 2018-06-05 PROCEDURE — 2060000000 HC ICU INTERMEDIATE R&B

## 2018-06-05 PROCEDURE — 83605 ASSAY OF LACTIC ACID: CPT

## 2018-06-05 PROCEDURE — C9113 INJ PANTOPRAZOLE SODIUM, VIA: HCPCS | Performed by: INTERNAL MEDICINE

## 2018-06-05 PROCEDURE — 6360000002 HC RX W HCPCS: Performed by: INTERNAL MEDICINE

## 2018-06-05 PROCEDURE — 82010 KETONE BODYS QUAN: CPT

## 2018-06-05 PROCEDURE — 83735 ASSAY OF MAGNESIUM: CPT

## 2018-06-05 PROCEDURE — 80053 COMPREHEN METABOLIC PANEL: CPT

## 2018-06-05 PROCEDURE — 96376 TX/PRO/DX INJ SAME DRUG ADON: CPT

## 2018-06-05 PROCEDURE — 85018 HEMOGLOBIN: CPT

## 2018-06-05 PROCEDURE — 36415 COLL VENOUS BLD VENIPUNCTURE: CPT

## 2018-06-05 RX ORDER — NICOTINE POLACRILEX 4 MG
15 LOZENGE BUCCAL PRN
Status: DISCONTINUED | OUTPATIENT
Start: 2018-06-05 | End: 2018-06-06 | Stop reason: HOSPADM

## 2018-06-05 RX ORDER — ONDANSETRON 2 MG/ML
4 INJECTION INTRAMUSCULAR; INTRAVENOUS ONCE
Status: COMPLETED | OUTPATIENT
Start: 2018-06-05 | End: 2018-06-05

## 2018-06-05 RX ORDER — LEVOTHYROXINE SODIUM 0.05 MG/1
50 TABLET ORAL DAILY
Status: DISCONTINUED | OUTPATIENT
Start: 2018-06-05 | End: 2018-06-06 | Stop reason: HOSPADM

## 2018-06-05 RX ORDER — LORAZEPAM 1 MG/1
4 TABLET ORAL
Status: DISCONTINUED | OUTPATIENT
Start: 2018-06-05 | End: 2018-06-06 | Stop reason: HOSPADM

## 2018-06-05 RX ORDER — LORAZEPAM 2 MG/ML
1 INJECTION INTRAMUSCULAR
Status: DISCONTINUED | OUTPATIENT
Start: 2018-06-05 | End: 2018-06-06 | Stop reason: HOSPADM

## 2018-06-05 RX ORDER — ONDANSETRON 2 MG/ML
4 INJECTION INTRAMUSCULAR; INTRAVENOUS EVERY 6 HOURS PRN
Status: DISCONTINUED | OUTPATIENT
Start: 2018-06-05 | End: 2018-06-06 | Stop reason: HOSPADM

## 2018-06-05 RX ORDER — SODIUM CHLORIDE 0.9 % (FLUSH) 0.9 %
10 SYRINGE (ML) INJECTION EVERY 12 HOURS SCHEDULED
Status: DISCONTINUED | OUTPATIENT
Start: 2018-06-05 | End: 2018-06-06 | Stop reason: HOSPADM

## 2018-06-05 RX ORDER — FOLIC ACID 1 MG/1
1 TABLET ORAL DAILY
Status: DISCONTINUED | OUTPATIENT
Start: 2018-06-05 | End: 2018-06-06 | Stop reason: HOSPADM

## 2018-06-05 RX ORDER — THIAMINE HYDROCHLORIDE 100 MG/ML
100 INJECTION, SOLUTION INTRAMUSCULAR; INTRAVENOUS DAILY
Status: DISCONTINUED | OUTPATIENT
Start: 2018-06-05 | End: 2018-06-06 | Stop reason: HOSPADM

## 2018-06-05 RX ORDER — LORAZEPAM 2 MG/ML
2 INJECTION INTRAMUSCULAR
Status: DISCONTINUED | OUTPATIENT
Start: 2018-06-05 | End: 2018-06-06 | Stop reason: HOSPADM

## 2018-06-05 RX ORDER — DEXTROSE MONOHYDRATE 25 G/50ML
12.5 INJECTION, SOLUTION INTRAVENOUS PRN
Status: DISCONTINUED | OUTPATIENT
Start: 2018-06-05 | End: 2018-06-06 | Stop reason: HOSPADM

## 2018-06-05 RX ORDER — LORAZEPAM 2 MG/ML
1 INJECTION INTRAMUSCULAR ONCE
Status: COMPLETED | OUTPATIENT
Start: 2018-06-05 | End: 2018-06-05

## 2018-06-05 RX ORDER — DEXTROSE, SODIUM CHLORIDE, AND POTASSIUM CHLORIDE 5; .45; .15 G/100ML; G/100ML; G/100ML
INJECTION INTRAVENOUS CONTINUOUS
Status: DISCONTINUED | OUTPATIENT
Start: 2018-06-05 | End: 2018-06-06 | Stop reason: HOSPADM

## 2018-06-05 RX ORDER — NICOTINE 21 MG/24HR
1 PATCH, TRANSDERMAL 24 HOURS TRANSDERMAL DAILY
Status: DISCONTINUED | OUTPATIENT
Start: 2018-06-05 | End: 2018-06-06 | Stop reason: HOSPADM

## 2018-06-05 RX ORDER — MAGNESIUM SULFATE 1 G/100ML
1 INJECTION INTRAVENOUS PRN
Status: DISCONTINUED | OUTPATIENT
Start: 2018-06-05 | End: 2018-06-06 | Stop reason: HOSPADM

## 2018-06-05 RX ORDER — SODIUM CHLORIDE 0.9 % (FLUSH) 0.9 %
10 SYRINGE (ML) INJECTION PRN
Status: DISCONTINUED | OUTPATIENT
Start: 2018-06-05 | End: 2018-06-06 | Stop reason: HOSPADM

## 2018-06-05 RX ORDER — TRAZODONE HYDROCHLORIDE 50 MG/1
50 TABLET ORAL NIGHTLY PRN
Status: DISCONTINUED | OUTPATIENT
Start: 2018-06-05 | End: 2018-06-05

## 2018-06-05 RX ORDER — PANTOPRAZOLE SODIUM 40 MG/10ML
40 INJECTION, POWDER, LYOPHILIZED, FOR SOLUTION INTRAVENOUS DAILY
Status: DISCONTINUED | OUTPATIENT
Start: 2018-06-05 | End: 2018-06-06 | Stop reason: HOSPADM

## 2018-06-05 RX ORDER — LORAZEPAM 2 MG/ML
4 INJECTION INTRAMUSCULAR
Status: DISCONTINUED | OUTPATIENT
Start: 2018-06-05 | End: 2018-06-06 | Stop reason: HOSPADM

## 2018-06-05 RX ORDER — LORAZEPAM 1 MG/1
2 TABLET ORAL
Status: DISCONTINUED | OUTPATIENT
Start: 2018-06-05 | End: 2018-06-06 | Stop reason: HOSPADM

## 2018-06-05 RX ORDER — TRAZODONE HYDROCHLORIDE 100 MG/1
100 TABLET ORAL NIGHTLY PRN
Status: DISCONTINUED | OUTPATIENT
Start: 2018-06-05 | End: 2018-06-06 | Stop reason: HOSPADM

## 2018-06-05 RX ORDER — DEXTROSE MONOHYDRATE 50 MG/ML
100 INJECTION, SOLUTION INTRAVENOUS PRN
Status: DISCONTINUED | OUTPATIENT
Start: 2018-06-05 | End: 2018-06-06 | Stop reason: HOSPADM

## 2018-06-05 RX ORDER — POTASSIUM CHLORIDE 7.45 MG/ML
10 INJECTION INTRAVENOUS PRN
Status: DISCONTINUED | OUTPATIENT
Start: 2018-06-05 | End: 2018-06-06 | Stop reason: HOSPADM

## 2018-06-05 RX ORDER — SODIUM CHLORIDE 9 MG/ML
INJECTION, SOLUTION INTRAVENOUS CONTINUOUS
Status: DISCONTINUED | OUTPATIENT
Start: 2018-06-05 | End: 2018-06-05

## 2018-06-05 RX ORDER — MAGNESIUM SULFATE IN WATER 40 MG/ML
2 INJECTION, SOLUTION INTRAVENOUS ONCE
Status: COMPLETED | OUTPATIENT
Start: 2018-06-05 | End: 2018-06-05

## 2018-06-05 RX ORDER — POTASSIUM CHLORIDE 20 MEQ/1
40 TABLET, EXTENDED RELEASE ORAL ONCE
Status: COMPLETED | OUTPATIENT
Start: 2018-06-05 | End: 2018-06-05

## 2018-06-05 RX ORDER — LORAZEPAM 1 MG/1
3 TABLET ORAL
Status: DISCONTINUED | OUTPATIENT
Start: 2018-06-05 | End: 2018-06-06 | Stop reason: HOSPADM

## 2018-06-05 RX ORDER — LORAZEPAM 2 MG/ML
3 INJECTION INTRAMUSCULAR
Status: DISCONTINUED | OUTPATIENT
Start: 2018-06-05 | End: 2018-06-06 | Stop reason: HOSPADM

## 2018-06-05 RX ORDER — LORAZEPAM 1 MG/1
1 TABLET ORAL
Status: DISCONTINUED | OUTPATIENT
Start: 2018-06-05 | End: 2018-06-06 | Stop reason: HOSPADM

## 2018-06-05 RX ORDER — 0.9 % SODIUM CHLORIDE 0.9 %
10 VIAL (ML) INJECTION DAILY
Status: DISCONTINUED | OUTPATIENT
Start: 2018-06-05 | End: 2018-06-06 | Stop reason: HOSPADM

## 2018-06-05 RX ORDER — DEXTROSE MONOHYDRATE 25 G/50ML
12.5 INJECTION, SOLUTION INTRAVENOUS ONCE
Status: COMPLETED | OUTPATIENT
Start: 2018-06-05 | End: 2018-06-05

## 2018-06-05 RX ADMIN — LORAZEPAM 2 MG: 2 INJECTION INTRAMUSCULAR; INTRAVENOUS at 20:40

## 2018-06-05 RX ADMIN — SODIUM CHLORIDE: 9 INJECTION, SOLUTION INTRAVENOUS at 03:54

## 2018-06-05 RX ADMIN — LORAZEPAM 3 MG: 2 INJECTION INTRAMUSCULAR; INTRAVENOUS at 16:35

## 2018-06-05 RX ADMIN — FOLIC ACID 1 MG: 1 TABLET ORAL at 08:18

## 2018-06-05 RX ADMIN — LORAZEPAM 4 MG: 2 INJECTION INTRAMUSCULAR; INTRAVENOUS at 10:44

## 2018-06-05 RX ADMIN — PANTOPRAZOLE SODIUM 40 MG: 40 INJECTION, POWDER, FOR SOLUTION INTRAVENOUS at 08:03

## 2018-06-05 RX ADMIN — THIAMINE HYDROCHLORIDE 100 MG: 100 INJECTION, SOLUTION INTRAMUSCULAR; INTRAVENOUS at 08:03

## 2018-06-05 RX ADMIN — LORAZEPAM 1 MG: 2 INJECTION INTRAMUSCULAR; INTRAVENOUS at 15:11

## 2018-06-05 RX ADMIN — LORAZEPAM 4 MG: 2 INJECTION INTRAMUSCULAR; INTRAVENOUS at 03:52

## 2018-06-05 RX ADMIN — DEXTROSE MONOHYDRATE 12.5 G: 25 INJECTION, SOLUTION INTRAVENOUS at 04:46

## 2018-06-05 RX ADMIN — LORAZEPAM 1 MG: 1 TABLET ORAL at 22:13

## 2018-06-05 RX ADMIN — TRAZODONE HYDROCHLORIDE 100 MG: 100 TABLET ORAL at 20:40

## 2018-06-05 RX ADMIN — MAGNESIUM SULFATE HEPTAHYDRATE 2 G: 40 INJECTION, SOLUTION INTRAVENOUS at 04:46

## 2018-06-05 RX ADMIN — ONDANSETRON 4 MG: 2 INJECTION INTRAMUSCULAR; INTRAVENOUS at 00:34

## 2018-06-05 RX ADMIN — ONDANSETRON 4 MG: 2 INJECTION INTRAMUSCULAR; INTRAVENOUS at 18:11

## 2018-06-05 RX ADMIN — LEVOTHYROXINE SODIUM 50 MCG: 50 TABLET ORAL at 06:44

## 2018-06-05 RX ADMIN — LORAZEPAM 2 MG: 1 TABLET ORAL at 08:05

## 2018-06-05 RX ADMIN — Medication 10 ML: at 20:41

## 2018-06-05 RX ADMIN — POTASSIUM CHLORIDE, DEXTROSE MONOHYDRATE AND SODIUM CHLORIDE: 150; 5; 450 INJECTION, SOLUTION INTRAVENOUS at 16:57

## 2018-06-05 RX ADMIN — LORAZEPAM 4 MG: 2 INJECTION INTRAMUSCULAR; INTRAVENOUS at 05:03

## 2018-06-05 RX ADMIN — LORAZEPAM 1 MG: 2 INJECTION INTRAMUSCULAR; INTRAVENOUS at 12:46

## 2018-06-05 RX ADMIN — ONDANSETRON 4 MG: 2 INJECTION INTRAMUSCULAR; INTRAVENOUS at 13:43

## 2018-06-05 RX ADMIN — POTASSIUM CHLORIDE 40 MEQ: 20 TABLET, EXTENDED RELEASE ORAL at 03:51

## 2018-06-05 RX ADMIN — LORAZEPAM 1 MG: 2 INJECTION INTRAMUSCULAR; INTRAVENOUS at 00:34

## 2018-06-05 ASSESSMENT — PAIN DESCRIPTION - PAIN TYPE: TYPE: ACUTE PAIN

## 2018-06-05 ASSESSMENT — PAIN SCALES - GENERAL
PAINLEVEL_OUTOF10: 0
PAINLEVEL_OUTOF10: 3

## 2018-06-05 ASSESSMENT — PAIN SCALES - WONG BAKER: WONGBAKER_NUMERICALRESPONSE: 0

## 2018-06-05 ASSESSMENT — ENCOUNTER SYMPTOMS: ABDOMINAL PAIN: 1

## 2018-06-05 ASSESSMENT — PAIN DESCRIPTION - FREQUENCY: FREQUENCY: CONTINUOUS

## 2018-06-05 ASSESSMENT — PAIN DESCRIPTION - LOCATION: LOCATION: BACK;HEAD

## 2018-06-05 ASSESSMENT — PAIN DESCRIPTION - DESCRIPTORS: DESCRIPTORS: ACHING

## 2018-06-06 VITALS
WEIGHT: 154.76 LBS | HEART RATE: 103 BPM | SYSTOLIC BLOOD PRESSURE: 112 MMHG | OXYGEN SATURATION: 95 % | DIASTOLIC BLOOD PRESSURE: 72 MMHG | RESPIRATION RATE: 16 BRPM | HEIGHT: 62 IN | TEMPERATURE: 98.1 F | BODY MASS INDEX: 28.48 KG/M2

## 2018-06-06 PROBLEM — K70.10 ALCOHOLIC HEPATITIS: Status: ACTIVE | Noted: 2018-06-06

## 2018-06-06 PROBLEM — F10.939 ALCOHOL WITHDRAWAL SEIZURE (HCC): Status: ACTIVE | Noted: 2018-06-05

## 2018-06-06 PROBLEM — R56.9 ALCOHOL WITHDRAWAL SEIZURE (HCC): Status: RESOLVED | Noted: 2018-06-05 | Resolved: 2018-06-06

## 2018-06-06 PROBLEM — Z59.89 INSURANCE COVERAGE PROBLEMS: Chronic | Status: ACTIVE | Noted: 2018-06-06

## 2018-06-06 PROBLEM — K70.10 ALCOHOLIC HEPATITIS: Status: RESOLVED | Noted: 2018-06-06 | Resolved: 2018-06-06

## 2018-06-06 PROBLEM — Z59.71 INSURANCE COVERAGE PROBLEMS: Chronic | Status: ACTIVE | Noted: 2018-06-06

## 2018-06-06 PROBLEM — F10.939 ALCOHOL WITHDRAWAL SEIZURE (HCC): Status: RESOLVED | Noted: 2018-06-05 | Resolved: 2018-06-06

## 2018-06-06 LAB
ALBUMIN SERPL-MCNC: 3.6 G/DL (ref 3.9–4.9)
ALP BLD-CCNC: 51 U/L (ref 40–130)
ALT SERPL-CCNC: 39 U/L (ref 0–33)
ANION GAP SERPL CALCULATED.3IONS-SCNC: 12 MEQ/L (ref 7–13)
AST SERPL-CCNC: 33 U/L (ref 0–35)
ATYPICAL LYMPHOCYTE RELATIVE PERCENT: 3 %
BASOPHILS ABSOLUTE: 0 K/UL (ref 0–0.2)
BASOPHILS RELATIVE PERCENT: 1 %
BILIRUB SERPL-MCNC: 0.7 MG/DL (ref 0–1.2)
BUN BLDV-MCNC: 3 MG/DL (ref 6–20)
CALCIUM SERPL-MCNC: 8.5 MG/DL (ref 8.6–10.2)
CHLORIDE BLD-SCNC: 105 MEQ/L (ref 98–107)
CO2: 21 MEQ/L (ref 22–29)
CREAT SERPL-MCNC: 0.41 MG/DL (ref 0.5–0.9)
EOSINOPHILS ABSOLUTE: 0 K/UL (ref 0–0.7)
EOSINOPHILS RELATIVE PERCENT: 3 %
GFR AFRICAN AMERICAN: >60
GFR NON-AFRICAN AMERICAN: >60
GLOBULIN: 2.3 G/DL (ref 2.3–3.5)
GLUCOSE BLD-MCNC: 99 MG/DL (ref 74–109)
HCT VFR BLD CALC: 42.1 % (ref 37–47)
HEMOGLOBIN: 14.2 G/DL (ref 12–16)
LYMPHOCYTES ABSOLUTE: 1.7 K/UL (ref 1–4.8)
LYMPHOCYTES RELATIVE PERCENT: 41 %
MCH RBC QN AUTO: 31.8 PG (ref 27–31.3)
MCHC RBC AUTO-ENTMCNC: 33.7 % (ref 33–37)
MCV RBC AUTO: 94.5 FL (ref 82–100)
MONOCYTES ABSOLUTE: 0.1 K/UL (ref 0.2–0.8)
MONOCYTES RELATIVE PERCENT: 1.9 %
NEUTROPHILS ABSOLUTE: 2.1 K/UL (ref 1.4–6.5)
NEUTROPHILS RELATIVE PERCENT: 54 %
PDW BLD-RTO: 16 % (ref 11.5–14.5)
PLATELET # BLD: 81 K/UL (ref 130–400)
PLATELET SLIDE REVIEW: ABNORMAL
POTASSIUM REFLEX MAGNESIUM: 3.7 MEQ/L (ref 3.5–5.1)
RBC # BLD: 4.45 M/UL (ref 4.2–5.4)
SODIUM BLD-SCNC: 138 MEQ/L (ref 132–144)
TOTAL PROTEIN: 5.9 G/DL (ref 6.4–8.1)
WBC # BLD: 3.8 K/UL (ref 4.8–10.8)

## 2018-06-06 PROCEDURE — 6370000000 HC RX 637 (ALT 250 FOR IP): Performed by: INTERNAL MEDICINE

## 2018-06-06 PROCEDURE — 2580000003 HC RX 258: Performed by: INTERNAL MEDICINE

## 2018-06-06 PROCEDURE — 85025 COMPLETE CBC W/AUTO DIFF WBC: CPT

## 2018-06-06 PROCEDURE — 80053 COMPREHEN METABOLIC PANEL: CPT

## 2018-06-06 PROCEDURE — 2500000003 HC RX 250 WO HCPCS: Performed by: INTERNAL MEDICINE

## 2018-06-06 PROCEDURE — 6360000002 HC RX W HCPCS: Performed by: INTERNAL MEDICINE

## 2018-06-06 PROCEDURE — 6370000000 HC RX 637 (ALT 250 FOR IP): Performed by: PHYSICIAN ASSISTANT

## 2018-06-06 PROCEDURE — 36415 COLL VENOUS BLD VENIPUNCTURE: CPT

## 2018-06-06 PROCEDURE — C9113 INJ PANTOPRAZOLE SODIUM, VIA: HCPCS | Performed by: INTERNAL MEDICINE

## 2018-06-06 RX ORDER — DESVENLAFAXINE 50 MG/1
50 TABLET, EXTENDED RELEASE ORAL DAILY
Qty: 14 TABLET | Refills: 0 | Status: SHIPPED | OUTPATIENT
Start: 2018-06-06 | End: 2020-08-07 | Stop reason: ALTCHOICE

## 2018-06-06 RX ORDER — ACETAMINOPHEN 325 MG/1
650 TABLET ORAL EVERY 4 HOURS PRN
Status: DISCONTINUED | OUTPATIENT
Start: 2018-06-06 | End: 2018-06-06 | Stop reason: HOSPADM

## 2018-06-06 RX ORDER — NICOTINE 21 MG/24HR
1 PATCH, TRANSDERMAL 24 HOURS TRANSDERMAL DAILY
Qty: 14 PATCH | Refills: 0 | Status: SHIPPED | OUTPATIENT
Start: 2018-06-07 | End: 2020-08-07

## 2018-06-06 RX ORDER — NALTREXONE HYDROCHLORIDE 50 MG/1
50 TABLET, FILM COATED ORAL
Qty: 14 TABLET | Refills: 0 | Status: SHIPPED | OUTPATIENT
Start: 2018-06-06 | End: 2018-06-20

## 2018-06-06 RX ORDER — ALPRAZOLAM 0.25 MG/1
0.25 TABLET ORAL 2 TIMES DAILY PRN
Qty: 14 TABLET | Refills: 0 | Status: SHIPPED | OUTPATIENT
Start: 2018-06-06 | End: 2018-06-20

## 2018-06-06 RX ORDER — LEVOTHYROXINE SODIUM 0.05 MG/1
50 TABLET ORAL DAILY
Qty: 14 TABLET | Refills: 0 | Status: SHIPPED | OUTPATIENT
Start: 2018-06-07 | End: 2020-08-07

## 2018-06-06 RX ORDER — 0.9 % SODIUM CHLORIDE 0.9 %
2000 INTRAVENOUS SOLUTION INTRAVENOUS ONCE
Status: COMPLETED | OUTPATIENT
Start: 2018-06-06 | End: 2018-06-06

## 2018-06-06 RX ORDER — TRAZODONE HYDROCHLORIDE 100 MG/1
100 TABLET ORAL NIGHTLY
Qty: 14 TABLET | Refills: 0 | Status: SHIPPED | OUTPATIENT
Start: 2018-06-06 | End: 2020-08-07

## 2018-06-06 RX ORDER — QUETIAPINE FUMARATE 25 MG/1
25 TABLET, FILM COATED ORAL 2 TIMES DAILY
Qty: 28 TABLET | Refills: 0 | Status: SHIPPED | OUTPATIENT
Start: 2018-06-06 | End: 2020-08-07

## 2018-06-06 RX ORDER — LORAZEPAM 2 MG/ML
1 INJECTION INTRAMUSCULAR ONCE
Status: DISCONTINUED | OUTPATIENT
Start: 2018-06-06 | End: 2018-06-06 | Stop reason: HOSPADM

## 2018-06-06 RX ORDER — FOLIC ACID 1 MG/1
1 TABLET ORAL DAILY
Qty: 14 TABLET | Refills: 0 | Status: SHIPPED | OUTPATIENT
Start: 2018-06-07 | End: 2020-08-07

## 2018-06-06 RX ADMIN — LORAZEPAM 1 MG: 1 TABLET ORAL at 05:39

## 2018-06-06 RX ADMIN — BENZOCAINE AND MENTHOL 1 LOZENGE: 15; 3.6 LOZENGE ORAL at 10:23

## 2018-06-06 RX ADMIN — Medication 10 ML: at 10:24

## 2018-06-06 RX ADMIN — SODIUM CHLORIDE 2000 ML: 9 INJECTION, SOLUTION INTRAVENOUS at 10:24

## 2018-06-06 RX ADMIN — LORAZEPAM 1 MG: 1 TABLET ORAL at 12:57

## 2018-06-06 RX ADMIN — PANTOPRAZOLE SODIUM 40 MG: 40 INJECTION, POWDER, FOR SOLUTION INTRAVENOUS at 10:22

## 2018-06-06 RX ADMIN — LEVOTHYROXINE SODIUM 50 MCG: 50 TABLET ORAL at 05:39

## 2018-06-06 RX ADMIN — ACETAMINOPHEN 650 MG: 325 TABLET ORAL at 10:23

## 2018-06-06 RX ADMIN — THIAMINE HYDROCHLORIDE 100 MG: 100 INJECTION, SOLUTION INTRAMUSCULAR; INTRAVENOUS at 10:23

## 2018-06-06 RX ADMIN — POTASSIUM CHLORIDE, DEXTROSE MONOHYDRATE AND SODIUM CHLORIDE: 150; 5; 450 INJECTION, SOLUTION INTRAVENOUS at 01:03

## 2018-06-06 RX ADMIN — FOLIC ACID 1 MG: 1 TABLET ORAL at 10:23

## 2018-06-07 LAB
VALPROIC ACID % FREE: ABNORMAL % (ref 5–18)
VALPROIC ACID TOTAL: <7 UG/ML (ref 50–125)
VALPROIC ACID, FREE: <7 UG/ML (ref 7–23)

## 2018-06-10 ENCOUNTER — HOSPITAL ENCOUNTER (EMERGENCY)
Age: 38
Discharge: HOME | End: 2018-06-11
Attending: EMERGENCY MEDICINE
Payer: MEDICAID

## 2018-06-10 ENCOUNTER — APPOINTMENT (OUTPATIENT)
Dept: CT IMAGING | Age: 38
End: 2018-06-10
Payer: MEDICAID

## 2018-06-10 DIAGNOSIS — F10.20 ALCOHOLISM (HCC): Primary | ICD-10-CM

## 2018-06-10 LAB
ALBUMIN SERPL-MCNC: 4.1 G/DL (ref 3.9–4.9)
ALP BLD-CCNC: 58 U/L (ref 40–130)
ALT SERPL-CCNC: 61 U/L (ref 0–33)
AMMONIA: 29 UMOL/L (ref 11–51)
ANION GAP SERPL CALCULATED.3IONS-SCNC: 26 MEQ/L (ref 7–13)
AST SERPL-CCNC: 75 U/L (ref 0–35)
BASOPHILS ABSOLUTE: 0.1 K/UL (ref 0–0.2)
BASOPHILS RELATIVE PERCENT: 1 %
BILIRUB SERPL-MCNC: 0.3 MG/DL (ref 0–1.2)
BUN BLDV-MCNC: 12 MG/DL (ref 6–20)
CALCIUM SERPL-MCNC: 8.9 MG/DL (ref 8.6–10.2)
CHLORIDE BLD-SCNC: 103 MEQ/L (ref 98–107)
CHP ED QC CHECK: YES
CK MB: 1.9 NG/ML (ref 0–3.8)
CO2: 19 MEQ/L (ref 22–29)
CREAT SERPL-MCNC: 0.5 MG/DL (ref 0.5–0.9)
CREATINE KINASE-MB INDEX: 0.2 % (ref 0–3.5)
EOSINOPHILS ABSOLUTE: 0.1 K/UL (ref 0–0.7)
EOSINOPHILS RELATIVE PERCENT: 2.1 %
ETHANOL PERCENT: 0.35 G/DL
ETHANOL: 395 MG/DL (ref 0–0.08)
GFR AFRICAN AMERICAN: >60
GFR NON-AFRICAN AMERICAN: >60
GLOBULIN: 2.9 G/DL (ref 2.3–3.5)
GLUCOSE BLD-MCNC: 62 MG/DL
GLUCOSE BLD-MCNC: 62 MG/DL (ref 74–109)
GLUCOSE BLD-MCNC: 63 MG/DL (ref 60–115)
GLUCOSE BLD-MCNC: 80 MG/DL (ref 60–115)
HCT VFR BLD CALC: 46.8 % (ref 37–47)
HEMOGLOBIN: 16.2 G/DL (ref 12–16)
LACTIC ACID: 3.6 MMOL/L (ref 0.5–2.2)
LYMPHOCYTES ABSOLUTE: 1.6 K/UL (ref 1–4.8)
LYMPHOCYTES RELATIVE PERCENT: 29.4 %
MCH RBC QN AUTO: 32.4 PG (ref 27–31.3)
MCHC RBC AUTO-ENTMCNC: 34.5 % (ref 33–37)
MCV RBC AUTO: 93.9 FL (ref 82–100)
MONOCYTES ABSOLUTE: 0.4 K/UL (ref 0.2–0.8)
MONOCYTES RELATIVE PERCENT: 7.9 %
NEUTROPHILS ABSOLUTE: 3.3 K/UL (ref 1.4–6.5)
NEUTROPHILS RELATIVE PERCENT: 59.6 %
PDW BLD-RTO: 16.2 % (ref 11.5–14.5)
PERFORMED ON: NORMAL
PERFORMED ON: NORMAL
PLATELET # BLD: 181 K/UL (ref 130–400)
POTASSIUM SERPL-SCNC: 3.5 MEQ/L (ref 3.5–5.1)
RBC # BLD: 4.98 M/UL (ref 4.2–5.4)
SODIUM BLD-SCNC: 148 MEQ/L (ref 132–144)
TOTAL CK: 890 U/L (ref 0–170)
TOTAL PROTEIN: 7 G/DL (ref 6.4–8.1)
WBC # BLD: 5.6 K/UL (ref 4.8–10.8)

## 2018-06-10 PROCEDURE — 6360000002 HC RX W HCPCS: Performed by: PHYSICIAN ASSISTANT

## 2018-06-10 PROCEDURE — 82140 ASSAY OF AMMONIA: CPT

## 2018-06-10 PROCEDURE — 85025 COMPLETE CBC W/AUTO DIFF WBC: CPT

## 2018-06-10 PROCEDURE — 82550 ASSAY OF CK (CPK): CPT

## 2018-06-10 PROCEDURE — 36415 COLL VENOUS BLD VENIPUNCTURE: CPT

## 2018-06-10 PROCEDURE — 99285 EMERGENCY DEPT VISIT HI MDM: CPT

## 2018-06-10 PROCEDURE — 80053 COMPREHEN METABOLIC PANEL: CPT

## 2018-06-10 PROCEDURE — 96375 TX/PRO/DX INJ NEW DRUG ADDON: CPT

## 2018-06-10 PROCEDURE — 2580000003 HC RX 258: Performed by: PHYSICIAN ASSISTANT

## 2018-06-10 PROCEDURE — 83605 ASSAY OF LACTIC ACID: CPT

## 2018-06-10 PROCEDURE — 70450 CT HEAD/BRAIN W/O DYE: CPT

## 2018-06-10 PROCEDURE — 82553 CREATINE MB FRACTION: CPT

## 2018-06-10 PROCEDURE — G0480 DRUG TEST DEF 1-7 CLASSES: HCPCS

## 2018-06-10 RX ORDER — 0.9 % SODIUM CHLORIDE 0.9 %
1000 INTRAVENOUS SOLUTION INTRAVENOUS ONCE
Status: COMPLETED | OUTPATIENT
Start: 2018-06-10 | End: 2018-06-11

## 2018-06-10 RX ORDER — ONDANSETRON 2 MG/ML
4 INJECTION INTRAMUSCULAR; INTRAVENOUS ONCE
Status: COMPLETED | OUTPATIENT
Start: 2018-06-10 | End: 2018-06-10

## 2018-06-10 RX ADMIN — SODIUM CHLORIDE 1000 ML: 9 INJECTION, SOLUTION INTRAVENOUS at 21:49

## 2018-06-10 RX ADMIN — ONDANSETRON 4 MG: 2 INJECTION INTRAMUSCULAR; INTRAVENOUS at 21:49

## 2018-06-10 ASSESSMENT — ENCOUNTER SYMPTOMS
VOMITING: 0
DIARRHEA: 0
ABDOMINAL PAIN: 0
SORE THROAT: 0
COUGH: 0
RHINORRHEA: 0
WHEEZING: 0
BACK PAIN: 0
SHORTNESS OF BREATH: 0
NAUSEA: 1
CONSTIPATION: 0

## 2018-06-11 VITALS
BODY MASS INDEX: 24.11 KG/M2 | DIASTOLIC BLOOD PRESSURE: 78 MMHG | SYSTOLIC BLOOD PRESSURE: 112 MMHG | TEMPERATURE: 97.5 F | RESPIRATION RATE: 20 BRPM | OXYGEN SATURATION: 99 % | WEIGHT: 150 LBS | HEIGHT: 66 IN | HEART RATE: 72 BPM

## 2018-06-11 PROCEDURE — 6360000002 HC RX W HCPCS: Performed by: PHYSICIAN ASSISTANT

## 2018-06-11 PROCEDURE — 96365 THER/PROPH/DIAG IV INF INIT: CPT

## 2018-06-11 PROCEDURE — 2580000003 HC RX 258: Performed by: PHYSICIAN ASSISTANT

## 2018-06-11 PROCEDURE — 6370000000 HC RX 637 (ALT 250 FOR IP): Performed by: PHYSICIAN ASSISTANT

## 2018-06-11 PROCEDURE — 96366 THER/PROPH/DIAG IV INF ADDON: CPT

## 2018-06-11 PROCEDURE — 2500000003 HC RX 250 WO HCPCS: Performed by: PHYSICIAN ASSISTANT

## 2018-06-11 PROCEDURE — 96376 TX/PRO/DX INJ SAME DRUG ADON: CPT

## 2018-06-11 PROCEDURE — 96375 TX/PRO/DX INJ NEW DRUG ADDON: CPT

## 2018-06-11 RX ORDER — NICOTINE 21 MG/24HR
1 PATCH, TRANSDERMAL 24 HOURS TRANSDERMAL ONCE
Status: DISCONTINUED | OUTPATIENT
Start: 2018-06-11 | End: 2018-06-11 | Stop reason: HOSPADM

## 2018-06-11 RX ORDER — ONDANSETRON 2 MG/ML
4 INJECTION INTRAMUSCULAR; INTRAVENOUS ONCE
Status: COMPLETED | OUTPATIENT
Start: 2018-06-11 | End: 2018-06-11

## 2018-06-11 RX ORDER — LORAZEPAM 2 MG/ML
2 INJECTION INTRAMUSCULAR ONCE
Status: COMPLETED | OUTPATIENT
Start: 2018-06-11 | End: 2018-06-11

## 2018-06-11 RX ADMIN — LORAZEPAM 2 MG: 2 INJECTION INTRAMUSCULAR; INTRAVENOUS at 01:14

## 2018-06-11 RX ADMIN — THIAMINE HYDROCHLORIDE: 100 INJECTION, SOLUTION INTRAMUSCULAR; INTRAVENOUS at 01:37

## 2018-06-11 RX ADMIN — ONDANSETRON 4 MG: 2 INJECTION INTRAMUSCULAR; INTRAVENOUS at 01:37

## 2018-06-20 ENCOUNTER — HOSPITAL ENCOUNTER (EMERGENCY)
Age: 38
Discharge: HOME | End: 2018-06-21
Payer: MEDICAID

## 2018-06-20 DIAGNOSIS — F10.230 ALCOHOL DEPENDENCE WITH UNCOMPLICATED WITHDRAWAL (HCC): ICD-10-CM

## 2018-06-20 DIAGNOSIS — R11.2 NAUSEA AND VOMITING, INTRACTABILITY OF VOMITING NOT SPECIFIED, UNSPECIFIED VOMITING TYPE: Primary | ICD-10-CM

## 2018-06-20 DIAGNOSIS — K22.6 MALLORY-WEISS TEAR: ICD-10-CM

## 2018-06-20 DIAGNOSIS — F10.11 HISTORY OF ALCOHOL ABUSE: ICD-10-CM

## 2018-06-20 PROCEDURE — 99285 EMERGENCY DEPT VISIT HI MDM: CPT

## 2018-06-21 VITALS
DIASTOLIC BLOOD PRESSURE: 59 MMHG | RESPIRATION RATE: 20 BRPM | HEIGHT: 63 IN | WEIGHT: 150 LBS | TEMPERATURE: 98.1 F | BODY MASS INDEX: 26.58 KG/M2 | OXYGEN SATURATION: 96 % | HEART RATE: 116 BPM | SYSTOLIC BLOOD PRESSURE: 130 MMHG

## 2018-06-21 LAB
ANION GAP SERPL CALCULATED.3IONS-SCNC: 34 MEQ/L (ref 7–13)
BASOPHILS ABSOLUTE: 0.2 K/UL (ref 0–0.2)
BASOPHILS RELATIVE PERCENT: 2.4 %
BUN BLDV-MCNC: 7 MG/DL (ref 6–20)
CALCIUM SERPL-MCNC: 8.3 MG/DL (ref 8.6–10.2)
CHLORIDE BLD-SCNC: 92 MEQ/L (ref 98–107)
CHP ED QC CHECK: YES
CO2: 15 MEQ/L (ref 22–29)
CREAT SERPL-MCNC: 0.53 MG/DL (ref 0.5–0.9)
EOSINOPHILS ABSOLUTE: 0 K/UL (ref 0–0.7)
EOSINOPHILS RELATIVE PERCENT: 0.3 %
GFR AFRICAN AMERICAN: >60
GFR NON-AFRICAN AMERICAN: >60
GLUCOSE BLD-MCNC: 59 MG/DL (ref 74–109)
HCT VFR BLD CALC: 55 % (ref 37–47)
HEMOGLOBIN: 18.8 G/DL (ref 12–16)
LIPASE: 27 U/L (ref 13–60)
LYMPHOCYTES ABSOLUTE: 2.9 K/UL (ref 1–4.8)
LYMPHOCYTES RELATIVE PERCENT: 41.9 %
MCH RBC QN AUTO: 32.5 PG (ref 27–31.3)
MCHC RBC AUTO-ENTMCNC: 34.2 % (ref 33–37)
MCV RBC AUTO: 95.1 FL (ref 82–100)
MONOCYTES ABSOLUTE: 0.5 K/UL (ref 0.2–0.8)
MONOCYTES RELATIVE PERCENT: 6.9 %
NEUTROPHILS ABSOLUTE: 3.3 K/UL (ref 1.4–6.5)
NEUTROPHILS RELATIVE PERCENT: 48.5 %
OCCULT BLOOD, OTHER: NEGATIVE
PDW BLD-RTO: 16.2 % (ref 11.5–14.5)
PLATELET # BLD: 242 K/UL (ref 130–400)
POTASSIUM SERPL-SCNC: 4.3 MEQ/L (ref 3.5–5.1)
PREGNANCY TEST URINE, POC: NEGATIVE
RBC # BLD: 5.79 M/UL (ref 4.2–5.4)
SODIUM BLD-SCNC: 141 MEQ/L (ref 132–144)
WBC # BLD: 6.8 K/UL (ref 4.8–10.8)

## 2018-06-21 PROCEDURE — 2500000003 HC RX 250 WO HCPCS: Performed by: PHYSICIAN ASSISTANT

## 2018-06-21 PROCEDURE — 36415 COLL VENOUS BLD VENIPUNCTURE: CPT

## 2018-06-21 PROCEDURE — 82271 OCCULT BLOOD OTHER SOURCES: CPT

## 2018-06-21 PROCEDURE — 6370000000 HC RX 637 (ALT 250 FOR IP): Performed by: PHYSICIAN ASSISTANT

## 2018-06-21 PROCEDURE — 96376 TX/PRO/DX INJ SAME DRUG ADON: CPT

## 2018-06-21 PROCEDURE — 96374 THER/PROPH/DIAG INJ IV PUSH: CPT

## 2018-06-21 PROCEDURE — 85025 COMPLETE CBC W/AUTO DIFF WBC: CPT

## 2018-06-21 PROCEDURE — 6360000002 HC RX W HCPCS: Performed by: PHYSICIAN ASSISTANT

## 2018-06-21 PROCEDURE — 2580000003 HC RX 258: Performed by: PHYSICIAN ASSISTANT

## 2018-06-21 PROCEDURE — 83690 ASSAY OF LIPASE: CPT

## 2018-06-21 PROCEDURE — 80048 BASIC METABOLIC PNL TOTAL CA: CPT

## 2018-06-21 PROCEDURE — 6360000002 HC RX W HCPCS

## 2018-06-21 PROCEDURE — 96375 TX/PRO/DX INJ NEW DRUG ADDON: CPT

## 2018-06-21 RX ORDER — CHLORDIAZEPOXIDE HYDROCHLORIDE 25 MG/1
25 CAPSULE, GELATIN COATED ORAL 3 TIMES DAILY PRN
Qty: 15 CAPSULE | Refills: 0 | Status: SHIPPED | OUTPATIENT
Start: 2018-06-21 | End: 2018-06-26

## 2018-06-21 RX ORDER — OMEPRAZOLE 20 MG/1
40 CAPSULE, DELAYED RELEASE ORAL DAILY
Qty: 30 CAPSULE | Refills: 0 | Status: SHIPPED | OUTPATIENT
Start: 2018-06-21 | End: 2020-08-07

## 2018-06-21 RX ORDER — 0.9 % SODIUM CHLORIDE 0.9 %
1000 INTRAVENOUS SOLUTION INTRAVENOUS ONCE
Status: DISCONTINUED | OUTPATIENT
Start: 2018-06-21 | End: 2018-06-21

## 2018-06-21 RX ORDER — ONDANSETRON 4 MG/1
4 TABLET, ORALLY DISINTEGRATING ORAL ONCE
Status: DISCONTINUED | OUTPATIENT
Start: 2018-06-21 | End: 2018-06-21

## 2018-06-21 RX ORDER — ONDANSETRON 2 MG/ML
4 INJECTION INTRAMUSCULAR; INTRAVENOUS ONCE
Status: COMPLETED | OUTPATIENT
Start: 2018-06-21 | End: 2018-06-21

## 2018-06-21 RX ORDER — NICOTINE 21 MG/24HR
1 PATCH, TRANSDERMAL 24 HOURS TRANSDERMAL ONCE
Status: DISCONTINUED | OUTPATIENT
Start: 2018-06-21 | End: 2018-06-21 | Stop reason: HOSPADM

## 2018-06-21 RX ORDER — ONDANSETRON 4 MG/1
4 TABLET, FILM COATED ORAL EVERY 8 HOURS PRN
Qty: 20 TABLET | Refills: 0 | Status: SHIPPED | OUTPATIENT
Start: 2018-06-21 | End: 2020-08-07

## 2018-06-21 RX ORDER — ONDANSETRON 2 MG/ML
INJECTION INTRAMUSCULAR; INTRAVENOUS
Status: DISCONTINUED
Start: 2018-06-21 | End: 2018-06-21

## 2018-06-21 RX ORDER — LORAZEPAM 2 MG/ML
2 INJECTION INTRAMUSCULAR ONCE
Status: COMPLETED | OUTPATIENT
Start: 2018-06-21 | End: 2018-06-21

## 2018-06-21 RX ORDER — PANTOPRAZOLE SODIUM 40 MG/1
40 TABLET, DELAYED RELEASE ORAL ONCE
Status: COMPLETED | OUTPATIENT
Start: 2018-06-21 | End: 2018-06-21

## 2018-06-21 RX ADMIN — LORAZEPAM 2 MG: 2 INJECTION INTRAMUSCULAR; INTRAVENOUS at 00:40

## 2018-06-21 RX ADMIN — SODIUM CHLORIDE 1000 ML: 9 INJECTION, SOLUTION INTRAVENOUS at 00:14

## 2018-06-21 RX ADMIN — ONDANSETRON 4 MG: 2 INJECTION INTRAMUSCULAR; INTRAVENOUS at 02:07

## 2018-06-21 RX ADMIN — ONDANSETRON 4 MG: 2 INJECTION INTRAMUSCULAR; INTRAVENOUS at 00:10

## 2018-06-21 RX ADMIN — PANTOPRAZOLE SODIUM 40 MG: 40 TABLET, DELAYED RELEASE ORAL at 00:40

## 2018-06-21 RX ADMIN — FOLIC ACID: 5 INJECTION, SOLUTION INTRAMUSCULAR; INTRAVENOUS; SUBCUTANEOUS at 02:01

## 2018-06-21 ASSESSMENT — ENCOUNTER SYMPTOMS
EYE DISCHARGE: 0
COUGH: 0
NAUSEA: 1
ABDOMINAL DISTENTION: 0
VOICE CHANGE: 0
PHOTOPHOBIA: 0
APNEA: 0
ANAL BLEEDING: 0
VOMITING: 1

## 2020-05-10 LAB — TSH SERPL DL<=0.05 MIU/L-ACNC: 8.37 MIU/L (ref 0.44–3.9)

## 2020-08-07 ENCOUNTER — HOSPITAL ENCOUNTER (INPATIENT)
Age: 40
LOS: 6 days | Discharge: HOME | DRG: 753 | End: 2020-08-13
Attending: EMERGENCY MEDICINE | Admitting: PSYCHIATRY & NEUROLOGY
Payer: MEDICAID

## 2020-08-07 LAB
ACETAMINOPHEN LEVEL: <5 UG/ML (ref 10–30)
ALBUMIN SERPL-MCNC: 4.1 G/DL (ref 3.5–4.6)
ALP BLD-CCNC: 59 U/L (ref 40–130)
ALT SERPL-CCNC: 33 U/L (ref 0–33)
AMPHETAMINE SCREEN, URINE: ABNORMAL
ANION GAP SERPL CALCULATED.3IONS-SCNC: 13 MEQ/L (ref 9–15)
AST SERPL-CCNC: 28 U/L (ref 0–35)
BACTERIA: ABNORMAL /HPF
BARBITURATE SCREEN URINE: ABNORMAL
BASOPHILS ABSOLUTE: 0.1 K/UL (ref 0–0.2)
BASOPHILS RELATIVE PERCENT: 0.7 %
BENZODIAZEPINE SCREEN, URINE: ABNORMAL
BILIRUB SERPL-MCNC: 0.6 MG/DL (ref 0.2–0.7)
BILIRUBIN URINE: NEGATIVE
BLOOD, URINE: NEGATIVE
BUN BLDV-MCNC: 17 MG/DL (ref 6–20)
CALCIUM SERPL-MCNC: 9.1 MG/DL (ref 8.5–9.9)
CANNABINOID SCREEN URINE: POSITIVE
CHLORIDE BLD-SCNC: 103 MEQ/L (ref 95–107)
CHOLESTEROL, TOTAL: 177 MG/DL (ref 0–199)
CLARITY: ABNORMAL
CO2: 21 MEQ/L (ref 20–31)
COCAINE METABOLITE SCREEN URINE: ABNORMAL
COLOR: YELLOW
CREAT SERPL-MCNC: 0.54 MG/DL (ref 0.5–0.9)
EKG ATRIAL RATE: 64 BPM
EKG P AXIS: 62 DEGREES
EKG P-R INTERVAL: 156 MS
EKG Q-T INTERVAL: 422 MS
EKG QRS DURATION: 76 MS
EKG QTC CALCULATION (BAZETT): 435 MS
EKG R AXIS: 58 DEGREES
EKG T AXIS: 48 DEGREES
EKG VENTRICULAR RATE: 64 BPM
EOSINOPHILS ABSOLUTE: 0.1 K/UL (ref 0–0.7)
EOSINOPHILS RELATIVE PERCENT: 0.6 %
EPITHELIAL CELLS, UA: ABNORMAL /HPF (ref 0–5)
ETHANOL PERCENT: NORMAL G/DL
ETHANOL: <10 MG/DL (ref 0–0.08)
GFR AFRICAN AMERICAN: >60
GFR NON-AFRICAN AMERICAN: >60
GLOBULIN: 3 G/DL (ref 2.3–3.5)
GLUCOSE BLD-MCNC: 81 MG/DL (ref 70–99)
GLUCOSE URINE: NEGATIVE MG/DL
HCG(URINE) PREGNANCY TEST: NEGATIVE
HCT VFR BLD CALC: 42.4 % (ref 37–47)
HDLC SERPL-MCNC: 65 MG/DL (ref 40–59)
HEMOGLOBIN: 14.5 G/DL (ref 12–16)
HYALINE CASTS: ABNORMAL /HPF (ref 0–5)
KETONES, URINE: 40 MG/DL
LDL CHOLESTEROL CALCULATED: 98 MG/DL (ref 0–129)
LEUKOCYTE ESTERASE, URINE: ABNORMAL
LYMPHOCYTES ABSOLUTE: 2.5 K/UL (ref 1–4.8)
LYMPHOCYTES RELATIVE PERCENT: 26.9 %
Lab: ABNORMAL
MAGNESIUM: 1.9 MG/DL (ref 1.7–2.4)
MCH RBC QN AUTO: 30.7 PG (ref 27–31.3)
MCHC RBC AUTO-ENTMCNC: 34.3 % (ref 33–37)
MCV RBC AUTO: 89.4 FL (ref 82–100)
METHADONE SCREEN, URINE: ABNORMAL
MONOCYTES ABSOLUTE: 0.5 K/UL (ref 0.2–0.8)
MONOCYTES RELATIVE PERCENT: 5.8 %
NEUTROPHILS ABSOLUTE: 6.1 K/UL (ref 1.4–6.5)
NEUTROPHILS RELATIVE PERCENT: 66 %
NITRITE, URINE: NEGATIVE
OPIATE SCREEN URINE: ABNORMAL
OXYCODONE URINE: ABNORMAL
PDW BLD-RTO: 12.8 % (ref 11.5–14.5)
PH UA: 7.5 (ref 5–9)
PHENCYCLIDINE SCREEN URINE: ABNORMAL
PLATELET # BLD: 242 K/UL (ref 130–400)
POTASSIUM SERPL-SCNC: 4.2 MEQ/L (ref 3.4–4.9)
PROPOXYPHENE SCREEN: ABNORMAL
PROTEIN UA: ABNORMAL MG/DL
RBC # BLD: 4.74 M/UL (ref 4.2–5.4)
RBC UA: ABNORMAL /HPF (ref 0–2)
SALICYLATE, SERUM: <0.3 MG/DL (ref 15–30)
SARS-COV-2, NAAT: NOT DETECTED
SODIUM BLD-SCNC: 137 MEQ/L (ref 135–144)
SPECIFIC GRAVITY UA: 1.03 (ref 1–1.03)
TOTAL CK: 109 U/L (ref 0–170)
TOTAL PROTEIN: 7.1 G/DL (ref 6.3–8)
TRIGL SERPL-MCNC: 70 MG/DL (ref 0–150)
TROPONIN: <0.01 NG/ML (ref 0–0.01)
TSH SERPL DL<=0.05 MIU/L-ACNC: 1.99 UIU/ML (ref 0.44–3.86)
UROBILINOGEN, URINE: 1 E.U./DL
WBC # BLD: 9.3 K/UL (ref 4.8–10.8)
WBC UA: ABNORMAL /HPF (ref 0–5)

## 2020-08-07 PROCEDURE — 99285 EMERGENCY DEPT VISIT HI MDM: CPT

## 2020-08-07 PROCEDURE — 83735 ASSAY OF MAGNESIUM: CPT

## 2020-08-07 PROCEDURE — 36415 COLL VENOUS BLD VENIPUNCTURE: CPT

## 2020-08-07 PROCEDURE — 6370000000 HC RX 637 (ALT 250 FOR IP): Performed by: NURSE PRACTITIONER

## 2020-08-07 PROCEDURE — 6370000000 HC RX 637 (ALT 250 FOR IP): Performed by: EMERGENCY MEDICINE

## 2020-08-07 PROCEDURE — 80307 DRUG TEST PRSMV CHEM ANLYZR: CPT

## 2020-08-07 PROCEDURE — U0002 COVID-19 LAB TEST NON-CDC: HCPCS

## 2020-08-07 PROCEDURE — 1240000000 HC EMOTIONAL WELLNESS R&B

## 2020-08-07 PROCEDURE — 93005 ELECTROCARDIOGRAM TRACING: CPT | Performed by: EMERGENCY MEDICINE

## 2020-08-07 PROCEDURE — 6370000000 HC RX 637 (ALT 250 FOR IP): Performed by: PSYCHIATRY & NEUROLOGY

## 2020-08-07 PROCEDURE — 84443 ASSAY THYROID STIM HORMONE: CPT

## 2020-08-07 PROCEDURE — 80053 COMPREHEN METABOLIC PANEL: CPT

## 2020-08-07 PROCEDURE — 84703 CHORIONIC GONADOTROPIN ASSAY: CPT

## 2020-08-07 PROCEDURE — G0480 DRUG TEST DEF 1-7 CLASSES: HCPCS

## 2020-08-07 PROCEDURE — 81001 URINALYSIS AUTO W/SCOPE: CPT

## 2020-08-07 PROCEDURE — 82550 ASSAY OF CK (CPK): CPT

## 2020-08-07 PROCEDURE — 85025 COMPLETE CBC W/AUTO DIFF WBC: CPT

## 2020-08-07 PROCEDURE — 80061 LIPID PANEL: CPT

## 2020-08-07 PROCEDURE — 84484 ASSAY OF TROPONIN QUANT: CPT

## 2020-08-07 RX ORDER — ACETAMINOPHEN 325 MG/1
650 TABLET ORAL EVERY 6 HOURS PRN
Status: DISCONTINUED | OUTPATIENT
Start: 2020-08-07 | End: 2020-08-13 | Stop reason: HOSPADM

## 2020-08-07 RX ORDER — HALOPERIDOL 5 MG/ML
5 INJECTION INTRAMUSCULAR EVERY 6 HOURS PRN
Status: DISCONTINUED | OUTPATIENT
Start: 2020-08-07 | End: 2020-08-13 | Stop reason: HOSPADM

## 2020-08-07 RX ORDER — CHLORDIAZEPOXIDE HYDROCHLORIDE 10 MG/1
10 CAPSULE, GELATIN COATED ORAL EVERY 4 HOURS PRN
Status: DISCONTINUED | OUTPATIENT
Start: 2020-08-07 | End: 2020-08-07

## 2020-08-07 RX ORDER — HALOPERIDOL 5 MG
5 TABLET ORAL EVERY 6 HOURS PRN
Status: DISCONTINUED | OUTPATIENT
Start: 2020-08-07 | End: 2020-08-13 | Stop reason: HOSPADM

## 2020-08-07 RX ORDER — CHLORDIAZEPOXIDE HYDROCHLORIDE 10 MG/1
10 CAPSULE, GELATIN COATED ORAL EVERY 4 HOURS PRN
Status: DISCONTINUED | OUTPATIENT
Start: 2020-08-07 | End: 2020-08-11

## 2020-08-07 RX ORDER — CIPROFLOXACIN 500 MG/1
500 TABLET, FILM COATED ORAL ONCE
Status: COMPLETED | OUTPATIENT
Start: 2020-08-07 | End: 2020-08-07

## 2020-08-07 RX ORDER — M-VIT,TX,IRON,MINS/CALC/FOLIC 27MG-0.4MG
1 TABLET ORAL DAILY
Status: DISCONTINUED | OUTPATIENT
Start: 2020-08-07 | End: 2020-08-13 | Stop reason: HOSPADM

## 2020-08-07 RX ORDER — LORAZEPAM 1 MG/1
1 TABLET ORAL ONCE
Status: COMPLETED | OUTPATIENT
Start: 2020-08-07 | End: 2020-08-07

## 2020-08-07 RX ORDER — THIAMINE MONONITRATE (VIT B1) 100 MG
100 TABLET ORAL DAILY
Status: DISCONTINUED | OUTPATIENT
Start: 2020-08-07 | End: 2020-08-13 | Stop reason: HOSPADM

## 2020-08-07 RX ORDER — LORAZEPAM 1 MG/1
4 TABLET ORAL
Status: DISCONTINUED | OUTPATIENT
Start: 2020-08-07 | End: 2020-08-07

## 2020-08-07 RX ORDER — DIVALPROEX SODIUM 500 MG/1
500 TABLET, EXTENDED RELEASE ORAL DAILY
Status: ON HOLD | COMMUNITY
End: 2020-08-13 | Stop reason: HOSPADM

## 2020-08-07 RX ORDER — BUSPIRONE HYDROCHLORIDE 15 MG/1
7.5 TABLET ORAL DAILY
Status: ON HOLD | COMMUNITY
End: 2020-08-13 | Stop reason: HOSPADM

## 2020-08-07 RX ORDER — NICOTINE 21 MG/24HR
1 PATCH, TRANSDERMAL 24 HOURS TRANSDERMAL DAILY
Status: DISCONTINUED | OUTPATIENT
Start: 2020-08-07 | End: 2020-08-13 | Stop reason: HOSPADM

## 2020-08-07 RX ORDER — CHLORDIAZEPOXIDE HYDROCHLORIDE 25 MG/1
50 CAPSULE, GELATIN COATED ORAL
Status: DISCONTINUED | OUTPATIENT
Start: 2020-08-07 | End: 2020-08-07

## 2020-08-07 RX ORDER — LEVOTHYROXINE SODIUM 0.07 MG/1
75 TABLET ORAL DAILY
Status: ON HOLD | COMMUNITY
End: 2021-10-12 | Stop reason: HOSPADM

## 2020-08-07 RX ORDER — LORAZEPAM 1 MG/1
4 TABLET ORAL
Status: DISCONTINUED | OUTPATIENT
Start: 2020-08-07 | End: 2020-08-11

## 2020-08-07 RX ORDER — HYDROXYZINE HYDROCHLORIDE 50 MG/ML
50 INJECTION, SOLUTION INTRAMUSCULAR EVERY 6 HOURS PRN
Status: DISCONTINUED | OUTPATIENT
Start: 2020-08-07 | End: 2020-08-13 | Stop reason: HOSPADM

## 2020-08-07 RX ORDER — HYDROXYZINE PAMOATE 50 MG/1
50 CAPSULE ORAL ONCE
Status: COMPLETED | OUTPATIENT
Start: 2020-08-07 | End: 2020-08-07

## 2020-08-07 RX ORDER — MAGNESIUM HYDROXIDE/ALUMINUM HYDROXICE/SIMETHICONE 120; 1200; 1200 MG/30ML; MG/30ML; MG/30ML
30 SUSPENSION ORAL EVERY 6 HOURS PRN
Status: DISCONTINUED | OUTPATIENT
Start: 2020-08-07 | End: 2020-08-13 | Stop reason: HOSPADM

## 2020-08-07 RX ORDER — CHLORDIAZEPOXIDE HYDROCHLORIDE 25 MG/1
75 CAPSULE, GELATIN COATED ORAL
Status: DISCONTINUED | OUTPATIENT
Start: 2020-08-07 | End: 2020-08-07

## 2020-08-07 RX ORDER — BUPROPION HYDROCHLORIDE 150 MG/1
150 TABLET ORAL EVERY MORNING
Status: ON HOLD | COMMUNITY
End: 2020-08-13 | Stop reason: HOSPADM

## 2020-08-07 RX ORDER — CHLORDIAZEPOXIDE HYDROCHLORIDE 25 MG/1
75 CAPSULE, GELATIN COATED ORAL
Status: DISCONTINUED | OUTPATIENT
Start: 2020-08-07 | End: 2020-08-11

## 2020-08-07 RX ORDER — TRAZODONE HYDROCHLORIDE 50 MG/1
50 TABLET ORAL NIGHTLY PRN
Status: DISCONTINUED | OUTPATIENT
Start: 2020-08-07 | End: 2020-08-13 | Stop reason: HOSPADM

## 2020-08-07 RX ORDER — FOLIC ACID 1 MG/1
1 TABLET ORAL DAILY
Status: DISCONTINUED | OUTPATIENT
Start: 2020-08-07 | End: 2020-08-13 | Stop reason: HOSPADM

## 2020-08-07 RX ORDER — ONDANSETRON 4 MG/1
4 TABLET, FILM COATED ORAL EVERY 4 HOURS PRN
Status: DISCONTINUED | OUTPATIENT
Start: 2020-08-07 | End: 2020-08-13 | Stop reason: HOSPADM

## 2020-08-07 RX ORDER — CIPROFLOXACIN 500 MG/1
500 TABLET, FILM COATED ORAL EVERY 12 HOURS SCHEDULED
Status: COMPLETED | OUTPATIENT
Start: 2020-08-07 | End: 2020-08-10

## 2020-08-07 RX ORDER — CHLORDIAZEPOXIDE HYDROCHLORIDE 25 MG/1
50 CAPSULE, GELATIN COATED ORAL
Status: DISCONTINUED | OUTPATIENT
Start: 2020-08-07 | End: 2020-08-11

## 2020-08-07 RX ORDER — HYDROXYZINE PAMOATE 50 MG/1
50 CAPSULE ORAL EVERY 6 HOURS PRN
Status: DISCONTINUED | OUTPATIENT
Start: 2020-08-07 | End: 2020-08-13 | Stop reason: HOSPADM

## 2020-08-07 RX ORDER — CHLORDIAZEPOXIDE HYDROCHLORIDE 25 MG/1
25 CAPSULE, GELATIN COATED ORAL EVERY 4 HOURS PRN
Status: DISCONTINUED | OUTPATIENT
Start: 2020-08-07 | End: 2020-08-07

## 2020-08-07 RX ORDER — CHLORDIAZEPOXIDE HYDROCHLORIDE 25 MG/1
25 CAPSULE, GELATIN COATED ORAL EVERY 4 HOURS PRN
Status: DISCONTINUED | OUTPATIENT
Start: 2020-08-07 | End: 2020-08-11

## 2020-08-07 RX ADMIN — HYDROXYZINE PAMOATE 50 MG: 50 CAPSULE ORAL at 18:40

## 2020-08-07 RX ADMIN — CIPROFLOXACIN 500 MG: 500 TABLET, FILM COATED ORAL at 20:34

## 2020-08-07 RX ADMIN — CIPROFLOXACIN 500 MG: 500 TABLET, FILM COATED ORAL at 14:26

## 2020-08-07 RX ADMIN — LORAZEPAM 1 MG: 1 TABLET ORAL at 11:10

## 2020-08-07 RX ADMIN — TRAZODONE HYDROCHLORIDE 50 MG: 50 TABLET ORAL at 20:34

## 2020-08-07 RX ADMIN — FOLIC ACID 1 MG: 1 TABLET ORAL at 20:34

## 2020-08-07 RX ADMIN — Medication 100 MG: at 20:33

## 2020-08-07 RX ADMIN — CHLORDIAZEPOXIDE HYDROCHLORIDE 10 MG: 10 CAPSULE ORAL at 20:34

## 2020-08-07 RX ADMIN — MULTIPLE VITAMINS W/ MINERALS TAB 1 TABLET: TAB at 20:34

## 2020-08-07 RX ADMIN — HYDROXYZINE PAMOATE 50 MG: 50 CAPSULE ORAL at 08:10

## 2020-08-07 ASSESSMENT — ENCOUNTER SYMPTOMS
COUGH: 0
SHORTNESS OF BREATH: 0
NAUSEA: 0
VOMITING: 0
BACK PAIN: 0
SORE THROAT: 0
ABDOMINAL PAIN: 0
DIARRHEA: 0

## 2020-08-07 ASSESSMENT — SLEEP AND FATIGUE QUESTIONNAIRES
DIFFICULTY ARISING: NO
DIFFICULTY STAYING ASLEEP: NO
DO YOU USE A SLEEP AID: NO
RESTFUL SLEEP: YES
DIFFICULTY FALLING ASLEEP: YES
DO YOU HAVE DIFFICULTY SLEEPING: YES
SLEEP PATTERN: DIFFICULTY FALLING ASLEEP
AVERAGE NUMBER OF SLEEP HOURS: 3

## 2020-08-07 NOTE — ED NOTES
Pt verbally contracts for safety while in [facility]. Pt reports they will notify staff with changes.        Leatha Gonzales RN  08/07/20 3077

## 2020-08-07 NOTE — ED NOTES
Lab at bedside for blood work. Pt tolerated well. Pt calm and cooperative at this time.         Ralph Winters RN  08/07/20 8727

## 2020-08-07 NOTE — ED NOTES
Pt in bed with eyes closed. Respirations are even and unlabored. No s/s of distress noted at this time. Will continue to monitor.       Mary Anne Manriquez RN  08/07/20 8765

## 2020-08-07 NOTE — ED NOTES
Lunch tray given to pt, pt continues to be lying in bed eyes closed     [place] VICENTE Dodge  08/07/20 1596 [facility] Baudilio, RN  08/07/20 1254

## 2020-08-07 NOTE — ED NOTES
Call received from Dr. Merla Gowers and dispo discussed and decision to admit for bipolar depression.      Les Fulton RN  08/07/20 5150

## 2020-08-07 NOTE — ED NOTES
Pt sitting in bed at this time. Eyes open and no complaints voiced. No signs of distress noted.       Kelin Melissa RN  08/07/20 0957

## 2020-08-07 NOTE — ED PROVIDER NOTES
[address] [facility] ED  eMERGENCYdEPARTMENT eNCOUnter      Pt Name: Rosalinda Boswell  MRN: [id number]  Arujngfshruti 1980  Date of evaluation: 8/7/2020  Kia James MD    CHIEF COMPLAINT           HPI  Rosalinda Boswell is a 47 y.o. female per chart review has a h/o bipolar, alcohol dependence presents to the ED with depression. Pt notes gradual onset, moderate, constant, depression x 2 days. +SI. No plan. Pt denies HI, AVH. Pt denies fever, n/v, cp, sob, dysuria. Pt notes she drinks alcohol but not everyday. ROS  Review of Systems   Constitutional: Negative for activity change, chills and fever. HENT: Negative for ear pain and sore throat. Eyes: Negative for visual disturbance. Respiratory: Negative for cough and shortness of breath. Cardiovascular: Negative for chest pain, palpitations and leg swelling. Gastrointestinal: Negative for abdominal pain, diarrhea, nausea and vomiting. Genitourinary: Negative for dysuria. Musculoskeletal: Negative for back pain. Skin: Negative for rash. Neurological: Negative for dizziness and weakness. Psychiatric/Behavioral: Positive for decreased concentration, dysphoric mood, self-injury and suicidal ideas. Except as noted above the remainder of the review of systems was reviewed and negative.        PAST MEDICAL HISTORY     Past Medical History:   Diagnosis Date    Alcohol abuse     Alcoholic hepatitis 9/9/6144    Anxiety     Bipolar 1 disorder ([facility] [address]     Depression     Seizures (HCC)     x1 only related to alcohol     Thyroid disease          SURGICAL HISTORY       Past Surgical History:   Procedure Laterality Date    BREAST ENHANCEMENT SURGERY      BREAST SURGERY      TONSILLECTOMY           CURRENTMEDICATIONS       Previous Medications    BUPROPION (WELLBUTRIN XL) 150 MG EXTENDED RELEASE TABLET    Take 150 mg by mouth every morning    BUSPIRONE (BUSPAR) 15 MG TABLET    Take 7.5 mg by mouth daily    DIVALPROEX (DEPAKOTE ER) 500 MG EXTENDED RELEASE TABLET    Take 500 mg by mouth daily    LEVOTHYROXINE (SYNTHROID) 75 MCG TABLET    Take 75 mcg by mouth Daily    MULTIPLE VITAMINS-MINERALS (MULTIVITAMIN & MINERAL PO)    Take 1 tablet by mouth daily       ALLERGIES     Patient has no known allergies. FAMILY HISTORY       Family History   Problem Relation Age of Onset    Depression Mother           SOCIAL HISTORY       Social History     Socioeconomic History    Marital status:      Spouse name: None    Number of children: 11    Years of education: 15    Highest education level: None   Occupational History    Occupation: nurse   Social Needs    Financial resource strain: None    Food insecurity     Worry: None     Inability: None    Transportation needs     Medical: None     Non-medical: None   Tobacco Use    Smoking status: Current Every Day Smoker     Packs/day: 1.00     Years: 15.00     Pack years: 15.00     Types: Cigarettes    Smokeless tobacco: Never Used   Substance and Sexual Activity    Alcohol use: Yes     Comment: drinks a fifth of rum per day. trying to stop drinking     Drug use: No    Sexual activity: Yes     Partners: Male     Comment: Sixto Valerio one person. \"   Lifestyle    Physical activity     Days per week: None     Minutes per session: None    Stress: None   Relationships    Social connections     Talks on phone: None     Gets together: None     Attends [employer] service: None     Active member of club or organization: None     Attends meetings of clubs or organizations: None     Relationship status: None    Intimate partner violence     Fear of current or ex partner: None     Emotionally abused: None     Physically abused: None     Forced sexual activity: None   Other Topics Concern    None   Social History Narrative    None         PHYSICAL EXAM       ED Triage Vitals [08/07/20 0701]   BP Temp Temp Source Pulse Resp SpO2 Height Weight   (!) 123/59 99.3 °F (37.4 °C) Tympanic 81 20 98 % 5' 2\" (1.575 m) 150 lb (68 kg)       Physical Exam  Vitals signs and nursing note reviewed. Constitutional:       Appearance: She is well-developed. HENT:      Head: Normocephalic. Right Ear: External ear normal.      Left Ear: External ear normal.   Eyes:      Conjunctiva/sclera: Conjunctivae normal.      Pupils: Pupils are equal, round, and reactive to light. Neck:      Musculoskeletal: Normal range of motion and neck supple. Cardiovascular:      Rate and Rhythm: Normal rate and regular rhythm. Heart sounds: Normal heart sounds. Pulmonary:      Effort: Pulmonary effort is normal.      Breath sounds: Normal breath sounds. Abdominal:      General: Bowel sounds are normal. There is no distension. Palpations: Abdomen is soft. Tenderness: There is no abdominal tenderness. Musculoskeletal: Normal range of motion. Skin:     General: Skin is warm and dry. Neurological:      Mental Status: She is alert and oriented to person, place, and time. Psychiatric:      Comments: Flat affect. No flight of ideas, circumferential thoughts, pressured speech. MDM  46 yo female presents to the ED with depression, SI.  Pt is afebrile, hemodynamically stable. Pt with a h/o alcohol dependence however pt clinically not in alcohol withdrawal.  Pt without tremors, not tachycardic, not hypertensive in the ED. EKG shows NSR with HR 64, normal axis, normal intervals, no ST changes. Labs unremarkable. UA shows likely UTI. Pt given PO cipro in the ED. Tox positive for THC. Pt is medically clear for psych evaluation. Case discussed with Dr. Marge Arteaga who recommended admission. Pt admitted to psych in stable condition. Pt understands plan. FINAL IMPRESSION      1. Bipolar depression ([address]    2.  Acute cystitis without hematuria          DISPOSITION/PLAN   DISPOSITION Decision To Admit 08/07/2020 01:26:35 PM        DISCHARGE MEDICATIONS:  [unfilled]         Amparo Dudley MD(electronically signed)  Attending Emergency Physician            Edgar Myers MD  08/07/20 4667

## 2020-08-07 NOTE — ED TRIAGE NOTES
Pt states that had a bottle of rum and drank 2 days after being sober and relapsing. Pt is very irritable and asking for various medications at this time. Pt has no visible tremors at this time. History of overdose 2 years ago and was hospitalized. Pt states that \"I don't know the last time I took prescriptions, when I was in rehab. They got the medications. \" Pt left before completions of rehab in June 2020. States that has been in and out of rehab for 4 years. Pt states that she is homeless. Pt states that she she would like to go to sleep and not wake up, but if she left feels that she would harm herself.

## 2020-08-07 NOTE — PROGRESS NOTES
Patient did not attend the [facility] due to having just arrived on the unit.  Electronically signed by Juliette Varela on 8/7/2020 at 5:28 PM

## 2020-08-07 NOTE — ED NOTES
Breakfast tray offered, pt declined at this time. Pt encouraged to drink fluids. Pt denies needs. No s/s of distress noted.       Xochilt John RN  08/07/20 3169

## 2020-08-07 NOTE — ED NOTES
Report given to [facility] RN.  Awaiting medical bed to be cleaned at this time     Laureano Ayala RN  08/07/20 6619

## 2020-08-07 NOTE — ED NOTES
Call placed to [facility] and spoke with Sandra Griffin. Awaiting for pt to be discharge for available be.       Carina Mcqueen RN  08/07/20 3061

## 2020-08-07 NOTE — ED NOTES
Pt arrived to [facility] ER. Pt changed into [facility] attire with [facility] staff present at side. Visual skin check completed and contraband check completed. Pt's belongings collected and called security to check in belongings. Pt provided urine specimen and specimen was sent to lab. Pt oriented to area and made aware expectations. Pt calm and cooperative at this time.         Maggie Norris RN  08/07/20 1892
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
## 2020-08-07 NOTE — ED NOTES
Pt in bed with eyes closed. Respirations are even and unlabored. No s/s of distress noted at this time. Will continue to monitor.         Michael Payton RN  08/07/20 5196

## 2020-08-07 NOTE — ED NOTES
Pt in bed with eyes closed. Respirations are even and unlabored. No s/s of distress noted at this time. Will continue to monitor.         Beatriz Kaur RN  08/07/20 1885

## 2020-08-07 NOTE — ED NOTES
[facility] Pt is lying in bed eyes closed at this time. No sign of distress noted at this time.        Paul Rowell RN  08/07/20 9937
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
## 2020-08-07 NOTE — ED NOTES
Patient has been cleared by doctor to go to .  No symptoms of withdraw     Elba Hood RN  08/07/20 5031

## 2020-08-07 NOTE — ED NOTES
Pt states that previously taking Wellbutrin, Latuda (but could not afford), Depakote, buspar and synthroid. Unable to given dosages and frequency stated that she received from [facility] when in house there and has not taken since then in June 2020.      Loida Snyder RN  08/07/20 45 Alma Arce RN  08/07/20 3118

## 2020-08-08 PROCEDURE — 6370000000 HC RX 637 (ALT 250 FOR IP): Performed by: NURSE PRACTITIONER

## 2020-08-08 PROCEDURE — 6370000000 HC RX 637 (ALT 250 FOR IP): Performed by: PSYCHIATRY & NEUROLOGY

## 2020-08-08 PROCEDURE — 99223 1ST HOSP IP/OBS HIGH 75: CPT | Performed by: PSYCHIATRY & NEUROLOGY

## 2020-08-08 PROCEDURE — 1240000000 HC EMOTIONAL WELLNESS R&B

## 2020-08-08 RX ORDER — QUETIAPINE FUMARATE 25 MG/1
25 TABLET, FILM COATED ORAL 2 TIMES DAILY
Status: DISCONTINUED | OUTPATIENT
Start: 2020-08-08 | End: 2020-08-13 | Stop reason: HOSPADM

## 2020-08-08 RX ORDER — OXCARBAZEPINE 150 MG/1
150 TABLET, FILM COATED ORAL 2 TIMES DAILY
Status: DISCONTINUED | OUTPATIENT
Start: 2020-08-08 | End: 2020-08-10

## 2020-08-08 RX ORDER — QUETIAPINE FUMARATE 100 MG/1
100 TABLET, FILM COATED ORAL NIGHTLY
Status: DISCONTINUED | OUTPATIENT
Start: 2020-08-08 | End: 2020-08-10

## 2020-08-08 RX ADMIN — CHLORDIAZEPOXIDE HYDROCHLORIDE 10 MG: 10 CAPSULE ORAL at 21:58

## 2020-08-08 RX ADMIN — CIPROFLOXACIN 500 MG: 500 TABLET, FILM COATED ORAL at 09:11

## 2020-08-08 RX ADMIN — HYDROXYZINE PAMOATE 50 MG: 50 CAPSULE ORAL at 03:30

## 2020-08-08 RX ADMIN — OXCARBAZEPINE 150 MG: 150 TABLET, FILM COATED ORAL at 21:59

## 2020-08-08 RX ADMIN — QUETIAPINE FUMARATE 100 MG: 100 TABLET ORAL at 22:26

## 2020-08-08 RX ADMIN — TRAZODONE HYDROCHLORIDE 50 MG: 50 TABLET ORAL at 22:01

## 2020-08-08 RX ADMIN — FOLIC ACID 1 MG: 1 TABLET ORAL at 09:11

## 2020-08-08 RX ADMIN — CHLORDIAZEPOXIDE HYDROCHLORIDE 10 MG: 10 CAPSULE ORAL at 10:25

## 2020-08-08 RX ADMIN — MULTIPLE VITAMINS W/ MINERALS TAB 1 TABLET: TAB at 09:11

## 2020-08-08 RX ADMIN — Medication 100 MG: at 09:11

## 2020-08-08 RX ADMIN — OXCARBAZEPINE 150 MG: 150 TABLET, FILM COATED ORAL at 10:19

## 2020-08-08 RX ADMIN — QUETIAPINE FUMARATE 25 MG: 25 TABLET ORAL at 10:20

## 2020-08-08 RX ADMIN — QUETIAPINE FUMARATE 25 MG: 25 TABLET ORAL at 16:52

## 2020-08-08 RX ADMIN — CIPROFLOXACIN 500 MG: 500 TABLET, FILM COATED ORAL at 21:59

## 2020-08-08 ASSESSMENT — LIFESTYLE VARIABLES: HISTORY_ALCOHOL_USE: YES

## 2020-08-08 NOTE — CARE COORDINATION
Brief Intervention and Referral to Treatment Summary    Patient was provided PHQ-9, AUDIT and DAST Screening:      PHQ-9 Score: 0  AUDIT Score:  16  DAST Score:  0    Patients substance use is considered     Low Risk/Healthy  Moderate Risk  Harmful  Dependent x    Patients depression is considered:     Minimal x  Mild   Moderate  Moderately Severe  Severe    Brief Education Was Provided    Patient was not receptive to educational materials offered at this time    Brief Intervention Is Provided (Only for AUDIT or DAST)     Patient denies readiness to decrease and/or stop use and a plan was not discussed    Recommendations/Referrals for Brief and/or Specialized Treatment Provided to Patient     medication management, group/individual therapies, family meetings, psycho -education, treatment team meetings to assist with stabilization.  Ask patient about AOD treatment optionsl    Electronically signed by Riaz Bejarano on 8/8/2020 at 9:12 AM

## 2020-08-08 NOTE — PROGRESS NOTES
Pt states when asked how she is doing this morning, \"I am here, I want to kill myself and I feel terrible. \"  Patient is asked if she feels safe on the unit. Pt rolls her eyes and states, \"Of course. \"  Patient eats breakfast.  Pt disheveled and disorganized. Pt has flat angry affect. Patient denies AVH or HI. Patient dep and anx are very high. Patient goes back to room resting with light off with angry affect. Patient is given 10 of librium per CIWA protocol.  Electronically signed by Royal Agnieszka LPN on 7/2/2053 at 86:85 AM

## 2020-08-08 NOTE — PROGRESS NOTES
Patient did not attend Activity Group despite staff encouragement.  Electronically signed by Jackelin De Los Santos on 8/8/2020 at 7:10 PM

## 2020-08-08 NOTE — H&P
[address] - Department of Psychiatry    History and Physical - Adult     CHIEF COMPLAINT:  Depression SI    History obtained from:  patient    Patient was seen after discussing with the treatment team and reviewing the chart        CIRCUMSTANCES OF ADMISSION:     Pt is a 47 y.o. female arriving to ED with SI without a plan and relapsing on alcohol in the last week. Pt homeless at this time and was previously living at a sober living house and left in June 2020. 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 Pt has history of treatment and is currently not taking any prescribed medications. Pt has a history of bipolar. Pt positive for SI without plan and states that would like to go to sleep and not wake up. Pt has previous SI attempt in 2018 by taking OD of pills. Pt for THC. Pt stating \"my life is a mess\". Pt has legal issues in [place] for a DUI. Pt denies violent behavior. Pt states that \"my brain can't go there right now\" when asked about transportation but, states no driving privileges. HISTORY OF PRESENT ILLNESS:      The patient is a 47 y.o. female  homeless, living in [facility], employed with significant past history of bipolar disorder  Has been having mood swings, rapid in nature, racing thoughts  Irritable and angry and frustrated  Suicidal thoughts - not want to live like this any more, want to take overdose after drinking. Was hoping drinking alcohol will kill her slowly  Sober since 2018 with only brief period of relapse  Pt relapsed a week ago - Wednesday night she drank 2/5th liquor and then blacked out for 2 days. No seizures recently but has a h/o w/d seizures    Stressors:life is chaotic in general, homeless, work as a nurse, DUI in [place]  Court hearing yesterday    The patient is not currently receiving care for the above psychiatric illness.     Medications Prior to Admission:   Medications Prior to Admission: buPROPion (WELLBUTRIN XL) 150 MG extended release tablet, Take 150 mg by mouth every morning  busPIRone (BUSPAR) 15 MG tablet, Take 7.5 mg by mouth daily  levothyroxine (SYNTHROID) 75 MCG tablet, Take 75 mcg by mouth Daily  divalproex (DEPAKOTE ER) 500 MG extended release tablet, Take 500 mg by mouth daily  Multiple Vitamins-Minerals (MULTIVITAMIN & MINERAL PO), Take 1 tablet by mouth daily    Compliance:none for past 2 months    Psychiatric Review of Systems       Depression: yes     Payal or Hypomania:  yes     Panic Attacks:  yes      Phobias:  no     Obsessions and Compulsions:  no     PTSD : no     Hallucinations:  no     Delusions:  no    Substance Abuse History:  ETOH: yes   Marijuana: no  Opiates: no  Other Drugs: no      Past Psychiatric History:  Prior Diagnosis:  Bipolar I disorder; borderline traits  Psychiatrist: no  Therapist:no  Hospitalization: yes  Hx of Suicidal Attempts: yes  Hx of violence:  no  ECT: no  Previous discontinued Psychiatric Med Trials:     Past Medical History:        Diagnosis Date    Alcohol abuse     Alcoholic hepatitis 6/1/1809    Anxiety     Bipolar 1 disorder (HCC)     Depression     Seizures (HCC)     x1 only related to alcohol     Thyroid disease        Past Surgical History:        Procedure Laterality Date    BREAST ENHANCEMENT SURGERY      BREAST SURGERY      TONSILLECTOMY         Allergies:   Patient has no known allergies. Family History  Family History   Problem Relation Age of Onset    Depression Mother          Social History:  Born and Raised: [place]  Describes Childhood:   supportive  Education: College  Employment: Employed full time  Relationships: single  Children: 5   Current Support: ex     Legal Hx: pending charges  Access to weapons?:  No      EXAMINATION:    REVIEW OF SYSTEMS:    ROS:  [x] All negative/unchanged except if checked.  Explain positive(checked items) below:  [] Constitutional  [] Eyes  [] Ear/Nose/Mouth/Throat  [] Respiratory  [] CV  [] GI  []   [] Musculoskeletal  [] Skin/Breast  [] Neurological  [] Endocrine  [] Heme/Lymph  [] Allergic/Immunologic    Explanation:     Vitals:  BP 95/66   Pulse 92   Temp 98 °F (36.7 °C) (Oral)   Resp 18   Ht 5' 2\" (1.575 m)   Wt 150 lb (68 kg)   LMP 07/26/2020 (Approximate)   SpO2 94%   BMI 27.44 kg/m²      Neurologic Exam:   Muscle Strength & Tone: full ROM, normal  Gait: normal gait   Involuntary Movements: No    Mental Status Examination:    Level of consciousness:  within normal limits   Appearance:  ill-appearing  Behavior/Motor:  psychomotor retardation  Attitude toward examiner:  cooperative  Speech:  slow   Mood: constricted, decreased range and depressed  Affect:  blunted  Thought processes:  slow   Thought content:  Suicidal Ideation:  active  Delusions:  no evidence of delusions  Perceptual Disturbance:  denies any perceptual disturbance  Cognition:  oriented to person, place, and time   Concentration poor  Memory intact  Insight poor   Judgement poor   Fund of Knowledge limited    Mini Mental Status 30/30      DIAGNOSIS:     Bipolar depression   Alcohol use disorder- recent relapse       RISK ASSESSMENT:    SUICIDE RISK ASSESSMENT: high  HOMICIDE: low  AGITATION/VIOLENCE: low  ELOPEMENT: low    LABS: REVIEWED TODAY:  Recent Labs     08/07/20  0920   WBC 9.3   HGB 14.5        Recent Labs     08/07/20  0920      K 4.2      CO2 21   BUN 17   CREATININE 0.54   GLUCOSE 81     Recent Labs     08/07/20  0920   BILITOT 0.6   ALKPHOS 59   AST 28   ALT 33     Lab Results   Component Value Date    LABAMPH Neg 08/07/2020    BARBSCNU Neg 08/07/2020    LABBENZ Neg 08/07/2020    LABMETH Neg 08/07/2020    OPIATESCREENURINE Neg 08/07/2020    PHENCYCLIDINESCREENURINE Neg 08/07/2020    ETOH <10 08/07/2020     Lab Results   Component Value Date    TSH 1.990 08/07/2020     No results found for: LITHIUM  Lab Results   Component Value Date    VALPROATE <7 (L) 06/04/2018     Lab Results   Component Value Date    VALPROATE <7 06/04/2018    VALPROATE <2.8 03/03/2018       FURTHER LABS ORDERED :      Radiology   No results found. EKG: TRACING REVIEWED    TREATMENT PLAN:    Risk Management:  close watch and suicide risk    Collateral Information:  Will obtain collateral information from the family or friends. Will obtain medical records as appropriate from out patient providers  Will consult the hospitalist for a physical exam to rule out any co-morbid physical condition. Home medication Reconciled       New Medications started during this admission :    See orders  Prn Haldol 5mg and Vistaril 50mg q6hr for extreme agitation. Trazodone as ordered for insomnia  Vistaril as ordered for anxiety  Discussed with the patient risk, benefit, alternative and common side effects for the  proposed medication treatment. Patient is consenting to the treatment. Psychotherapy:   Encourage participation in milieu and group therapy  Individual therapy as needed        Behavioral Services  Medicare Certification      Admission Day 1  I certify that this patient's inpatient psychiatric hospital admission is medically necessary for:     (1) treatment which could reasonably be expected to improve this patient's condition, or     (2) diagnostic study or its equivalent.        Electronically signed by Julio Falcon MD on 8/8/2020 at 9:34 AM

## 2020-08-08 NOTE — PROGRESS NOTES
Patient had a sad affect, was worrisome, distracted, slightly irritable and frustrated. Patient stated she is depressed and stressed. Patient is homeless and is staying at a hotel. 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 She was sober, she recently was in a treatment program and sober living but left before completing in June 2020. She relapsed on alcohol one week ago and drank for 2 days in a row. She has been off her medication for awhile. She stated she has relationship issues with everyone but did not elaborate. She is upset about the loss of support she had with her sponsor and AA. She felt suicidal without a plan. She stated she wants to figure out what is in her head. She has no leisure interests.  Electronically signed by Margy Escamilla, [address] on 8/8/2020 at 2:19 PM

## 2020-08-08 NOTE — PROGRESS NOTES
Patient awake and at nurse's station, complaints of anxiety and mild sweating, reports numbness to arms has resolved, medicated with prn Vistaril per CIWA scale.

## 2020-08-08 NOTE — PROGRESS NOTES
Pt consents signed without issue. When completed, pt asked for Haldol and librium for \"withdrawal\". Explained to patient that her nurse would be assessing her for symptoms of withdrawal throughout the evening and she would be medicated as indicated by the assessments. Pt then stated she is \"crawling out of her skin and going crazy in her head\". She states that she \"needs the haldol before she gets out of control, she's had to be strapped down at other psych units\".

## 2020-08-08 NOTE — PROGRESS NOTES
Patient did not attend the 2100 Wrap Up and Relaxation Group despite staff encouragement.  Electronically signed by Joselyn Keith on 8/7/2020 at 9:55 PM

## 2020-08-08 NOTE — PROGRESS NOTES
Patient did not attend the 1900 Activity Group despite staff encouragement.  Electronically signed by Jessica Otto on 8/7/2020 at 8:26 PM

## 2020-08-08 NOTE — PROGRESS NOTES
Pt. refused to attend the 1100 skills group, despite staff encouragement. Electronically signed by Jayy Kaur, [address] on 8/8/2020 at 1:10 PM

## 2020-08-08 NOTE — GROUP NOTE
Group Therapy Note    Date: 8/8/2020    Group Start Time: 1600  Group End Time: 1640  Group Topic: Healthy Living/Wellness    [facility]    Miryam Mariusz        Group Therapy Note    Attendees: 14/21         Patient's Goal:  To learn the differences between healthy coping skills and unhealthy coping skills, and how to use healthy coping skills. Notes:  Patient participated in group discussion. Patient arrived to group late.     Status After Intervention:  Unchanged    Participation Level: Minimal    Participation Quality: Appropriate and Attentive      Speech:  hesitant      Thought Process/Content: Logical      Affective Functioning: Flat      Mood: depressed      Level of consciousness:  Alert and Attentive      Response to Learning: Progressing to goal      Endings: None Reported    Modes of Intervention: Education      Discipline Responsible: [address] [place]      Signature:  Miryam Vee

## 2020-08-08 NOTE — CONSULTS
[address] [facility] MEDICINE    HISTORY AND PHYSICAL EXAM    PATIENT NAME:  Danny Vann    MRN:  [id number]  SERVICE DATE:  8/8/2020   SERVICE TIME:  2:04 PM    Primary Care Physician: No primary care provider on file. SUBJECTIVE  CHIEF COMPLAINT:  Medically appropriate for inpatient psychiatry admission. Consult for medical H/P encounter. HPI:  This is a 47 y.o. female with PMHx alcohol hepatitis, bipolar depression and thyroid disease who presented to emergency room with reports of increased depression over the last 2 days and suicidal ideations. Patient cleared from emergency room for psychiatric care. Patient Seen, Chart, Labs, Radiologystudies, and Consults reviewed. Patient denies headache, chest pain, shortness of breath, N/V/D/C, fever/chills.        PAST MEDICAL HISTORY:    Past Medical History:   Diagnosis Date    Alcohol abuse     Alcoholic hepatitis 4/6/2759    Anxiety     Bipolar 1 disorder ([facility] [address]     Depression     Seizures (HCC)     x1 only related to alcohol     Thyroid disease      PAST SURGICAL HISTORY:    Past Surgical History:   Procedure Laterality Date    BREAST ENHANCEMENT SURGERY      BREAST SURGERY      TONSILLECTOMY       FAMILY HISTORY:    Family History   Problem Relation Age of Onset    Depression Mother      SOCIAL HISTORY:    Social History     Socioeconomic History    Marital status:      Spouse name: Not on file    Number of children: 11    Years of education: 15    Highest education level: Not on file   Occupational History    Occupation: nurse   Social Needs    Financial resource strain: Not on file    Food insecurity     Worry: Not on file     Inability: Not on file   [employer] needs     Medical: Not on file     Non-medical: Not on file   Tobacco Use    Smoking status: Current Every Day Smoker     Packs/day: 1.00     Years: 15.00     Pack years: 15.00     Types: Cigarettes    Smokeless tobacco: Never Used   Substance and Sexual Activity    Alcohol use: Yes     Comment: drinks a fifth of rum per day. trying to stop drinking     Drug use: No    Sexual activity: Yes     Partners: Male     Comment: Kevin López one person. \"   Lifestyle    Physical activity     Days per week: Not on file     Minutes per session: Not on file    Stress: Not on file   Relationships    Social connections     Talks on phone: Not on file     Gets together: Not on file     Attends Lutheran service: Not on file     Active member of club or organization: Not on file     Attends meetings of clubs or organizations: Not on file     Relationship status: Not on file    Intimate partner violence     Fear of current or ex partner: Not on file     Emotionally abused: Not on file     Physically abused: Not on file     Forced sexual activity: Not on file   Other Topics Concern    Not on file   Social History Narrative    Not on file     MEDICATIONS:    Current Facility-Administered Medications   Medication Dose Route Frequency Provider Last Rate Last Dose    OXcarbazepine (TRILEPTAL) tablet 150 mg  150 mg Oral BID Amanda Vaughan MD   150 mg at 08/08/20 1019    QUEtiapine (SEROQUEL) tablet 100 mg  100 mg Oral Nightly Amanda Vaughan MD        QUEtiapine (SEROQUEL) tablet 25 mg  25 mg Oral BID Amanda Vaughan MD   25 mg at 08/08/20 1020    acetaminophen (TYLENOL) tablet 650 mg  650 mg Oral Q6H PRN Amanda Vaughan MD        traZODone (DESYREL) tablet 50 mg  50 mg Oral Nightly PRN Amanda Vaughan MD   50 mg at 08/07/20 2034    magnesium hydroxide (MILK OF MAGNESIA) 400 MG/5ML suspension 30 mL  30 mL Oral Daily PRN Amanda Vaughan MD        aluminum & magnesium hydroxide-simethicone (MAALOX) 200-200-20 MG/5ML suspension 30 mL  30 mL Oral Q6H PRN Amanda Vaughan MD        haloperidol lactate (HALDOL) injection 5 mg  5 mg Intramuscular Q6H PRN Amanda Vaughan MD        Or    haloperidol (HALDOL) tablet 5 mg  5 mg Oral Q6H PRN Amanda Vaughan MD       [facility] hydrOXYzine (VISTARIL) injection 50 mg  50 mg Intramuscular Q6H PRN Vazquez Angelo MD        Or    hydrOXYzine (VISTARIL) capsule 50 mg  50 mg Oral Q6H PRN Vazquez Angelo MD   50 mg at 08/08/20 0330    therapeutic multivitamin-minerals 1 tablet  1 tablet Oral Daily Vazquez Angelo MD   1 tablet at 08/08/20 0911    vitamin B-1 (THIAMINE) tablet 100 mg  100 mg Oral Daily Vazquez Angelo MD   100 mg at 08/08/20 0911    ondansetron (ZOFRAN) tablet 4 mg  4 mg Oral Q4H PRN Vazquez Angelo MD        folic acid (FOLVITE) tablet 1 mg  1 mg Oral Daily Vazquez Angelo MD   1 mg at 08/08/20 7494    chlordiazePOXIDE (LIBRIUM) capsule 10 mg  10 mg Oral Q4H PRN Vazquez Angelo MD   10 mg at 08/08/20 1025    Or    chlordiazePOXIDE (LIBRIUM) capsule 25 mg  25 mg Oral Q4H PRN Vazquez Angelo MD        Or    chlordiazePOXIDE (LIBRIUM) capsule 50 mg  50 mg Oral Q2H PRN Vazquez Angelo MD        Or    chlordiazePOXIDE (LIBRIUM) capsule 75 mg  75 mg Oral Q1H PRN Vazquez Angelo MD        Or    LORazepam (ATIVAN) tablet 4 mg  4 mg Oral Q1H PRN Vazquez Angelo MD        nicotine (NICODERM CQ) 21 MG/24HR 1 patch  1 patch Transdermal Daily Vazquez Angelo MD   1 patch at 08/08/20 0910    ciprofloxacin (CIPRO) tablet 500 mg  500 mg Oral 2 times per day JERMAINE Hunt - CNP   500 mg at 08/08/20 0911       ALLERGIES: Patient has no known allergies. REVIEW OF SYSTEM:   ROS as noted in HPI, 12 point ROS reviewed and otherwise negative.     OBJECTIVE  PHYSICAL EXAM: /70   Pulse 85   Temp 98 °F (36.7 °C) (Oral)   Resp 18   Ht 5' 2\" (1.575 m)   Wt 150 lb (68 kg)   LMP 07/26/2020 (Approximate)   SpO2 93%   BMI 27.44 kg/m²   CONSTITUTIONAL:  awake, alert, cooperative, no apparent distress, and appears stated age  EYES:  Lids and lashes normal, pupils equal, round and reactive to light, extra ocular muscles intact, sclera clear, conjunctiva normal  ENT:  Normocephalic, without obvious abnormality, atraumatic, sinuses nontender on palpation, external ears without lesions, oral pharynx with moist mucus membranes, tonsils without erythema or exudates, gums normal and good dentition. NECK:  Supple, symmetrical, trachea midline, no adenopathy, thyroid symmetric, not enlarged and no tenderness, skin normal  LUNGS:  No increased work of breathing, good air exchange, clear to auscultation bilaterally, no crackles or wheezing  CARDIOVASCULAR:  Normal apical impulse, regular rate and rhythm, normal S1 and S2, no S3 or S4, and no murmur noted  ABDOMEN:  No scars, normal bowel sounds, soft, non-distended, non-tender, no masses palpated, no hepatosplenomegally  MUSCULOSKELETAL:  There is no redness, warmth, or swelling of the joints. Full range of motion noted. Motor strength is 5 out of 5 all extremities bilaterally. Tone is normal.  NEUROLOGIC:  Awake, alert, oriented to name, place and time. Cranial nerves II-XII are grossly intact. Motor is 5 out of 5 bilaterally. Cerebellar finger to nose, heel to shin intact. Sensory is intact. Babinski down going, Romberg negative, and gait is normal.  SKIN:  no bruising or bleeding, normal skin color, texture, turgor, no redness, warmth, or swelling and no jaundice    DATA:     Diagnostic tests reviewed for today's visit:    Most recent labs and imaging results reviewed. VTE Prophylaxis:     ASSESSMENT AND PLAN  Active Problems:    Bipolar depression ([facility] [address]  Plan: Patient mated to behavioral health for evaluation and treatment    Alcohol dependence with withdrawal: CIWA protocol, seizure precautions, fall precautions Ativan prn CIWA triggered. Multivitamin, thiamine, folate  PO supplements. Chemical dependency consulted. SW on board. Hypothyroidism: On home dose Synthroid. This is only a history and physical examination and not medical management.  The patient is to contact and follow up with their primary care physician and go over any abnormal labs, imaging, findings, medical concerns, or conditions that we have and have not addressed during this encounter.     Plan of care discussed with: patient    SIGNATURE: JERMAINE Medina CNP  DATE: August 8, 2020  TIME: 2:04 PM

## 2020-08-08 NOTE — CARE COORDINATION
BHI Biopsychosocial Assessment    Current Level of Psychosocial Functioning     Independent x  Dependent    Minimal Assist     Comments: Independent in daily activities. Psychosocial High Risk Factors (check all that apply)    Unable to obtain meds   Chronic illness/pain    Substance abuse x  Lack of Family Support   Financial stress   Isolation   Inadequate Community Resources  Suicide attempt(s)  Not taking medications   Victim of crime   Developmental Delay  Unable to manage personal needs    Age 72 or older   Homeless  No transportation   Readmission within 30 days  Unemployment  Traumatic Event    Psychiatric Advanced Directive: none reported    Family to involve in treatment: none reported    Sexual Orientation:  jose j    Patient Strengths: positive engagement    Patient Barriers:  Alcohol use    Opiate education provided: denied need    Safety plan: completed; signed    CMHC/MH history: na    Plan of Care:  medication management, group/individual therapies, family meetings, psycho -education, treatment team meetings to assist with stabilization    Initial Discharge Plan:  TBD    Clinical Summary:      47[facility] Admitted to Alcohol dependence. Patient was cooperative, and consistent in answering questions during assessment. Patient admitted to [facility] without plan. Patient denied HI and also denied any A/V.     Electronically signed by Jessy Ochoa on 8/8/2020 at 9:08 AM

## 2020-08-08 NOTE — PROGRESS NOTES
Pt has been continued on the CIWA q.4h. receiving a 10 mg of librium at 10 am due to sweating and irritability. Patient's vitals have remained wnl. Patient has been out on the unit not social with peers. Pt ate lunch and has been watching tv. Patient will continue to be monitored.  Electronically signed by Woo Vasques LPN on 4/7/5240 at 0:98 PM

## 2020-08-09 PROCEDURE — 6370000000 HC RX 637 (ALT 250 FOR IP): Performed by: PSYCHIATRY & NEUROLOGY

## 2020-08-09 PROCEDURE — 99232 SBSQ HOSP IP/OBS MODERATE 35: CPT | Performed by: PSYCHIATRY & NEUROLOGY

## 2020-08-09 PROCEDURE — 1240000000 HC EMOTIONAL WELLNESS R&B

## 2020-08-09 PROCEDURE — 6370000000 HC RX 637 (ALT 250 FOR IP): Performed by: NURSE PRACTITIONER

## 2020-08-09 RX ADMIN — HYDROXYZINE PAMOATE 50 MG: 50 CAPSULE ORAL at 13:05

## 2020-08-09 RX ADMIN — CIPROFLOXACIN 500 MG: 500 TABLET, FILM COATED ORAL at 21:53

## 2020-08-09 RX ADMIN — CHLORDIAZEPOXIDE HYDROCHLORIDE 10 MG: 10 CAPSULE ORAL at 22:06

## 2020-08-09 RX ADMIN — Medication 100 MG: at 12:58

## 2020-08-09 RX ADMIN — MULTIPLE VITAMINS W/ MINERALS TAB 1 TABLET: TAB at 08:47

## 2020-08-09 RX ADMIN — QUETIAPINE FUMARATE 25 MG: 25 TABLET ORAL at 08:47

## 2020-08-09 RX ADMIN — CHLORDIAZEPOXIDE HYDROCHLORIDE 10 MG: 10 CAPSULE ORAL at 08:49

## 2020-08-09 RX ADMIN — OXCARBAZEPINE 150 MG: 150 TABLET, FILM COATED ORAL at 08:47

## 2020-08-09 RX ADMIN — OXCARBAZEPINE 150 MG: 150 TABLET, FILM COATED ORAL at 21:53

## 2020-08-09 RX ADMIN — CIPROFLOXACIN 500 MG: 500 TABLET, FILM COATED ORAL at 08:47

## 2020-08-09 RX ADMIN — TRAZODONE HYDROCHLORIDE 50 MG: 50 TABLET ORAL at 21:53

## 2020-08-09 RX ADMIN — QUETIAPINE FUMARATE 25 MG: 25 TABLET ORAL at 16:49

## 2020-08-09 RX ADMIN — CHLORDIAZEPOXIDE HYDROCHLORIDE 10 MG: 10 CAPSULE ORAL at 02:11

## 2020-08-09 RX ADMIN — CHLORDIAZEPOXIDE HYDROCHLORIDE 10 MG: 10 CAPSULE ORAL at 13:06

## 2020-08-09 RX ADMIN — HYDROXYZINE PAMOATE 50 MG: 50 CAPSULE ORAL at 02:10

## 2020-08-09 RX ADMIN — FOLIC ACID 1 MG: 1 TABLET ORAL at 08:47

## 2020-08-09 RX ADMIN — HYDROXYZINE PAMOATE 50 MG: 50 CAPSULE ORAL at 21:53

## 2020-08-09 RX ADMIN — QUETIAPINE FUMARATE 100 MG: 100 TABLET ORAL at 21:54

## 2020-08-09 NOTE — PROGRESS NOTES
person. \"   Lifestyle    Physical activity     Days per week: Not on file     Minutes per session: Not on file    Stress: Not on file   Relationships    Social connections     Talks on phone: Not on file     Gets together: Not on file     Attends Jain service: Not on file     Active member of club or organization: Not on file     Attends meetings of clubs or organizations: Not on file     Relationship status: Not on file    Intimate partner violence     Fear of current or ex partner: Not on file     Emotionally abused: Not on file     Physically abused: Not on file     Forced sexual activity: Not on file   Other Topics Concern    Not on file   Social History Narrative    Not on file           ROS:  [x] All negative/unchanged except if checked.  Explain positive(checked items) below:  [] Constitutional  [] Eyes  [] Ear/Nose/Mouth/Throat  [] Respiratory  [] CV  [] GI  []   [] Musculoskeletal  [] Skin/Breast  [] Neurological  [] Endocrine  [] Heme/Lymph  [] Allergic/Immunologic    Explanation:     MEDICATIONS:    Current Facility-Administered Medications:     OXcarbazepine (TRILEPTAL) tablet 150 mg, 150 mg, Oral, BID, Danielle Yeboah MD, 150 mg at 08/09/20 0847    QUEtiapine (SEROQUEL) tablet 100 mg, 100 mg, Oral, Nightly, Danielle Yeboah MD, 100 mg at 08/08/20 2226    QUEtiapine (SEROQUEL) tablet 25 mg, 25 mg, Oral, BID, Danielle Yeboah MD, 25 mg at 08/09/20 0847    acetaminophen (TYLENOL) tablet 650 mg, 650 mg, Oral, Q6H PRN, Danielle Yeboah MD    traZODone (DESYREL) tablet 50 mg, 50 mg, Oral, Nightly PRN, Danielle Yeboah MD, 50 mg at 08/08/20 2201    magnesium hydroxide (MILK OF MAGNESIA) 400 MG/5ML suspension 30 mL, 30 mL, Oral, Daily PRN, Danielle Yeboah MD    aluminum & magnesium hydroxide-simethicone (MAALOX) 200-200-20 MG/5ML suspension 30 mL, 30 mL, Oral, Q6H PRN, Danielle Yeboah MD    haloperidol lactate (HALDOL) injection 5 mg, 5 mg, Intramuscular, Q6H PRN **OR** haloperidol (HALDOL) tablet 5 mg, 5 mg, Oral, Q6H PRN, Liliam Azar MD    hydrOXYzine (VISTARIL) injection 50 mg, 50 mg, Intramuscular, Q6H PRN **OR** hydrOXYzine (VISTARIL) capsule 50 mg, 50 mg, Oral, Q6H PRN, Liliam Azar MD, 50 mg at 08/09/20 0210    therapeutic multivitamin-minerals 1 tablet, 1 tablet, Oral, Daily, Liliam Azar MD, 1 tablet at 08/09/20 0847    vitamin B-1 (THIAMINE) tablet 100 mg, 100 mg, Oral, Daily, Liliam Azar MD, 100 mg at 08/08/20 0911    ondansetron (ZOFRAN) tablet 4 mg, 4 mg, Oral, Q4H PRN, Liliam Azar MD    folic acid (FOLVITE) tablet 1 mg, 1 mg, Oral, Daily, Liliam Azar MD, 1 mg at 08/09/20 0847    chlordiazePOXIDE (LIBRIUM) capsule 10 mg, 10 mg, Oral, Q4H PRN, 10 mg at 08/09/20 0849 **OR** chlordiazePOXIDE (LIBRIUM) capsule 25 mg, 25 mg, Oral, Q4H PRN **OR** chlordiazePOXIDE (LIBRIUM) capsule 50 mg, 50 mg, Oral, Q2H PRN **OR** chlordiazePOXIDE (LIBRIUM) capsule 75 mg, 75 mg, Oral, Q1H PRN **OR** LORazepam (ATIVAN) tablet 4 mg, 4 mg, Oral, Q1H PRN, Liliam Azar MD    nicotine (NICODERM CQ) 21 MG/24HR 1 patch, 1 patch, Transdermal, Daily, Liliam Azar MD, 1 patch at 08/09/20 0850    ciprofloxacin (CIPRO) tablet 500 mg, 500 mg, Oral, 2 times per day, JERMAINE Morales CNP, 500 mg at 08/09/20 8161      Examination:  /71   Pulse 79   Temp 98 °F (36.7 °C) (Oral)   Resp 18   Ht 5' 2\" (1.575 m)   Wt 150 lb (68 kg)   LMP 07/26/2020 (Approximate)   SpO2 98%   BMI 27.44 kg/m²   Gait - steady  Medication side effects(SE): no    Mental Status Examination:    Level of consciousness:  within normal limits   Appearance:  fair grooming and fair hygiene  Behavior/Motor:  psychomotor retardation  Attitude toward examiner:  cooperative  Speech:  slow   Mood: depressed  Affect:  blunted  Thought processes:  slow   Thought content:  Suicidal Ideation:  passive  Cognition:  oriented to person, place, and time   Concentration poor  [employer]

## 2020-08-09 NOTE — PROGRESS NOTES
Pt. declined to attend the 0900 community meeting, despite staff encouragement. Electronically signed by Michele Mchugh, [address] on 8/9/2020 at 9:51 AM

## 2020-08-09 NOTE — PROGRESS NOTES
Daily Note: 0867-5111  Pt visible on the unit and minimally social with peers. Pt continues to be assessed via the CIWA every 4 hours for alcohol withdrawal. Pt reports fleeting SI with multiple plans including CO2 poisoning via a car running in a garage. Pt commits to safety on the unit and denies HI or A/V hallucinations. Pt reports increased cravings for alcohol and states that she is worried about being discharged and would like to talk to social work about her options for sober living after discharge. Pt reports high depression and anxiety. Pt reports poor broken sleep and increased appetite. Pt denies any other issues at this time.

## 2020-08-09 NOTE — PROGRESS NOTES
Pt cooperative, irritable with assessment. She c/o racing thoughts, feeling overwhelmed with her situation, poor sleep, and suicidal thoughts with no plan. She states she's frustrated with treatment and her racing thoughts are causing her to have increased depression and anxiety. Her appetite is improving. She is concerned about being homeless and finding transportation to her work. She would like to go to some type of sober living or rehab after discharge. She denies homicidal ideation and hallucinations.

## 2020-08-09 NOTE — PROGRESS NOTES
Pt. refused to attend the 1100 skills group, despite staff encouragement. Electronically signed by Warden Fung, [address] on 8/9/2020 at 12:07 PM

## 2020-08-09 NOTE — PROGRESS NOTES
Patient did not attend Wrap Up and relaxation Group despite staff encouragement.  Electronically signed by Miryam Vee on 8/8/2020 at 9:26 PM

## 2020-08-09 NOTE — PROGRESS NOTES
Patient is given 10 mg of librium on the CIWA protocol at 0830 and goes back to bed after eating breakfast.  Pt is polite and cooperative with this nurse, taking morning meds without issue. Patient does not attend morning groups. Pt tired, dep and anx. Patient does have PDW upset with how her life is. 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 Pt will be monitored. Patient does feel safe here and is hopeful. Patient is c/o poor sleep. Appetite is good. Pt would like to touch base with her sponsor and is advised that she can come up to the nurses station to get her cell and get the number out of it. Patient asks to shower and then would like to talk with social work as she would like to do some sort of outpatient rehab because she wants to work as well. Pt feels she really has no support.     Electronically signed by Ramin Perez LPN on 8/7/4756 at 12:83 AM

## 2020-08-09 NOTE — PROGRESS NOTES
Patient did not attend Healthy Living Group despite staff encouragement.  Electronically signed by Josh Lima on 8/9/2020 at 4:50 PM

## 2020-08-10 PROCEDURE — 6370000000 HC RX 637 (ALT 250 FOR IP): Performed by: PSYCHIATRY & NEUROLOGY

## 2020-08-10 PROCEDURE — 93010 ELECTROCARDIOGRAM REPORT: CPT | Performed by: INTERNAL MEDICINE

## 2020-08-10 PROCEDURE — 1240000000 HC EMOTIONAL WELLNESS R&B

## 2020-08-10 PROCEDURE — 99232 SBSQ HOSP IP/OBS MODERATE 35: CPT | Performed by: PSYCHIATRY & NEUROLOGY

## 2020-08-10 PROCEDURE — 6370000000 HC RX 637 (ALT 250 FOR IP): Performed by: NURSE PRACTITIONER

## 2020-08-10 RX ORDER — OXCARBAZEPINE 300 MG/1
300 TABLET, FILM COATED ORAL 2 TIMES DAILY
Status: DISCONTINUED | OUTPATIENT
Start: 2020-08-10 | End: 2020-08-13 | Stop reason: HOSPADM

## 2020-08-10 RX ORDER — QUETIAPINE FUMARATE 200 MG/1
200 TABLET, FILM COATED ORAL NIGHTLY
Status: DISCONTINUED | OUTPATIENT
Start: 2020-08-10 | End: 2020-08-11

## 2020-08-10 RX ADMIN — QUETIAPINE FUMARATE 25 MG: 25 TABLET ORAL at 08:55

## 2020-08-10 RX ADMIN — MULTIPLE VITAMINS W/ MINERALS TAB 1 TABLET: TAB at 08:52

## 2020-08-10 RX ADMIN — TRAZODONE HYDROCHLORIDE 50 MG: 50 TABLET ORAL at 21:07

## 2020-08-10 RX ADMIN — HYDROXYZINE PAMOATE 50 MG: 50 CAPSULE ORAL at 08:52

## 2020-08-10 RX ADMIN — Medication 100 MG: at 08:55

## 2020-08-10 RX ADMIN — QUETIAPINE FUMARATE 25 MG: 25 TABLET ORAL at 16:07

## 2020-08-10 RX ADMIN — QUETIAPINE FUMARATE 200 MG: 200 TABLET ORAL at 21:07

## 2020-08-10 RX ADMIN — HYDROXYZINE PAMOATE 50 MG: 50 CAPSULE ORAL at 21:08

## 2020-08-10 RX ADMIN — OXCARBAZEPINE 300 MG: 300 TABLET, FILM COATED ORAL at 21:07

## 2020-08-10 RX ADMIN — FOLIC ACID 1 MG: 1 TABLET ORAL at 08:53

## 2020-08-10 RX ADMIN — CIPROFLOXACIN 500 MG: 500 TABLET, FILM COATED ORAL at 08:54

## 2020-08-10 RX ADMIN — OXCARBAZEPINE 150 MG: 150 TABLET, FILM COATED ORAL at 08:58

## 2020-08-10 NOTE — GROUP NOTE
Group Therapy Note    Date: 8/10/2020    Group Start Time: 0930  Group End Time: 1921  Group Topic: Psychoeducation    [facility]    Demian Burkett        Group Therapy Note    Attendees: 7         Patient's Goal:  \"To talk to a \"    Notes:  Patient declined to work on a project but she stay in group the entire time, she observed and she listen to the music.     Status After Intervention:  Unchanged    Participation Level: None    Participation Quality: Appropriate      Speech:  quiet      Thought Process/Content: Linear      Affective Functioning: Flat      Mood: calm      Level of consciousness:  Alert      Response to Learning: Progressing to goal      Endings: None Reported    Modes of Intervention: Education, Socialization and Activity      Discipline Responsible: Psychoeducational Specialist      Signature:  Demian Burkett

## 2020-08-10 NOTE — PROGRESS NOTES
Patient continues on the CIWA protocol scoring a 3 this am, anxiety and aggitation. Patient eating out of her room, social with select peers. Patient wants help with her addiction. Pt states she remains the same as far as mental health. Patient continues with fleeting SI and is not feeling better but does remain safe on the unit. Pt is afraid to leave at this time. Dep/anx still high. Patient is given a vistaril with morning meds.  Electronically signed by Wagner Herrera LPN on 4/36/2332 at 05:54 AM

## 2020-08-10 NOTE — GROUP NOTE
Group Therapy Note    Date: 8/9/2020    Group Start Time: 2100  Group End Time: 2115  Group Topic: Wrap-Up    [facility]    Ellen Novak        Group Therapy Note    Attendees: 11/19         Patient's Goal:  Patient stated she did not have a goal today. Notes:  Patient participated in deep breathing exercise following Wrap Up.     Status After Intervention:  Unchanged    Participation Level: Interactive    Participation Quality: Appropriate and Attentive      Speech:  hesitant      Thought Process/Content: Logical      Affective Functioning: Flat      Mood: euthymic      Level of consciousness:  Alert and Attentive      Response to Learning: Progressing to goal      Endings: None Reported    Modes of Intervention: Support      Discipline Responsible: Above Security      Signature:  Ellen Novak

## 2020-08-10 NOTE — PROGRESS NOTES
Patient did not attend Activity Group despite staff encouragement.  Electronically signed by Priscilla Ugalde on 8/10/2020 at 7:05 PM

## 2020-08-10 NOTE — PROGRESS NOTES
Pt was laying in bed with a anxious affect. Pt states ''my mind races at times and I feel like for the past 4 to 5 years I have been on a merry go round and was tired and just didn't want to do it anymore\". Pt reports sleep is fair and states ''I have been eating way to much\". Pt was offered vistaril and declined and was going to call her sponsor. Pt reports a passive death wish with no plan or intent. Pt denies HI,AVH.

## 2020-08-10 NOTE — PROGRESS NOTES
[facility] FOLLOW-UP NOTE       8/10/2020     Patient was seen and examined in person, Chart reviewed   Patient's case discussed with staff/team    Chief Complaint: Depression, SI    Interim History:   No change in her presentation  Pt did not sleep last night  Racing thoughts  Still feel depressed and suicidal thoughts  Feel safe in the unit  Still has some sweating as part of w/d  Anxious ++  Pt is worried about her legal issues - DUI, wondering if there is a warrant for her arrest for not showing up in court  Trying to get to sober house or stay in hotel and work while doing her OP rehab    Appetite:   [] Normal/Unchanged  [] Increased  [x] Decreased      Sleep:       [] Normal/Unchanged  [] Fair       [x] Poor              Energy:    [] Normal/Unchanged  [] Increased  [x] Decreased        SI [x] Present  [] Absent    HI  []Present  [x] Absent     Aggression:  [] yes  [x] no    Patient is [] able  [] unable to CONTRACT FOR SAFETY     PAST MEDICAL/PSYCHIATRIC HISTORY:   Past Medical History:   Diagnosis Date    Alcohol abuse     Alcoholic hepatitis 4/3/4664    Anxiety     [facility]ipolar [facility] ([facility]     Depression     Seizures ([facility]     x1 only related to alcohol     Thyroid disease        FAMILY/SOCIAL HISTORY:  Family History   Problem Relation Age of Onset    Depression Mother      Social History     Socioeconomic History    Marital status:      Spouse name: Not on file    Number of children: 11    Years of education: 15    Highest education level: Not on file   Occupational History    Occupation: nurse   Social Needs    Financial resource strain: Not on file    Food insecurity     Worry: Not on file     Inability: Not on file   [employer] needs     Medical: Not on file     Non-medical: Not on file   Tobacco Use    Smoking status: Current Every Day Smoker     Packs/day: 1.00     Years: 15.00     Pack years: 15.00     Types: Cigarettes    Smokeless tobacco: Never Used   Substance and Sexual Activity    Alcohol use: Yes     Comment: drinks a fifth of rum per day. trying to stop drinking     Drug use: No    Sexual activity: Yes     Partners: Male     Comment: Tivis Plunk one person. \"   Lifestyle    Physical activity     Days per week: Not on file     Minutes per session: Not on file    Stress: Not on file   Relationships    Social connections     Talks on phone: Not on file     Gets together: Not on file     Attends Scientologist service: Not on file     Active member of club or organization: Not on file     Attends meetings of clubs or organizations: Not on file     Relationship status: Not on file    Intimate partner violence     Fear of current or ex partner: Not on file     Emotionally abused: Not on file     Physically abused: Not on file     Forced sexual activity: Not on file   Other Topics Concern    Not on file   Social History Narrative    Not on file           ROS:  [x] All negative/unchanged except if checked.  Explain positive(checked items) below:  [] Constitutional  [] Eyes  [] Ear/Nose/Mouth/Throat  [] Respiratory  [] CV  [] GI  []   [] Musculoskeletal  [] Skin/Breast  [] Neurological  [] Endocrine  [] Heme/Lymph  [] Allergic/Immunologic    Explanation:     MEDICATIONS:    Current Facility-Administered Medications:     OXcarbazepine (TRILEPTAL) tablet 150 mg, 150 mg, Oral, BID, Ruperto Abdalla MD, 150 mg at 08/10/20 0858    QUEtiapine (SEROQUEL) tablet 100 mg, 100 mg, Oral, Nightly, Ruperto Abdalla MD, 100 mg at 08/09/20 2154    QUEtiapine (SEROQUEL) tablet 25 mg, 25 mg, Oral, BID, Ruperto Abdalla MD, 25 mg at 08/10/20 0855    acetaminophen (TYLENOL) tablet 650 mg, 650 mg, Oral, Q6H PRN, Ruperto Abdalla MD    traZODone (DESYREL) tablet 50 mg, 50 mg, Oral, Nightly PRN, Ruperto Abdalla MD, 50 mg at 08/09/20 2153    magnesium hydroxide (MILK OF MAGNESIA) 400 MG/5ML suspension 30 mL, 30 mL, Oral, Daily PRN, Ruperto Abdalla

## 2020-08-10 NOTE — PROGRESS NOTES
Group Therapy Note    Date: 8/10/2020  Start Time: 1430  End Time:  1510    Number of Participants: 7    Type of Group: Cognitive Skills    Patient's Goal: To participate in mood management group. Notes: Patient declined to attend psychoeducation group at 1430 despite encouragement by staff.      Discipline Responsible: /Counselor    PATRICIA Meléndez

## 2020-08-10 NOTE — PROGRESS NOTES
Patient did not attend Activity Group despite staff encouragement.  Electronically signed by Debra Altamirano on 8/9/2020 at 9:12 PM

## 2020-08-10 NOTE — GROUP NOTE
Group Therapy Note    Date: 8/10/2020    Group Start Time: 3652  Group End Time: 5835  Group Topic: Healthy Living/Wellness    [facility] G[facility]WIND[facility] Laird        Group Therapy Note    Attendees: 10/20         Patient's Goal:  To learn about healthy and unhealthy communication, and how to communicate in a healthy manner. Notes:  Patient actively participated in group discussion.     Status After Intervention:  Unchanged    Participation Level: Interactive    Participation Quality: Appropriate and Attentive      Speech:  hesitant      Thought Process/Content: Logical      Affective Functioning: Flat      Mood: euthymic      Level of consciousness:  Alert and Attentive      Response to Learning: Able to verbalize current knowledge/experience      Endings: None Reported    Modes of Intervention: Education      Discipline Responsible: [address], [employer]      Signature:  Fortunato Laird

## 2020-08-11 PROCEDURE — 99232 SBSQ HOSP IP/OBS MODERATE 35: CPT | Performed by: PSYCHIATRY & NEUROLOGY

## 2020-08-11 PROCEDURE — 1240000000 HC EMOTIONAL WELLNESS R&B

## 2020-08-11 PROCEDURE — 6370000000 HC RX 637 (ALT 250 FOR IP): Performed by: PSYCHIATRY & NEUROLOGY

## 2020-08-11 RX ORDER — BUSPIRONE HYDROCHLORIDE 7.5 MG/1
15 TABLET ORAL 3 TIMES DAILY
Status: DISCONTINUED | OUTPATIENT
Start: 2020-08-11 | End: 2020-08-13 | Stop reason: HOSPADM

## 2020-08-11 RX ORDER — QUETIAPINE FUMARATE 300 MG/1
300 TABLET, FILM COATED ORAL NIGHTLY
Status: DISCONTINUED | OUTPATIENT
Start: 2020-08-11 | End: 2020-08-13 | Stop reason: HOSPADM

## 2020-08-11 RX ADMIN — OXCARBAZEPINE 300 MG: 300 TABLET, FILM COATED ORAL at 09:16

## 2020-08-11 RX ADMIN — HYDROXYZINE PAMOATE 50 MG: 50 CAPSULE ORAL at 10:55

## 2020-08-11 RX ADMIN — MULTIPLE VITAMINS W/ MINERALS TAB 1 TABLET: TAB at 09:16

## 2020-08-11 RX ADMIN — OXCARBAZEPINE 300 MG: 300 TABLET, FILM COATED ORAL at 21:13

## 2020-08-11 RX ADMIN — FOLIC ACID 1 MG: 1 TABLET ORAL at 09:16

## 2020-08-11 RX ADMIN — QUETIAPINE FUMARATE 300 MG: 300 TABLET ORAL at 21:13

## 2020-08-11 RX ADMIN — TRAZODONE HYDROCHLORIDE 50 MG: 50 TABLET ORAL at 21:13

## 2020-08-11 RX ADMIN — QUETIAPINE FUMARATE 25 MG: 25 TABLET ORAL at 16:18

## 2020-08-11 RX ADMIN — BUSPIRONE HYDROCHLORIDE 15 MG: 7.5 TABLET ORAL at 21:13

## 2020-08-11 RX ADMIN — HYDROXYZINE PAMOATE 50 MG: 50 CAPSULE ORAL at 17:24

## 2020-08-11 RX ADMIN — Medication 100 MG: at 10:35

## 2020-08-11 RX ADMIN — QUETIAPINE FUMARATE 25 MG: 25 TABLET ORAL at 09:16

## 2020-08-11 RX ADMIN — HYDROXYZINE PAMOATE 50 MG: 50 CAPSULE ORAL at 03:11

## 2020-08-11 RX ADMIN — BUSPIRONE HYDROCHLORIDE 15 MG: 7.5 TABLET ORAL at 13:57

## 2020-08-11 NOTE — PROGRESS NOTES
[facility]. FOLLOW-UP NOTE       8/11/2020     Patient was seen and examined in person, Chart reviewed   Patient's case discussed with staff/team    Chief Complaint: Depression, SI    Interim History:     Patient is irritable due to people asking the same question about the circumstance of her admission even after being here for many days. Patient is also stressed about her current circumstance including a job legal issues and homelessness. Patient is planning to get to a sober living condition and continue to work. She reported her mood to be getting better although her sleep is not good in spite of taking Seroquel at high dose. She is markedly anxious and fidgety.   Appetite:   [] Normal/Unchanged  [] Increased  [x] Decreased      Sleep:       [] Normal/Unchanged  [] Fair       [x] Poor              Energy:    [] Normal/Unchanged  [] Increased  [x] Decreased        SI [x] Present  [] Absent    HI  []Present  [x] Absent     Aggression:  [] yes  [x] no    Patient is [] able  [] unable to CONTRACT FOR SAFETY     PAST MEDICAL/PSYCHIATRIC HISTORY:   Past Medical History:   Diagnosis Date    Alcohol abuse     Alcoholic hepatitis 0/4/0447    Anxiety     Bipolar 1 disorder ([facility]     Depression     Seizures ([facility]     x1 only related to alcohol     Thyroid disease        FAMILY/SOCIAL HISTORY:  Family History   Problem Relation Age of Onset    Depression Mother      Social History     Socioeconomic History    Marital status:      Spouse name: Not on file    Number of children: 11    Years of education: 15    Highest education level: Not on file   Occupational History    Occupation: nurse   Social Needs    Financial resource strain: Not on file    Food insecurity     Worry: Not on file     Inability: Not on file   [employer] needs     Medical: Not on file     Non-medical: Not on file   Tobacco Use    Smoking status: Current Every Day Smoker     Packs/day: 1.00     Years: 15.00     Pack years: 15.00     Types: Cigarettes    Smokeless tobacco: Never Used   Substance and Sexual Activity    Alcohol use: Yes     Comment: drinks a fifth of rum per day. trying to stop drinking     Drug use: No    Sexual activity: Yes     Partners: Male     Comment: Wale Delgadillo one person. \"   Lifestyle    Physical activity     Days per week: Not on file     Minutes per session: Not on file    Stress: Not on file   Relationships    Social connections     Talks on phone: Not on file     Gets together: Not on file     Attends Advent service: Not on file     Active member of club or organization: Not on file     Attends meetings of clubs or organizations: Not on file     Relationship status: Not on file    Intimate partner violence     Fear of current or ex partner: Not on file     Emotionally abused: Not on file     Physically abused: Not on file     Forced sexual activity: Not on file   Other Topics Concern    Not on file   Social History Narrative    Not on file           ROS:  [x] All negative/unchanged except if checked.  Explain positive(checked items) below:  [] Constitutional  [] Eyes  [] Ear/Nose/Mouth/Throat  [] Respiratory  [] CV  [] GI  []   [] Musculoskeletal  [] Skin/Breast  [] Neurological  [] Endocrine  [] Heme/Lymph  [] Allergic/Immunologic    Explanation:     MEDICATIONS:    Current Facility-Administered Medications:     QUEtiapine (SEROQUEL) tablet 300 mg, 300 mg, Oral, Nightly, Patricia Rene MD    busPIRone (BUSPAR) tablet 15 mg, 15 mg, Oral, TID, Patricia Rene MD, 15 mg at 08/11/20 1357    OXcarbazepine (TRILEPTAL) tablet 300 mg, 300 mg, Oral, BID, Patricia Rene MD, 300 mg at 08/11/20 0916    QUEtiapine (SEROQUEL) tablet 25 mg, 25 mg, Oral, BID, Patricia Rene MD, 25 mg at 08/11/20 1618    acetaminophen (TYLENOL) tablet 650 mg, 650 mg, Oral, Q6H PRN, Patricia Rene MD    traZODone (DESYREL) tablet 50 mg, 50 mg, Oral, Nightly PRN, Cara Viveros Joshua Barrett MD, 50 mg at 08/10/20 2107    magnesium hydroxide (MILK OF MAGNESIA) 400 MG/5ML suspension 30 mL, 30 mL, Oral, Daily PRN, Alex Duffy MD    aluminum & magnesium hydroxide-simethicone (MAALOX) 200-200-20 MG/5ML suspension 30 mL, 30 mL, Oral, Q6H PRN, Alex Duffy MD    haloperidol lactate (HALDOL) injection 5 mg, 5 mg, Intramuscular, Q6H PRN **OR** haloperidol (HALDOL) tablet 5 mg, 5 mg, Oral, Q6H PRN, Alex Duffy MD    hydrOXYzine (VISTARIL) injection 50 mg, 50 mg, Intramuscular, Q6H PRN **OR** hydrOXYzine (VISTARIL) capsule 50 mg, 50 mg, Oral, Q6H PRN, Alex Duffy MD, 50 mg at 08/11/20 1724    therapeutic multivitamin-minerals 1 tablet, 1 tablet, Oral, Daily, Alex Duffy MD, 1 tablet at 08/11/20 0916    vitamin B-1 (THIAMINE) tablet 100 mg, 100 mg, Oral, Daily, Alex Duffy MD, 100 mg at 08/11/20 1035    ondansetron (ZOFRAN) tablet 4 mg, 4 mg, Oral, Q4H PRN, Alex Duffy MD    folic acid (FOLVITE) tablet 1 mg, 1 mg, Oral, Daily, Alex Duffy MD, 1 mg at 08/11/20 0916    chlordiazePOXIDE (LIBRIUM) capsule 10 mg, 10 mg, Oral, Q4H PRN, 10 mg at 08/09/20 2206 **OR** chlordiazePOXIDE (LIBRIUM) capsule 25 mg, 25 mg, Oral, Q4H PRN **OR** chlordiazePOXIDE (LIBRIUM) capsule 50 mg, 50 mg, Oral, Q2H PRN **OR** chlordiazePOXIDE (LIBRIUM) capsule 75 mg, 75 mg, Oral, Q1H PRN **OR** LORazepam (ATIVAN) tablet 4 mg, 4 mg, Oral, Q1H PRN, Alex Duffy MD    nicotine (NICODERM CQ) 21 MG/24HR 1 patch, 1 patch, Transdermal, Daily, Alex Duffy MD, 1 patch at 08/11/20 0916      Examination:  BP 95/62   Pulse 87   Temp 98 °F (36.7 °C) (Oral)   Resp 18   Ht 5' 2\" (1.575 m)   Wt 150 lb (68 kg)   LMP 07/26/2020 (Approximate)   SpO2 96%   BMI 27.44 kg/m²   Gait - steady  Medication side effects(SE): no    Mental Status Examination:    Level of consciousness:  within normal limits   Appearance:  fair grooming and fair hygiene  Behavior/Motor:  psychomotor retardation  Attitude toward examiner:  cooperative  Speech:  slow   Mood: depressed  Affect:  blunted  Thought processes:  slow   Thought content:  Suicidal Ideation:  passive  Cognition:  oriented to person, place, and time   Concentration poor  Insight poor   Judgement poor     ASSESSMENT:   Patient symptoms are:  [] Well controlled  [] Improving  [] Worsening  [] No change      Diagnosis:   Principal Problem:    Bipolar depression ([facility]  Active Problems:    Alcohol dependence with uncomplicated withdrawal ([facility]  Resolved Problems:    * No resolved hospital problems. *      LABS:    No results for input(s): WBC, HGB, PLT in the last 72 hours. No results for input(s): NA, K, CL, CO2, BUN, CREATININE, GLUCOSE in the last 72 hours. No results for input(s): BILITOT, ALKPHOS, AST, ALT in the last 72 hours. Lab Results   Component Value Date    LABAMPH Neg 08/07/2020    BARBSCNU Neg 08/07/2020    LABBENZ Neg 08/07/2020    LABMETH Neg 08/07/2020    OPIATESCREENURINE Neg 08/07/2020    PHENCYCLIDINESCREENURINE Neg 08/07/2020    ETOH <10 08/07/2020     Lab Results   Component Value Date    TSH 1.990 08/07/2020     No results found for: LITHIUM  Lab Results   Component Value Date    VALPROATE <7 (L) 06/04/2018       RISK ASSESSMENT:     Treatment Plan:  Reviewed current Medications with the patient. Medication as ordered  Risks, benefits, side effects, drug-to-drug interactions and alternatives to treatment were discussed. Collateral information:   CD evaluation  Encourage patient to attend group and other milieu activities.   Discharge planning discussed with the patient and treatment team.    PSYCHOTHERAPY/COUNSELING:  [x] Therapeutic interview  [x] Supportive  [] CBT  [] Ongoing  [] Other    [x] Patient continues to need, on a daily basis, active treatment furnished directly by or requiring the supervision of inpatient psychiatric personnel      Anticipated Length of stay            Electronically signed by Merari Cartagena

## 2020-08-11 NOTE — PROGRESS NOTES
Pt. refused to attend the 1000 skills group, despite staff encouragement. Electronically signed by Taiow Fuchs, [address] on 8/11/2020 at 2:28 PM

## 2020-08-11 NOTE — PROGRESS NOTES
Patient did not attend the [address] [facility] despite staff encouragement.  Electronically signed by Lola Reed on 8/11/2020 at 4:53 PM

## 2020-08-11 NOTE — BH NOTE
Patient did not attend 1PM meditation group despite staff encouragement.   Electronically signed by Isauro Harris RN on 8/11/2020 at 2:15 PM

## 2020-08-11 NOTE — PROGRESS NOTES
Pt was tearful during assessment stated \" I lost my job they said I am a three day no show\". Pt continues to have poor sleep D/T waking frequently and racing thoughts. pt has not been attending groups despite encouragement. Pt continues to have fleeting SI with no plan or intent. denies MARCO CARVER.

## 2020-08-11 NOTE — PROGRESS NOTES
Pt. declined to attend the 0900 community meeting, despite staff encouragement. Electronically signed by Warden Fung [address] on 8/11/2020 at 10:01 AM

## 2020-08-11 NOTE — FLOWSHEET NOTE
Pt states vistaril was ineffective anxiety remains the same. Pt did just meet with [facility]. Encouraged to continue to utilize deep breathing techniques. Will continue to monitor.

## 2020-08-12 PROCEDURE — 99232 SBSQ HOSP IP/OBS MODERATE 35: CPT | Performed by: PSYCHIATRY & NEUROLOGY

## 2020-08-12 PROCEDURE — 6370000000 HC RX 637 (ALT 250 FOR IP): Performed by: PSYCHIATRY & NEUROLOGY

## 2020-08-12 PROCEDURE — 90833 PSYTX W PT W E/M 30 MIN: CPT | Performed by: PSYCHIATRY & NEUROLOGY

## 2020-08-12 PROCEDURE — 1240000000 HC EMOTIONAL WELLNESS R&B

## 2020-08-12 RX ADMIN — MULTIPLE VITAMINS W/ MINERALS TAB 1 TABLET: TAB at 08:48

## 2020-08-12 RX ADMIN — TRAZODONE HYDROCHLORIDE 50 MG: 50 TABLET ORAL at 21:07

## 2020-08-12 RX ADMIN — FOLIC ACID 1 MG: 1 TABLET ORAL at 08:48

## 2020-08-12 RX ADMIN — BUSPIRONE HYDROCHLORIDE 15 MG: 7.5 TABLET ORAL at 21:05

## 2020-08-12 RX ADMIN — OXCARBAZEPINE 300 MG: 300 TABLET, FILM COATED ORAL at 08:48

## 2020-08-12 RX ADMIN — QUETIAPINE FUMARATE 25 MG: 25 TABLET ORAL at 16:38

## 2020-08-12 RX ADMIN — HYDROXYZINE PAMOATE 50 MG: 50 CAPSULE ORAL at 21:06

## 2020-08-12 RX ADMIN — OXCARBAZEPINE 300 MG: 300 TABLET, FILM COATED ORAL at 21:06

## 2020-08-12 RX ADMIN — BUSPIRONE HYDROCHLORIDE 15 MG: 7.5 TABLET ORAL at 14:13

## 2020-08-12 RX ADMIN — BUSPIRONE HYDROCHLORIDE 15 MG: 7.5 TABLET ORAL at 08:47

## 2020-08-12 RX ADMIN — QUETIAPINE FUMARATE 300 MG: 300 TABLET ORAL at 21:06

## 2020-08-12 RX ADMIN — QUETIAPINE FUMARATE 25 MG: 25 TABLET ORAL at 08:48

## 2020-08-12 RX ADMIN — Medication 100 MG: at 08:48

## 2020-08-12 NOTE — PROGRESS NOTES
Pt is A & O X4. States depression & anxiety are both 4/10. Denies SI/HI/AVH. Pt is showering daily. Likes to watch tv with other pts. Occasionally attends groups. Appetite is good and is sleeping 8 hrs per night. States she may be dc'd tomorrow & is looking forward to that.

## 2020-08-12 NOTE — PROGRESS NOTES
Pt. attended the 0900 community meeting. Electronically signed by Marlon [address] on 8/12/2020 at 9:59 AM

## 2020-08-12 NOTE — PROGRESS NOTES
[facility]. 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 Patient is happy about the outcome. Patient want to go to [facility] for a sober living house and she already met with lets get real today. She is planning to remain sober on discharge and have a fresh start. Patient denies any active suicidal thoughts today. She continues to has sleep problems.   Appetite:   [] Normal/Unchanged  [] Increased  [x] Decreased      Sleep:       [] Normal/Unchanged  [] Fair       [x] Poor              Energy:    [] Normal/Unchanged  [] Increased  [x] Decreased        SI [] Present  [x] Absent    HI  []Present  [x] Absent     Aggression:  [] yes  [x] no    Patient is [] able  [] unable to CONTRACT FOR SAFETY     PAST MEDICAL/PSYCHIATRIC HISTORY:   Past Medical History:   Diagnosis Date    Alcohol abuse     Alcoholic hepatitis 0/2/6180    Anxiety     Bipolar 1 disorder ([facility]     Depression     Seizures ([facility]     x1 only related to alcohol     Thyroid disease        FAMILY/SOCIAL HISTORY:  Family History   Problem Relation Age of Onset    Depression Mother      Social History     Socioeconomic History    Marital status:      Spouse name: Not on file    Number of children: 11    Years of education: 15    Highest education level: Not on file   Occupational History    Occupation: nurse   Social Needs    Financial resource strain: Not on file    Food insecurity     Worry: Not on file     Inability: Not on file   [employer] needs     Medical: Not on file     Non-medical: Not on file   Tobacco Use    Smoking status: Current Every Day Smoker Packs/day: 1.00     Years: 15.00     Pack years: 15.00     Types: Cigarettes    Smokeless tobacco: Never Used   Substance and Sexual Activity    Alcohol use: Yes     Comment: drinks a fifth of rum per day. trying to stop drinking     Drug use: No    Sexual activity: Yes     Partners: Male     Comment: Dion Few one person. \"   Lifestyle    Physical activity     Days per week: Not on file     Minutes per session: Not on file    Stress: Not on file   Relationships    Social connections     Talks on phone: Not on file     Gets together: Not on file     Attends Jain service: Not on file     Active member of club or organization: Not on file     Attends meetings of clubs or organizations: Not on file     Relationship status: Not on file    Intimate partner violence     Fear of current or ex partner: Not on file     Emotionally abused: Not on file     Physically abused: Not on file     Forced sexual activity: Not on file   Other Topics Concern    Not on file   Social History Narrative    Not on file           ROS:  [x] All negative/unchanged except if checked.  Explain positive(checked items) below:  [] Constitutional  [] Eyes  [] Ear/Nose/Mouth/Throat  [] Respiratory  [] CV  [] GI  []   [] Musculoskeletal  [] Skin/Breast  [] Neurological  [] Endocrine  [] Heme/Lymph  [] Allergic/Immunologic    Explanation:     MEDICATIONS:    Current Facility-Administered Medications:     QUEtiapine (SEROQUEL) tablet 300 mg, 300 mg, Oral, Nightly, Madalyn Maldonado MD, 300 mg at 08/11/20 2113    busPIRone (BUSPAR) tablet 15 mg, 15 mg, Oral, TID, Madalyn Maldonado MD, 15 mg at 08/12/20 1413    OXcarbazepine (TRILEPTAL) tablet 300 mg, 300 mg, Oral, BID, Madalyn Maldonado MD, 300 mg at 08/12/20 0848    QUEtiapine (SEROQUEL) tablet 25 mg, 25 mg, Oral, BID, Madalyn Maldonado MD, 25 mg at 08/12/20 0848    acetaminophen (TYLENOL) tablet 650 mg, 650 mg, Oral, Q6H PRN, Madalyn Maldonado MD    traZODone (DESYREL) tablet 50 mg, 50 mg, Oral, Nightly PRN, Hamzah Charles MD, 50 mg at 08/11/20 2113    magnesium hydroxide (MILK OF MAGNESIA) 400 MG/5ML suspension 30 mL, 30 mL, Oral, Daily PRN, Hamzah Charles MD    aluminum & magnesium hydroxide-simethicone (MAALOX) 200-200-20 MG/5ML suspension 30 mL, 30 mL, Oral, Q6H PRN, Hamzah Charles MD    haloperidol lactate (HALDOL) injection 5 mg, 5 mg, Intramuscular, Q6H PRN **OR** haloperidol (HALDOL) tablet 5 mg, 5 mg, Oral, Q6H PRN, Hamzah Charles MD    hydrOXYzine (VISTARIL) injection 50 mg, 50 mg, Intramuscular, Q6H PRN **OR** hydrOXYzine (VISTARIL) capsule 50 mg, 50 mg, Oral, Q6H PRN, Hamzah Charles MD, 50 mg at 08/11/20 1724    therapeutic multivitamin-minerals 1 tablet, 1 tablet, Oral, Daily, Hamzah Charles MD, 1 tablet at 08/12/20 0848    vitamin B-1 (THIAMINE) tablet 100 mg, 100 mg, Oral, Daily, Hamzah Charles MD, 100 mg at 08/12/20 0848    ondansetron (ZOFRAN) tablet 4 mg, 4 mg, Oral, Q4H PRN, Hamzah Charles MD    folic acid (FOLVITE) tablet 1 mg, 1 mg, Oral, Daily, Hamzah Charles MD, 1 mg at 08/12/20 0848    nicotine (NICODERM CQ) 21 MG/24HR 1 patch, 1 patch, Transdermal, Daily, Hamzah Charles MD, 1 patch at 08/12/20 0849      Examination:  /72   Pulse 81   Temp 98 °F (36.7 °C) (Oral)   Resp 18   Ht 5' 2\" (1.575 m)   Wt 150 lb (68 kg)   LMP 07/26/2020 (Approximate)   SpO2 98%   BMI 27.44 kg/m²   Gait - steady  Medication side effects(SE): no    Mental Status Examination:    Level of consciousness:  within normal limits   Appearance:  fair grooming and fair hygiene  Behavior/Motor:  Less psychomotor retardation  Attitude toward examiner:  cooperative  Speech:  slow   Mood: less depressed  Affect:  blunted  Thought processes:  slow   Thought content:  Suicidal Ideation:  denies  Cognition:  oriented to person, place, and time   Concentration better  Insight better  Judgement better    ASSESSMENT:   Patient symptoms are:  [] Well controlled  [x] Improving  [] Worsening  [] No change      Diagnosis:   Principal Problem:    Bipolar depression ([facility]  Active Problems:    Alcohol dependence with uncomplicated withdrawal ([facility]  Resolved Problems:    * No resolved hospital problems. *      LABS:    No results for input(s): WBC, HGB, PLT in the last 72 hours. No results for input(s): NA, K, CL, CO2, BUN, CREATININE, GLUCOSE in the last 72 hours. No results for input(s): BILITOT, ALKPHOS, AST, ALT in the last 72 hours. Lab Results   Component Value Date    LABAMPH Neg 08/07/2020    BARBSCNU Neg 08/07/2020    LABBENZ Neg 08/07/2020    LABMETH Neg 08/07/2020    OPIATESCREENURINE Neg 08/07/2020    PHENCYCLIDINESCREENURINE Neg 08/07/2020    ETOH <10 08/07/2020     Lab Results   Component Value Date    TSH 1.990 08/07/2020     No results found for: LITHIUM  Lab Results   Component Value Date    VALPROATE <7 (L) 06/04/2018       RISK ASSESSMENT:     Treatment Plan:  Reviewed current Medications with the patient. Medication as ordered  Risks, benefits, side effects, drug-to-drug interactions and alternatives to treatment were discussed. Collateral information:   CD evaluation  Encourage patient to attend group and other milieu activities. Discharge planning discussed with the patient and treatment team.    PSYCHOTHERAPY/COUNSELING:  [x] Therapeutic interview  [x] Supportive  [] CBT  [] Ongoing  [] Other  Patient was seen 1:1 for 20 minutes, other than E&M time spent, focusing on      - coping skills techniques     - Anxiety management techniques discussed including deep breathing exercise and PMR     - discussing patients strength and weakness      - Motivational interviewing to assess the stage of change and assessing patient readiness to quit substance use.      - Focusing on negative cognition and maladaptive thoughts, which is feeding and maintaining the depression symptoms         [x] Patient continues to need, on a daily basis, active treatment furnished directly by or requiring the supervision of inpatient psychiatric personnel      Anticipated Length of stay            Electronically signed by Efrem Marks MD on 8/12/2020 at 4:24 PM

## 2020-08-12 NOTE — PROGRESS NOTES
Pt denies current SI/HI or AVH. Patient is stating that she has anger due to losing her job. Pt anxiety is still high, pt has began on Buspar. Pt is upset and dep blaming the staff for her job loss because something was not faxed correctly. Pt encouraged to call her job and talk with them herself. Patient sleep is better. Pt is out of room this am eating and goes to group. Pt is social with select peers.  Electronically signed by Miguel Morris LPN on 6/60/4771 at 14:63 AM

## 2020-08-12 NOTE — CARE COORDINATION
Spoke to patients place of employment who requested that information confirming that fax of admit was sent to the wrong number. This worker refaxed over. Employer stated that patient will have a position there. Tatyana Ortiz es

## 2020-08-12 NOTE — GROUP NOTE
Group Therapy Note    Date: 8/12/2020    Group Start Time: 1430  Group End Time: 6709  Group Topic: Cognitive Skills    [facility]    PATRICIA Quesada        Group Therapy Note    Attendees: 7         Patient's Goal:  To participate in mood management group. Notes:  Patient learned about the cycle of low self-esteem. Status After Intervention:  Improved    Participation Level: Active Listener    Participation Quality: Appropriate      Speech:  normal      Thought Process/Content: Logical      Affective Functioning: Congruent      Mood: elevated      Level of consciousness:  Alert      Response to Learning: Able to verbalize current knowledge/experience      Endings: None Reported    Modes of Intervention: Education      Discipline Responsible: /Counselor      Signature:   PATRICIA Quesada

## 2020-08-12 NOTE — PROGRESS NOTES
Patient did not attend the Activity Group despite staff encouragement.  Electronically signed by Joselyn Keith on 8/11/2020 at 8:04 PM

## 2020-08-12 NOTE — GROUP NOTE
Group Therapy Note    Date: 8/12/2020    Group Start Time: 1000  Group End Time: 1100  Group Topic: Psychoeducation    [facility]    Mario Gilbert        Group Therapy Note    Attendees: 10         Patient's Goal:  \"Talk to the Doctor about discharge\"    Notes:  Patient was more relax and work fairly on her task in group. Status After Intervention:  Improved    Participation Level:  Active Listener    Participation Quality: Appropriate      Speech:  normal      Thought Process/Content: Linear      Affective Functioning: Congruent      Mood: More relax      Level of consciousness:  Alert      Response to Learning: Progressing to goal      Endings: None Reported    Modes of Intervention: Education, Socialization and Activity      Discipline Responsible: Psychoeducational Specialist      Signature:  Mario Gilbert

## 2020-08-12 NOTE — GROUP NOTE
Group Therapy Note    Date: 8/12/2020    Group Start Time: 9341  Group End Time: 1006  Group Topic: Healthy Living/Wellness    [facility]    Eden Monroe        Group Therapy Note    Attendees: 8/15         Patient's Goal:  To learn about self esteem and how to build it. Notes:  Patient participated in group discussion.     Status After Intervention:  Improved    Participation Level: Interactive    Participation Quality: Appropriate and Attentive      Speech:  normal      Thought Process/Content: Logical      Affective Functioning: Flat      Mood: euthymic      Level of consciousness:  Alert and Attentive      Response to Learning: Able to verbalize current knowledge/experience      Endings: None Reported    Modes of Intervention: Education      Discipline Responsible: [address], [employer] [facility]      Signature:  Eden Monroe

## 2020-08-12 NOTE — PROGRESS NOTES
Patient did not attend the 2100 Wrap Up and Relaxation Group despite staff encouragement.  Electronically signed by George Saavedra on 8/11/2020 at 9:52 PM

## 2020-08-13 VITALS
WEIGHT: 150 LBS | TEMPERATURE: 98 F | SYSTOLIC BLOOD PRESSURE: 110 MMHG | HEIGHT: 62 IN | RESPIRATION RATE: 18 BRPM | DIASTOLIC BLOOD PRESSURE: 76 MMHG | HEART RATE: 88 BPM | BODY MASS INDEX: 27.6 KG/M2 | OXYGEN SATURATION: 96 %

## 2020-08-13 PROCEDURE — 6370000000 HC RX 637 (ALT 250 FOR IP): Performed by: PSYCHIATRY & NEUROLOGY

## 2020-08-13 PROCEDURE — 99239 HOSP IP/OBS DSCHRG MGMT >30: CPT | Performed by: PSYCHIATRY & NEUROLOGY

## 2020-08-13 RX ORDER — QUETIAPINE FUMARATE 25 MG/1
25 TABLET, FILM COATED ORAL 2 TIMES DAILY
Qty: 30 TABLET | Refills: 3 | Status: ON HOLD | OUTPATIENT
Start: 2020-08-13 | End: 2021-10-12 | Stop reason: HOSPADM

## 2020-08-13 RX ORDER — QUETIAPINE FUMARATE 300 MG/1
300 TABLET, FILM COATED ORAL NIGHTLY
Qty: 15 TABLET | Refills: 3 | Status: ON HOLD | OUTPATIENT
Start: 2020-08-13 | End: 2021-10-12 | Stop reason: HOSPADM

## 2020-08-13 RX ORDER — BUSPIRONE HYDROCHLORIDE 15 MG/1
15 TABLET ORAL 3 TIMES DAILY
Qty: 45 TABLET | Refills: 2 | Status: ON HOLD | OUTPATIENT
Start: 2020-08-13 | End: 2021-10-12 | Stop reason: HOSPADM

## 2020-08-13 RX ORDER — TRAZODONE HYDROCHLORIDE 50 MG/1
50 TABLET ORAL NIGHTLY PRN
Qty: 15 TABLET | Refills: 2 | Status: ON HOLD | OUTPATIENT
Start: 2020-08-13 | End: 2021-10-12 | Stop reason: HOSPADM

## 2020-08-13 RX ORDER — OXCARBAZEPINE 300 MG/1
300 TABLET, FILM COATED ORAL 2 TIMES DAILY
Qty: 30 TABLET | Refills: 3 | Status: ON HOLD | OUTPATIENT
Start: 2020-08-13 | End: 2021-10-12 | Stop reason: SDUPTHER

## 2020-08-13 RX ADMIN — MULTIPLE VITAMINS W/ MINERALS TAB 1 TABLET: TAB at 08:42

## 2020-08-13 RX ADMIN — QUETIAPINE FUMARATE 25 MG: 25 TABLET ORAL at 08:42

## 2020-08-13 RX ADMIN — OXCARBAZEPINE 300 MG: 300 TABLET, FILM COATED ORAL at 08:42

## 2020-08-13 RX ADMIN — FOLIC ACID 1 MG: 1 TABLET ORAL at 08:42

## 2020-08-13 RX ADMIN — BUSPIRONE HYDROCHLORIDE 15 MG: 7.5 TABLET ORAL at 08:42

## 2020-08-13 RX ADMIN — Medication 100 MG: at 08:42

## 2020-08-13 NOTE — PROGRESS NOTES
Pt bright out on the unit, feeling good about going to [facility] today. Pt able to make needs known as she needs letters for return to work, etc.  Patient polite and cooperative with care, takes morning meds and participates in her care. Pt denies SI/HI or AVH. Pt is hopeful and encouraged about her future. Pt mentions that she has not even thought about smoking with the patch on and may try to stop smoking. Pt appetite is good and sleep is good.  Electronically signed by Renato Blanc LPN on 1/16/2081 at 2:79 AM'

## 2020-08-13 NOTE — DISCHARGE SUMMARY
DISCHARGE SUMMARY      Patient ID:  Sukumar Moses  [id number]  47 y.o.  1980    Patient Location:   [address] [place] [address] Road      Provider Location (City/State):   [place] date: 8/7/2020    Discharge date and time: 8/13/2020    Admitting Physician: Vazquez Angelo MD     Discharge Physician: Dr Gamaliel Chappell MD    Admission Diagnoses: Bipolar depression [facility]) [F31.9]    Admission Condition: poor    Discharged Condition: stable    Admission Circumstance:     Pt is a 47 y.o. female arriving to ED with SI without a plan and relapsing on alcohol in the last week. Pt homeless at this time and was previously living at a sober living house and left in June 2020. 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  Pt has history of treatment and is currently not taking any prescribed medications. Pt has a history of bipolar.  Pt positive for SI without plan and states that would like to go to sleep and not wake up. Pt has previous SI attempt in 2018 by taking OD of pills.  Pt for THC. Pt stating \"my life is a mess\". Pt has legal issues in [place] for a DUI. Pt denies violent behavior. Pt states that \"my brain can't go there right now\" when asked about transportation but, states no driving privileges.      HISTORY OF PRESENT ILLNESS:       The patient is a 47 y.o. female  homeless, living in [facility], employed with significant past history of bipolar disorder  Has been having mood swings, rapid in nature, racing thoughts  Irritable and angry and frustrated  Suicidal thoughts - not want to live like this any more, want to take overdose after drinking. Was hoping drinking alcohol will kill her slowly  Sober since 2018 with only brief period of relapse  Pt relapsed a week ago - Wednesday night she drank 2/5th liquor and then blacked out for 2 days.    No seizures recently but has a h/o w/d seizures     Stressors:life is chaotic in general, homeless, work as a nurse, DUI in [employer] hearing yesterday     The patient is not currently receiving care for the above psychiatric illness.     Medications Prior to Admission:     Prescriptions Prior to Admission   Medications Prior to Admission: buPROPion (WELLBUTRIN XL) 150 MG extended release tablet, Take 150 mg by mouth every morning  busPIRone (BUSPAR) 15 MG tablet, Take 7.5 mg by mouth daily  levothyroxine (SYNTHROID) 75 MCG tablet, Take 75 mcg by mouth Daily  divalproex (DEPAKOTE ER) 500 MG extended release tablet, Take 500 mg by mouth daily  Multiple Vitamins-Minerals (MULTIVITAMIN & MINERAL PO), Take 1 tablet by mouth daily        Compliance:none for past 2 months     Psychiatric Review of Systems       Depression: yes     Payal or Hypomania:  yes     Panic Attacks:  yes      Phobias:  no     Obsessions and Compulsions:  no     PTSD : no     Hallucinations:  no     Delusions:  no     Substance Abuse History:  ETOH: yes   Marijuana: no  Opiates: no  Other Drugs: no        Past Psychiatric History:  Prior Diagnosis:  Bipolar I disorder; borderline traits  Psychiatrist: no  Therapist:no  Hospitalization: yes  Hx of Suicidal Attempts: yes  Hx of violence:  no  ECT: no  Previous discontinued Psychiatric Med Trials:            PAST MEDICAL/PSYCHIATRIC HISTORY:   Past Medical History:   Diagnosis Date    Alcohol abuse     Alcoholic hepatitis 1/5/0130    Anxiety     Bipolar 1 disorder (HCC)     Depression     Seizures (HCC)     x1 only related to alcohol     Thyroid disease        FAMILY/SOCIAL HISTORY:  Family History   Problem Relation Age of Onset    Depression Mother      Social History     Socioeconomic History    Marital status:      Spouse name: Not on file    Number of children: 11    Years of education: 15    Highest education level: Not on file   Occupational History    Occupation: nurse   Social Needs    Financial resource strain: Not on file    Food insecurity     Worry: Not on file     Inability: Not on file   Claudia Donato Transportation needs     Medical: Not on file     Non-medical: Not on file   Tobacco Use    Smoking status: Current Every Day Smoker     Packs/day: 1.00     Years: 15.00     Pack years: 15.00     Types: Cigarettes    Smokeless tobacco: Never Used   Substance and Sexual Activity    Alcohol use: Yes     Comment: drinks a fifth of rum per day. trying to stop drinking     Drug use: No    Sexual activity: Yes     Partners: Male     Comment: Sara Pelaez one person. \"   Lifestyle    Physical activity     Days per week: Not on file     Minutes per session: Not on file    Stress: Not on file   Relationships    Social connections     Talks on phone: Not on file     Gets together: Not on file     Attends Christianity service: Not on file     Active member of club or organization: Not on file     Attends meetings of clubs or organizations: Not on file     Relationship status: Not on file    Intimate partner violence     Fear of current or ex partner: Not on file     Emotionally abused: Not on file     Physically abused: Not on file     Forced sexual activity: Not on file   Other Topics Concern    Not on file   Social History Narrative    Not on file       MEDICATIONS:    Current Facility-Administered Medications:     QUEtiapine (SEROQUEL) tablet 300 mg, 300 mg, Oral, Nightly, Kamila Espinosa MD, 300 mg at 08/12/20 2106    busPIRone (BUSPAR) tablet 15 mg, 15 mg, Oral, TID, Kamila Espinosa MD, 15 mg at 08/12/20 2105    OXcarbazepine (TRILEPTAL) tablet 300 mg, 300 mg, Oral, BID, Kamila Espinosa MD, 300 mg at 08/12/20 2106    QUEtiapine (SEROQUEL) tablet 25 mg, 25 mg, Oral, BID, Kamila Espinosa MD, 25 mg at 08/12/20 1638    acetaminophen (TYLENOL) tablet 650 mg, 650 mg, Oral, Q6H PRN, Kamila Espinosa MD    traZODone (DESYREL) tablet 50 mg, 50 mg, Oral, Nightly PRN, Kamila Espinosa MD, 50 mg at 08/12/20 2107    magnesium hydroxide (MILK OF MAGNESIA) 400 MG/5ML suspension 30 mL, 30 mL, Oral, Daily PRN, Tammy Citizen Allie Aquino MD    aluminum & magnesium hydroxide-simethicone (MAALOX) 200-200-20 MG/5ML suspension 30 mL, 30 mL, Oral, Q6H PRN, Patricia Rene MD    haloperidol lactate (HALDOL) injection 5 mg, 5 mg, Intramuscular, Q6H PRN **OR** haloperidol (HALDOL) tablet 5 mg, 5 mg, Oral, Q6H PRN, Patricia Rene MD    hydrOXYzine (VISTARIL) injection 50 mg, 50 mg, Intramuscular, Q6H PRN **OR** hydrOXYzine (VISTARIL) capsule 50 mg, 50 mg, Oral, Q6H PRN, Patricia Rene MD, 50 mg at 08/12/20 2106    therapeutic multivitamin-minerals 1 tablet, 1 tablet, Oral, Daily, Patricia Rene MD, 1 tablet at 08/12/20 0848    vitamin B-1 (THIAMINE) tablet 100 mg, 100 mg, Oral, Daily, Patricia Rene MD, 100 mg at 08/12/20 0848    ondansetron (ZOFRAN) tablet 4 mg, 4 mg, Oral, Q4H PRN, Patricia Rene MD    folic acid (FOLVITE) tablet 1 mg, 1 mg, Oral, Daily, Patricia Rene MD, 1 mg at 08/12/20 0848    nicotine (NICODERM CQ) 21 MG/24HR 1 patch, 1 patch, Transdermal, Daily, Patricia Rene MD, 1 patch at 08/12/20 0849    Examination:  /76   Pulse 88   Temp 98 °F (36.7 °C)   Resp 18   Ht 5' 2\" (1.575 m)   Wt 150 lb (68 kg)   LMP 07/26/2020 (Approximate)   SpO2 96%   BMI 27.44 kg/m²   Gait - steady    HOSPITAL COURSE[de-identified]  Following admission to the hospital, patient had a complete physical exam and blood work up  Patient was monitored closely with suicide precaution  Patient was started on meds as listed below  Was encouraged to participate in group and other milieu activity  Patient started to feel better with this combination of treatment. Significant progress in the symptoms since admission.     Mood better, with the score of 2/10 - bad  No AVH or paranoid thoughts  No Hopeless or worthless feeling  No active SI/HI  Appetite:  [x] Normal  [] Increased  [] Decreased    Sleep:       [x] Normal  [] Fair       [] Poor            Energy:    [x] Normal  [] Increased  [] Decreased     SI [] Present  [x] Absent  HI []Present  [x] Absent   Aggression:  [] yes  [] no  Patient is [x] able  [] unable to CONTRACT FOR SAFETY   Medication side effects(SE):  [x] None(Psych. Meds.) [] Other      Mental Status Examination on discharge:    Level of consciousness:  within normal limits   Appearance:  well-appearing  Behavior/Motor:  no abnormalities noted  Attitude toward examiner:  attentive and good eye contact  Speech:  spontaneous, normal rate and normal volume   Mood: anxious  Affect:  mood congruent  Thought processes:  linear   Thought content:  Suicidal Ideation:  denies suicidal ideation  Cognition:  oriented to person, place, and time   Concentration intact  Memory intact  Insight good   Judgement fair   Fund of Knowledge adequate      ASSESSMENT:  Patient symptoms are:  [x] Well controlled  [x] Improving  [] Worsening  [] No change      Diagnosis:  Principal Problem:    Bipolar depression ([facility]  Active Problems:    Alcohol dependence with uncomplicated withdrawal ([facility]  Resolved Problems:    * No resolved hospital problems. *      LABS:    No results for input(s): WBC, HGB, PLT in the last 72 hours. No results for input(s): NA, K, CL, CO2, BUN, CREATININE, GLUCOSE in the last 72 hours. No results for input(s): BILITOT, ALKPHOS, AST, ALT in the last 72 hours. Lab Results   Component Value Date    LABAMPH Neg 08/07/2020    BARBSCNU Neg 08/07/2020    LABBENZ Neg 08/07/2020    LABMETH Neg 08/07/2020    OPIATESCREENURINE Neg 08/07/2020    PHENCYCLIDINESCREENURINE Neg 08/07/2020    ETOH <10 08/07/2020     Lab Results   Component Value Date    TSH 1.990 08/07/2020     No results found for: LITHIUM  Lab Results   Component Value Date    VALPROATE <7 (L) 06/04/2018       RISK ASSESSMENT AT DISCHARGE: Low risk for suicide and homicide. Treatment Plan:  Reviewed current Medications with the patient. Education provided on the complaince with treatment.     Risks, benefits, side effects, drug-to-drug interactions and alternatives to treatment were discussed. Encourage patient to attend outpatient follow up appointment and therapy. Patient was advised to call the outpatient provider, visit the nearest ED or call 911 if symptoms are not manageable. Patient's family member was contacted prior to the discharge. Medication List      START taking these medications    OXcarbazepine 300 MG tablet  Commonly known as:  TRILEPTAL  Take 1 tablet by mouth 2 times daily     * QUEtiapine 25 MG tablet  Commonly known as:  SEROQUEL  Take 1 tablet by mouth 2 times daily     * QUEtiapine 300 MG tablet  Commonly known as:  SEROQUEL  Take 1 tablet by mouth nightly         * This list has 2 medication(s) that are the same as other medications prescribed for you. Read the directions carefully, and ask your doctor or other care provider to review them with you.             CHANGE how you take these medications    busPIRone 15 MG tablet  Commonly known as:  BUSPAR  Take 15 mg by mouth 3 times daily  What changed:    · how much to take  · when to take this     traZODone 50 MG tablet  Commonly known as:  DESYREL  Take 1 tablet by mouth nightly as needed for Sleep  What changed:    · medication strength  · how much to take  · when to take this  · reasons to take this        CONTINUE taking these medications    levothyroxine 75 MCG tablet  Commonly known as:  SYNTHROID     MULTIVITAMIN & MINERAL PO        STOP taking these medications    buPROPion 150 MG extended release tablet  Commonly known as:  WELLBUTRIN XL     desvenlafaxine succinate 50 MG Tb24 extended release tablet  Commonly known as:  PRISTIQ     divalproex 500 MG extended release tablet  Commonly known as:  DEPAKOTE ER           Where to Get Your Medications      These medications were sent to [address] [place] [address] [address]  [address] [facility], [facility] [id number]    Phone:  [phone]   · busPIRone 15 MG tablet  · OXcarbazepine 300 MG tablet  · QUEtiapine 25 MG tablet  · QUEtiapine 300 MG tablet  · traZODone 50 MG tablet           Reason for more than one antipsychotic:   [x] N/A  [] 3 failed monotherapy(drugs tried):  [] Cross over to a new antipsychotic  [] Taper to monotherapy from polypharmacy  [] Augmentation of Clozapine therapy due to treatment resistance to single therapy        TIME SPEND - 35 MINUTES TO COMPLETE THE EVALUATION, DISCHARGE SUMMARY, MEDICATION RECONCILIATION AND FOLLOW UP CARE     SignedGermania Arredondo  8/13/2020  8:38 AM

## 2020-08-13 NOTE — CARE COORDINATION
Called and left message for Sharona Rad [phone] to confirm that patient is going to THE [facility]. Let her know patient is being discharged today and requested a return call.    Electronically signed by Sis Miles on 8/13/2020 at 9:20 AM

## 2021-10-07 ENCOUNTER — HOSPITAL ENCOUNTER (INPATIENT)
Age: 41
LOS: 5 days | Discharge: HOME | DRG: 753 | End: 2021-10-12
Attending: EMERGENCY MEDICINE | Admitting: PSYCHIATRY & NEUROLOGY
Payer: MEDICAID

## 2021-10-07 DIAGNOSIS — F33.9 EPISODE OF RECURRENT MAJOR DEPRESSIVE DISORDER, UNSPECIFIED DEPRESSION EPISODE SEVERITY (HCC): Primary | ICD-10-CM

## 2021-10-07 LAB
ACETAMINOPHEN LEVEL: <5 UG/ML (ref 10–30)
ALBUMIN SERPL-MCNC: 4.9 G/DL (ref 3.5–4.6)
ALP BLD-CCNC: 73 U/L (ref 40–130)
ALT SERPL-CCNC: 35 U/L (ref 0–33)
AMPHETAMINE SCREEN, URINE: NORMAL
ANION GAP SERPL CALCULATED.3IONS-SCNC: 16 MEQ/L (ref 9–15)
AST SERPL-CCNC: 42 U/L (ref 0–35)
BARBITURATE SCREEN URINE: NORMAL
BASOPHILS ABSOLUTE: 0.1 K/UL (ref 0–0.2)
BASOPHILS RELATIVE PERCENT: 1.8 %
BENZODIAZEPINE SCREEN, URINE: NORMAL
BILIRUB SERPL-MCNC: 0.5 MG/DL (ref 0.2–0.7)
BILIRUBIN URINE: NEGATIVE
BLOOD, URINE: NEGATIVE
BUN BLDV-MCNC: 10 MG/DL (ref 6–20)
CALCIUM SERPL-MCNC: 9.2 MG/DL (ref 8.5–9.9)
CANNABINOID SCREEN URINE: NORMAL
CHLORIDE BLD-SCNC: 101 MEQ/L (ref 95–107)
CK MB: 2 NG/ML (ref 0–3.8)
CLARITY: CLEAR
CO2: 24 MEQ/L (ref 20–31)
COCAINE METABOLITE SCREEN URINE: NORMAL
COLOR: YELLOW
CREAT SERPL-MCNC: 0.59 MG/DL (ref 0.5–0.9)
CREATINE KINASE-MB INDEX: 1.2 % (ref 0–3.5)
EOSINOPHILS ABSOLUTE: 0.1 K/UL (ref 0–0.7)
EOSINOPHILS RELATIVE PERCENT: 1.2 %
ETHANOL PERCENT: 0.15 G/DL
ETHANOL PERCENT: 0.33 G/DL
ETHANOL: 170 MG/DL (ref 0–0.08)
ETHANOL: 379 MG/DL (ref 0–0.08)
GFR AFRICAN AMERICAN: >60
GFR NON-AFRICAN AMERICAN: >60
GLOBULIN: 3.3 G/DL (ref 2.3–3.5)
GLUCOSE BLD-MCNC: 81 MG/DL (ref 70–99)
GLUCOSE URINE: NEGATIVE MG/DL
HCT VFR BLD CALC: 49.3 % (ref 37–47)
HEMOGLOBIN: 17.1 G/DL (ref 12–16)
KETONES, URINE: NEGATIVE MG/DL
LEUKOCYTE ESTERASE, URINE: NEGATIVE
LYMPHOCYTES ABSOLUTE: 3.2 K/UL (ref 1–4.8)
LYMPHOCYTES RELATIVE PERCENT: 46.2 %
Lab: NORMAL
MCH RBC QN AUTO: 30.9 PG (ref 27–31.3)
MCHC RBC AUTO-ENTMCNC: 34.7 % (ref 33–37)
MCV RBC AUTO: 89.3 FL (ref 82–100)
METHADONE SCREEN, URINE: NORMAL
MONOCYTES ABSOLUTE: 0.3 K/UL (ref 0.2–0.8)
MONOCYTES RELATIVE PERCENT: 4.7 %
NEUTROPHILS ABSOLUTE: 3.2 K/UL (ref 1.4–6.5)
NEUTROPHILS RELATIVE PERCENT: 46.1 %
NITRITE, URINE: NEGATIVE
OPIATE SCREEN URINE: NORMAL
OXYCODONE URINE: NORMAL
PDW BLD-RTO: 13.3 % (ref 11.5–14.5)
PH UA: 5.5 (ref 5–9)
PHENCYCLIDINE SCREEN URINE: NORMAL
PLATELET # BLD: 314 K/UL (ref 130–400)
POTASSIUM SERPL-SCNC: 4.1 MEQ/L (ref 3.4–4.9)
PROPOXYPHENE SCREEN: NORMAL
PROTEIN UA: NEGATIVE MG/DL
RBC # BLD: 5.52 M/UL (ref 4.2–5.4)
SALICYLATE, SERUM: <0.3 MG/DL (ref 15–30)
SARS-COV-2, NAAT: NOT DETECTED
SODIUM BLD-SCNC: 141 MEQ/L (ref 135–144)
SPECIFIC GRAVITY UA: 1 (ref 1–1.03)
TOTAL CK: 171 U/L (ref 0–170)
TOTAL PROTEIN: 8.2 G/DL (ref 6.3–8)
URINE REFLEX TO CULTURE: NORMAL
UROBILINOGEN, URINE: 0.2 E.U./DL
WBC # BLD: 6.9 K/UL (ref 4.8–10.8)

## 2021-10-07 PROCEDURE — 6360000002 HC RX W HCPCS: Performed by: EMERGENCY MEDICINE

## 2021-10-07 PROCEDURE — 81003 URINALYSIS AUTO W/O SCOPE: CPT

## 2021-10-07 PROCEDURE — 2580000003 HC RX 258

## 2021-10-07 PROCEDURE — 82077 ASSAY SPEC XCP UR&BREATH IA: CPT

## 2021-10-07 PROCEDURE — 36415 COLL VENOUS BLD VENIPUNCTURE: CPT

## 2021-10-07 PROCEDURE — 85025 COMPLETE CBC W/AUTO DIFF WBC: CPT

## 2021-10-07 PROCEDURE — 82550 ASSAY OF CK (CPK): CPT

## 2021-10-07 PROCEDURE — 99285 EMERGENCY DEPT VISIT HI MDM: CPT

## 2021-10-07 PROCEDURE — 2580000003 HC RX 258: Performed by: EMERGENCY MEDICINE

## 2021-10-07 PROCEDURE — 84443 ASSAY THYROID STIM HORMONE: CPT

## 2021-10-07 PROCEDURE — 96372 THER/PROPH/DIAG INJ SC/IM: CPT

## 2021-10-07 PROCEDURE — 80307 DRUG TEST PRSMV CHEM ANLYZR: CPT

## 2021-10-07 PROCEDURE — 82553 CREATINE MB FRACTION: CPT

## 2021-10-07 PROCEDURE — 1240000000 HC EMOTIONAL WELLNESS R&B

## 2021-10-07 PROCEDURE — 6370000000 HC RX 637 (ALT 250 FOR IP): Performed by: EMERGENCY MEDICINE

## 2021-10-07 PROCEDURE — 87635 SARS-COV-2 COVID-19 AMP PRB: CPT

## 2021-10-07 PROCEDURE — 80143 DRUG ASSAY ACETAMINOPHEN: CPT

## 2021-10-07 PROCEDURE — 6370000000 HC RX 637 (ALT 250 FOR IP): Performed by: PSYCHIATRY & NEUROLOGY

## 2021-10-07 PROCEDURE — 80053 COMPREHEN METABOLIC PANEL: CPT

## 2021-10-07 PROCEDURE — 80179 DRUG ASSAY SALICYLATE: CPT

## 2021-10-07 PROCEDURE — 96374 THER/PROPH/DIAG INJ IV PUSH: CPT

## 2021-10-07 RX ORDER — LORAZEPAM 2 MG/ML
1 INJECTION INTRAMUSCULAR EVERY 4 HOURS PRN
Status: DISCONTINUED | OUTPATIENT
Start: 2021-10-07 | End: 2021-10-12 | Stop reason: HOSPADM

## 2021-10-07 RX ORDER — ONDANSETRON 4 MG/1
4 TABLET, ORALLY DISINTEGRATING ORAL EVERY 6 HOURS PRN
Status: DISCONTINUED | OUTPATIENT
Start: 2021-10-07 | End: 2021-10-12 | Stop reason: HOSPADM

## 2021-10-07 RX ORDER — LORAZEPAM 2 MG/ML
3 INJECTION INTRAMUSCULAR
Status: DISCONTINUED | OUTPATIENT
Start: 2021-10-07 | End: 2021-10-12 | Stop reason: HOSPADM

## 2021-10-07 RX ORDER — LORAZEPAM 1 MG/1
4 TABLET ORAL
Status: DISCONTINUED | OUTPATIENT
Start: 2021-10-07 | End: 2021-10-12 | Stop reason: HOSPADM

## 2021-10-07 RX ORDER — LORAZEPAM 2 MG/ML
1 INJECTION INTRAMUSCULAR ONCE
Status: COMPLETED | OUTPATIENT
Start: 2021-10-07 | End: 2021-10-07

## 2021-10-07 RX ORDER — TRAZODONE HYDROCHLORIDE 50 MG/1
50 TABLET ORAL NIGHTLY PRN
Status: DISCONTINUED | OUTPATIENT
Start: 2021-10-07 | End: 2021-10-09

## 2021-10-07 RX ORDER — LORAZEPAM 2 MG/ML
2 INJECTION INTRAMUSCULAR
Status: DISCONTINUED | OUTPATIENT
Start: 2021-10-07 | End: 2021-10-12 | Stop reason: HOSPADM

## 2021-10-07 RX ORDER — LORAZEPAM 1 MG/1
2 TABLET ORAL
Status: DISCONTINUED | OUTPATIENT
Start: 2021-10-07 | End: 2021-10-12 | Stop reason: HOSPADM

## 2021-10-07 RX ORDER — HYDROXYZINE HYDROCHLORIDE 50 MG/ML
50 INJECTION, SOLUTION INTRAMUSCULAR EVERY 6 HOURS PRN
Status: DISCONTINUED | OUTPATIENT
Start: 2021-10-07 | End: 2021-10-12 | Stop reason: HOSPADM

## 2021-10-07 RX ORDER — LORAZEPAM 1 MG/1
1 TABLET ORAL EVERY 4 HOURS PRN
Status: DISCONTINUED | OUTPATIENT
Start: 2021-10-07 | End: 2021-10-12 | Stop reason: HOSPADM

## 2021-10-07 RX ORDER — 0.9 % SODIUM CHLORIDE 0.9 %
1000 INTRAVENOUS SOLUTION INTRAVENOUS ONCE
Status: COMPLETED | OUTPATIENT
Start: 2021-10-07 | End: 2021-10-07

## 2021-10-07 RX ORDER — ACETAMINOPHEN 325 MG/1
650 TABLET ORAL EVERY 4 HOURS PRN
Status: DISCONTINUED | OUTPATIENT
Start: 2021-10-07 | End: 2021-10-12 | Stop reason: HOSPADM

## 2021-10-07 RX ORDER — HALOPERIDOL 5 MG
5 TABLET ORAL EVERY 6 HOURS PRN
Status: DISCONTINUED | OUTPATIENT
Start: 2021-10-07 | End: 2021-10-12 | Stop reason: HOSPADM

## 2021-10-07 RX ORDER — HYDROXYZINE PAMOATE 50 MG/1
50 CAPSULE ORAL EVERY 6 HOURS PRN
Status: DISCONTINUED | OUTPATIENT
Start: 2021-10-07 | End: 2021-10-12 | Stop reason: HOSPADM

## 2021-10-07 RX ORDER — LORAZEPAM 1 MG/1
1 TABLET ORAL ONCE
Status: COMPLETED | OUTPATIENT
Start: 2021-10-07 | End: 2021-10-07

## 2021-10-07 RX ORDER — HALOPERIDOL 5 MG/ML
5 INJECTION INTRAMUSCULAR EVERY 6 HOURS PRN
Status: DISCONTINUED | OUTPATIENT
Start: 2021-10-07 | End: 2021-10-12 | Stop reason: HOSPADM

## 2021-10-07 RX ORDER — ZIPRASIDONE MESYLATE 20 MG/ML
20 INJECTION, POWDER, LYOPHILIZED, FOR SOLUTION INTRAMUSCULAR ONCE
Status: COMPLETED | OUTPATIENT
Start: 2021-10-07 | End: 2021-10-07

## 2021-10-07 RX ORDER — NICOTINE 21 MG/24HR
1 PATCH, TRANSDERMAL 24 HOURS TRANSDERMAL DAILY
Status: DISCONTINUED | OUTPATIENT
Start: 2021-10-07 | End: 2021-10-12 | Stop reason: HOSPADM

## 2021-10-07 RX ORDER — LORAZEPAM 0.5 MG/1
0.5 TABLET ORAL EVERY 4 HOURS PRN
Status: DISCONTINUED | OUTPATIENT
Start: 2021-10-07 | End: 2021-10-12 | Stop reason: HOSPADM

## 2021-10-07 RX ORDER — LORAZEPAM 2 MG/ML
0.5 INJECTION INTRAMUSCULAR EVERY 4 HOURS PRN
Status: DISCONTINUED | OUTPATIENT
Start: 2021-10-07 | End: 2021-10-12 | Stop reason: HOSPADM

## 2021-10-07 RX ORDER — LANOLIN ALCOHOL/MO/W.PET/CERES
100 CREAM (GRAM) TOPICAL DAILY
Status: DISCONTINUED | OUTPATIENT
Start: 2021-10-07 | End: 2021-10-12 | Stop reason: HOSPADM

## 2021-10-07 RX ORDER — MULTIVITAMIN WITH IRON
1 TABLET ORAL DAILY
Status: DISCONTINUED | OUTPATIENT
Start: 2021-10-07 | End: 2021-10-12 | Stop reason: HOSPADM

## 2021-10-07 RX ORDER — POLYETHYLENE GLYCOL 3350 17 G/17G
17 POWDER, FOR SOLUTION ORAL DAILY PRN
Status: DISCONTINUED | OUTPATIENT
Start: 2021-10-07 | End: 2021-10-12 | Stop reason: HOSPADM

## 2021-10-07 RX ORDER — LORAZEPAM 2 MG/ML
4 INJECTION INTRAMUSCULAR
Status: DISCONTINUED | OUTPATIENT
Start: 2021-10-07 | End: 2021-10-12 | Stop reason: HOSPADM

## 2021-10-07 RX ORDER — FOLIC ACID 1 MG/1
1 TABLET ORAL DAILY
Status: DISCONTINUED | OUTPATIENT
Start: 2021-10-07 | End: 2021-10-12 | Stop reason: HOSPADM

## 2021-10-07 RX ORDER — LORAZEPAM 1 MG/1
3 TABLET ORAL
Status: DISCONTINUED | OUTPATIENT
Start: 2021-10-07 | End: 2021-10-12 | Stop reason: HOSPADM

## 2021-10-07 RX ADMIN — FOLIC ACID 2 MG: 1 TABLET ORAL at 17:24

## 2021-10-07 RX ADMIN — Medication 100 MG: at 17:25

## 2021-10-07 RX ADMIN — HYDROXYZINE PAMOATE 50 MG: 50 CAPSULE ORAL at 15:20

## 2021-10-07 RX ADMIN — LORAZEPAM 1 MG: 1 TABLET ORAL at 11:02

## 2021-10-07 RX ADMIN — WATER 10 ML: 1 INJECTION INTRAMUSCULAR; INTRAVENOUS; SUBCUTANEOUS at 02:45

## 2021-10-07 RX ADMIN — HYDROXYZINE PAMOATE 50 MG: 50 CAPSULE ORAL at 21:19

## 2021-10-07 RX ADMIN — ZIPRASIDONE MESYLATE 20 MG: 20 INJECTION, POWDER, LYOPHILIZED, FOR SOLUTION INTRAMUSCULAR at 02:45

## 2021-10-07 RX ADMIN — TRAZODONE HYDROCHLORIDE 50 MG: 50 TABLET ORAL at 21:19

## 2021-10-07 RX ADMIN — LORAZEPAM 1 MG: 2 INJECTION INTRAMUSCULAR; INTRAVENOUS at 01:48

## 2021-10-07 RX ADMIN — THERA TABS 1 TABLET: TAB at 17:25

## 2021-10-07 RX ADMIN — SODIUM CHLORIDE 1000 ML: 9 INJECTION, SOLUTION INTRAVENOUS at 01:48

## 2021-10-07 ASSESSMENT — SLEEP AND FATIGUE QUESTIONNAIRES
DIFFICULTY STAYING ASLEEP: YES
DO YOU HAVE DIFFICULTY SLEEPING: YES
DIFFICULTY FALLING ASLEEP: YES
DO YOU USE A SLEEP AID: NO
RESTFUL SLEEP: NO
DIFFICULTY ARISING: NO
SLEEP PATTERN: DIFFICULTY FALLING ASLEEP;DISTURBED/INTERRUPTED SLEEP;RESTLESSNESS;NIGHTMARES/TERRORS

## 2021-10-07 ASSESSMENT — ENCOUNTER SYMPTOMS
PHOTOPHOBIA: 0
ABDOMINAL DISTENTION: 0
SHORTNESS OF BREATH: 0
VOMITING: 0
SORE THROAT: 0
ABDOMINAL PAIN: 0
EYE DISCHARGE: 0
COUGH: 0
CHEST TIGHTNESS: 0
WHEEZING: 0

## 2021-10-07 ASSESSMENT — PATIENT HEALTH QUESTIONNAIRE - PHQ9
SUM OF ALL RESPONSES TO PHQ QUESTIONS 1-9: 19
SUM OF ALL RESPONSES TO PHQ QUESTIONS 1-9: 19

## 2021-10-07 NOTE — ED NOTES
Called security  And will be there for the escort as soon as possible to transport the pt to [facility]     Estee Juarez RN  10/07/21 [id number]

## 2021-10-07 NOTE — ED NOTES
Patients cell phone fell out of bed when patient was thrashing around. Patient medicated for agitation. Patients phone given to [facility] for storage until discharge.       Sudha Gomes RN  10/07/21 [id number]
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
## 2021-10-07 NOTE — FLOWSHEET NOTE
Pt to [address] from [facility] AN AFFILIATE OF [facility]. Skin/contraband check completed by this RN and RN Dara Em. Pt familiar with unit and declined tour.

## 2021-10-07 NOTE — ED NOTES
Called staffing for a 1:1 staff for the pt. Hayley Oliveira RN for a 1:1  Staff with the pt in the ER can go to the unit with the pt.   Called security for the pt's belongings and her escort to [facility]  Per security will be a few before can escort the pt to [facility]     Judith Ruiz RN  10/07/21 2257

## 2021-10-07 NOTE — PROGRESS NOTES
1:1 , Patient resting quietly in bed with eyes closed, positioned on back. HOB  elevated approx. 35%, respirations even, unlabored.

## 2021-10-07 NOTE — ED PROVIDER NOTES
[address] [facility] ED  eMERGENCY dEPARTMENT eNCOUnter      Pt Name: Piedad Dancer  MRN: [id number]  [place] 1980  Date of evaluation: 10/7/2021  Provider: Lee Ann Shepherd MD    CHIEF COMPLAINT       Chief Complaint   Patient presents with    Alcohol Intoxication         HISTORY OF PRESENT ILLNESS   (Location/Symptom, Timing/Onset,Context/Setting, Quality, Duration, Modifying Factors, Severity)  Note limiting factors. Piedad Dancer is a 46 y.o. female who presents to the emergency department with acute alcohol intoxication and admitted alcohol abuse. Patient initially requesting rehab for alcohol abuse. Later in the assessment she does admit to feeling suicidal without a specific plan. She lives alone and a friend brought her in for evaluation. She is obviously intoxicated today. Denies illicit drug use. No physical complaints currently. She reports being sober for over a year and then relapsing now over the last few weeks    HPI    NursingNotes were reviewed. REVIEW OF SYSTEMS    (2-9 systems for level 4, 10 or more for level 5)     Review of Systems   Constitutional: Negative for chills, diaphoresis, fatigue and fever. HENT: Negative for congestion, ear pain, mouth sores and sore throat. Eyes: Negative for photophobia and discharge. Respiratory: Negative for cough, chest tightness, shortness of breath and wheezing. Cardiovascular: Negative for chest pain and palpitations. Gastrointestinal: Negative for abdominal distention, abdominal pain and vomiting. Endocrine: Negative for cold intolerance. Genitourinary: Negative for difficulty urinating. Musculoskeletal: Negative for arthralgias and gait problem. Skin: Negative for pallor and rash. Allergic/Immunologic: Negative for immunocompromised state. Neurological: Negative for dizziness and syncope. Hematological: Negative for adenopathy. Psychiatric/Behavioral: Negative for agitation and hallucinations.    All other systems reviewed and are negative. Except as noted above the remainder of the review of systems was reviewed and negative. PAST MEDICAL HISTORY     Past Medical History:   Diagnosis Date    Alcohol abuse     Alcoholic hepatitis 2/7/7141    Anxiety     Bipolar 1 disorder ([facility] [address]     Depression     Seizures (HCC)     x1 only related to alcohol     Thyroid disease          SURGICALHISTORY       Past Surgical History:   Procedure Laterality Date    BREAST ENHANCEMENT SURGERY      BREAST SURGERY      TONSILLECTOMY           CURRENT MEDICATIONS       Previous Medications    BUSPIRONE (BUSPAR) 15 MG TABLET    Take 15 mg by mouth 3 times daily    LEVOTHYROXINE (SYNTHROID) 75 MCG TABLET    Take 75 mcg by mouth Daily    MULTIPLE VITAMINS-MINERALS (MULTIVITAMIN & MINERAL PO)    Take 1 tablet by mouth daily    OXCARBAZEPINE (TRILEPTAL) 300 MG TABLET    Take 1 tablet by mouth 2 times daily    QUETIAPINE (SEROQUEL) 25 MG TABLET    Take 1 tablet by mouth 2 times daily    QUETIAPINE (SEROQUEL) 300 MG TABLET    Take 1 tablet by mouth nightly    TRAZODONE (DESYREL) 50 MG TABLET    Take 1 tablet by mouth nightly as needed for Sleep       ALLERGIES     Patient has no known allergies. FAMILY HISTORY       Family History   Problem Relation Age of Onset    Depression Mother           SOCIAL HISTORY       Social History     Socioeconomic History    Marital status:      Spouse name: Not on file    Number of children: 11    Years of education: 15    Highest education level: Not on file   Occupational History    Occupation: nurse   Tobacco Use    Smoking status: Current Every Day Smoker     Packs/day: 1.00     Years: 15.00     Pack years: 15.00     Types: Cigarettes    Smokeless tobacco: Never Used   Vaping Use    Vaping Use: Never used   Substance and Sexual Activity    Alcohol use: Yes     Comment: drinks a fifth of rum per day.  trying to stop drinking     Drug use: No    Sexual activity: Yes     Partners: Male     Comment: Mehreen Ayala one person. \"   Other Topics Concern    Not on file   Social History Narrative    Not on file     Social Determinants of Health     Financial Resource Strain:     Difficulty of Paying Living Expenses:    Food Insecurity:     Worried About Running Out of Food in the Last Year:     [address] in the Last Year:    Transportation Needs:     Lack of Transportation (Medical):  Lack of Transportation (Non-Medical):    Physical Activity:     Days of Exercise per Week:     Minutes of Exercise per Session:    Stress:     Feeling of Stress :    Social Connections:     Frequency of Communication with Friends and Family:     Frequency of Social Gatherings with Friends and Family:     Attends Restoration Services:     Active Member of Clubs or Organizations:     Attends Club or Organization Meetings:     Marital Status:    Intimate Partner Violence:     Fear of Current or Ex-Partner:     Emotionally Abused:     Physically Abused:     Sexually Abused:        SCREENINGS      @FLOW([id number])@      PHYSICAL EXAM    (up to 7 for level 4, 8 or more for level 5)     ED Triage Vitals [10/07/21 0105]   BP Temp Temp src Pulse Resp SpO2 Height Weight   124/80 98.4 °F (36.9 °C) -- 104 16 98 % 5' 2\" (1.575 m) 135 lb (61.2 kg)       Physical Exam  Vitals and nursing note reviewed. Constitutional:       Appearance: She is well-developed. HENT:      Head: Normocephalic. Right Ear: Tympanic membrane normal.      Left Ear: Tympanic membrane normal.      Nose: Nose normal.      Mouth/Throat:      Mouth: Mucous membranes are moist.   Eyes:      Conjunctiva/sclera: Conjunctivae normal.      Pupils: Pupils are equal, round, and reactive to light. Cardiovascular:      Rate and Rhythm: Normal rate and regular rhythm. Heart sounds: Normal heart sounds. Pulmonary:      Effort: Pulmonary effort is normal.      Breath sounds: Normal breath sounds.    Abdominal:      General: Bowel sounds are normal.      Palpations: Abdomen is soft. Tenderness: There is no abdominal tenderness. There is no guarding. Musculoskeletal:         General: No swelling or tenderness. Normal range of motion. Cervical back: Normal range of motion and neck supple. Skin:     General: Skin is warm and dry. Capillary Refill: Capillary refill takes less than 2 seconds. Neurological:      Mental Status: She is alert and oriented to person, place, and time. Psychiatric:         Attention and Perception: Attention normal.         Mood and Affect: Mood normal.         Speech: Speech normal.         Behavior: Behavior normal.         Thought Content: Thought content includes suicidal ideation. Thought content does not include suicidal plan. Judgment: Judgment is impulsive. DIAGNOSTIC RESULTS     EKG: All EKG's are interpreted by the Emergency Department Physician who either signs or Co-signsthis chart in the absence of a cardiologist.      RADIOLOGY:   Lucero Chars such as CT, Ultrasound and MRI are read by the radiologist. Plain radiographic images are visualized and preliminarily interpreted by the emergency physician with the below findings:    Interpretation per the Radiologist below, if available at the time ofthis note:    No orders to display         ED BEDSIDE ULTRASOUND:   Performed by ED Physician - none    LABS:  Labs Reviewed - No data to display    All other labs were within normal range or not returned as of this dictation.     EMERGENCY DEPARTMENT COURSE and DIFFERENTIAL DIAGNOSIS/MDM:   Vitals:    Vitals:    10/07/21 0105   BP: 124/80   Pulse: 104   Resp: 16   Temp: 98.4 °F (36.9 °C)   SpO2: 98%   Weight: 135 lb (61.2 kg)   Height: 5' 2\" (1.575 m)        MDM       CONSULTS:  None    PROCEDURES:  Unless otherwise noted below, none     Procedures    FINAL IMPRESSION      1. Episode of recurrent major depressive disorder, unspecified depression episode severity ([facility] [address] DISPOSITION/PLAN   DISPOSITION  admit to [facility].  Dr Gisselle Alvarado TO:  No follow-up provider specified.     DISCHARGE MEDICATIONS:  New Prescriptions    No medications on file          (Please note that portions of this note were completed with a voice recognition program.  Efforts were made to edit the dictations but occasionally words are mis-transcribed.)    Shlomo Bass MD (electronically signed)  Attending Emergency Physician          Jane Doe DO  10/07/21 6493

## 2021-10-07 NOTE — ED NOTES
Report given to Joes on [facility] regarding her alcohol level S/I no psychosis Reviewed all of her labs, medical and surgical history, her recent relapse on alcohol, had been living in [facility] and had been sober for 15 months. Reviewed current S/I no plan, pink sheet and needs a 1:1 for the floor due to risk of falls.   Room [facility], [address] [place] [address]  10/07/21 4289

## 2021-10-07 NOTE — PROGRESS NOTES
Patient medicated with Vistaril 50mg by mouth per request,@ 1520, see MAR, voiced anxiety 8/10 with 10 being the worst. Patient resting quietly in bed with eyes closed, positioned, right side. Respirations even, unlabored. 1:1  continues.

## 2021-10-07 NOTE — ED NOTES
Called staffing to see about a 1:1 for the pt and no answer called the supervisor talked with Jacqui Heath RN Nursing supervisor explained her intoxication and need a 1:1 for falls prevention  Called [facility] to alert to this pt is admitted and will need a 1:1 for her admit to the unit for prevention of falls.      Reid Mckeon RN  10/07/21 8499

## 2021-10-07 NOTE — ED NOTES
Pt is aware of her admit to [facility] and the staff staying with her in the ER are aware of her admit to [facility] and to follow with the pt to the floor.   Awaiting security for an escort to [facility]     Constantine Alvarenga RN  10/07/21 6134

## 2021-10-07 NOTE — ED NOTES
Dr. Lexy Roberto is asking if the pt can be assessed for mental health services   Pt's alcohol level is 0.149 and Dr. Lexy Roberto is requesting for her assessment to be completed by [facility].   Advised would come to her room to assess the pt     Terell Galicia RN  10/07/21 3818

## 2021-10-07 NOTE — PROGRESS NOTES
Patient did not attend group despite staff encouragement.   Electronically signed by Gudelia Powell on 10/7/2021 at 5:21 PM

## 2021-10-07 NOTE — ED NOTES
Consulted with Dr. Mc December and reviewed pt's arrival to ER her original alcohol pavel current alcohol level, no drugs, S/I with no plan, S/I for 2-3 weeks  Pt had been sober for 15 months and started drinking 2-3 weeks ago with daily drinking for the past week. Pt has no delusion, no psychosis  On [facility] in 2015 has been living at a [address]4 house in [facility] [place]   Pt did not want to be seen by [facility] staff and wanted psych services for her S/I     Hugo Quiñonez, RN  10/07/21 6724

## 2021-10-07 NOTE — ED NOTES
Talked with Dr. Martin Holley in regards to pt's admit to [facility] gave her the pt's DX: Major Depression and Dr. Ashanti Tracey admitting the pt     Vaishnavi Mcallister RN  10/07/21 3653

## 2021-10-07 NOTE — FLOWSHEET NOTE
Admission assessment completed. Pt says she does not know how she got to the ER. Last thing she remembers was being around an old boyfriend. Feels he could have brought her here. Was living and 3/4/ house and now living on her own. Relapsed on drinking alcohol and has been binge drinking x 1 week. Admits she has felt more depressed since relapse. Does not have a support system besides  community. 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 Denies SI/HI/AVH. Admits to some anxiety. Has not been eating much since relapse. Sleep has been poor due to racing thoughts and nightmares. Does not take any home medications and has not followed up for mental health treatment.

## 2021-10-08 LAB — TSH REFLEX: 1.78 UIU/ML (ref 0.44–3.86)

## 2021-10-08 PROCEDURE — 1240000000 HC EMOTIONAL WELLNESS R&B

## 2021-10-08 PROCEDURE — 99223 1ST HOSP IP/OBS HIGH 75: CPT | Performed by: PSYCHIATRY & NEUROLOGY

## 2021-10-08 PROCEDURE — 6370000000 HC RX 637 (ALT 250 FOR IP): Performed by: PSYCHIATRY & NEUROLOGY

## 2021-10-08 RX ORDER — OXCARBAZEPINE 150 MG/1
150 TABLET, FILM COATED ORAL 2 TIMES DAILY
Status: DISCONTINUED | OUTPATIENT
Start: 2021-10-08 | End: 2021-10-11

## 2021-10-08 RX ORDER — ARIPIPRAZOLE 5 MG/1
5 TABLET ORAL DAILY
Status: DISCONTINUED | OUTPATIENT
Start: 2021-10-08 | End: 2021-10-11

## 2021-10-08 RX ADMIN — HYDROXYZINE PAMOATE 50 MG: 50 CAPSULE ORAL at 09:24

## 2021-10-08 RX ADMIN — THERA TABS 1 TABLET: TAB at 08:38

## 2021-10-08 RX ADMIN — TRAZODONE HYDROCHLORIDE 50 MG: 50 TABLET ORAL at 21:17

## 2021-10-08 RX ADMIN — LORAZEPAM 0.5 MG: 0.5 TABLET ORAL at 00:10

## 2021-10-08 RX ADMIN — OXCARBAZEPINE 150 MG: 150 TABLET, FILM COATED ORAL at 11:41

## 2021-10-08 RX ADMIN — HYDROXYZINE PAMOATE 50 MG: 50 CAPSULE ORAL at 16:14

## 2021-10-08 RX ADMIN — HYDROXYZINE PAMOATE 50 MG: 50 CAPSULE ORAL at 02:58

## 2021-10-08 RX ADMIN — ARIPIPRAZOLE 5 MG: 5 TABLET ORAL at 11:40

## 2021-10-08 RX ADMIN — FOLIC ACID 1 MG: 1 TABLET ORAL at 08:38

## 2021-10-08 RX ADMIN — Medication 100 MG: at 08:38

## 2021-10-08 RX ADMIN — OXCARBAZEPINE 150 MG: 150 TABLET, FILM COATED ORAL at 21:17

## 2021-10-08 RX ADMIN — HALOPERIDOL 5 MG: 5 TABLET ORAL at 01:51

## 2021-10-08 NOTE — PROGRESS NOTES
Behavioral Services  Medicare Certification Upon Admission    I certify that this patient's inpatient psychiatric hospital admission is medically necessary for:    [x] (1) Treatment which could reasonably be expected to improve this patient's condition,       [x] (2) Or for diagnostic study;     AND     [x](2) The inpatient psychiatric services are provided while the individual is under the care of a physician and are included in the individualized plan of care.     Estimated length of stay/service 3-5 days    Plan for post-hospital care OP care    Electronically signed by Zina Ricardo MD on 10/8/2021 at 11:19 AM

## 2021-10-08 NOTE — PROGRESS NOTES
Pt. presents resting in bed. Depressed affect. Reports relapsing on ETOH after 13 months sobriety. Denies smoking marijuana or using any other drugs. Poor sleep. Has supportive AA friends and sponsor. Low motivation. Reports 10-12-21 is court hearing for  bankruptcy. Denies AH/VH and denies feeling homocidal. Feels suicidal and has past attempt. Is a LPN. Stopped meds after being dc from hospital and is non compliant. Has no idea how she arrived at the ER. Has 5 kids she sees daily who live with their dad.  Stressors include  1.trying to stay sober  2.no home life with my kids  3.work  *doing stuff with AA friends, stuff with family  Electronically signed by Chris Kennedy on 10/8/2021 at 12:19 PM

## 2021-10-08 NOTE — H&P
[address] - Department of Psychiatry    History and Physical - Adult     CHIEF COMPLAINT:  Depression SI    History obtained from:  patient    Patient was seen after discussing with the treatment team and reviewing the chart      HISTORY OF PRESENT ILLNESS:      The patient is a 46 y.o. female  homeless, living alone, employed with significant past history of bipolar disorder, alcoholism    Pt has been in multiple treatment facilities  Since 2016 over 10   place, last was at [facility] [place] was there for 11 months, I moved into to their 3/4 house in 7/21 and had been there  And started drinking 2-3 weeks ago daily. Pt has not lived alone or with anyone for years. Pt  Has a GED from [employer] in 2000. 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 Was hoping drinking alcohol will kill her slowly  No seizures recently but has a h/o w/d seizures    The patient is not currently receiving care for the above psychiatric illness.     Medications Prior to Admission:   Medications Prior to Admission: busPIRone (BUSPAR) 15 MG tablet, Take 15 mg by mouth 3 times daily (Patient taking differently: Take 7.5 mg by mouth 3 times daily )  OXcarbazepine (TRILEPTAL) 300 MG tablet, Take 1 tablet by mouth 2 times daily  traZODone (DESYREL) 50 MG tablet, Take 1 tablet by mouth nightly as needed for Sleep  QUEtiapine (SEROQUEL) 25 MG tablet, Take 1 tablet by mouth 2 times daily  QUEtiapine (SEROQUEL) 300 MG tablet, Take 1 tablet by mouth nightly  levothyroxine (SYNTHROID) 75 MCG tablet, Take 75 mcg by mouth Daily  Multiple Vitamins-Minerals (MULTIVITAMIN & MINERAL PO), Take 1 tablet by mouth daily    Compliance:no    Psychiatric Review of Systems       Depression: yes     Pyaal or Hypomania:  yes     Panic Attacks:  yes      Phobias:  no     Obsessions and Compulsions:  no     PTSD : no     Hallucinations:  no     Delusions:  no    Substance Abuse History:  ETOH: yes   Marijuana: no  Opiates: no  Other Drugs: no      Past Psychiatric History:  Prior Diagnosis:  Bipolar I disorder; borderline traits, alcohlism  Psychiatrist: no  Therapist:no  Hospitalization: yes  Hx of Suicidal Attempts: yes  Hx of violence:  no  ECT: no  Previous discontinued Psychiatric Med Trials:     Past Medical History:        Diagnosis Date    Alcohol abuse     Alcoholic hepatitis 1/8/4330    Anxiety     Bipolar 1 disorder (HCC)     Depression     Seizures (HCC)     x1 only related to alcohol     Thyroid disease        Past Surgical History:        Procedure Laterality Date    BREAST ENHANCEMENT SURGERY      BREAST SURGERY      TONSILLECTOMY         Allergies:   Patient has no known allergies. Family History  Family History   Problem Relation Age of Onset    Depression Mother          Social History:  Born and Raised: [place]  Describes Childhood:   supportive  Education: College  Employment: Employed full time  Relationships: single  Children: 5   Current Support: ex     Legal Hx: pending charges  Access to weapons?:  No      EXAMINATION:    REVIEW OF SYSTEMS:    ROS:  [x] All negative/unchanged except if checked.  Explain positive(checked items) below:  [] Constitutional  [] Eyes  [] Ear/Nose/Mouth/Throat  [] Respiratory  [] CV  [] GI  []   [] Musculoskeletal  [] Skin/Breast  [] Neurological  [] Endocrine  [] Heme/Lymph  [] Allergic/Immunologic    Explanation:     Vitals:  /60   Pulse 84   Temp 99 °F (37.2 °C) (Oral)   Resp 16   Ht 5' 2\" (1.575 m)   Wt 135 lb (61.2 kg)   LMP  (LMP Unknown)   SpO2 96%   Breastfeeding No   BMI 24.69 kg/m²      Neurologic Exam:   Muscle Strength & Tone: full ROM, normal  Gait: normal gait   Involuntary Movements: No    Mental Status Examination:    Level of consciousness:  within normal limits   Appearance:  ill-appearing  Behavior/Motor:  psychomotor retardation  Attitude toward examiner:  cooperative  Speech:  slow   Mood: constricted, decreased range and depressed  Affect:  blunted  Thought processes:  slow   Thought content:  Suicidal Ideation:  active  Delusions:  no evidence of delusions  Perceptual Disturbance:  denies any perceptual disturbance  Cognition:  oriented to person, place, and time   Concentration poor  Memory intact  Insight poor   Judgement poor   Fund of Knowledge limited    Mini Mental Status 30/30      DIAGNOSIS:     Bipolar depression   Alcohol use disorder- recent relapse       RISK ASSESSMENT:    SUICIDE RISK ASSESSMENT: high  HOMICIDE: low  AGITATION/VIOLENCE: low  ELOPEMENT: low    LABS: REVIEWED TODAY:  Recent Labs     10/07/21  0230   WBC 6.9   HGB 17.1*        Recent Labs     10/07/21  0230      K 4.1      CO2 24   BUN 10   CREATININE 0.59   GLUCOSE 81     Recent Labs     10/07/21  0230   BILITOT 0.5   ALKPHOS 73   AST 42*   ALT 35*     Lab Results   Component Value Date    LABAMPH Neg 10/07/2021    BARBSCNU Neg 10/07/2021    LABBENZ Neg 10/07/2021    LABMETH Neg 10/07/2021    OPIATESCREENURINE Neg 10/07/2021    PHENCYCLIDINESCREENURINE Neg 10/07/2021    ETOH 170 10/07/2021     Lab Results   Component Value Date    TSH 1.990 08/07/2020     No results found for: LITHIUM  Lab Results   Component Value Date    VALPROATE <7 (L) 06/04/2018     Lab Results   Component Value Date    VALPROATE <7 06/04/2018    VALPROATE <2.8 03/03/2018 FURTHER LABS ORDERED :      Radiology   No results found. EKG: TRACING REVIEWED    TREATMENT PLAN:    Risk Management:  close watch and suicide risk    Collateral Information:  Will obtain collateral information from the family or friends. Will obtain medical records as appropriate from out patient providers  Will consult the hospitalist for a physical exam to rule out any co-morbid physical condition. Home medication Reconciled       New Medications started during this admission :    See orders  Prn Haldol 5mg and Vistaril 50mg q6hr for extreme agitation. Trazodone as ordered for insomnia  Vistaril as ordered for anxiety  Discussed with the patient risk, benefit, alternative and common side effects for the  proposed medication treatment. Patient is consenting to the treatment. Psychotherapy:   Encourage participation in milieu and group therapy  Individual therapy as needed        Behavioral Services  Medicare Certification      Admission Day 1  I certify that this patient's inpatient psychiatric hospital admission is medically necessary for:     (1) treatment which could reasonably be expected to improve this patient's condition, or     (2) diagnostic study or its equivalent.        Electronically signed by Jenni Sherman MD on 10/8/2021 at 11:19 AM

## 2021-10-08 NOTE — GROUP NOTE
Group Therapy Note    Date: 10/8/2021    Group Start Time: 1630  Group End Time: 1700  Group Topic: Healthy Living/Wellness    [facility]    Mónica Mosqueda        Group Therapy Note    Attendees: 8/19         Patient's Goal:  To participate in an activity learning about various healthy living topics    Notes:  Patient minimally participated in group      Status After Intervention:  Unchanged    Participation Level: Minimal    Participation Quality: Appropriate      Speech:  mute      Thought Process/Content: Logical      Affective Functioning: Flat      Mood: anxious and depressed      Level of consciousness:  Alert      Response to Learning: Resistant      Endings: None Reported    Modes of Intervention: Education      Discipline Responsible: [address] [email or web address]      Signature:  Mónica Mosqueda

## 2021-10-08 NOTE — PROGRESS NOTES
Pt upset,states is feeling agitated because she cannot sleep,c/o racing thoughts. VSS, pt is alert and oriented, skin cool and dry to touch. Flushed. Pt stated,''when I come off etoh,this is how I feel. I dont want to go off.'' medicated with haldol 5mg po at 0151.

## 2021-10-08 NOTE — PROGRESS NOTES
Patient requesting ativan, patient says \"doctor said it is on PRN\"  This nurse said yes as we see fit. Vitals low 109/43    CIWA score #3. Told patient to try taking a shower and move around .  Told patient no ativan at this time

## 2021-10-08 NOTE — PROGRESS NOTES
Pt is starting get agitated with restless leg  Pt saying I just wanted to sleep she say I really don't want to act out .  Pt did have more medical after checking her BP

## 2021-10-08 NOTE — PROGRESS NOTES
Pt isolating to room. 1:1 , falls risk. Patient ambulates independently, standby assist, gait unsteady. Patient forgetful, requires reorientation, redirection. Patient voiced unexplained anxiety, helplessness, worthlessness, increased irritability, racing thoughts, relapse, started consuming alcohol, daily, 1-2 weeks ago. Consuming alcohol increased depression, suicidal ideations. Pt denies SI, HI and A/V hallucinations. Will continue to monitor.

## 2021-10-08 NOTE — PROGRESS NOTES
Patient was resting in bed. Pt is now awake requesting ativan. Notified pt the ativan ordered is for withdrawal. Pt reports she is restless.  Offered pt vistaril but continued to request ativan

## 2021-10-08 NOTE — PROGRESS NOTES
Pt. refused to attend the 1000 skills group, despite staff encouragement.  Electronically signed by Wally Cruz on 10/8/2021 at 12:01 PM

## 2021-10-08 NOTE — PROGRESS NOTES
Assumed care of pt at 1930,asleep and remains with sitter.  Pm assessment deferred until pt is awake

## 2021-10-08 NOTE — PROGRESS NOTES
2200 remains with sitter,falls risk  2300 remains with sitter,falls risk  2400 remains with sitter,falls risk.  Pt awake

## 2021-10-08 NOTE — PROGRESS NOTES
Pt calling for nurse stating she cannot sleep,and continues to have racing thoughts. Pt states shes been asking for food and no one is giving it to her. Explained to pt she had a snack ay 2030, she also has a sitter with her to assist with her needs. Medicated with vistaril 50mg po at 0258, and given a large snack.  Remains with sitter

## 2021-10-08 NOTE — PROGRESS NOTES
Patient reports feeling very hot. Pt has tingling in her hands and feet. No tremor noted. Pt is being monitored on the CIWA. Scored a 5. Pt received 0.5mg of ativan.

## 2021-10-08 NOTE — CONSULTS
[facility]  MEDICINE    HISTORY AND PHYSICAL EXAM    PATIENT NAME:  Renee Adhikari    MRN:  [id number]  SERVICE DATE:  10/8/2021   SERVICE TIME:  12:25 PM    Primary Care Physician: No primary care provider on file. SUBJECTIVE  CHIEF COMPLAINT:  Medically appropriate for inpatient psychiatry admission. Consult for medical H/P encounter. HPI:  This is a 46 y.o. female with PMHx of alcohol hepatitis, alcohol abuse, thyroid disorder, bipolar depression who presented to emergency room with acute alcohol intoxication requesting rehab for alcohol abuse. Also admits to suicidal ideation. She reports being sober for over a year and then relapsing now over the last few weeks. Patient medically cleared from emergency room for psychiatric care. Patient Seen, Chart, Labs, Radiologystudies, and Consults reviewed. Patient denies headache, chest pain, shortness of breath, N/V/D/C, fever/chills.        PAST MEDICAL HISTORY:    Past Medical History:   Diagnosis Date    Alcohol abuse     Alcoholic hepatitis 9/7/6250    Anxiety     Bipolar 1 disorder ([facility] [address]     Depression     Seizures (HCC)     x1 only related to alcohol     Thyroid disease      PAST SURGICAL HISTORY:    Past Surgical History:   Procedure Laterality Date    BREAST ENHANCEMENT SURGERY      BREAST SURGERY      TONSILLECTOMY       FAMILY HISTORY:    Family History   Problem Relation Age of Onset    Depression Mother      SOCIAL HISTORY:    Social History     Socioeconomic History    Marital status:      Spouse name: Not on file    Number of children: 11    Years of education: 15    Highest education level: Not on file   Occupational History    Occupation: nurse   Tobacco Use    Smoking status: Current Every Day Smoker     Packs/day: 1.00     Years: 15.00     Pack years: 15.00     Types: Cigarettes    Smokeless tobacco: Never Used   Vaping Use    Vaping Use: Some days    Substances: Nicotine, Flavoring    Devices: Pre-filled or refillable cartridge   Substance and Sexual Activity    Alcohol use: Yes     Comment: drinks a fifth of rum per day. trying to stop drinking     Drug use: No    Sexual activity: Yes     Partners: Male     Comment: Ray Montalvo one person. \"   Other Topics Concern    Not on file   Social History Narrative    Not on file     Social Determinants of Health     Financial Resource Strain:     Difficulty of Paying Living Expenses:    Food Insecurity:     Worried About Running Out of Food in the Last Year:     [address] in the Last Year:    Transportation Needs:     Lack of Transportation (Medical):      Lack of Transportation (Non-Medical):    Physical Activity:     Days of Exercise per Week:     Minutes of Exercise per Session:    Stress:     Feeling of Stress :    Social Connections:     Frequency of Communication with Friends and Family:     Frequency of Social Gatherings with Friends and Family:     Attends Taoist Services:     Active Member of Clubs or Organizations:     Attends Club or Organization Meetings:     Marital Status:    Intimate Partner Violence:     Fear of Current or Ex-Partner:     Emotionally Abused:     Physically Abused:     Sexually Abused:      MEDICATIONS:    Current Facility-Administered Medications   Medication Dose Route Frequency Provider Last Rate Last Admin    ARIPiprazole (ABILIFY) tablet 5 mg  5 mg Oral Daily Rubi Nichols MD   5 mg at 10/08/21 1140    OXcarbazepine (TRILEPTAL) tablet 150 mg  150 mg Oral BID Rubi Nichols MD   150 mg at 10/08/21 1141    acetaminophen (TYLENOL) tablet 650 mg  650 mg Oral Q4H PRN Rubi Nichols MD        polyethylene glycol (GLYCOLAX) packet 17 g  17 g Oral Daily PRN Rubi Nichols MD        nicotine (NICODERM CQ) 21 MG/24HR 1 patch  1 patch TransDERmal Daily Rubi Nichols MD   1 patch at 10/08/21 0838    LORazepam (ATIVAN) tablet 0.5 mg  0.5 mg Oral Q4H PRN Rubi Nichols MD   0.5 mg at 10/08/21 0010 Or    LORazepam (ATIVAN) injection 0.5 mg  0.5 mg IntraMUSCular Q4H PRLESLIE Conde MD        Or    LORazepam (ATIVAN) injection 1 mg  1 mg IntraMUSCular Q4H PRLESLIE Conde, MD        Or    LORazepam (ATIVAN) tablet 1 mg  1 mg Oral Q4H PRLESLIE Conde, MD        Or    LORazepam (ATIVAN) tablet 2 mg  2 mg Oral Q2H PRLESLIE Conde, MD        Or    LORazepam (ATIVAN) injection 2 mg  2 mg IntraMUSCular Q2H PRN Lamin Conde, MD        Or    LORazepam (ATIVAN) tablet 3 mg  3 mg Oral Q1H PRN Lamin Conde, MD        Or    LORazepam (ATIVAN) injection 3 mg  3 mg IntraMUSCular Q1H PRLESLIE Conde MD        Or    LORazepam (ATIVAN) tablet 4 mg  4 mg Oral Q1H PRLESLIE Conde MD        Or    LORazepam (ATIVAN) injection 4 mg  4 mg IntraMUSCular Q1H PRLESLIE Conde MD        ondansetron (ZOFRAN-ODT) disintegrating tablet 4 mg  4 mg Oral Q6H PRLESLIE Conde MD        thiamine tablet 100 mg  100 mg Oral Daily Lamin Conde MD   100 mg at 56/13/09 5734    folic acid (FOLVITE) tablet 1 mg  1 mg Oral Daily Lamin Conde MD   1 mg at 10/08/21 0838    multivitamin 1 tablet  1 tablet Oral Daily Lamin Conde MD   1 tablet at 10/08/21 0838    haloperidol lactate (HALDOL) injection 5 mg  5 mg IntraMUSCular Q6H PRLESLIE Conde MD        Or    haloperidol (HALDOL) tablet 5 mg  5 mg Oral Q6H PRLESLIE Conde MD   5 mg at 10/08/21 0151    hydrOXYzine (VISTARIL) capsule 50 mg  50 mg Oral Q6H PRN Lamin Conde MD   50 mg at 10/08/21 4352    Or    hydrOXYzine (VISTARIL) injection 50 mg  50 mg IntraMUSCular Q6H PRLESLIE Conde MD        traZODone (DESYREL) tablet 50 mg  50 mg Oral Nightly ELIZABETH Conde MD   50 mg at 10/07/21 1028    influenza quadrivalent split vaccine (FLUZONE;FLUARIX;FLULAVAL;AFLURIA) injection 0.5 mL  0.5 mL IntraMUSCular Prior to discharge Lamin Conde MD           ALLERGIES: Patient has no known allergies. REVIEW OF SYSTEM:   ROS as noted in HPI, 12 point ROS reviewed and otherwise negative. OBJECTIVE  PHYSICAL EXAM: BP (!) 109/43   Pulse 89   Temp 99 °F (37.2 °C) (Oral)   Resp 16   Ht 5' 2\" (1.575 m)   Wt 135 lb (61.2 kg)   LMP  (LMP Unknown)   SpO2 95%   Breastfeeding No   BMI 24.69 kg/m²   CONSTITUTIONAL:  awake, alert, cooperative, no apparent distress, and appears stated age  EYES:  Lids and lashes normal, pupils equal, round and reactive to light, extra ocular muscles intact, sclera clear, conjunctiva normal  ENT:  Normocephalic, without obvious abnormality, atraumatic, sinuses nontender on palpation, external ears without lesions, oral pharynx with moist mucus membranes, tonsils without erythema or exudates, gums normal and good dentition. NECK:  Supple, symmetrical, trachea midline, no adenopathy, thyroid symmetric, not enlarged and no tenderness, skin normal  LUNGS:  No increased work of breathing, good air exchange, clear to auscultation bilaterally, no crackles or wheezing  CARDIOVASCULAR:  Normal apical impulse, regular rate and rhythm, normal S1 and S2, no S3 or S4, and no murmur noted  ABDOMEN:  No scars, normal bowel sounds, soft, non-distended, non-tender, no masses palpated, no hepatosplenomegally  MUSCULOSKELETAL:  There is no redness, warmth, or swelling of the joints. Full range of motion noted. Motor strength is 5 out of 5 all extremities bilaterally. Tone is normal.  NEUROLOGIC:  Awake, alert, oriented to name, place and time. Cranial nerves II-XII are grossly intact. Motor is 5 out of 5 bilaterally. Sensory is intact.  gait is normal.  SKIN:  no bruising or bleeding, normal skin color, texture, turgor, no redness, warmth, or swelling and no jaundice    DATA:     Diagnostic tests reviewed for today's visit:    Most recent labs and imaging results reviewed.      VTE Prophylaxis:     ASSESSMENT AND PLAN  Active Problems:    Major depression, recurrent ([address]  Plan: Patient admitted to behavorial health for evaluation and treatment     Alcohol dependence: CIWA protocol, seizure precautions, fall precautions Ativan prn CIWA triggered. Multivitamin, thiamine, folate PO supplements. Chemical dependency consulted. SW on board. Hypothyroidism: On home dose Synthroid. This is only a history and physical examination and not medical management. The patient is to contact and follow up with their primary care physician and go over any abnormal labs, imaging, findings, medical concerns, or conditions that we have and have not addressed during this encounter.     Plan of care discussed with: patient    SIGNATURE: JERMAINE Aguirre CNP  DATE: October 8, 2021  TIME: 12:25 PM

## 2021-10-08 NOTE — PROGRESS NOTES
Pt. attended the 0900 community meeting.  Electronically signed by Cecily Washington on 10/8/2021 at 9:39 AM

## 2021-10-09 PROCEDURE — 1240000000 HC EMOTIONAL WELLNESS R&B

## 2021-10-09 PROCEDURE — 6370000000 HC RX 637 (ALT 250 FOR IP): Performed by: PSYCHIATRY & NEUROLOGY

## 2021-10-09 PROCEDURE — 6370000000 HC RX 637 (ALT 250 FOR IP): Performed by: NURSE PRACTITIONER

## 2021-10-09 RX ORDER — MECOBALAMIN 5000 MCG
5 TABLET,DISINTEGRATING ORAL NIGHTLY PRN
Status: DISCONTINUED | OUTPATIENT
Start: 2021-10-09 | End: 2021-10-12 | Stop reason: HOSPADM

## 2021-10-09 RX ORDER — QUETIAPINE FUMARATE 50 MG/1
50 TABLET, FILM COATED ORAL ONCE
Status: COMPLETED | OUTPATIENT
Start: 2021-10-09 | End: 2021-10-09

## 2021-10-09 RX ORDER — TRAZODONE HYDROCHLORIDE 100 MG/1
100 TABLET ORAL NIGHTLY
Status: DISCONTINUED | OUTPATIENT
Start: 2021-10-09 | End: 2021-10-12 | Stop reason: HOSPADM

## 2021-10-09 RX ADMIN — HYDROXYZINE PAMOATE 50 MG: 50 CAPSULE ORAL at 16:47

## 2021-10-09 RX ADMIN — Medication 100 MG: at 10:31

## 2021-10-09 RX ADMIN — Medication 5 MG: at 00:13

## 2021-10-09 RX ADMIN — LORAZEPAM 0.5 MG: 0.5 TABLET ORAL at 00:12

## 2021-10-09 RX ADMIN — FOLIC ACID 1 MG: 1 TABLET ORAL at 10:31

## 2021-10-09 RX ADMIN — OXCARBAZEPINE 150 MG: 150 TABLET, FILM COATED ORAL at 21:01

## 2021-10-09 RX ADMIN — HYDROXYZINE PAMOATE 50 MG: 50 CAPSULE ORAL at 10:31

## 2021-10-09 RX ADMIN — ACETAMINOPHEN 650 MG: 325 TABLET ORAL at 16:47

## 2021-10-09 RX ADMIN — Medication 5 MG: at 21:01

## 2021-10-09 RX ADMIN — OXCARBAZEPINE 150 MG: 150 TABLET, FILM COATED ORAL at 10:30

## 2021-10-09 RX ADMIN — QUETIAPINE FUMARATE 50 MG: 50 TABLET ORAL at 22:37

## 2021-10-09 RX ADMIN — ARIPIPRAZOLE 5 MG: 5 TABLET ORAL at 10:31

## 2021-10-09 RX ADMIN — TRAZODONE HYDROCHLORIDE 100 MG: 100 TABLET ORAL at 22:02

## 2021-10-09 RX ADMIN — THERA TABS 1 TABLET: TAB at 10:30

## 2021-10-09 ASSESSMENT — LIFESTYLE VARIABLES: HISTORY_ALCOHOL_USE: YES

## 2021-10-09 ASSESSMENT — PAIN SCALES - GENERAL: PAINLEVEL_OUTOF10: 5

## 2021-10-09 NOTE — PROGRESS NOTES
Pt awake on rounds,informed lpn she cannot sleep,and is feeling anxious,angry. This writer spoke with pt and feels no one is tx her sx. Reports she is having some w/d from etoh, thou is also bipolar,high anxiety, mind is constantly racing,cannot sleep. Pt is flushed, and noted diaphoresis. Reports desyrel that was given at 2117 is ineffective. Reports in past takes up to 400mg seroquel,and feels she needs to be back on this medication. Pt scored 6 on CIWA, PS NP and prn melatonin ordered. Pt was medicated with ativan 0.5mg at 0012 and melatonin 5mg at 0013.  Will monitior

## 2021-10-09 NOTE — PROGRESS NOTES
Patient did not attend wrap up group despite staff encouragement.  Electronically signed by Ben Harmon RN on 10/8/21 at 9:58 PM EDT

## 2021-10-09 NOTE — PROGRESS NOTES
Patient did not attend group despite staff encouragement.   Electronically signed by Malu Cervantes on 10/9/2021 at 5:50 PM

## 2021-10-09 NOTE — PROGRESS NOTES
Patient did not attend recreation group despite staff encouragement.  Electronically signed by Alyce Cast RN on 10/8/21 at 9:37 PM EDT

## 2021-10-09 NOTE — CARE COORDINATION
Brief Intervention and Referral to Treatment Summary    Patient was provided PHQ-9, AUDIT and DAST Screening:      PHQ-9 Score: 19  AUDIT Score:  21  DAST Score:    0    Patients substance use is considered     Low Risk/Healthy   Moderate Risk  Harmful  Dependent x (alcohol)    Patients depression is considered:     Minimal  Mild   Moderate  Moderately Severe x  Severe    Brief Education Was Provided    Patient was receptive  Patient was not receptive x      Brief Intervention Is Provided (Only for AUDIT or DAST)     Patient reports readiness to decrease and/or stop use and a plan was discussed   Patient denies readiness to decrease and/or stop use and a plan was not discussed x      Recommendations/Referrals for Brief and/or Specialized Treatment Provided to Patient   Patient reported using [facility] services in the past and does not want to work with them at this time. Writer left [facility] information for patient to read and reconsider.

## 2021-10-09 NOTE — PROGRESS NOTES
Patient did not attend wellness group despite encouragement by staff. Electronically signed by hCuck Vicente on 10/9/2021 at 3:22 PM
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
## 2021-10-09 NOTE — FLOWSHEET NOTE
Pt has been visible out on the unit and minimally social with peers. Pt c/o of anxiety rated a 6/10 and requested vistaril. Pt endorses feeling hopeless and helpless and states \" I am worried because I am supposed to be at court on the 12 for bankruptcy\". Pt denies SI,HI,AVH.

## 2021-10-09 NOTE — PROGRESS NOTES
Pt. declined to attend the 0900 community meeting, despite staff encouragement.  Electronically signed by Heladio Maciel on 10/9/2021 at 11:33 AM

## 2021-10-09 NOTE — PROGRESS NOTES
Pt is sleeping now, awakened for am meds, no tremors or sweats noted, offered and gave vistaril 50 mg po for generalized anxiety #5, pt reports she has racing thoughts at night, and was encouraged to talk with doctor about it,

## 2021-10-09 NOTE — PROGRESS NOTES
Patient has been withdrawn and isolative. She is tearful during 1:1 discussing her thoughts. She has been thinking and worrying about multiple issues including her job, housing and all those affected by her alcohol relapse. She is very tearful hopeless and helpless. She received an eviction notice despite paying her rent and wonders if it was related to her behaviors while drunk. She had worked hard and feels bad that she failed her sobriety. She just wants her life to be over for her emotional pain to stop. She denies suicidal intent or HI. She wonders if she lost her job because she was intoxicated and does not know what she did or did not do. She recognizes she is having some physical withdrawals, but report that is more manageable than her emotional self.

## 2021-10-09 NOTE — CARE COORDINATION
10/9/21 @ 10:10     Called patient's  and left a [employer] compliant voicemail to return the call. Pt has an appointment for final bankruptcy signing on 8/12/21 and would like to reschedule if she will not be discharged before then.

## 2021-10-09 NOTE — SUICIDE SAFETY PLAN
SAFETY PLAN    A suicide Safety Plan is a document that supports someone when they are having thoughts of suicide. Warning Signs that indicate a suicidal crisis may be developing: What (situations, thoughts, feelings, body sensations, behaviors, etc.) do you experience that lets you know you are beginning to think about suicide? 1. irritability  2. isolation  3. Obsessive thinking    Internal Coping Strategies:  What things can I do (relaxation techniques, hobbies, physical activities, etc.) to take my mind off my problems without contacting another person? 1. exercise  2. Watch TV      People and social settings that provide distraction: Who can I call or where can I go to distract me? 3. Place: AA            4. Place: Staying busy    People whom I can ask for help: Who can I call when I need help - for example, friends, family, clergy, someone else? 1. Name: Marci Velazquez  members    Phone:#'s in phone       Professionals or [address] I can contact during a crisis: Who can I call for help - for example, my doctor, my psychiatrist, my psychologist, a mental health provider, a suicide hotline? 1. Suicide Prevention Lifeline: 5-041-553-TALK (1770)    2. [address] [place] [address] Emergency Services -  for example, [address] [place] [address] suicide hotline, [facility] Hotline: 964  Emergency Services Address: [address] [facility]  Emergency Services Phone:[phone]    3. Call 253    Making the environment safe: How can I make my environment (house/apartment/living space) safer? For example, can I remove guns, medications, and other items? 1. Go to AA meetings  2.  Not be alone/isolation

## 2021-10-09 NOTE — GROUP NOTE
Group Therapy Note    Date: 10/9/2021    Group Start Time: 1830  Group End Time: 1900  Group Topic: Recreational    [facility]    Jesse Goodman        Group Therapy Note    Attendees: 8         Patient's Goal:  To participate in a game of headbands with peers    Notes:  Pt participated in group    Status After Intervention:  Unchanged    Participation Level:  Active Listener    Participation Quality: Appropriate and Attentive      Speech:  normal      Thought Process/Content: Logical      Affective Functioning: Congruent      Mood: euthymic      Level of consciousness:  Alert      Response to Learning: Able to verbalize current knowledge/experience      Endings: None Reported    Modes of Intervention: Activity      Discipline Responsible: Behavorial Health Tech      Signature:  Jesse Goodman

## 2021-10-10 PROCEDURE — 1240000000 HC EMOTIONAL WELLNESS R&B

## 2021-10-10 PROCEDURE — 6370000000 HC RX 637 (ALT 250 FOR IP): Performed by: PSYCHIATRY & NEUROLOGY

## 2021-10-10 RX ORDER — QUETIAPINE FUMARATE 25 MG/1
25 TABLET, FILM COATED ORAL 3 TIMES DAILY
Status: DISCONTINUED | OUTPATIENT
Start: 2021-10-10 | End: 2021-10-12 | Stop reason: HOSPADM

## 2021-10-10 RX ADMIN — Medication 100 MG: at 10:01

## 2021-10-10 RX ADMIN — FOLIC ACID 1 MG: 1 TABLET ORAL at 10:00

## 2021-10-10 RX ADMIN — QUETIAPINE FUMARATE 25 MG: 25 TABLET ORAL at 15:00

## 2021-10-10 RX ADMIN — HYDROXYZINE PAMOATE 50 MG: 50 CAPSULE ORAL at 21:19

## 2021-10-10 RX ADMIN — THERA TABS 1 TABLET: TAB at 10:01

## 2021-10-10 RX ADMIN — QUETIAPINE FUMARATE 25 MG: 25 TABLET ORAL at 10:05

## 2021-10-10 RX ADMIN — TRAZODONE HYDROCHLORIDE 100 MG: 100 TABLET ORAL at 21:19

## 2021-10-10 RX ADMIN — QUETIAPINE FUMARATE 25 MG: 25 TABLET ORAL at 21:19

## 2021-10-10 RX ADMIN — HYDROXYZINE PAMOATE 50 MG: 50 CAPSULE ORAL at 10:00

## 2021-10-10 RX ADMIN — OXCARBAZEPINE 150 MG: 150 TABLET, FILM COATED ORAL at 21:19

## 2021-10-10 RX ADMIN — ARIPIPRAZOLE 5 MG: 5 TABLET ORAL at 10:01

## 2021-10-10 RX ADMIN — ACETAMINOPHEN 650 MG: 325 TABLET ORAL at 10:01

## 2021-10-10 RX ADMIN — OXCARBAZEPINE 150 MG: 150 TABLET, FILM COATED ORAL at 10:01

## 2021-10-10 ASSESSMENT — PAIN SCALES - GENERAL: PAINLEVEL_OUTOF10: 3

## 2021-10-10 NOTE — FLOWSHEET NOTE
Pt was laying in her bed in her room awake and alert. Pt reports sleeping \"okay\" and states \"I wake a lot but then fall back to sleep right away. I think it might be from my nicotine but I don't want to take it off\". Pt also stated \" The seroquel takes the edge off\". Pt reports feeling hopeless and helpless at times and was encouraged to take the patch off at night. Pt denies SI,HI,AVH.

## 2021-10-10 NOTE — GROUP NOTE
Group Therapy Note    Date: 10/9/2021    Group Start Time: 1700  Group End Time: 1720  Group Topic: Wrap-Up    [facility]    Ashwin Arroyo        Group Therapy Note    Attendees: 7/19         Patient's Goal:  Patient did not have a goal for the day    Notes:  Pt stated she had a good day and stalked to her sponsor     Status After Intervention:  Unchanged    Participation Level:  Active Listener    Participation Quality: Appropriate      Speech:  normal      Thought Process/Content: Logical      Affective Functioning: Congruent      Mood: euthymic      Level of consciousness:  Alert      Response to Learning: Able to verbalize current knowledge/experience      Endings: None Reported    Modes of Intervention: Activity      Discipline Responsible: Behavorial Health Tech      Signature:  Ashwin Arroyo

## 2021-10-10 NOTE — PROGRESS NOTES
Department of Psychiatry  Attending Progress Note      SUBJECTIVE:  Patient is a 45 yo female who has struggled with addiction and bipolar disorder since early teenage year. Patient feels guilty for relapsing after 14 months of sobriety. Patient believes that her children will not talk to her after finding out about her relapse. Patient also reports poor sleep without seroquel. Patient feels that we are aware of her pattern stopping the medications whenever she feels better  OBJECTIVE: anxious sad, irritable when discussing her medications.      Physical  VITALS:  /60   Pulse 70   Temp 98.1 °F (36.7 °C) (Oral)   Resp 16   Ht 5' 2\" (1.575 m)   Wt 135 lb (61.2 kg)   LMP  (LMP Unknown)   SpO2 95%   Breastfeeding No   BMI 24.69 kg/m²   CONSTITUTIONAL:  awake, alert, cooperative, no apparent distress, and appears stated age  Mental Status Examination:  Level of consciousness: awake and alert  Appearance:  well-appearing  Behavior/Motor:  psychomotor agitation  Attitude toward examiner:  cooperative  Speech:  spontaneous, normal rate, normal volume and well articulated  Mood: anxious and sad  Affect:  mood congruent  Thought processes:  goal directed  Thought content:  Homocidal ideation denies  Suicidal Ideation:  passive  Delusions:  Paranoid sbout how she is perceived by her children  Perceptual Disturbance:  denies any perceptual disturbance  Cognition:  oriented to person, place, and time  Mildly distractible  Poor insight  Poor judgement  Data  Labs:    CBC with Differential:    Lab Results   Component Value Date    WBC 6.9 10/07/2021    RBC 5.52 10/07/2021    HGB 17.1 10/07/2021    HCT 49.3 10/07/2021     10/07/2021    MCV 89.3 10/07/2021    MCH 30.9 10/07/2021    MCHC 34.7 10/07/2021    RDW 13.3 10/07/2021    LYMPHOPCT 46.2 10/07/2021    MONOPCT 4.7 10/07/2021    BASOPCT 1.8 10/07/2021    MONOSABS 0.3 10/07/2021    LYMPHSABS 3.2 10/07/2021    EOSABS 0.1 10/07/2021    BASOSABS 0.1 10/07/2021 Medications  Current Facility-Administered Medications: melatonin disintegrating tablet 5 mg, 5 mg, Oral, Nightly PRN  traZODone (DESYREL) tablet 100 mg, 100 mg, Oral, Nightly  ARIPiprazole (ABILIFY) tablet 5 mg, 5 mg, Oral, Daily  OXcarbazepine (TRILEPTAL) tablet 150 mg, 150 mg, Oral, BID  acetaminophen (TYLENOL) tablet 650 mg, 650 mg, Oral, Q4H PRN  polyethylene glycol (GLYCOLAX) packet 17 g, 17 g, Oral, Daily PRN  nicotine (NICODERM CQ) 21 MG/24HR 1 patch, 1 patch, TransDERmal, Daily  LORazepam (ATIVAN) tablet 0.5 mg, 0.5 mg, Oral, Q4H PRN **OR** LORazepam (ATIVAN) injection 0.5 mg, 0.5 mg, IntraMUSCular, Q4H PRN **OR** LORazepam (ATIVAN) injection 1 mg, 1 mg, IntraMUSCular, Q4H PRN **OR** LORazepam (ATIVAN) tablet 1 mg, 1 mg, Oral, Q4H PRN **OR** LORazepam (ATIVAN) tablet 2 mg, 2 mg, Oral, Q2H PRN **OR** LORazepam (ATIVAN) injection 2 mg, 2 mg, IntraMUSCular, Q2H PRN **OR** LORazepam (ATIVAN) tablet 3 mg, 3 mg, Oral, Q1H PRN **OR** LORazepam (ATIVAN) injection 3 mg, 3 mg, IntraMUSCular, Q1H PRN **OR** LORazepam (ATIVAN) tablet 4 mg, 4 mg, Oral, Q1H PRN **OR** LORazepam (ATIVAN) injection 4 mg, 4 mg, IntraMUSCular, Q1H PRN  ondansetron (ZOFRAN-ODT) disintegrating tablet 4 mg, 4 mg, Oral, Q6H PRN  thiamine tablet 100 mg, 100 mg, Oral, Daily  folic acid (FOLVITE) tablet 1 mg, 1 mg, Oral, Daily  multivitamin 1 tablet, 1 tablet, Oral, Daily  haloperidol lactate (HALDOL) injection 5 mg, 5 mg, IntraMUSCular, Q6H PRN **OR** haloperidol (HALDOL) tablet 5 mg, 5 mg, Oral, Q6H PRN  hydrOXYzine (VISTARIL) capsule 50 mg, 50 mg, Oral, Q6H PRN **OR** hydrOXYzine (VISTARIL) injection 50 mg, 50 mg, IntraMUSCular, Q6H PRN  influenza quadrivalent split vaccine (FLUZONE;FLUARIX;FLULAVAL;AFLURIA) injection 0.5 mL, 0.5 mL, IntraMUSCular, Prior to discharge    ASSESSMENT AND PLAN    dxL bipolar depressed  Alcohol use disorder  Plan: increaae trazodone to 100 mg. Mary Campos MD

## 2021-10-10 NOTE — GROUP NOTE
Group Therapy Note    Date: 10/10/2021    Group Start Time: 1600  Group End Time: 5446  Group Topic: Healthy Living/Wellness    [facility]    Belkis Tee LPN        Group Therapy Note    Attendees: 14/22         Patient's Goal: 12 step/ coping skills    Status After Intervention:  Unchanged    Participation Level:  Active Listener    Participation Quality: Appropriate      Speech:  normal      Thought Process/Content: Logical      Affective Functioning: Congruent      Mood: euthymic      Level of consciousness:  Oriented x4      Response to Learning: Able to verbalize current knowledge/experience      Endings: None Reported    Modes of Intervention: Education      Discipline Responsible: Licensed Practical Nurse      Signature:  Belkis Tee LPN

## 2021-10-10 NOTE — PROGRESS NOTES
Pt upset in dayroom with loud and irritable tone. Pt unhappy d/t current medication regimen and states we are doing nothing for her during this hospital admission. Pt states her typical medication regimen she receives during previous admissions works for her and pt does not understand why she has been put on different medications. Pt c/o extreme anxiety and irritability. /76, P 73. Pt does not show any s/sx of w/d. Pt c/o insomnia and states this is a primary symptom of w/d for her. Pt requesting vistaril although it is too early. Pt dismisses this writer when offered pt alternatives for distractions and encouraged pt to utilize different coping skills. Pt becomes almost enraged that nurses and physicians are not doing enough for her. Call placed to Dr. Shakir Alamo. New orders received. Pt accepting of 1 x dose of Seroquel 50 mg PO @ 2237. Pt given time to express feelings. Advised pt to let staff know if she is still unable to sleep. Will continue to monitor.

## 2021-10-10 NOTE — GROUP NOTE
Group Therapy Note    Date: 10/10/2021    Group Start Time: 1100  Group End Time: 8148  Group Topic: Group Therapy    [facility]    PATRICIA Costello        Group Therapy Note    Attendees: 12         Patient's Goal:  To participate in a goal oriented group. Notes:  Patient stated he goal is to recover from her relapse after being clean and sober for 15 months. Status After Intervention:  Improved    Participation Level: Active Listener    Participation Quality: Appropriate      Speech:  normal      Thought Process/Content: Logical      Affective Functioning: Congruent      Mood: elevated      Level of consciousness:  Alert      Response to Learning: Able to verbalize current knowledge/experience      Endings: None Reported    Modes of Intervention: Education      Discipline Responsible: /Counselor      Signature:   PATRICIA Costello

## 2021-10-10 NOTE — GROUP NOTE
Group Therapy Note    Date: 10/10/2021    Group Start Time: 1800  Group End Time: 1845  Group Topic: Recreational    [facility]    Severiano Stoll RN        Group Therapy Note    Attendees: 16/23       Patient's Goal: positive interaction with others     Status After Intervention:  Unchanged    Participation Level:  Active Listener    Participation Quality: Appropriate      Speech:  normal      Thought Process/Content: Logical      Affective Functioning: Congruent      Mood: euthymic      Level of consciousness:  Oriented x4      Response to Learning: Able to verbalize/acknowledge new learning      Endings: None Reported    Modes of Intervention: Socialization      Discipline Responsible: Registered Nurse      Signature:  Severiano Stoll RN

## 2021-10-10 NOTE — PROGRESS NOTES
Patient spent more time out of her room but generally sitting alone. She is participating in groups and making calls that are important to her. When asked if she had any suicidal thoughts her response was  \"not really\". She is very overwhelmed with life and expresses feelings of guilt and regret for not reaching out to her sponsor prior to her relapse. Her children, work and her multiple stressors tipped her. She also spoke of being demanding and going off with nurse last evening when she did not get the medications she expected. Flat, depressed affect. Showered.

## 2021-10-10 NOTE — PROGRESS NOTES
Pt. refused to attend the 1000 skills group, despite staff encouragement.  Electronically signed by Judy Borges on 10/10/2021 at 11:22 AM

## 2021-10-10 NOTE — CARE COORDINATION
FAMILY COLLATERAL NOTE    Family/Support Name: Zina Travis #:[phone]  Relationship to Pt[de-identified] brother        Family/Support contact aware of hospitalization: Yes    Presenting Symptoms/Current Concerns:  Admitted to ER from relasping from alcohol. Top 3 Life Stressors:   Alcohol  Failed marriage and no longer having the same relationship with her children. 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 Background History Relevant to Current Hospitalization:  Patients marriage didn't work out and she no longer has the same relationship with her children. Family Mental Health/Substance Use History: Many members of the family do drink, but they take care of their responsibilites and his siser can't do that. Support Network's Goal for Hospitalization:   Priotize what is important to her. Discharge Plan:   Return to collaterals appartment, [facility]. Support Network Supportive of Discharge Plan:   Attend AA meetings for support. Support can confirm Safety of Location and Security of Weapons:   Collateral can confirm that there are no weapons in the home. Support agreeable to Safeguard and Monitor Medications (including Prescription and OTC): Yes    Identified Barriers to Compliance with Discharge Plan: None reported. Recommendations for Support Network: Attend AA meeting and meet regularly with Marci mayes.     .Electronically signed by Iqra Max [address] on 10/10/2021 at 1:01 PM      [address]

## 2021-10-10 NOTE — PLAN OF CARE
Problem: Depressive Behavior With or Without Suicide Precautions:  Goal: Absence of self-harm  Description: Absence of self-harm  10/10/2021 0110 by Mikie Coleman RN  Outcome: Met This Shift  Note: No evidence of self harm observed or reported. 10/9/2021 1413 by JERMAINE Andrade  Outcome: Met This Shift     Problem: Substance Abuse:  Goal: Absence of drug withdrawal signs and symptoms  Description: Absence of drug withdrawal signs and symptoms  10/10/2021 0110 by Mikie Coleman RN  Outcome: Met This Shift  Note: No s/sx of withdrawal observed. 10/9/2021 1413 by JERMAINE Andrade  Outcome: Ongoing     Problem: Falls - Risk of:  Goal: Will remain free from falls  Description: Will remain free from falls  10/10/2021 0110 by Mikie Coleman RN  Outcome: Met This Shift  10/9/2021 1413 by JERMAINE Andrade  Outcome: Met This Shift  Goal: Absence of physical injury  Description: Absence of physical injury  10/10/2021 0110 by Mikie Coleman RN  Outcome: Met This Shift  10/9/2021 1413 by JERMAINE Andrade  Outcome: Met This Shift     Problem: Depressive Behavior With or Without Suicide Precautions:  Goal: Able to verbalize and/or display a decrease in depressive symptoms  Description: Able to verbalize and/or display a decrease in depressive symptoms  10/10/2021 0110 by Mikie Coleman RN  Outcome: Ongoing  Note: Pt continues to report depression r/t current situation and relapse on alcohol.    10/9/2021 1413 by JERMAINE Andrade  Outcome: Ongoing

## 2021-10-10 NOTE — PROGRESS NOTES
Pt. attended the 0900 community meeting.  Electronically signed by Araceli Valdez on 10/10/2021 at 9:49 AM

## 2021-10-11 PROBLEM — F33.9 MAJOR DEPRESSION, RECURRENT (HCC): Status: RESOLVED | Noted: 2021-10-07 | Resolved: 2021-10-11

## 2021-10-11 PROBLEM — F10.20 ALCOHOL DEPENDENCE (HCC): Status: ACTIVE | Noted: 2018-03-08

## 2021-10-11 PROCEDURE — 99232 SBSQ HOSP IP/OBS MODERATE 35: CPT | Performed by: PSYCHIATRY & NEUROLOGY

## 2021-10-11 PROCEDURE — 6370000000 HC RX 637 (ALT 250 FOR IP): Performed by: PSYCHIATRY & NEUROLOGY

## 2021-10-11 PROCEDURE — 1240000000 HC EMOTIONAL WELLNESS R&B

## 2021-10-11 PROCEDURE — 90833 PSYTX W PT W E/M 30 MIN: CPT | Performed by: PSYCHIATRY & NEUROLOGY

## 2021-10-11 RX ORDER — OXCARBAZEPINE 300 MG/1
300 TABLET, FILM COATED ORAL 2 TIMES DAILY
Status: DISCONTINUED | OUTPATIENT
Start: 2021-10-11 | End: 2021-10-12 | Stop reason: HOSPADM

## 2021-10-11 RX ADMIN — QUETIAPINE FUMARATE 25 MG: 25 TABLET ORAL at 13:49

## 2021-10-11 RX ADMIN — THERA TABS 1 TABLET: TAB at 08:51

## 2021-10-11 RX ADMIN — QUETIAPINE FUMARATE 25 MG: 25 TABLET ORAL at 08:51

## 2021-10-11 RX ADMIN — OXCARBAZEPINE 300 MG: 300 TABLET, FILM COATED ORAL at 21:21

## 2021-10-11 RX ADMIN — FOLIC ACID 1 MG: 1 TABLET ORAL at 08:51

## 2021-10-11 RX ADMIN — OXCARBAZEPINE 150 MG: 150 TABLET, FILM COATED ORAL at 08:51

## 2021-10-11 RX ADMIN — QUETIAPINE FUMARATE 25 MG: 25 TABLET ORAL at 21:21

## 2021-10-11 RX ADMIN — TRAZODONE HYDROCHLORIDE 100 MG: 100 TABLET ORAL at 21:21

## 2021-10-11 RX ADMIN — Medication 100 MG: at 08:51

## 2021-10-11 RX ADMIN — ARIPIPRAZOLE 5 MG: 5 TABLET ORAL at 08:51

## 2021-10-11 NOTE — PROGRESS NOTES
Pt. declined to attend the 0900 community meeting, despite staff encouragement. Electronically signed by Shira Frisamuel, [address] on 10/11/2021 at 9:58 AM

## 2021-10-11 NOTE — GROUP NOTE
Group Therapy Note    Date: 10/10/2021    Group Start Time: 2100  Group End Time: 2130  Group Topic: Wrap-Up    [facility]    Yanci Harman RN; JERMAINE Cm - CNS    To participate in wrap up group, state their goal and whether or not their goal was met. Also to state something good that happened today. Group Therapy Note    Attendees: 21/23         Patient's Goal:  To stay sober and plan to talk to the doctor tomorrow. Notes:  Pt reports accomplishing step work for sponsor today. Status After Intervention:  Improved    Participation Level:  Active Listener and Interactive    Participation Quality: Appropriate      Speech:  normal      Thought Process/Content: Logical      Affective Functioning: Congruent      Mood: euthymic      Level of consciousness:  Alert and Oriented x4      Response to Learning: Progressing to goal      Endings: None Reported    Modes of Intervention: Support      Discipline Responsible: Registered Nurse      Signature:  Yanci Harman RN

## 2021-10-11 NOTE — PROGRESS NOTES
Pt out on unit, but not social with peers. Preoccupied with missing court hearing tomorrow regarding bankruptcy. Pt reports showering today. Pt presents with clean and well kept appearance. Pt reports good appetite. Pt reports good sleep. Pt reports poor sleep, due to trouble falling asleep and staying asleep. Pt denies SI, HI and A/V hallucinations. No voiced delusions at this time. Pt alert and oriented x 4. Pt is focused on discharge and is anxious to get back to work and deal with her bankruptcy hearing.

## 2021-10-11 NOTE — GROUP NOTE
Group Therapy Note    Date: 10/11/2021    Group Start Time: 6203  Group End Time: 4996  Group Topic: Healthy Living/Wellness    [facility]    Colleen Schuster        Group Therapy Note    Attendees: 14/23         Patient's Goal:  To learn about and practice healthy coping skills. Notes:  Patient participated in group. Status After Intervention:  Unchanged    Participation Level:  Active Listener    Participation Quality: Appropriate and Attentive      Speech:  normal      Thought Process/Content: Logical      Affective Functioning: Congruent      Mood: euthymic      Level of consciousness:  Alert and Attentive      Response to Learning: Progressing to goal      Endings: None Reported    Modes of Intervention: Education      Discipline Responsible: [address], [employer]      Signature:  Colleen Schuster

## 2021-10-11 NOTE — PROGRESS NOTES
Department of Psychiatry  Attending Progress Note      SUBJECTIVE:  Patient is a 47 yo female who has struggled with addiction and bipolar disorder since early teenage year. ptient reports severe anxiety about her children not accepting her, about her career, about her relapse. PAtient to being alone which she says gets to her. . Patient reports feeling bad about relapse, states her sponsor think she is dependent on [address]: tired and anxious appearing  Physical  VITALS:  /66   Pulse 80   Temp 98.5 °F (36.9 °C) (Oral)   Resp 16   Ht 5' 2\" (1.575 m)   Wt 135 lb (61.2 kg)   LMP  (LMP Unknown)   SpO2 99%   Breastfeeding No   BMI 24.69 kg/m²   CONSTITUTIONAL:  awake, alert, cooperative, no apparent distress, and appears stated age  Mental Status Examination: no change   Level of consciousness: awake and alert  Appearance:  Anxious appearing  Behavior/Motor:  psychomotor agitation  Attitude toward examiner:  cooperative  Speech:  spontaneous, normal rate, normal volume and well articulated  Mood: anxious and sad  Affect:  mood congruent  Thought processes:  goal directed  Thought content:  Homocidal ideation denies  Suicidal Ideation:  passive  Delusions:  Paranoid sbout how she is perceived by her children  Perceptual Disturbance:  denies any perceptual disturbance  Cognition:  oriented to person, place, and time  Mildly distractible  Poor insight  Poor judgement  Data  Labs:    CBC with Differential:    Lab Results   Component Value Date    WBC 6.9 10/07/2021    RBC 5.52 10/07/2021    HGB 17.1 10/07/2021    HCT 49.3 10/07/2021     10/07/2021    MCV 89.3 10/07/2021    MCH 30.9 10/07/2021    MCHC 34.7 10/07/2021    RDW 13.3 10/07/2021    LYMPHOPCT 46.2 10/07/2021    MONOPCT 4.7 10/07/2021    BASOPCT 1.8 10/07/2021    MONOSABS 0.3 10/07/2021    LYMPHSABS 3.2 10/07/2021    EOSABS 0.1 10/07/2021    BASOSABS 0.1 10/07/2021       Medications  Current Facility-Administered Medications: QUEtiapine (SEROQUEL) tablet 25 mg, 25 mg, Oral, TID  melatonin disintegrating tablet 5 mg, 5 mg, Oral, Nightly PRN  traZODone (DESYREL) tablet 100 mg, 100 mg, Oral, Nightly  ARIPiprazole (ABILIFY) tablet 5 mg, 5 mg, Oral, Daily  OXcarbazepine (TRILEPTAL) tablet 150 mg, 150 mg, Oral, BID  acetaminophen (TYLENOL) tablet 650 mg, 650 mg, Oral, Q4H PRN  polyethylene glycol (GLYCOLAX) packet 17 g, 17 g, Oral, Daily PRN  nicotine (NICODERM CQ) 21 MG/24HR 1 patch, 1 patch, TransDERmal, Daily  LORazepam (ATIVAN) tablet 0.5 mg, 0.5 mg, Oral, Q4H PRN **OR** LORazepam (ATIVAN) injection 0.5 mg, 0.5 mg, IntraMUSCular, Q4H PRN **OR** LORazepam (ATIVAN) injection 1 mg, 1 mg, IntraMUSCular, Q4H PRN **OR** LORazepam (ATIVAN) tablet 1 mg, 1 mg, Oral, Q4H PRN **OR** LORazepam (ATIVAN) tablet 2 mg, 2 mg, Oral, Q2H PRN **OR** LORazepam (ATIVAN) injection 2 mg, 2 mg, IntraMUSCular, Q2H PRN **OR** LORazepam (ATIVAN) tablet 3 mg, 3 mg, Oral, Q1H PRN **OR** LORazepam (ATIVAN) injection 3 mg, 3 mg, IntraMUSCular, Q1H PRN **OR** LORazepam (ATIVAN) tablet 4 mg, 4 mg, Oral, Q1H PRN **OR** LORazepam (ATIVAN) injection 4 mg, 4 mg, IntraMUSCular, Q1H PRN  ondansetron (ZOFRAN-ODT) disintegrating tablet 4 mg, 4 mg, Oral, Q6H PRN  thiamine tablet 100 mg, 100 mg, Oral, Daily  folic acid (FOLVITE) tablet 1 mg, 1 mg, Oral, Daily  multivitamin 1 tablet, 1 tablet, Oral, Daily  haloperidol lactate (HALDOL) injection 5 mg, 5 mg, IntraMUSCular, Q6H PRN **OR** haloperidol (HALDOL) tablet 5 mg, 5 mg, Oral, Q6H PRN  hydrOXYzine (VISTARIL) capsule 50 mg, 50 mg, Oral, Q6H PRN **OR** hydrOXYzine (VISTARIL) injection 50 mg, 50 mg, IntraMUSCular, Q6H PRN  influenza quadrivalent split vaccine (FLUZONE;FLUARIX;FLULAVAL;AFLURIA) injection 0.5 mL, 0.5 mL, IntraMUSCular, Prior to discharge    ASSESSMENT AND PLAN    dxL bipolar depressed  Alcohol use disorder  Plan:add small dose seroquel through the day. Ambar Lord MD

## 2021-10-11 NOTE — CARE COORDINATION
Group Therapy Note    Date: 10/11/2021  Start Time: 1400  End Time: 1440    Number of Participants: 9    Type of Group: Cognitive Skills    Patient's Goal:  To participate in mood management group. Notes: Patient declined to attend psychoeducation group at 1400 despite encouragement by staff.      Discipline Responsible: /Counselor    PATRICIA Sal

## 2021-10-11 NOTE — PLAN OF CARE
Problem: Depressive Behavior With or Without Suicide Precautions:  Goal: Able to verbalize and/or display a decrease in depressive symptoms  Description: Able to verbalize and/or display a decrease in depressive symptoms  10/11/2021 0221 by Stephany Gillis RN  Outcome: Met This Shift  Note: Pt reports overall feeling much improved today. 10/10/2021 1353 by JERMAINE Poon  Outcome: Ongoing  Goal: Absence of self-harm  Description: Absence of self-harm  10/11/2021 0221 by Stephany Gillis RN  Outcome: Met This Shift  Note: No evidence of self harm observed or reported. 10/10/2021 1353 by JERMAINE Poon  Outcome: Ongoing     Problem: Substance Abuse:  Goal: Absence of drug withdrawal signs and symptoms  Description: Absence of drug withdrawal signs and symptoms  10/11/2021 0221 by Stephany Gillis RN  Outcome: Met This Shift  Note: No s/sx of w/d observed/ reported.    10/10/2021 1353 by JERMAINE Poon  Outcome: Ongoing     Problem: Falls - Risk of:  Goal: Will remain free from falls  Description: Will remain free from falls  10/11/2021 0221 by Stephany Gillis RN  Outcome: Met This Shift  10/10/2021 1353 by JERMAINE Poon  Outcome: Ongoing  Goal: Absence of physical injury  Description: Absence of physical injury  10/11/2021 0221 by Stephany Gillis RN  Outcome: Met This Shift  10/10/2021 1353 by JERMAINE Poon  Outcome: Ongoing

## 2021-10-11 NOTE — PROGRESS NOTES
[facility]. FOLLOW-UP NOTE       10/11/2021     Patient was seen and examined in person, Chart reviewed   Patient's case discussed with staff/team    Chief Complaint: depression SI    Interim History:     Pt report that she relapsed 2 weeks ago with alcohol which destabilized er mood  Pt did not seek the help of sponsor or rehab.   Pt has a mandatory court hearing tomorrow at 11.30 AM for bankruptcy  Pt continue to work at [employer] alone, having her kids involved with her life - has 5 children 2 of them are under 25, live with their dad  Has been seeing them more often recently   Was [facility] [address]  [facility]  Do not have a pyshciatrist    Appetite:   [x] Normal/Unchanged  [] Increased  [] Decreased      Sleep:       [] Normal/Unchanged  [x] Fair       [] Poor              Energy:    [x] Normal/Unchanged  [] Increased  [] Decreased        SI [] Present  [x] Absent    HI  []Present  [x] Absent     Aggression:  [] yes  [x] no    Patient is [x] able  [] unable to CONTRACT FOR SAFETY     PAST MEDICAL/PSYCHIATRIC HISTORY:   Past Medical History:   Diagnosis Date    Alcohol abuse     Alcoholic hepatitis 8/7/9834    Anxiety     Bipolar 1 disorder ([facility]     Depression     [facility]     x1 only related to alcohol     Thyroid disease        FAMILY/SOCIAL HISTORY:  Family History   Problem Relation Age of Onset    Depression Mother      Social History     Socioeconomic History    Marital status:      Spouse name: Not on file    Number of children: 11    Years of education: 15    Highest education level: Not on file   Occupational History    Occupation: nurse   Tobacco Use    Smoking status: Current Every Day Smoker     Packs/day: 1.00     Years: 15.00     Pack years: 15.00     Types: Cigarettes    Smokeless tobacco: Never Used   Vaping Use    Vaping Use: Some days    Substances: Nicotine, Flavoring    Devices: Pre-filled or (ABILIFY) tablet 5 mg, 5 mg, Oral, Daily, Leah Butt MD, 5 mg at 10/11/21 0851    OXcarbazepine (TRILEPTAL) tablet 150 mg, 150 mg, Oral, BID, Leah Butt MD, 150 mg at 10/11/21 0851    acetaminophen (TYLENOL) tablet 650 mg, 650 mg, Oral, Q4H PRN, Leah Butt MD, 650 mg at 10/10/21 1001    polyethylene glycol (GLYCOLAX) packet 17 g, 17 g, Oral, Daily PRN, Leah Butt MD    nicotine (NICODERM CQ) 21 MG/24HR 1 patch, 1 patch, TransDERmal, Daily, Leah Butt MD, 1 patch at 10/11/21 0855    LORazepam (ATIVAN) tablet 0.5 mg, 0.5 mg, Oral, Q4H PRN, 0.5 mg at 10/09/21 0012 **OR** LORazepam (ATIVAN) injection 0.5 mg, 0.5 mg, IntraMUSCular, Q4H PRN **OR** LORazepam (ATIVAN) injection 1 mg, 1 mg, IntraMUSCular, Q4H PRN **OR** LORazepam (ATIVAN) tablet 1 mg, 1 mg, Oral, Q4H PRN **OR** LORazepam (ATIVAN) tablet 2 mg, 2 mg, Oral, Q2H PRN **OR** LORazepam (ATIVAN) injection 2 mg, 2 mg, IntraMUSCular, Q2H PRN **OR** LORazepam (ATIVAN) tablet 3 mg, 3 mg, Oral, Q1H PRN **OR** LORazepam (ATIVAN) injection 3 mg, 3 mg, IntraMUSCular, Q1H PRN **OR** LORazepam (ATIVAN) tablet 4 mg, 4 mg, Oral, Q1H PRN **OR** LORazepam (ATIVAN) injection 4 mg, 4 mg, IntraMUSCular, Q1H PRN, Leah Butt MD    ondansetron (ZOFRAN-ODT) disintegrating tablet 4 mg, 4 mg, Oral, Q6H PRN, Leha Butt MD    thiamine tablet 100 mg, 100 mg, Oral, Daily, Leah Butt MD, 100 mg at 08/99/61 0152    folic acid (FOLVITE) tablet 1 mg, 1 mg, Oral, Daily, Leah Butt MD, 1 mg at 10/11/21 0851    multivitamin 1 tablet, 1 tablet, Oral, Daily, Leah Butt MD, 1 tablet at 10/11/21 0851    haloperidol lactate (HALDOL) injection 5 mg, 5 mg, IntraMUSCular, Q6H PRN **OR** haloperidol (HALDOL) tablet 5 mg, 5 mg, Oral, Q6H PRN, Leah Butt MD, 5 mg at 10/08/21 0151    hydrOXYzine (VISTARIL) capsule 50 mg, 50 mg, Oral, Q6H PRN, 50 mg at 10/10/21 2119 **OR** hydrOXYzine (VISTARIL) injection 50 mg, 50 mg, IntraMUSCular, Q6H PRN, Tyrone Rizo MD    influenza quadrivalent split vaccine (FLUZONE;FLUARIX;FLULAVAL;AFLURIA) injection 0.5 mL, 0.5 mL, IntraMUSCular, Prior to discharge, Tyrone Rizo MD      Examination:  BP (!) 91/59   Pulse 84   Temp 98.1 °F (36.7 °C) (Oral)   Resp 16   Ht 5' 2\" (1.575 m)   Wt 135 lb (61.2 kg)   LMP  (LMP Unknown)   SpO2 99%   Breastfeeding No   BMI 24.69 kg/m²   Gait - steady  Medication side effects(SE): no    Mental Status Examination:    Level of consciousness:  within normal limits   Appearance:  fair grooming and fair hygiene  Behavior/Motor:  no abnormalities noted  Attitude toward examiner:  cooperative  Speech:  slow   Mood:less depressed  Affect:  mood congruent  Thought processes:  linear   Thought content:  Suicidal Ideation:  denies suicidal ideation  Cognition:  oriented to person, place, and time   Concentration distractible  Insight fair   Judgement fair     ASSESSMENT:   Patient symptoms are:  [] Well controlled  [x] Improving  [] Worsening  [] No change      Diagnosis:   Principal Problem:    Bipolar depression ([facility]  Active Problems:    Alcohol dependence ([facility]  Resolved Problems:    Major depression, recurrent [facility]      LABS:    No results for input(s): WBC, HGB, PLT in the last 72 hours. No results for input(s): NA, K, CL, CO2, BUN, CREATININE, GLUCOSE in the last 72 hours. No results for input(s): BILITOT, ALKPHOS, AST, ALT in the last 72 hours. Lab Results   Component Value Date    LABAMPH Neg 10/07/2021    BARBSCNU Neg 10/07/2021    LABBENZ Neg 10/07/2021    LABMETH Neg 10/07/2021    OPIATESCREENURINE Neg 10/07/2021    PHENCYCLIDINESCREENURINE Neg 10/07/2021    ETOH 170 10/07/2021     Lab Results   Component Value Date    TSH 1.990 08/07/2020     No results found for: LITHIUM  Lab Results   Component Value Date    VALPROATE <7 (L) 06/04/2018       Treatment Plan:  Reviewed current Medications with the patient.    D/C abilify  Increase trileptal  CD rehab to be restarted- OP program  Risks, benefits, side effects, drug-to-drug interactions and alternatives to treatment were discussed. Collateral information:   Encourage patient to attend group and other milieu activities. Discharge planning discussed with the patient and treatment team.    PSYCHOTHERAPY/COUNSELING:  [x] Therapeutic interview  [x] Supportive  [] CBT  [] Ongoing  [] Other  Patient was seen 1:1 for 20 minutes, other than E&M time spent, focusing on      - coping skills techniques     - Anxiety management techniques discussed including deep breathing exercise and PMR     - discussing patients strength and weakness      - Motivational interviewing to assess the stage of change and assessing patient readiness to quit substance use.      - Focusing on negative cognition and maladaptive thoughts, which is feeding and maintaining the depression symptoms         [x] Patient continues to need, on a daily basis, active treatment furnished directly by or requiring the supervision of inpatient psychiatric personnel      Anticipated Length of stay:tomorrow AM            Electronically signed by Alfonso Schafer MD on 10/11/2021 at 10:54 AM

## 2021-10-11 NOTE — GROUP NOTE
Group Therapy Note    Date: 10/11/2021    Group Start Time: 1000  Group End Time: 1050  Group Topic: Psychoeducation    [facility]    Masoud Martinis        Group Therapy Note    Attendees: 13         Patient's Goal:  \"Work on going home\"    Notes:  Patient attended the 1000 skills group. She was quiet and kept to herself. She work fairly on her task.     Status After Intervention:  Unchanged    Participation Level: Fair    Participation Quality: Appropriate      Speech:  quiet      Thought Process/Content: Linear      Affective Functioning: Flat      Mood: calm      Level of consciousness:  Alert      Response to Learning: Progressing to goal      Endings: None Reported    Modes of Intervention: Education, Socialization and Activity      Discipline Responsible: Psychoeducational Specialist      Signature:  Masoud Hammond

## 2021-10-12 VITALS
HEART RATE: 73 BPM | BODY MASS INDEX: 24.84 KG/M2 | DIASTOLIC BLOOD PRESSURE: 52 MMHG | OXYGEN SATURATION: 96 % | WEIGHT: 135 LBS | SYSTOLIC BLOOD PRESSURE: 92 MMHG | TEMPERATURE: 97.8 F | HEIGHT: 62 IN | RESPIRATION RATE: 20 BRPM

## 2021-10-12 PROCEDURE — 6370000000 HC RX 637 (ALT 250 FOR IP): Performed by: PSYCHIATRY & NEUROLOGY

## 2021-10-12 PROCEDURE — 99239 HOSP IP/OBS DSCHRG MGMT >30: CPT | Performed by: PSYCHIATRY & NEUROLOGY

## 2021-10-12 RX ORDER — QUETIAPINE FUMARATE 25 MG/1
25 TABLET, FILM COATED ORAL 3 TIMES DAILY
Qty: 45 TABLET | Refills: 3 | Status: ON HOLD | OUTPATIENT
Start: 2021-10-12 | End: 2021-11-01 | Stop reason: HOSPADM

## 2021-10-12 RX ORDER — OXCARBAZEPINE 300 MG/1
300 TABLET, FILM COATED ORAL 2 TIMES DAILY
Qty: 30 TABLET | Refills: 3 | Status: ON HOLD | OUTPATIENT
Start: 2021-10-12 | End: 2021-11-01 | Stop reason: SDUPTHER

## 2021-10-12 RX ORDER — TRAZODONE HYDROCHLORIDE 100 MG/1
100 TABLET ORAL NIGHTLY
Qty: 15 TABLET | Refills: 2 | Status: ON HOLD | OUTPATIENT
Start: 2021-10-12 | End: 2021-11-01 | Stop reason: HOSPADM

## 2021-10-12 RX ORDER — MULTIVITAMIN WITH IRON
1 TABLET ORAL DAILY
Qty: 30 TABLET | Refills: 1 | Status: SHIPPED | OUTPATIENT
Start: 2021-10-13

## 2021-10-12 RX ADMIN — THERA TABS 1 TABLET: TAB at 08:45

## 2021-10-12 RX ADMIN — OXCARBAZEPINE 300 MG: 300 TABLET, FILM COATED ORAL at 08:45

## 2021-10-12 RX ADMIN — Medication 100 MG: at 08:45

## 2021-10-12 RX ADMIN — QUETIAPINE FUMARATE 25 MG: 25 TABLET ORAL at 08:45

## 2021-10-12 RX ADMIN — FOLIC ACID 1 MG: 1 TABLET ORAL at 08:45

## 2021-10-12 NOTE — PROGRESS NOTES
Pt tates depression level is #5/10, anxiety level is #3-4/10,denies SI/HI/AVH. Pt states she only slept 3 hrs last night. ,states scheduled sleep meds are ineffective. Pt is visible on unit in the tv area. Appetite is good for all meals. Pt attends some select groups.

## 2021-10-12 NOTE — DISCHARGE SUMMARY
DISCHARGE SUMMARY      Patient ID:  Deliah Lesches  [id number]  46 y.o.  1980      Admit date: 10/7/2021    Discharge date and time: 10/12/2021    Admitting Physician: Monique Moser MD     Discharge Physician: Dr Danna Rowan MD    Admission Diagnoses: Major depression, recurrent ([facility] [F33.9]  Episode of recurrent major depressive disorder, unspecified depression episode severity ([facility] [F33.9]    Admission Condition: poor    Discharged Condition: stable    Admission Circumstance: The patient is a 46 y.o. female  homeless, living alone, employed with significant past history of bipolar disorder, alcoholism     Pt has been in multiple treatment facilities [address] [place] [address] over 10   place, last was at [facility] in [place], [place] was there for 11 months, I moved into to their 3/4 house in 7/21 and had been there  And started drinking 2-3 weeks ago daily.  Pt has not lived alone or with anyone for years.  Pt  Has a GED from [employer] in 2000.  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       Pt was at [address] end of August 2020- left the place in July 2021  Sober for 13 months and after discharge relapsed quickly with alcohol use  Patient was not allowed to take any medication while at [facility] [place]  After release, she felt more depressed and lonely  Was drinking about a fifth of vodka   Going through withdrawal   Has been having depressed, mood swings, rapid in nature, racing thoughts  Irritable and angry and frustrated  Suicidal thoughts - not want to live like this any more, want to take overdose after drinking.  Was hoping drinking alcohol will kill her slowly  No seizures recently but has a h/o w/d seizures     The patient is not currently receiving care for the above psychiatric illness.     Medications Prior to Admission:     Prescriptions Prior to Admission   Medications Prior to Admission: busPIRone (BUSPAR) 15 MG tablet, Take 15 mg by mouth 3 times daily (Patient taking differently: Take 7.5 mg by mouth 3 times daily )  OXcarbazepine (TRILEPTAL) 300 MG tablet, Take 1 tablet by mouth 2 times daily  traZODone (DESYREL) 50 MG tablet, Take 1 tablet by mouth nightly as needed for Sleep  QUEtiapine (SEROQUEL) 25 MG tablet, Take 1 tablet by mouth 2 times daily  QUEtiapine (SEROQUEL) 300 MG tablet, Take 1 tablet by mouth nightly  levothyroxine (SYNTHROID) 75 MCG tablet, Take 75 mcg by mouth Daily  Multiple Vitamins-Minerals (MULTIVITAMIN & MINERAL PO), Take 1 tablet by mouth daily        Compliance:no     Psychiatric Review of Systems       Depression: yes     Payal or Hypomania:  yes     Panic Attacks:  yes      Phobias:  no     Obsessions and Compulsions:  no     PTSD : no     Hallucinations:  no     Delusions:  no     Substance Abuse History:  ETOH: yes   Marijuana: no  Opiates: no  Other Drugs: no        Past Psychiatric History:  Prior Diagnosis:  Bipolar I disorder; borderline traits, alcohlism  Psychiatrist: no  Therapist:no  Hospitalization: yes  Hx of Suicidal Attempts: yes  Hx of violence:  no  ECT: no        PAST MEDICAL/PSYCHIATRIC HISTORY:   Past Medical History:   Diagnosis Date    Alcohol abuse     Alcoholic hepatitis 3/6/3291    Anxiety     Bipolar 1 disorder (HCC)     Depression     Seizures (HCC)     x1 only related to alcohol     Thyroid disease        FAMILY/SOCIAL HISTORY:  Family History   Problem Relation Age of Onset    Depression Mother      Social History     Socioeconomic History    Marital status:      Spouse name: Not on file    Number of children: 11    Years of education: 15    Highest education level: Not on file   Occupational History    Occupation: nurse   Tobacco Use    Smoking status: Current Every Day Smoker     Packs/day: 1.00     Years: 15.00     Pack years: 15.00     Types: Cigarettes    Smokeless tobacco: Never Used   Vaping Use    Vaping Use: Some days    Substances: Nicotine, Flavoring    Devices: nicotine (NICODERM CQ) 21 MG/24HR 1 patch, 1 patch, TransDERmal, Daily, Dee Zavala MD, 1 patch at 10/11/21 0855    LORazepam (ATIVAN) tablet 0.5 mg, 0.5 mg, Oral, Q4H PRN, 0.5 mg at 10/09/21 0012 **OR** LORazepam (ATIVAN) injection 0.5 mg, 0.5 mg, IntraMUSCular, Q4H PRN **OR** LORazepam (ATIVAN) injection 1 mg, 1 mg, IntraMUSCular, Q4H PRN **OR** LORazepam (ATIVAN) tablet 1 mg, 1 mg, Oral, Q4H PRN **OR** LORazepam (ATIVAN) tablet 2 mg, 2 mg, Oral, Q2H PRN **OR** LORazepam (ATIVAN) injection 2 mg, 2 mg, IntraMUSCular, Q2H PRN **OR** LORazepam (ATIVAN) tablet 3 mg, 3 mg, Oral, Q1H PRN **OR** LORazepam (ATIVAN) injection 3 mg, 3 mg, IntraMUSCular, Q1H PRN **OR** LORazepam (ATIVAN) tablet 4 mg, 4 mg, Oral, Q1H PRN **OR** LORazepam (ATIVAN) injection 4 mg, 4 mg, IntraMUSCular, Q1H PRN, Dee Zavala MD    ondansetron (ZOFRAN-ODT) disintegrating tablet 4 mg, 4 mg, Oral, Q6H PRN, Dee Zavala MD    thiamine tablet 100 mg, 100 mg, Oral, Daily, Dee Zavala MD, 100 mg at 21/53/93 0426    folic acid (FOLVITE) tablet 1 mg, 1 mg, Oral, Daily, Dee Zavala MD, 1 mg at 10/12/21 0845    multivitamin 1 tablet, 1 tablet, Oral, Daily, Dee Zavala MD, 1 tablet at 10/12/21 0845    haloperidol lactate (HALDOL) injection 5 mg, 5 mg, IntraMUSCular, Q6H PRN **OR** haloperidol (HALDOL) tablet 5 mg, 5 mg, Oral, Q6H PRN, Dee Zavala MD, 5 mg at 10/08/21 0151    hydrOXYzine (VISTARIL) capsule 50 mg, 50 mg, Oral, Q6H PRN, 50 mg at 10/10/21 2119 **OR** hydrOXYzine (VISTARIL) injection 50 mg, 50 mg, IntraMUSCular, Q6H PRN, Dee Zavala MD    influenza quadrivalent split vaccine (FLUZONE;FLUARIX;FLULAVAL;AFLURIA) injection 0.5 mL, 0.5 mL, IntraMUSCular, Prior to discharge, Dee Zavala MD    Examination:  BP (!) 92/52   Pulse 73   Temp 97.8 °F (36.6 °C) (Oral)   Resp 20   Ht 5' 2\" (1.575 m)   Wt 135 lb (61.2 kg)   LMP  (LMP Unknown)   SpO2 96%   Breastfeeding No   BMI 24.69 kg/m² Gait - steady    HOSPITAL COURSE[de-identified]  Following admission to the hospital, patient had a complete physical exam and blood work up  Patient was monitored closely with suicide precaution  Patient was started on meds as listed below  Was encouraged to participate in group and other milieu activity  Patient started to feel better with this combination of treatment. Significant progress in the symptoms since admission. Mood better, with the score of 2/10 - bad  No AVH or paranoid thoughts  No Hopeless or worthless feeling  No active SI/HI  Appetite:  [x] Normal  [] Increased  [] Decreased    Sleep:       [x] Normal  [] Fair       [] Poor            Energy:    [x] Normal  [] Increased  [] Decreased     SI [] Present  [x] Absent  HI  []Present  [x] Absent   Aggression:  [] yes  [] no  Patient is [x] able  [] unable to CONTRACT FOR SAFETY   Medication side effects(SE):  [x] None(Psych. Meds.) [] Other      Mental Status Examination on discharge:    Level of consciousness:  within normal limits   Appearance:  well-appearing  Behavior/Motor:  no abnormalities noted  Attitude toward examiner:  attentive and good eye contact  Speech:  spontaneous, normal rate and normal volume   Mood: euthymic  Affect:  mood congruent  Thought processes:  goal directed   Thought content:  Suicidal Ideation:  denies suicidal ideation  Cognition:  oriented to person, place, and time   Concentration intact  Memory intact  Insight good   Judgement fair   Fund of Knowledge adequate      ASSESSMENT:  Patient symptoms are:  [x] Well controlled  [x] Improving  [] Worsening  [] No change      Diagnosis:  Principal Problem:    Bipolar depression ([facility]  Active Problems:    Alcohol dependence ([facility]  Resolved Problems:    Major depression, recurrent ([facility]      LABS:    No results for input(s): WBC, HGB, PLT in the last 72 hours. No results for input(s): NA, K, CL, CO2, BUN, CREATININE, GLUCOSE in the last 72 hours.   No results for input(s): BILITOT, ALKPHOS, AST, ALT in the last 72 hours. Lab Results   Component Value Date    LABAMPH Neg 10/07/2021    BARBSCNU Neg 10/07/2021    LABBENZ Neg 10/07/2021    LABMETH Neg 10/07/2021    OPIATESCREENURINE Neg 10/07/2021    PHENCYCLIDINESCREENURINE Neg 10/07/2021    ETOH 170 10/07/2021     Lab Results   Component Value Date    TSH 1.990 08/07/2020     No results found for: LITHIUM  Lab Results   Component Value Date    VALPROATE <7 (L) 06/04/2018       RISK ASSESSMENT AT DISCHARGE: Low risk for suicide and homicide. Treatment Plan:  Reviewed current Medications with the patient. Education provided on the complaince with treatment. Risks, benefits, side effects, drug-to-drug interactions and alternatives to treatment were discussed. Encourage patient to attend outpatient follow up appointment and therapy. Patient was advised to call the outpatient provider, visit the nearest ED or call 911 if symptoms are not manageable. Patient's family member was contacted prior to the discharge. Medication List      START taking these medications    multivitamin Tabs tablet  Take 1 tablet by mouth daily  Start taking on: October 13, 2021        CHANGE how you take these medications    QUEtiapine 25 MG tablet  Commonly known as: SEROQUEL  Take 1 tablet by mouth 3 times daily  What changed:   · when to take this  · Another medication with the same name was removed. Continue taking this medication, and follow the directions you see here.      traZODone 100 MG tablet  Commonly known as: DESYREL  Take 1 tablet by mouth nightly  What changed:   · medication strength  · how much to take  · when to take this  · reasons to take this        CONTINUE taking these medications    OXcarbazepine 300 MG tablet  Commonly known as: TRILEPTAL  Take 1 tablet by mouth 2 times daily        STOP taking these medications    busPIRone 15 MG tablet  Commonly known as: BUSPAR     levothyroxine 75 MCG tablet  Commonly known as: SYNTHROID     MULTIVITAMIN & MINERAL PO           Where to Get Your Medications      These medications were sent to [id number] - [address], [place] [address]    Phone: [phone]   · multivitamin Tabs tablet  · OXcarbazepine 300 MG tablet  · QUEtiapine 25 MG tablet  · traZODone 100 MG tablet           Reason for more than one antipsychotic:   [x] N/A  [] 3 failed monotherapy(drugs tried):  [] Cross over to a new antipsychotic  [] Taper to monotherapy from polypharmacy  [] Augmentation of Clozapine therapy due to treatment resistance to single therapy        TIME SPEND - 35 MINUTES TO COMPLETE THE EVALUATION, DISCHARGE SUMMARY, MEDICATION RECONCILIATION AND FOLLOW UP CARE     Signed:  Shannan Duffy MD  10/12/2021  9:06 AM

## 2021-10-12 NOTE — PROGRESS NOTES
Patient did not attend the 0900 community meeting due to getting ready to get discharged.  Electronically signed by Marlon [address] on 10/12/2021 at 9:59 AM

## 2021-10-12 NOTE — GROUP NOTE
Group Therapy Note    Date: 10/11/2021    Group Start Time: 1930  Group End Time: 2015  Group Topic: Recreational    [facility]    Shirley Simons        Group Therapy Note    Attendees: 12/24         Patient's Goal:  To watch a movie or participate in another provided activity. Notes:  Patient participated in group.     Status After Intervention:  Unchanged    Participation Level: Interactive    Participation Quality: Appropriate and Attentive      Speech:  normal      Thought Process/Content: Logical      Affective Functioning: Congruent      Mood: euthymic      Level of consciousness:  Alert and Attentive      Response to Learning: Progressing to goal      Endings: None Reported    Modes of Intervention: Activity      Discipline Responsible: [address], [employer]      Signature:  Shirley Simons

## 2021-10-12 NOTE — PROGRESS NOTES
Patient did not attend group despite staff encouragement.   Electronically signed by Flora Vasquez on 10/11/2021 at 9:52 PM

## 2021-10-12 NOTE — CARE COORDINATION
Discharge instructions reviewed verbally and in writing including f/u appointments. Patient verbalizes understanding and signed as such. All belongings returned for discharge. Patient denies SI, HI, A/V hallucinations, mood is stable.   Discharged with cab voucher for transport home

## 2021-10-12 NOTE — PROGRESS NOTES
Patient did not attend 1000 skills group due to getting discharged.  Electronically signed by Prosper Dean, [address] on 10/12/2021 at 1:47 PM

## 2021-10-28 ENCOUNTER — HOSPITAL ENCOUNTER (INPATIENT)
Age: 41
LOS: 4 days | Discharge: HOME | DRG: 753 | End: 2021-11-01
Attending: STUDENT IN AN ORGANIZED HEALTH CARE EDUCATION/TRAINING PROGRAM | Admitting: PSYCHIATRY & NEUROLOGY
Payer: MEDICAID

## 2021-10-28 DIAGNOSIS — F10.929 ACUTE ALCOHOLIC INTOXICATION WITH COMPLICATION (HCC): ICD-10-CM

## 2021-10-28 DIAGNOSIS — F31.9 BIPOLAR DEPRESSION (HCC): Primary | ICD-10-CM

## 2021-10-28 LAB
ACETAMINOPHEN LEVEL: <5 UG/ML (ref 10–30)
ALBUMIN SERPL-MCNC: 4.2 G/DL (ref 3.5–4.6)
ALP BLD-CCNC: 63 U/L (ref 40–130)
ALT SERPL-CCNC: 27 U/L (ref 0–33)
AMPHETAMINE SCREEN, URINE: NORMAL
ANION GAP SERPL CALCULATED.3IONS-SCNC: 13 MEQ/L (ref 9–15)
ANISOCYTOSIS: ABNORMAL
AST SERPL-CCNC: 23 U/L (ref 0–35)
ATYPICAL LYMPHOCYTE RELATIVE PERCENT: 3 %
BARBITURATE SCREEN URINE: NORMAL
BASOPHILS ABSOLUTE: 0 K/UL (ref 0–0.2)
BASOPHILS RELATIVE PERCENT: 0.8 %
BENZODIAZEPINE SCREEN, URINE: NORMAL
BILIRUB SERPL-MCNC: <0.2 MG/DL (ref 0.2–0.7)
BILIRUBIN URINE: NEGATIVE
BLOOD, URINE: NEGATIVE
BUN BLDV-MCNC: 12 MG/DL (ref 6–20)
CALCIUM SERPL-MCNC: 9.1 MG/DL (ref 8.5–9.9)
CANNABINOID SCREEN URINE: NORMAL
CHLORIDE BLD-SCNC: 104 MEQ/L (ref 95–107)
CHOLESTEROL, TOTAL: 202 MG/DL (ref 0–199)
CLARITY: CLEAR
CO2: 24 MEQ/L (ref 20–31)
COCAINE METABOLITE SCREEN URINE: NORMAL
COLOR: YELLOW
CREAT SERPL-MCNC: 0.6 MG/DL (ref 0.5–0.9)
EOSINOPHILS ABSOLUTE: 0.4 K/UL (ref 0–0.7)
EOSINOPHILS RELATIVE PERCENT: 5 %
ETHANOL PERCENT: 0.24 G/DL
ETHANOL: 274 MG/DL (ref 0–0.08)
GFR AFRICAN AMERICAN: >60
GFR NON-AFRICAN AMERICAN: >60
GLOBULIN: 2.6 G/DL (ref 2.3–3.5)
GLUCOSE BLD-MCNC: 95 MG/DL (ref 70–99)
GLUCOSE URINE: NEGATIVE MG/DL
HCG(URINE) PREGNANCY TEST: NEGATIVE
HCT VFR BLD CALC: 43.6 % (ref 37–47)
HDLC SERPL-MCNC: 96 MG/DL (ref 40–59)
HEMOGLOBIN: 14.6 G/DL (ref 12–16)
KETONES, URINE: NEGATIVE MG/DL
LDL CHOLESTEROL CALCULATED: 85 MG/DL (ref 0–129)
LEUKOCYTE ESTERASE, URINE: NEGATIVE
LYMPHOCYTES ABSOLUTE: 4.1 K/UL (ref 1–4.8)
LYMPHOCYTES RELATIVE PERCENT: 46 %
Lab: NORMAL
MCH RBC QN AUTO: 30.9 PG (ref 27–31.3)
MCHC RBC AUTO-ENTMCNC: 33.5 % (ref 33–37)
MCV RBC AUTO: 92.3 FL (ref 82–100)
METHADONE SCREEN, URINE: NORMAL
MICROCYTES: ABNORMAL
MONOCYTES ABSOLUTE: 0.1 K/UL (ref 0.2–0.8)
MONOCYTES RELATIVE PERCENT: 1 %
NEUTROPHILS ABSOLUTE: 3.8 K/UL (ref 1.4–6.5)
NEUTROPHILS RELATIVE PERCENT: 45 %
NITRITE, URINE: NEGATIVE
OPIATE SCREEN URINE: NORMAL
OXYCODONE URINE: NORMAL
PDW BLD-RTO: 14.1 % (ref 11.5–14.5)
PH UA: 7 (ref 5–9)
PHENCYCLIDINE SCREEN URINE: NORMAL
PLATELET # BLD: 225 K/UL (ref 130–400)
PLATELET SLIDE REVIEW: ADEQUATE
POTASSIUM SERPL-SCNC: 3.6 MEQ/L (ref 3.4–4.9)
PROPOXYPHENE SCREEN: NORMAL
PROTEIN UA: NEGATIVE MG/DL
RBC # BLD: 4.72 M/UL (ref 4.2–5.4)
SALICYLATE, SERUM: <0.3 MG/DL (ref 15–30)
SARS-COV-2, NAAT: NOT DETECTED
SODIUM BLD-SCNC: 141 MEQ/L (ref 135–144)
SPECIFIC GRAVITY UA: 1 (ref 1–1.03)
TOTAL CK: 116 U/L (ref 0–170)
TOTAL PROTEIN: 6.8 G/DL (ref 6.3–8)
TRIGL SERPL-MCNC: 105 MG/DL (ref 0–150)
TSH SERPL DL<=0.05 MIU/L-ACNC: 0.63 UIU/ML (ref 0.44–3.86)
URINE REFLEX TO CULTURE: NORMAL
UROBILINOGEN, URINE: 0.2 E.U./DL
WBC # BLD: 8.4 K/UL (ref 4.8–10.8)

## 2021-10-28 PROCEDURE — 85025 COMPLETE CBC W/AUTO DIFF WBC: CPT

## 2021-10-28 PROCEDURE — 1240000000 HC EMOTIONAL WELLNESS R&B

## 2021-10-28 PROCEDURE — 6370000000 HC RX 637 (ALT 250 FOR IP): Performed by: REGISTERED NURSE

## 2021-10-28 PROCEDURE — 36415 COLL VENOUS BLD VENIPUNCTURE: CPT

## 2021-10-28 PROCEDURE — 80143 DRUG ASSAY ACETAMINOPHEN: CPT

## 2021-10-28 PROCEDURE — 82550 ASSAY OF CK (CPK): CPT

## 2021-10-28 PROCEDURE — 82077 ASSAY SPEC XCP UR&BREATH IA: CPT

## 2021-10-28 PROCEDURE — 80053 COMPREHEN METABOLIC PANEL: CPT

## 2021-10-28 PROCEDURE — 80061 LIPID PANEL: CPT

## 2021-10-28 PROCEDURE — 99284 EMERGENCY DEPT VISIT MOD MDM: CPT

## 2021-10-28 PROCEDURE — 87635 SARS-COV-2 COVID-19 AMP PRB: CPT

## 2021-10-28 PROCEDURE — 6370000000 HC RX 637 (ALT 250 FOR IP): Performed by: PSYCHIATRY & NEUROLOGY

## 2021-10-28 PROCEDURE — 81003 URINALYSIS AUTO W/O SCOPE: CPT

## 2021-10-28 PROCEDURE — 96372 THER/PROPH/DIAG INJ SC/IM: CPT

## 2021-10-28 PROCEDURE — 6360000002 HC RX W HCPCS

## 2021-10-28 PROCEDURE — 80307 DRUG TEST PRSMV CHEM ANLYZR: CPT

## 2021-10-28 PROCEDURE — 84703 CHORIONIC GONADOTROPIN ASSAY: CPT

## 2021-10-28 PROCEDURE — 2580000003 HC RX 258

## 2021-10-28 PROCEDURE — 80179 DRUG ASSAY SALICYLATE: CPT

## 2021-10-28 PROCEDURE — 84443 ASSAY THYROID STIM HORMONE: CPT

## 2021-10-28 RX ORDER — SODIUM CHLORIDE 0.9 % (FLUSH) 0.9 %
5-40 SYRINGE (ML) INJECTION EVERY 12 HOURS SCHEDULED
Status: DISCONTINUED | OUTPATIENT
Start: 2021-10-28 | End: 2021-10-28

## 2021-10-28 RX ORDER — DIPHENHYDRAMINE HYDROCHLORIDE 50 MG/ML
INJECTION INTRAMUSCULAR; INTRAVENOUS
Status: COMPLETED
Start: 2021-10-28 | End: 2021-10-28

## 2021-10-28 RX ORDER — BENZTROPINE MESYLATE 1 MG/ML
2 INJECTION INTRAMUSCULAR; INTRAVENOUS 2 TIMES DAILY PRN
Status: DISCONTINUED | OUTPATIENT
Start: 2021-10-28 | End: 2021-11-01 | Stop reason: HOSPADM

## 2021-10-28 RX ORDER — ZIPRASIDONE MESYLATE 20 MG/ML
INJECTION, POWDER, LYOPHILIZED, FOR SOLUTION INTRAMUSCULAR
Status: DISCONTINUED
Start: 2021-10-28 | End: 2021-10-28

## 2021-10-28 RX ORDER — LORAZEPAM 1 MG/1
3 TABLET ORAL
Status: DISCONTINUED | OUTPATIENT
Start: 2021-10-28 | End: 2021-11-01 | Stop reason: HOSPADM

## 2021-10-28 RX ORDER — LORAZEPAM 2 MG/ML
2 INJECTION INTRAMUSCULAR
Status: DISCONTINUED | OUTPATIENT
Start: 2021-10-28 | End: 2021-10-28

## 2021-10-28 RX ORDER — LORAZEPAM 2 MG/ML
2 INJECTION INTRAMUSCULAR ONCE
Status: COMPLETED | OUTPATIENT
Start: 2021-10-28 | End: 2021-10-28

## 2021-10-28 RX ORDER — LORAZEPAM 1 MG/1
2 TABLET ORAL
Status: DISCONTINUED | OUTPATIENT
Start: 2021-10-28 | End: 2021-11-01 | Stop reason: HOSPADM

## 2021-10-28 RX ORDER — LORAZEPAM 1 MG/1
1 TABLET ORAL
Status: DISCONTINUED | OUTPATIENT
Start: 2021-10-28 | End: 2021-10-28

## 2021-10-28 RX ORDER — LANOLIN ALCOHOL/MO/W.PET/CERES
100 CREAM (GRAM) TOPICAL DAILY
Status: DISCONTINUED | OUTPATIENT
Start: 2021-10-28 | End: 2021-11-01 | Stop reason: HOSPADM

## 2021-10-28 RX ORDER — SODIUM CHLORIDE 0.9 % (FLUSH) 0.9 %
5-40 SYRINGE (ML) INJECTION PRN
Status: DISCONTINUED | OUTPATIENT
Start: 2021-10-28 | End: 2021-10-28

## 2021-10-28 RX ORDER — SODIUM CHLORIDE 9 MG/ML
25 INJECTION, SOLUTION INTRAVENOUS PRN
Status: DISCONTINUED | OUTPATIENT
Start: 2021-10-28 | End: 2021-10-28

## 2021-10-28 RX ORDER — HALOPERIDOL 5 MG/ML
5 INJECTION INTRAMUSCULAR EVERY 6 HOURS PRN
Status: DISCONTINUED | OUTPATIENT
Start: 2021-10-28 | End: 2021-11-01 | Stop reason: HOSPADM

## 2021-10-28 RX ORDER — ONDANSETRON 4 MG/1
4 TABLET, ORALLY DISINTEGRATING ORAL EVERY 6 HOURS PRN
Status: DISCONTINUED | OUTPATIENT
Start: 2021-10-28 | End: 2021-11-01 | Stop reason: HOSPADM

## 2021-10-28 RX ORDER — LORAZEPAM 2 MG/ML
3 INJECTION INTRAMUSCULAR
Status: DISCONTINUED | OUTPATIENT
Start: 2021-10-28 | End: 2021-10-28

## 2021-10-28 RX ORDER — ACETAMINOPHEN 325 MG/1
650 TABLET ORAL EVERY 4 HOURS PRN
Status: DISCONTINUED | OUTPATIENT
Start: 2021-10-28 | End: 2021-11-01 | Stop reason: HOSPADM

## 2021-10-28 RX ORDER — FOLIC ACID 1 MG/1
1 TABLET ORAL DAILY
Status: DISCONTINUED | OUTPATIENT
Start: 2021-10-28 | End: 2021-11-01 | Stop reason: HOSPADM

## 2021-10-28 RX ORDER — LORAZEPAM 1 MG/1
4 TABLET ORAL
Status: DISCONTINUED | OUTPATIENT
Start: 2021-10-28 | End: 2021-10-28

## 2021-10-28 RX ORDER — LORAZEPAM 1 MG/1
3 TABLET ORAL
Status: DISCONTINUED | OUTPATIENT
Start: 2021-10-28 | End: 2021-10-28

## 2021-10-28 RX ORDER — LORAZEPAM 2 MG/ML
INJECTION INTRAMUSCULAR
Status: COMPLETED
Start: 2021-10-28 | End: 2021-10-28

## 2021-10-28 RX ORDER — HYDROXYZINE PAMOATE 50 MG/1
50 CAPSULE ORAL EVERY 6 HOURS PRN
Status: DISCONTINUED | OUTPATIENT
Start: 2021-10-28 | End: 2021-11-01 | Stop reason: HOSPADM

## 2021-10-28 RX ORDER — LORAZEPAM 2 MG/ML
4 INJECTION INTRAMUSCULAR
Status: DISCONTINUED | OUTPATIENT
Start: 2021-10-28 | End: 2021-11-01 | Stop reason: HOSPADM

## 2021-10-28 RX ORDER — ZIPRASIDONE MESYLATE 20 MG/ML
10 INJECTION, POWDER, LYOPHILIZED, FOR SOLUTION INTRAMUSCULAR ONCE
Status: DISCONTINUED | OUTPATIENT
Start: 2021-10-28 | End: 2021-10-28

## 2021-10-28 RX ORDER — LORAZEPAM 2 MG/ML
1 INJECTION INTRAMUSCULAR
Status: DISCONTINUED | OUTPATIENT
Start: 2021-10-28 | End: 2021-10-28

## 2021-10-28 RX ORDER — GAUZE BANDAGE 2" X 2"
100 BANDAGE TOPICAL DAILY
Status: DISCONTINUED | OUTPATIENT
Start: 2021-10-28 | End: 2021-10-28 | Stop reason: RX

## 2021-10-28 RX ORDER — LORAZEPAM 2 MG/ML
4 INJECTION INTRAMUSCULAR
Status: DISCONTINUED | OUTPATIENT
Start: 2021-10-28 | End: 2021-10-28

## 2021-10-28 RX ORDER — LORAZEPAM 0.5 MG/1
0.5 TABLET ORAL EVERY 4 HOURS PRN
Status: DISCONTINUED | OUTPATIENT
Start: 2021-10-28 | End: 2021-11-01 | Stop reason: HOSPADM

## 2021-10-28 RX ORDER — HALOPERIDOL 5 MG
5 TABLET ORAL EVERY 6 HOURS PRN
Status: DISCONTINUED | OUTPATIENT
Start: 2021-10-28 | End: 2021-11-01 | Stop reason: HOSPADM

## 2021-10-28 RX ORDER — TRAZODONE HYDROCHLORIDE 50 MG/1
50 TABLET ORAL NIGHTLY PRN
Status: DISCONTINUED | OUTPATIENT
Start: 2021-10-28 | End: 2021-10-30

## 2021-10-28 RX ORDER — NICOTINE 21 MG/24HR
1 PATCH, TRANSDERMAL 24 HOURS TRANSDERMAL DAILY
Status: DISCONTINUED | OUTPATIENT
Start: 2021-10-28 | End: 2021-11-01 | Stop reason: HOSPADM

## 2021-10-28 RX ORDER — DIPHENHYDRAMINE HYDROCHLORIDE 50 MG/ML
50 INJECTION INTRAMUSCULAR; INTRAVENOUS ONCE
Status: COMPLETED | OUTPATIENT
Start: 2021-10-28 | End: 2021-10-28

## 2021-10-28 RX ORDER — LORAZEPAM 1 MG/1
1 TABLET ORAL EVERY 4 HOURS PRN
Status: DISCONTINUED | OUTPATIENT
Start: 2021-10-28 | End: 2021-11-01 | Stop reason: HOSPADM

## 2021-10-28 RX ORDER — LORAZEPAM 1 MG/1
2 TABLET ORAL
Status: DISCONTINUED | OUTPATIENT
Start: 2021-10-28 | End: 2021-10-28

## 2021-10-28 RX ORDER — MULTIVITAMIN WITH IRON
1 TABLET ORAL DAILY
Status: DISCONTINUED | OUTPATIENT
Start: 2021-10-28 | End: 2021-11-01 | Stop reason: HOSPADM

## 2021-10-28 RX ORDER — HYDROXYZINE HYDROCHLORIDE 50 MG/ML
50 INJECTION, SOLUTION INTRAMUSCULAR EVERY 6 HOURS PRN
Status: DISCONTINUED | OUTPATIENT
Start: 2021-10-28 | End: 2021-11-01 | Stop reason: HOSPADM

## 2021-10-28 RX ADMIN — LORAZEPAM 2 MG: 2 INJECTION INTRAMUSCULAR; INTRAVENOUS at 09:46

## 2021-10-28 RX ADMIN — WATER 10 ML: 1 INJECTION INTRAMUSCULAR; INTRAVENOUS; SUBCUTANEOUS at 09:42

## 2021-10-28 RX ADMIN — LORAZEPAM 2 MG: 2 INJECTION INTRAMUSCULAR at 09:46

## 2021-10-28 RX ADMIN — FOLIC ACID 1 MG: 1 TABLET ORAL at 19:56

## 2021-10-28 RX ADMIN — TRAZODONE HYDROCHLORIDE 50 MG: 50 TABLET ORAL at 21:56

## 2021-10-28 RX ADMIN — DIPHENHYDRAMINE HYDROCHLORIDE 50 MG: 50 INJECTION, SOLUTION INTRAMUSCULAR; INTRAVENOUS at 09:45

## 2021-10-28 RX ADMIN — THERA TABS 1 TABLET: TAB at 19:56

## 2021-10-28 RX ADMIN — Medication 100 MG: at 19:55

## 2021-10-28 RX ADMIN — ONDANSETRON 4 MG: 4 TABLET, ORALLY DISINTEGRATING ORAL at 19:54

## 2021-10-28 RX ADMIN — LORAZEPAM 1 MG: 1 TABLET ORAL at 21:56

## 2021-10-28 RX ADMIN — DIPHENHYDRAMINE HYDROCHLORIDE 50 MG: 50 INJECTION INTRAMUSCULAR; INTRAVENOUS at 09:45

## 2021-10-28 RX ADMIN — LORAZEPAM 2 MG: 2 INJECTION INTRAMUSCULAR; INTRAVENOUS at 09:42

## 2021-10-28 NOTE — ED NOTES
Pt sitting on bed, yelling out she needs more medication on occasion. Benny Garvin  10/28/21 Pauline Eaton 94  Kelley Garvin  10/28/21 1024

## 2021-10-28 NOTE — ED NOTES
Provisional Diagnosis:    Bipolar Depression, ETOH abuse    Psychosocial and Contextual Factors:    UNK at this time for living situations. Patient is an LPN, working at a nursing home. C-SSRS Summary:     Patient: C-SSRS Suicide Screening  1) Within the past month, have you wished you were dead or wished you could go to sleep and not wake up? : Yes  2) Have you actually had any thoughts of killing yourself? : Yes  3) Have you been thinking about how you might kill yourself? : No  4) Have you had these thoughts and had some intention of acting on them? : Yes  5) Have you started to work out or worked out the details of how to kill yourself? Do you intend to carry out this plan? : Yes  6) Have you ever done anything, started to do anything, or prepared to do anything to end your life?: No  Risk of Suicide: High Risk    Family: None    Agency: None          Abuse Assessment  Physical Abuse: Denies  Verbal Abuse: Denies  Emotional abuse: Denies  Financial Abuse: Denies  Sexual abuse: Denies    Clinical Summary:    46year old female presents to ED with compliance of suicidal ideations and ETOH. She reports she has been non-complaint with medications since leaving [facility]. Patient has been self medicating with ETOH daily. She reports she plans to \"drink myself to death\". She reports no support and \"no one cares about me\". Reports no contact with family or kids. Speech and thought process is tangential. She is disorganized and has difficulty following commands. Denies any HI, or symptoms of psychosis. She is generally uncooperative. She had called [facility] at around 0630 and reported SI as well. Level of Care Disposition:      Per Dr Howard Kim - admit to 3[facility], once more sober. He will review medications when he sees patient due to none compliance. Lianna Pacheco.      Ambrose Smith, RN  10/28/21 28 [facility], RN  10/28/21 28 [facility], RN  10/28/21 28 [facility], RN  10/28/21 2434

## 2021-10-28 NOTE — PROGRESS NOTES
Report received from [employer] from [facility]FFILIATE [facility]. Patient coming to unit on a pink slip with a diagnosis of bipolar depression. 46year old female presents to ED with compliance of suicidal ideations and ETOH. She reports she has been non-complaint with medications since leaving 3[facility]. Patient has been self medicating with ETOH daily. She reports she plans to \"drink myself to death\". She reports no support and \"no one cares about me\". Reports no contact with family or kids. Speech and thought process is tangential. She is disorganized and has difficulty following commands. Denies any HI, or symptoms of psychosis. She is generally uncooperative. She had called [facility] at around 0630 and reported SI as well. Tox (-) Covid (-).  Electronically signed by Melissa Kaur RN on 10/28/21 at 5:59 PM EDT

## 2021-10-28 NOTE — ED NOTES
Patient up to restroom with one assist. Remains unsteady in gate, and drowsy.      Keyshawn Chawla RN  10/28/21 0106

## 2021-10-28 NOTE — ED PROVIDER NOTES
[facility] 3W [facility]  eMERGENCY dEPARTMENT eNCOUnter      Pt Name: Ilana George  MRN: [id number]  Arjungfshruti 1980  Date of evaluation: 10/28/2021  Provider: Krish Barkley MD      HISTORY OF PRESENT ILLNESS      Chief Complaint   Patient presents with    Alcohol Intoxication     Pt states she needs a eval       The history is provided by the Patient. Ilana George is a 46 y.o. female with a PMH clinically significant for Bipolar depression, Etoh dependence, Thyroid disease, Anxiety presenting to the ED via PV c/o depression, anxiety, suicidal ideations without plan and excessive EtOH use since most recent hospitalization. States she is also not been taking any of her medications. Will not say why she is having these issues, just states she hates being in her own skin. Denies any homicidal ideations, auditory or visual hallucinations. Denies any other illicit substance abuse. Denies any pain at this time. Admits to drinking large amounts of alcohol today. Refusing to answer any further questions in the ED. States she needs medication before she flips out. Patient is a very poor historian. Most recent admission to the hospital for recurrent episode of major depression appreciated. Noted history of homelessness, chronic alcoholism, poor social support. Discharged on Seroquel, trazodone, ox carbamazepine. REVIEW OF SYSTEMS       Review of Systems   Unable to perform ROS: Psychiatric disorder   Psychiatric/Behavioral: Positive for dysphoric mood and suicidal ideas. Negative for hallucinations. The patient is nervous/anxious.         PAST MEDICAL HISTORY     Past Medical History:   Diagnosis Date    Alcohol abuse     Alcoholic hepatitis 0/3/3513    Anxiety     Bipolar 1 disorder ([facility] [address]     Depression     Seizures (HCC)     x1 only related to alcohol     Thyroid disease        SURGICAL HISTORY       Past Surgical History:   Procedure Laterality Date    BREAST ENHANCEMENT SURGERY      BREAST SURGERY      TONSILLECTOMY         FAMILY HISTORY       Family History   Problem Relation Age of Onset    Depression Mother        SOCIAL HISTORY       Social History     Socioeconomic History    Marital status:      Spouse name: None    Number of children: 11    Years of education: 15    Highest education level: None   Occupational History    Occupation: nurse   Tobacco Use    Smoking status: Current Every Day Smoker     Packs/day: 1.00     Years: 15.00     Pack years: 15.00     Types: Cigarettes    Smokeless tobacco: Never Used   Vaping Use    Vaping Use: Some days    Substances: Nicotine, Flavoring    Devices: Pre-filled or refillable cartridge   Substance and Sexual Activity    Alcohol use: Yes     Comment: drinks a fifth of rum per day. trying to stop drinking     Drug use: No    Sexual activity: Yes     Partners: Male     Comment: Yovana Mattsonjemma one person. \"   Other Topics Concern    None   Social History Narrative    None     Social Determinants of Health     Financial Resource Strain:     Difficulty of Paying Living Expenses:    Food Insecurity:     Worried About Running Out of Food in the Last Year:     [address] in the Last Year:    Transportation Needs:     Lack of Transportation (Medical):      Lack of Transportation (Non-Medical):    Physical Activity:     Days of Exercise per Week:     Minutes of Exercise per Session:    Stress:     Feeling of Stress :    Social Connections:     Frequency of Communication with Friends and Family:     Frequency of Social Gatherings with Friends and Family:     Attends Jewish Services:     Active Member of Clubs or Organizations:     Attends Club or Organization Meetings:     Marital Status:    Intimate Partner Violence:     Fear of Current or Ex-Partner:     Emotionally Abused:     Physically Abused:     Sexually Abused:        CURRENT MEDICATIONS       Current Discharge Medication List      CONTINUE these medications which have NOT CHANGED Details   OXcarbazepine (TRILEPTAL) 300 MG tablet Take 1 tablet by mouth 2 times daily  Qty: 30 tablet, Refills: 3      traZODone (DESYREL) 100 MG tablet Take 1 tablet by mouth nightly  Qty: 15 tablet, Refills: 2      QUEtiapine (SEROQUEL) 25 MG tablet Take 1 tablet by mouth 3 times daily  Qty: 45 tablet, Refills: 3      Multiple Vitamin (MULTIVITAMIN) TABS tablet Take 1 tablet by mouth daily  Qty: 30 tablet, Refills: 1             ALLERGIES     Patient has no known allergies. PHYSICAL EXAM       ED Triage Vitals [10/28/21 0910]   BP Temp Temp Source Pulse Resp SpO2 Height Weight   107/70 98.3 °F (36.8 °C) Temporal 104 18 96 % 5' 2\" (1.575 m) 145 lb (65.8 kg)       Physical Exam  Vitals and nursing note reviewed. Constitutional:       General: She is not in acute distress. HENT:      Head: Normocephalic and atraumatic. Mouth/Throat:      Mouth: Mucous membranes are moist.      Pharynx: Oropharynx is clear. Eyes:      Extraocular Movements: Extraocular movements intact. Pupils: Pupils are equal, round, and reactive to light. Cardiovascular:      Rate and Rhythm: Regular rhythm. Tachycardia present. Pulses: Normal pulses. Heart sounds: Normal heart sounds. Pulmonary:      Effort: Pulmonary effort is normal. No respiratory distress. Breath sounds: Normal breath sounds. Abdominal:      General: There is no distension. Palpations: Abdomen is soft. Tenderness: There is no abdominal tenderness. There is no guarding or rebound. Musculoskeletal:         General: No tenderness. Cervical back: Normal range of motion and neck supple. Right lower leg: No edema. Left lower leg: No edema. Skin:     General: Skin is warm and dry. Capillary Refill: Capillary refill takes less than 2 seconds. Neurological:      General: No focal deficit present. Mental Status: She is alert and oriented to person, place, and time.    Psychiatric:         Mood and Affect: Mood is anxious. Affect is labile and angry. Behavior: Behavior is aggressive. Thought Content: Thought content includes suicidal ideation. Thought content does not include homicidal ideation. Thought content does not include homicidal or suicidal plan. Judgment: Judgment is impulsive and inappropriate. MDM:   Chart Reviewed: PMH and additional information as noted in HPI obtained from chart review    Vitals:    Vitals:    10/28/21 0910 10/28/21 1054 10/28/21 1845   BP: 107/70 107/70 102/68   Pulse: 104 104 98   Resp: 18 16 18   Temp: 98.3 °F (36.8 °C) 98.3 °F (36.8 °C) 98.7 °F (37.1 °C)   TempSrc: Temporal Temporal Oral   SpO2: 96% 96% 98%   Weight: 145 lb (65.8 kg)     Height: 5' 2\" (1.575 m)         PROCEDURES:  Unless otherwise noted below, none  Procedures    LABS:  Labs Reviewed   ACETAMINOPHEN LEVEL - Abnormal; Notable for the following components:       Result Value    Acetaminophen Level <5 (*)     All other components within normal limits   CBC WITH AUTO DIFFERENTIAL - Abnormal; Notable for the following components:    Monocytes Absolute 0.1 (*)     All other components within normal limits   LIPID PANEL - Abnormal; Notable for the following components:    Cholesterol, Total 202 (*)     HDL 96 (*)     All other components within normal limits   SALICYLATE LEVEL - Abnormal; Notable for the following components:    Salicylate, Serum <6.4 (*)     All other components within normal limits   COVID-19, RAPID   CK   COMPREHENSIVE METABOLIC PANEL   ETHANOL   PREGNANCY, URINE   TSH WITHOUT REFLEX   URINE DRUG SCREEN   URINE RT REFLEX TO CULTURE       No orders to display            46 y.o. female with a PMH clinically significant for Bipolar depression, Etoh dependence, Thyroid disease, Anxiety presenting to the ED via PV c/o depression, anxiety, suicidal ideations without plan and excessive EtOH use since most recent hospitalization.   Upon initial evaluation, Pt mildly tachycardic but agitated and clinically intoxicated. Otherwise Afebrile, HDS and in NAD. PE as noted above. Labs,  and EKG, as noted above. Labs showing significantly elevated EtOH. Otherwise largely unremarkable. Given findings, clinical presentation most likely consistent w/ suicidal ideations without clear plan in the setting of EtOH intoxication/dependence and medication noncompliance in the setting of known psychiatric disease. Medically stable for further evaluation and management by psychiatry. Administered medications as noted below for anxiolysis in the ED. Will be admitted to psychiatry.    Pt was administered   Medications   haloperidol lactate (HALDOL) injection 5 mg (has no administration in time range)     Or   haloperidol (HALDOL) tablet 5 mg (has no administration in time range)   hydrOXYzine (VISTARIL) capsule 50 mg (has no administration in time range)     Or   hydrOXYzine (VISTARIL) injection 50 mg (has no administration in time range)   folic acid (FOLVITE) tablet 1 mg (1 mg Oral Given 10/28/21 1956)   LORazepam (ATIVAN) tablet 0.5 mg (has no administration in time range)     Or   LORazepam (ATIVAN) tablet 1 mg (has no administration in time range)     Or   LORazepam (ATIVAN) tablet 2 mg (has no administration in time range)     Or   LORazepam (ATIVAN) tablet 3 mg (has no administration in time range)     Or   LORazepam (ATIVAN) injection 4 mg (has no administration in time range)   multivitamin 1 tablet (1 tablet Oral Given 10/28/21 1956)   ondansetron (ZOFRAN-ODT) disintegrating tablet 4 mg (4 mg Oral Given 10/28/21 1954)   acetaminophen (TYLENOL) tablet 650 mg (has no administration in time range)   traZODone (DESYREL) tablet 50 mg (has no administration in time range)   benztropine mesylate (COGENTIN) injection 2 mg (has no administration in time range)   magnesium hydroxide (MILK OF MAGNESIA) 400 MG/5ML suspension 30 mL (has no administration in time range)   nicotine (NICODERM CQ) 21 MG/24HR 1 patch (1 patch TransDERmal Not Given 10/28/21 1931)   thiamine tablet 100 mg (100 mg Oral Given 10/28/21 1955)   LORazepam (ATIVAN) injection 2 mg (2 mg IntraMUSCular Given 10/28/21 0946)   diphenhydrAMINE (BENADRYL) injection 50 mg (50 mg IntraMUSCular Given 10/28/21 0945)   sterile water injection (10 mLs  Given 10/28/21 0942)   LORazepam (ATIVAN) 2 MG/ML injection (2 mg  Given 10/28/21 0942)       Plan: Admit to Psych. CRITICAL CARE TIME   Total CriticalCare time was 0 minutes, excluding separately reportable procedures. There was a high probability of clinically significant/life threatening deterioration in the patient's condition which required my urgent intervention. FINAL IMPRESSION      1. Bipolar depression ([facility] [address]    2.  Acute alcoholic intoxication with complication [facility])          DISPOSITION/PLAN   DISPOSITION Admitted 10/28/2021 06:02:28 PM      Current Discharge Medication List           MD Barry Valente MD  10/28/21 2045

## 2021-10-28 NOTE — PROGRESS NOTES
Patient laying down in bed and appears to be sleeping with eyes closed and respirations even and unlabored. Pt wakes up when her name said, but appears drowsy. Pt refuses to complete admission assessment, stating she is too tired. Pt denies SI and contracts for safety on unit. When asked other questions pt's closes her eyes, does not answer,and appears to be sleeping. Pt refuses physical assessment or consents at this time. Electronically signed by Margarita Moncada RN on 10/28/21 at 7:55 PM EDT

## 2021-10-28 NOTE — PROGRESS NOTES
Patient voiced nausea, medicated with Zofran. Patient voiced, \"I need food, do you have the sandwhichs. \" Patient made aware, snack time @ 31 36 62.

## 2021-10-28 NOTE — ED NOTES
Resting on bed. No s/s of distress. Regular respirations. Tony Michele  [facility]  10/28/21 4636

## 2021-10-28 NOTE — ED NOTES
Patient up to restroom with steady gate. Patient oriented and cooperative. Report called to 3w.      Ivette Miller RN  10/28/21 1208

## 2021-10-28 NOTE — ED NOTES
Patient demanding geodon from doctor. States she is going to [facility]" if she doesn't get it. New orders received. Patient continues to make demands after getting medications. Wants ER doctor to give her her Seroquel she states she hasn't been taking. Informed that would be up to psychiatry if she is admitted. 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 She appears highly intox at this time, having difficulty following simple commands. TONEY r/t high likelihood of being too intoxicated.      Richard Varela, RN  10/28/21 1221 Nathaly Myers RN  10/28/21 1005

## 2021-10-29 PROCEDURE — 6370000000 HC RX 637 (ALT 250 FOR IP): Performed by: REGISTERED NURSE

## 2021-10-29 PROCEDURE — 1240000000 HC EMOTIONAL WELLNESS R&B

## 2021-10-29 PROCEDURE — 99223 1ST HOSP IP/OBS HIGH 75: CPT | Performed by: PSYCHIATRY & NEUROLOGY

## 2021-10-29 PROCEDURE — 6370000000 HC RX 637 (ALT 250 FOR IP): Performed by: PSYCHIATRY & NEUROLOGY

## 2021-10-29 RX ORDER — OXCARBAZEPINE 300 MG/1
300 TABLET, FILM COATED ORAL 2 TIMES DAILY
Status: DISCONTINUED | OUTPATIENT
Start: 2021-10-29 | End: 2021-11-01 | Stop reason: HOSPADM

## 2021-10-29 RX ORDER — QUETIAPINE FUMARATE 50 MG/1
100 TABLET, EXTENDED RELEASE ORAL NIGHTLY
Status: DISCONTINUED | OUTPATIENT
Start: 2021-10-29 | End: 2021-10-30

## 2021-10-29 RX ADMIN — LORAZEPAM 0.5 MG: 0.5 TABLET ORAL at 23:13

## 2021-10-29 RX ADMIN — FOLIC ACID 1 MG: 1 TABLET ORAL at 09:43

## 2021-10-29 RX ADMIN — OXCARBAZEPINE 300 MG: 300 TABLET, FILM COATED ORAL at 11:14

## 2021-10-29 RX ADMIN — LORAZEPAM 0.5 MG: 0.5 TABLET ORAL at 09:40

## 2021-10-29 RX ADMIN — HYDROXYZINE PAMOATE 50 MG: 50 CAPSULE ORAL at 09:40

## 2021-10-29 RX ADMIN — QUETIAPINE FUMARATE 100 MG: 50 TABLET, EXTENDED RELEASE ORAL at 21:06

## 2021-10-29 RX ADMIN — TRAZODONE HYDROCHLORIDE 50 MG: 50 TABLET ORAL at 23:13

## 2021-10-29 RX ADMIN — Medication 100 MG: at 09:40

## 2021-10-29 RX ADMIN — HYDROXYZINE PAMOATE 50 MG: 50 CAPSULE ORAL at 16:23

## 2021-10-29 RX ADMIN — OXCARBAZEPINE 300 MG: 300 TABLET, FILM COATED ORAL at 21:07

## 2021-10-29 RX ADMIN — THERA TABS 1 TABLET: TAB at 09:40

## 2021-10-29 ASSESSMENT — SLEEP AND FATIGUE QUESTIONNAIRES
RESTFUL SLEEP: NO
DIFFICULTY ARISING: NO
DIFFICULTY FALLING ASLEEP: YES
DIFFICULTY STAYING ASLEEP: YES
DO YOU USE A SLEEP AID: NO
DO YOU HAVE DIFFICULTY SLEEPING: YES
AVERAGE NUMBER OF SLEEP HOURS: 7
SLEEP PATTERN: DISTURBED/INTERRUPTED SLEEP

## 2021-10-29 ASSESSMENT — LIFESTYLE VARIABLES: HISTORY_ALCOHOL_USE: YES

## 2021-10-29 ASSESSMENT — PATIENT HEALTH QUESTIONNAIRE - PHQ9: SUM OF ALL RESPONSES TO PHQ QUESTIONS 1-9: 20

## 2021-10-29 NOTE — PROGRESS NOTES
Pt. declined to attend the 0900 community meeting, despite staff encouragement. Electronically signed by Fly Palmer, [address] on 10/29/2021 at 9:50 AM

## 2021-10-29 NOTE — CARE COORDINATION
Psychosocial Assessment    Current Level of Psychosocial Functioning     Independent x  Dependent    Minimal Assist     Comments:      Psychosocial High Risk Factors (check all that apply)    Unable to obtain meds   Chronic illness/pain    Substance abuse x alcohol   Lack of Family Support   Financial stress   Isolation x  Inadequate Community Resources x (doesn't follow through with appts)  Suicide attempt(s)  Not taking medications x  Victim of crime   Developmental Delay  Unable to manage personal needs    Age 74 or older   Homeless  No transportation   Readmission within 30 days x  Unemployment  Traumatic Event    Patient meets at least 5 high risk factors. Family/Supports identified:   No family support identified  Sexual Orientation:    Did not disclose  Patient Strengths:  employed  Patient Barriers:   Not compliant with medications or appointments  Safety plan:  completed  CMHC/MH history:  *Previous admission less than a month*  Plan of Care:  medication management, group/individual therapies, family meetings, psycho -education, treatment team meetings to assist with stabilization    Initial Discharge Plan: To return to her home. Clinical Summary:     Per notes, patient is  A 46 y.o. female with a PMH clinically significant for Bipolar depression, Etoh dependence, Thyroid disease, Anxiety who presented to the ED via PV c/o depression, anxiety, suicidal ideations without plan and excessive EtOH use since most recent hospitalization. Patient was agitated during this assessment. Patient is a very poor historian. She reports no contact with family. Patient is not compliant with medications or psych appointments. She recently left a detox center after 24 hours AMA. She does not want referral for alcohol abuse. She reports \"just wanting to go home. \"  She plans to discharge back to her home.   will assist with discharge per doctor's orders.

## 2021-10-29 NOTE — PROGRESS NOTES
Patient did not attend recreation group despite staff encouragement.  Electronically signed by Owen Benitez RN on 10/28/21 at 8:39 PM EDT

## 2021-10-29 NOTE — PROGRESS NOTES
Awaken pt to start trileptal 300 mg  Med education print out given. Pt was agreeable, states she has been on it before just a lower dose. Pt appears calm and relaxed.

## 2021-10-29 NOTE — H&P
[address] [place] [address] Department of Psychiatry    History and Physical - Adult     CHIEF COMPLAINT:  Depression SI    History obtained from:  patient    Patient was seen after discussing with the treatment team and reviewing the chart      HISTORY OF PRESENT ILLNESS:      The patient is a 46 y.o. female  homeless, living alone, employed with significant past history of bipolar disorder, alcoholism    Went home a month ago after discharge from here  Relapsed with alcohol quickly and then went to [place] [address] a week ago for detox. Left AMA 24 later, went home, started drinking again  Has been drinking 5 fifth of liquor per day  Last drink was yesterday. Going through withdrawal. Non compliant with medication    Depression Severity: Rating mood to be around 1/10 (10- good)  Quality:melancholic  Worse all day  Content: Hopeless, worthless and helpless feeling  Suicidal thoughts - want to drink and take an overdose, not want to be alive like this  Associated symptoms:  Poor concentration, anhedonia, decrease motivation  Sleep and appetite- poor        The patient is not currently receiving care for the above psychiatric illness.     Medications Prior to Admission:   Medications Prior to Admission: OXcarbazepine (TRILEPTAL) 300 MG tablet, Take 1 tablet by mouth 2 times daily  traZODone (DESYREL) 100 MG tablet, Take 1 tablet by mouth nightly  QUEtiapine (SEROQUEL) 25 MG tablet, Take 1 tablet by mouth 3 times daily  Multiple Vitamin (MULTIVITAMIN) TABS tablet, Take 1 tablet by mouth daily    Compliance:no    Psychiatric Review of Systems       Depression: yes     Payal or Hypomania:  Yes- mood swings, anger irritable, racing thoughts, impulsive drinking     Panic Attacks:  yes      Phobias:  no     Obsessions and Compulsions:  no     PTSD : no     Hallucinations:  no     Delusions:  no    Substance Abuse History:  ETOH: yes   Marijuana: no  Opiates: no  Other Drugs: no      Past Psychiatric History:  Prior Diagnosis:  Bipolar I disorder; borderline traits, alcohlism  Psychiatrist: no  Therapist:no  Hospitalization: yes  Hx of Suicidal Attempts: yes  Hx of violence:  no  ECT: no  Previous discontinued Psychiatric Med Trials:     Past Medical History:        Diagnosis Date    Alcohol abuse     Alcoholic hepatitis 6/2/9595    Anxiety     Bipolar 1 disorder (HCC)     Depression     Seizures (HCC)     x1 only related to alcohol     Thyroid disease        Past Surgical History:        Procedure Laterality Date    BREAST ENHANCEMENT SURGERY      BREAST SURGERY      TONSILLECTOMY         Allergies:   Patient has no known allergies. Family History  Family History   Problem Relation Age of Onset    Depression Mother          Social History:  Born and Raised: [place]  Describes Childhood:   supportive  Education: College  Employment: Employed full time  Relationships: single  Children: 5   Current Support: ex     Legal Hx: pending charges  Access to weapons?:  No      EXAMINATION:    REVIEW OF SYSTEMS:    ROS:  [x] All negative/unchanged except if checked.  Explain positive(checked items) below:  [] Constitutional  [] Eyes  [] Ear/Nose/Mouth/Throat  [] Respiratory  [] CV  [] GI  []   [] Musculoskeletal  [] Skin/Breast  [] Neurological  [] Endocrine  [] Heme/Lymph  [] Allergic/Immunologic    Explanation:     Vitals:  /72   Pulse 91   Temp 97.7 °F (36.5 °C)   Resp 18   Ht 5' 2\" (1.575 m)   Wt 145 lb (65.8 kg)   LMP 10/14/2021   SpO2 97%   BMI 26.52 kg/m²      Neurologic Exam:   Muscle Strength & Tone: full ROM, normal  Gait: normal gait   Involuntary Movements: No    Mental Status Examination:    Level of consciousness:  within normal limits   Appearance:  ill-appearing  Behavior/Motor:  psychomotor retardation  Attitude toward examiner:  cooperative  Speech:  slow   Mood: constricted, decreased range and depressed  Affect:  blunted  Thought processes:  slow   Thought content:  Suicidal Ideation: active  Delusions:  no evidence of delusions  Perceptual Disturbance:  denies any perceptual disturbance  Cognition:  oriented to person, place, and time   Concentration poor  Memory intact  Insight poor   Judgement poor   Fund of Knowledge limited    Mini Mental Status 30/30      DIAGNOSIS:     Bipolar depression   Alcohol use disorder- recent relapse       RISK ASSESSMENT:    SUICIDE RISK ASSESSMENT: high  HOMICIDE: low  AGITATION/VIOLENCE: low  ELOPEMENT: low    LABS: REVIEWED TODAY:  Recent Labs     10/28/21  1051   WBC 8.4   HGB 14.6        Recent Labs     10/28/21  1051      K 3.6      CO2 24   BUN 12   CREATININE 0.60   GLUCOSE 95     Recent Labs     10/28/21  1051   BILITOT <0.2   ALKPHOS 63   AST 23   ALT 27     Lab Results   Component Value Date    LABAMPH Neg 10/28/2021    BARBSCNU Neg 10/28/2021    LABBENZ Neg 10/28/2021    LABMETH Neg 10/28/2021    OPIATESCREENURINE Neg 10/28/2021    PHENCYCLIDINESCREENURINE Neg 10/28/2021    ETOH 274 10/28/2021     Lab Results   Component Value Date    TSH 0.629 10/28/2021     No results found for: LITHIUM  Lab Results   Component Value Date    VALPROATE <7 (L) 06/04/2018     Lab Results   Component Value Date    VALPROATE <7 06/04/2018    VALPROATE <2.8 03/03/2018       FURTHER LABS ORDERED :      Radiology   No results found. EKG: TRACING REVIEWED    TREATMENT PLAN:    Risk Management:  close watch and suicide risk    Collateral Information:  Will obtain collateral information from the family or friends. Will obtain medical records as appropriate from out patient providers  Will consult the hospitalist for a physical exam to rule out any co-morbid physical condition. Home medication Reconciled       New Medications started during this admission :    See orders  Prn Haldol 5mg and Vistaril 50mg q6hr for extreme agitation.   Trazodone as ordered for insomnia  Vistaril as ordered for anxiety  Discussed with the patient risk, benefit, alternative and common side effects for the  proposed medication treatment. Patient is consenting to the treatment.     Psychotherapy:   Encourage participation in milieu and group therapy  Individual therapy as needed          Electronically signed by Laureano Johnson MD on 10/29/2021 at 9:54 AM

## 2021-10-29 NOTE — PROGRESS NOTES
Patient presents with a depressed affect. She shares feelings of despair. She stopped her medication after her hospitalization. She feared that if she drank with the Seroquel that she may die. She does not recall events prior to inpatient yesterday due to alcohol. She still worries about what will happen to her and has great guilt about drinking. She was very connected to her sponsor and the sponsor will no longer work with her. She was worried about her job. Number was provided to her and they are moving her to [place] so the current absences are forgiven. She has guilt about how her issues have affected her children. She denies current SI, HI, or hallucinations. She is struggling with feelings of neuropathy in both hands and both legs. Patiens is on the periphery.

## 2021-10-29 NOTE — PROGRESS NOTES
Behavioral Services  Medicare Certification Upon Admission    I certify that this patient's inpatient psychiatric hospital admission is medically necessary for:    [x] (1) Treatment which could reasonably be expected to improve this patient's condition,       [x] (2) Or for diagnostic study;     AND     [x](2) The inpatient psychiatric services are provided while the individual is under the care of a physician and are included in the individualized plan of care.     Estimated length of stay/service 3-5 days    Plan for post-hospital care OP care    Electronically signed by Joel Zheng MD on 10/29/2021 at 9:53 AM

## 2021-10-29 NOTE — PROGRESS NOTES
Pt. refused to attend the 1000 skills group, despite staff encouragement. Electronically signed by Jo Conroy, [address] on 10/29/2021 at 1:36 PM

## 2021-10-29 NOTE — PROGRESS NOTES
eEplained and gave am vitamins, vistaril 50 mg was offered for anxiety and ativan 0.5 po given for c/o arms tingling with anxiety per Rn. No tremors or sweats noted.  Pt denied pain and was able to eat breakfast.

## 2021-10-29 NOTE — PROGRESS NOTES
Patient had a flat affect, was worrisome and slightly irritable but she was cooperative. She is familiar with the unit and this writer. Patient stated she is depressed and stressed. She came to the hospital because she wants help to stop drinking alcohol. She was binge drinking and could not stop. She is stressed with work and she has 5 children which some have special needs. She felt overwhelmed. She feels suicidal sometimes and feels no cares about her. She was non complaint with her medications since leaving 3 WT. She has poor sleep and ok appetite. She has no leisure interests at this time but she does go to [facility].  Electronically signed by Sabiha Mariano, [address] on 10/29/2021 at 2:26 PM

## 2021-10-29 NOTE — SUICIDE SAFETY PLAN
SAFETY PLAN    A suicide Safety Plan is a document that supports someone when they are having thoughts of suicide. Warning Signs that indicate a suicidal crisis may be developing: What (situations, thoughts, feelings, body sensations, behaviors, etc.) do you experience that lets you know you are beginning to think about suicide? 1. irritablity  2. isolation  3. Obsessive thinking    Internal Coping Strategies:  What things can I do (relaxation techniques, hobbies, physical activities, etc.) to take my mind off my problems without contacting another person? 1. exercise  2. Watch TV      People and social settings that provide distraction: Who can I call or where can I go to distract me? 1. Place: AA  2. Place:  Just staying busy               People whom I can ask for help: Who can I call when I need help - for example, friends, family, clergy, someone else? 1. Name: Marci Velazquez members  Phone:#'s in phone      Professionals or [address] I can contact during a crisis: Who can I call for help - for example, my doctor, my psychiatrist, my psychologist, a mental health provider, a suicide hotline? 1. Suicide Prevention Lifeline: 8-606-994-TALK (4691)    2. [address] [place] [address] Emergency Services -  for example, [address] suicide hotline, [facility] Hotline: Z017711  Emergency Services Address: [address] [place] [address]  Emergency Services Phone: [phone]    3. Call 229      Making the environment safe: How can I make my environment (house/apartment/living space) safer? For example, can I remove guns, medications, and other items? 1. Go to AA meetings  2.  Not be alone/isolation

## 2021-10-29 NOTE — CONSULTS
[facility]  MEDICINE    HISTORY AND PHYSICAL EXAM    PATIENT NAME:  Kenia Mccullough    MRN:  [id number]  SERVICE DATE:  10/29/2021   SERVICE TIME:  9:31 AM    Primary Care Physician: No primary care provider on file. SUBJECTIVE  CHIEF COMPLAINT:  Medically appropriate for inpatient psychiatry admission. Consult for medical H/P encounter. HPI:  This is a 46 y.o. female who presents with  PMHx anxiety, depression, alcoholic hepatitis and abuse, thyroid disease who presented to emergency room with CC of depression, anxiety, SI without plan and excessive ETOH use since discharge from recent hospitalization. Admits to non-compliance with medication regimen. ETOH level in . Reports she binge drinks and her last drink was yesterday. Tearful during visit. Patient cleared from emergency room for psychiatric care. Patient Seen, Chart, Labs, Radiologystudies, and Consults reviewed. Patient denies headache, chest pain, shortness of breath, N/V/D/C, fever/chills.        PAST MEDICAL HISTORY:    Past Medical History:   Diagnosis Date    Alcohol abuse     Alcoholic hepatitis 5/2/8522    Anxiety     Bipolar 1 [facility] ([facility]     Depression     Seizures (HCC)     x1 only related to alcohol     Thyroid disease      PAST SURGICAL HISTORY:    Past Surgical History:   Procedure Laterality Date    BREAST ENHANCEMENT SURGERY      BREAST SURGERY      TONSILLECTOMY       FAMILY HISTORY:    Family History   Problem Relation Age of Onset    Depression Mother      SOCIAL HISTORY:    Social History     Socioeconomic History    Marital status:      Spouse name: Not on file    Number of children: 11    Years of education: 15    Highest education level: Not on file   Occupational History    Occupation: nurse   Tobacco Use    Smoking status: Current Every Day Smoker     Packs/day: 1.00     Years: 15.00     Pack years: 15.00     Types: Cigarettes    Smokeless tobacco: Never Used   Vaping Use    Vaping Use: Some days    Substances: Nicotine, Flavoring    Devices: Pre-filled or refillable cartridge   Substance and Sexual Activity    Alcohol use: Yes     Comment: drinks a fifth of rum per day. trying to stop drinking     Drug use: No    Sexual activity: Yes     Partners: Male     Comment: [facility] one person. \"   Other Topics Concern    Not on file   Social History Narrative    Not on file     Social Determinants of Health     Financial Resource Strain:     Difficulty of Paying Living Expenses:    Food Insecurity:     Worried About Running Out of Food in the Last Year:     [address] in the Last Year:    Transportation Needs:     Lack of Transportation (Medical):      Lack of Transportation (Non-Medical):    Physical Activity:     Days of Exercise per Week:     Minutes of Exercise per Session:    Stress:     Feeling of Stress :    Social Connections:     Frequency of Communication with Friends and Family:     Frequency of Social Gatherings with Friends and Family:     Attends Confucianism Services:     Active Member of Clubs or Organizations:     Attends Club or Organization Meetings:     Marital Status:    Intimate Partner Violence:     Fear of Current or Ex-Partner:     Emotionally Abused:     Physically Abused:     Sexually Abused:      MEDICATIONS:    Current Facility-Administered Medications   Medication Dose Route Frequency Provider Last Rate Last Admin    haloperidol lactate (HALDOL) injection 5 mg  5 mg IntraMUSCular Q6H PRN Marilyn Raymundo MD        Or    haloperidol (HALDOL) tablet 5 mg  5 mg Oral Q6H PRN Marilyn Raymundo MD        hydrOXYzine (VISTARIL) capsule 50 mg  50 mg Oral Q6H PRN Marilyn Raymundo MD        Or    hydrOXYzine (VISTARIL) injection 50 mg  50 mg IntraMUSCular Q6H PRN Marilyn Raymundo MD        folic acid (FOLVITE) tablet 1 mg  1 mg Oral Daily Marilyn Raymundo MD   1 mg at 10/28/21 1956    LORazepam (ATIVAN) tablet 0.5 mg  0.5 mg Oral Q4H PRN Loleta Babinski Shira Morales MD        Or    LORazepam (ATIVAN) tablet 1 mg  1 mg Oral Q4H PRN Lvaerne Guardado MD   1 mg at 10/28/21 2156    Or    LORazepam (ATIVAN) tablet 2 mg  2 mg Oral Q2H PRN Laverne Guardado MD        Or    LORazepam (ATIVAN) tablet 3 mg  3 mg Oral Q1H PRN Laverne Guardado MD        Or    LORazepam (ATIVAN) injection 4 mg  4 mg IntraMUSCular Q1H PRN Laverne Guardado MD        multivitamin 1 tablet  1 tablet Oral Daily Laverne Guardado MD   1 tablet at 10/28/21 1956    ondansetron (ZOFRAN-ODT) disintegrating tablet 4 mg  4 mg Oral Q6H PRN Laverne Guardado MD   4 mg at 10/28/21 1954    acetaminophen (TYLENOL) tablet 650 mg  650 mg Oral Q4H PRN Laverne Guardado MD        traZODone (DESYREL) tablet 50 mg  50 mg Oral Nightly PRN Laverne Guardado MD   50 mg at 10/28/21 2156    benztropine mesylate (COGENTIN) injection 2 mg  2 mg IntraMUSCular BID PRN Laverne Guardado MD        magnesium hydroxide (MILK OF MAGNESIA) 400 MG/5ML suspension 30 mL  30 mL Oral Daily PRN Laverne Guardado MD        nicotine (NICODERM CQ) 21 MG/24HR 1 patch  1 patch TransDERmal Daily Laverne Guardado MD   1 patch at 10/28/21 2158    thiamine tablet 100 mg  100 mg Oral Daily TianaJERMAINE Eller - CNS   100 mg at 10/28/21 1955       ALLERGIES: Patient has no known allergies. REVIEW OF SYSTEM:   ROS as noted in HPI, 12 point ROS reviewed and otherwise negative.     OBJECTIVE  PHYSICAL EXAM: /72   Pulse 91   Temp 97.7 °F (36.5 °C)   Resp 18   Ht 5' 2\" (1.575 m)   Wt 145 lb (65.8 kg)   LMP 10/14/2021   SpO2 97%   BMI 26.52 kg/m²   CONSTITUTIONAL:  awake, alert, cooperative, no apparent distress, and appears stated age  EYES:  Pupils equal, round and reactive to light, conjunctiva normal  ENT:  Normocephalic, without obvious abnormality, atraumatic, sinuses nontender on palpation, external ears without lesions, oral pharynx with moist mucus membranes, tonsils without erythema or exudates, gums normal and

## 2021-10-30 PROCEDURE — 6370000000 HC RX 637 (ALT 250 FOR IP): Performed by: PSYCHIATRY & NEUROLOGY

## 2021-10-30 PROCEDURE — 6370000000 HC RX 637 (ALT 250 FOR IP): Performed by: REGISTERED NURSE

## 2021-10-30 PROCEDURE — 1240000000 HC EMOTIONAL WELLNESS R&B

## 2021-10-30 RX ORDER — QUETIAPINE FUMARATE 50 MG/1
50 TABLET, FILM COATED ORAL 2 TIMES DAILY
Status: DISCONTINUED | OUTPATIENT
Start: 2021-10-30 | End: 2021-11-01 | Stop reason: HOSPADM

## 2021-10-30 RX ORDER — QUETIAPINE 200 MG/1
200 TABLET, FILM COATED, EXTENDED RELEASE ORAL NIGHTLY
Status: DISCONTINUED | OUTPATIENT
Start: 2021-10-30 | End: 2021-10-31

## 2021-10-30 RX ORDER — TRAZODONE HYDROCHLORIDE 50 MG/1
50 TABLET ORAL NIGHTLY
Status: DISCONTINUED | OUTPATIENT
Start: 2021-10-30 | End: 2021-11-01 | Stop reason: HOSPADM

## 2021-10-30 RX ADMIN — TRAZODONE HYDROCHLORIDE 50 MG: 50 TABLET ORAL at 20:46

## 2021-10-30 RX ADMIN — HYDROXYZINE PAMOATE 50 MG: 50 CAPSULE ORAL at 19:05

## 2021-10-30 RX ADMIN — FOLIC ACID 1 MG: 1 TABLET ORAL at 08:47

## 2021-10-30 RX ADMIN — HYDROXYZINE PAMOATE 50 MG: 50 CAPSULE ORAL at 09:00

## 2021-10-30 RX ADMIN — OXCARBAZEPINE 300 MG: 300 TABLET, FILM COATED ORAL at 20:46

## 2021-10-30 RX ADMIN — QUETIAPINE FUMARATE 200 MG: 200 TABLET, EXTENDED RELEASE ORAL at 20:46

## 2021-10-30 RX ADMIN — LORAZEPAM 0.5 MG: 0.5 TABLET ORAL at 13:48

## 2021-10-30 RX ADMIN — OXCARBAZEPINE 300 MG: 300 TABLET, FILM COATED ORAL at 08:46

## 2021-10-30 RX ADMIN — QUETIAPINE FUMARATE 50 MG: 50 TABLET ORAL at 13:48

## 2021-10-30 RX ADMIN — Medication 100 MG: at 08:46

## 2021-10-30 RX ADMIN — THERA TABS 1 TABLET: TAB at 08:46

## 2021-10-30 NOTE — PROGRESS NOTES
Pt reported to [address] [place] [address] that she had \"been pressing her call light with no answer\" and wants to speak to the charge nurse. No call lights were ever ringing for patients room. This writer presented to patients room and asked what assistance was needed, pt stated that she needed \"something for sleep and anxiety\". Asked pt to come to nurse's station so that [facility] could be checked for available medication.  Green team RN medicated per CIWA score and PRN request.

## 2021-10-30 NOTE — FLOWSHEET NOTE
Patient has been isolative to bed all day due to wanting to avoid a peer. Upset with her care. Says that many nurses are giving her the run around on her medications. Wants to know what she is prescribed and when she can have it. States that her withdrawal comes with extreme anxiety and complains of ongoing numbness/ tingling in her extremities. States she is not seeking the ativan but can not help the way she feels. Denies SI/HI.AVH. Admits to heightened depression and anxiety. Has stressors of children, some with special needs, job and loss of her sponsor. Has had poor sleep with racing thoughts. Appetite is fair. Neat in appearance.

## 2021-10-30 NOTE — PROGRESS NOTES
Irritable, anxious. C/o other pt bothering her, insisting he knows her. Isolating in room. Vistaril given per request. Instructed to notify staff if he approaches her.

## 2021-10-30 NOTE — PROGRESS NOTES
[facility] [address] NOTE     10/30/2021     Patient was seen and examined in person, Chart reviewed   Patient's case discussed with staff/team    Chief Complaint: depressed mood, anxiety, suicidal ideation    Interim History:     Patient said she is still feeling depressed and anxious. She said she used to take high doses of Seroquel. She is unhappy because she has to ask for Trazodone instead of it being prescribed as a regular medication. She said that she still feels at times that she doesn't want to live; has no plan for suicide though. She said she is scared of another patient who had made comments that she was his \"girlfriend\". She said her appetite was OK. She denied homicidal ideation. She denied hallucinations, paranoia or other delusions.      Appetite:   [x] Normal/Unchanged  [] Increased  [] Decreased      Sleep:       [] Normal/Unchanged  [x] Fair       [] Poor              Energy:    [] Normal/Unchanged  [] Increased  [x] Decreased        SI [x] Present  [] Absent    HI  []Present  [x] Absent     Aggression:  [] yes  [x] no    Patient is [] able  [x] unable to CONTRACT FOR SAFETY     PAST MEDICAL/PSYCHIATRIC HISTORY:   Past Medical History:   Diagnosis Date    Alcohol abuse     Alcoholic hepatitis 1/9/5982    Anxiety     Bipolar [facility] ([facility]     Depression     [facility]     x1 only related to alcohol     Thyroid disease        FAMILY/SOCIAL HISTORY:  Family History   Problem Relation Age of Onset    Depression Mother      Social History     Socioeconomic History    Marital status:      Spouse name: Not on file    Number of children: 11    Years of education: 15    Highest education level: Not on file   Occupational History    Occupation: nurse   Tobacco Use    Smoking status: Current Every Day Smoker     Packs/day: 1.00     Years: 15.00     Pack years: 15.00     Types: Cigarettes    Smokeless tobacco: Never Used   Vaping Use    Vaping Use: Some days    Substances: Nicotine, Flavoring    Devices: Pre-filled or refillable cartridge   Substance and Sexual Activity    Alcohol use: Yes     Comment: drinks a fifth of rum per day. trying to stop drinking     Drug use: No    Sexual activity: Yes     Partners: Male     Comment: [facility] one person. \"   Other Topics Concern    Not on file   Social History Narrative    Not on file     Social Determinants of Health     Financial Resource Strain:     Difficulty of Paying Living Expenses:    Food Insecurity:     Worried About Running Out of Food in the Last Year:     [address] in the Last Year:    Transportation Needs:     Lack of Transportation (Medical):  Lack of Transportation (Non-Medical):    Physical Activity:     Days of Exercise per Week:     Minutes of Exercise per Session:    Stress:     Feeling of Stress :    Social Connections:     Frequency of Communication with Friends and Family:     Frequency of Social Gatherings with Friends and Family:     Attends Quaker Services:     Active Member of Clubs or Organizations:     Attends Club or Organization Meetings:     Marital Status:    Intimate Partner Violence:     Fear of Current or Ex-Partner:     Emotionally Abused:     Physically Abused:     Sexually Abused:            ROS:  [x] All negative/unchanged except if checked.  Explain positive(checked items) below:  [] Constitutional  [] Eyes  [] Ear/Nose/Mouth/Throat  [] Respiratory  [] CV  [] GI  []   [] Musculoskeletal  [] Skin/Breast  [] Neurological  [] Endocrine  [] Heme/Lymph  [] Allergic/Immunologic    Explanation:     MEDICATIONS:    Current Facility-Administered Medications:     QUEtiapine (SEROQUEL) tablet 50 mg, 50 mg, Oral, BID- 8&2, Alex Joseph MD, 50 mg at 10/30/21 1348    QUEtiapine (SEROQUEL XR) extended release tablet 200 mg, 200 mg, Oral, Nightly, Alex Joseph MD    OXcarbazepine (TRILEPTAL) tablet 300 mg, 300 mg, Oral, BID, Christiano Esqueda MD, 300 mg at 10/30/21 0846    haloperidol lactate (HALDOL) injection 5 mg, 5 mg, IntraMUSCular, Q6H PRN **OR** haloperidol (HALDOL) tablet 5 mg, 5 mg, Oral, Q6H PRN, Arik Caldwell MD    hydrOXYzine (VISTARIL) capsule 50 mg, 50 mg, Oral, Q6H PRN, 50 mg at 10/30/21 0900 **OR** hydrOXYzine (VISTARIL) injection 50 mg, 50 mg, IntraMUSCular, Q6H PRN, Arik Caldwell MD    folic acid (FOLVITE) tablet 1 mg, 1 mg, Oral, Daily, Arik Caldwell MD, 1 mg at 10/30/21 0847    LORazepam (ATIVAN) tablet 0.5 mg, 0.5 mg, Oral, Q4H PRN, 0.5 mg at 10/30/21 1348 **OR** LORazepam (ATIVAN) tablet 1 mg, 1 mg, Oral, Q4H PRN, 1 mg at 10/28/21 2156 **OR** LORazepam (ATIVAN) tablet 2 mg, 2 mg, Oral, Q2H PRN **OR** LORazepam (ATIVAN) tablet 3 mg, 3 mg, Oral, Q1H PRN **OR** LORazepam (ATIVAN) injection 4 mg, 4 mg, IntraMUSCular, Q1H PRN, Arik Caldwell MD    multivitamin 1 tablet, 1 tablet, Oral, Daily, Arik Caldwell MD, 1 tablet at 10/30/21 0846    ondansetron (ZOFRAN-ODT) disintegrating tablet 4 mg, 4 mg, Oral, Q6H PRN, Arik Caldwell MD, 4 mg at 10/28/21 1954    acetaminophen (TYLENOL) tablet 650 mg, 650 mg, Oral, Q4H PRN, Arik Caldwell MD    traZODone (DESYREL) tablet 50 mg, 50 mg, Oral, Nightly PRN, Arik Caldwell MD, 50 mg at 10/29/21 2313    benztropine mesylate (COGENTIN) injection 2 mg, 2 mg, IntraMUSCular, BID PRN, Arik Caldwell MD    magnesium hydroxide (MILK OF MAGNESIA) 400 MG/5ML suspension 30 mL, 30 mL, Oral, Daily PRN, Arik Caldwell MD    nicotine (NICODERM CQ) 21 MG/24HR 1 patch, 1 patch, TransDERmal, Daily, Arik Caldwell MD, 1 patch at 10/30/21 0851    thiamine tablet 100 mg, 100 mg, Oral, Daily, JERMAINE Lin - CNS, 100 mg at 10/30/21 0846      Examination:  /61   Pulse 98   Temp 97.7 °F (36.5 °C) (Oral)   Resp 16   Ht 5' 2\" (1.575 m)   Wt 145 lb (65.8 kg)   LMP 10/14/2021   SpO2 98%   BMI 26.52 kg/m²   Gait - steady  Medication side effects(SE): none reported    Mental Status Examination:    Level of consciousness:  within normal limits   Appearance:  fair grooming and good hygiene  Behavior/Motor:  psychomotor retardation  Attitude toward examiner:  cooperative and good eye contact  Speech:  spontaneous, normal rate, normal volume and well articulated   Mood: anxious, constricted, decreased range and depressed  Affect:  mood congruent, anxious and depressed  Thought processes:  linear, goal directed and coherent   Thought content:  Homocidal ideation denies  Suicidal Ideation:  passive  Delusions:  no evidence of delusions  Perceptual Disturbance:  denies any perceptual disturbance  Cognition:  oriented to person, place, and time   Concentration distractible  Insight fair   Judgement fair     ASSESSMENT:   Patient symptoms are:  [x] Well controlled  [] Improving  [] Worsening  [] No change      Diagnosis:   Active Problems:    [facility]  Resolved Problems:    * No resolved hospital problems. *  Alcohol use disorder    LABS:    Recent Labs     10/28/21  1051   WBC 8.4   HGB 14.6        Recent Labs     10/28/21  1051      K 3.6      CO2 24   BUN 12   CREATININE 0.60   GLUCOSE 95     Recent Labs     10/28/21  1051   BILITOT <0.2   ALKPHOS 63   AST 23   ALT 27     Lab Results   Component Value Date    LABAMPH Neg 10/28/2021    BARBSCNU Neg 10/28/2021    LABBENZ Neg 10/28/2021    LABMETH Neg 10/28/2021    OPIATESCREENURINE Neg 10/28/2021    PHENCYCLIDINESCREENURINE Neg 10/28/2021    ETOH 274 10/28/2021     Lab Results   Component Value Date    TSH 0.629 10/28/2021     No results found for: LITHIUM  Lab Results   Component Value Date    VALPROATE <7 (L) 06/04/2018       RISK ASSESSMENT:   Suicide risk: high    Treatment Plan:  Reviewed current Medications with the patient.  Will start Seroquel and prescribe trazodone as a regular medication  Risks, benefits, side effects, drug-to-drug interactions and alternatives to treatment were discussed. Collateral information: pending  CD evaluation pending  Encourage patient to attend group and other milieu activities.   Discharge planning discussed with the patient and treatment team.    PSYCHOTHERAPY/COUNSELING:  [x] Therapeutic interview  [x] Supportive  [] CBT  [] Ongoing  [] Other    [x] Patient continues to need, on a daily basis, active treatment furnished directly by or requiring the supervision of inpatient psychiatric personnel      Anticipated Length of stay:            Electronically signed by Francisco Copeland MD on 10/30/2021 at 4:18 PM

## 2021-10-30 NOTE — PROGRESS NOTES
Irritable. C/o tingling in feet and hands. Wants something for anxiety. Feels no one is listening to her. Ativan given for CIWA score of 5.

## 2021-10-30 NOTE — PROGRESS NOTES
Pt. declined to attend the 0900 community meeting, despite staff encouragement. Electronically signed by Janeth Julio, [address] on 10/30/2021 at 9:37 AM

## 2021-10-30 NOTE — PROGRESS NOTES
Pt. refused to attend the 1000 skills group, despite staff encouragement. Electronically signed by Harvinder Santacruz, [address] on 10/30/2021 at 11:34 AM

## 2021-10-30 NOTE — PROGRESS NOTES
Patient did not attend group despite staff encouragement.   Electronically signed by Leo Castillo on 10/30/2021 at 5:20 PM

## 2021-10-31 PROCEDURE — 6370000000 HC RX 637 (ALT 250 FOR IP): Performed by: PSYCHIATRY & NEUROLOGY

## 2021-10-31 PROCEDURE — 1240000000 HC EMOTIONAL WELLNESS R&B

## 2021-10-31 PROCEDURE — 6370000000 HC RX 637 (ALT 250 FOR IP): Performed by: REGISTERED NURSE

## 2021-10-31 RX ORDER — ACAMPROSATE CALCIUM 333 MG/1
666 TABLET, DELAYED RELEASE ORAL 3 TIMES DAILY
Status: DISCONTINUED | OUTPATIENT
Start: 2021-11-01 | End: 2021-11-01 | Stop reason: HOSPADM

## 2021-10-31 RX ADMIN — FOLIC ACID 1 MG: 1 TABLET ORAL at 09:27

## 2021-10-31 RX ADMIN — HYDROXYZINE PAMOATE 50 MG: 50 CAPSULE ORAL at 09:28

## 2021-10-31 RX ADMIN — HYDROXYZINE PAMOATE 50 MG: 50 CAPSULE ORAL at 16:05

## 2021-10-31 RX ADMIN — QUETIAPINE FUMARATE 50 MG: 50 TABLET ORAL at 14:40

## 2021-10-31 RX ADMIN — Medication 100 MG: at 09:27

## 2021-10-31 RX ADMIN — OXCARBAZEPINE 300 MG: 300 TABLET, FILM COATED ORAL at 09:27

## 2021-10-31 RX ADMIN — THERA TABS 1 TABLET: TAB at 09:27

## 2021-10-31 RX ADMIN — TRAZODONE HYDROCHLORIDE 50 MG: 50 TABLET ORAL at 21:06

## 2021-10-31 RX ADMIN — ACETAMINOPHEN 650 MG: 325 TABLET ORAL at 11:53

## 2021-10-31 RX ADMIN — QUETIAPINE FUMARATE 50 MG: 50 TABLET ORAL at 09:27

## 2021-10-31 RX ADMIN — QUETIAPINE FUMARATE 300 MG: 200 TABLET, EXTENDED RELEASE ORAL at 21:05

## 2021-10-31 RX ADMIN — OXCARBAZEPINE 300 MG: 300 TABLET, FILM COATED ORAL at 21:06

## 2021-10-31 ASSESSMENT — PAIN SCALES - GENERAL
PAINLEVEL_OUTOF10: 3
PAINLEVEL_OUTOF10: 5

## 2021-10-31 NOTE — PROGRESS NOTES
Morning Community Meeting Topics    Allyssa Burkett attended the morning community meeting on 10/31/21. Topics discussed today     [x] Introduction   Day of the week and date   Mask distribution   Current mask requirements  [x]Teams   Explanation of  Green and Blue team criteria   Nurses assigned to each team for today   Explanation about green and blue paper  o Date  o Patient's Name  o Patient's Nurse  o Goals  [x] Visitation   Announce the visiting hours for the day   Announce which team is allowed to have visitors for the day   Review any updated Covid 19 requirements for visitors during visitation  o Vaccine Card or negative Covid test within 48 hours of visit  o State Identification   Patients are reminded to alert the  at least 1 hour before visitation   [x] Unit Orientation   Coffee use   Phone location and etiquette   Shower locations  [employer] and dryer location and process   Common area expectations   Staff rounds expectation  [x] Meals    Educate patient to the menu  o The patient is encouraged to fill out the menu to get preferences at mealtime  o The patient is educated that if they do not fill out the menu, they will get the standard tray  o The coffee pot is decaf, patient encouraged to order regular coffee from menu.    Educate patient to the meal process   Patient encouraged to eat snacks provided twice daily  o Snacks may stay in patient room     [x] Discharge Process   Discharge expectations   Fill out the survey after discharge   [x] Hygiene   Daily showers encouraged  o Showers availability discussed    Daily dressing encouraged  o Discussed wearing street clothing   Education provided on where to place linens and clothing  o Linens in the hamper  o personal clothing does not go into the linen hamper  [x] Group    Patient encouraged to attend group provided   Time of Group Meetings discussed   Gentle reminder that attendance is a Physician order  [x]

## 2021-10-31 NOTE — PROGRESS NOTES
Patient did not attend group despite staff encouragement.   Electronically signed by Ashly Horner on 10/30/2021 at 9:30 PM

## 2021-10-31 NOTE — GROUP NOTE
Group Therapy Note    Date: 10/31/2021    Group Start Time: 1000  Group End Time: 1050  Group Topic: Psychoeducation    MLCHRISTINA 3W BHGURWINDER Kaur        Group Therapy Note    Attendees: 5         Patient's Goal:  \"Try not to sleep all day\"    Notes:  Patient attended the 1000 skills group. Patient was more alert and more attentive in group. She work fairly on her task.     Status After Intervention:  Improved    Participation Level: Fair    Participation Quality: Appropriate      Speech:  normal      Thought Process/Content: Linear      Affective Functioning: Flat      Mood: calm      Level of consciousness:  Alert      Response to Learning: Progressing to goal      Endings: None Reported    Modes of Intervention: Education, Socialization and Activity      Discipline Responsible: Psychoeducational Specialist      Signature:  Irena Kaur

## 2021-10-31 NOTE — PROGRESS NOTES
Patient did not attend group despite staff encouragement.   Electronically signed by Swathi Reyes on 10/30/2021 at 9:57 PM

## 2021-10-31 NOTE — PROGRESS NOTES
[facility] FOLLOW-UP NOTE     10/31/2021     Patient was seen and examined in person, Chart reviewed   Patient's case discussed with staff/team    Chief Complaint: depressed mood, anxiety, suicidal ideation    Interim History:     Patient said she is \"OK\", \"trying\". She said she slept off and on; woke up a lot. Her appetite is OK. She denied having suicidal ideation, 'just racing thoughts' about work and her children. She said she feels that 'everybody hates me'. She is fearful she will fall apart because of the alcohol. She said she had tried Campral before for alcohol cravings, but did not give it a real chance; at the time she was not really invested in staying sober.  She said she would be OK with trying MAT again for alcohol dependence    Appetite:   [x] Normal/Unchanged  [] Increased  [] Decreased      Sleep:       [] Normal/Unchanged  [x] Fair       [] Poor              Energy:    [] Normal/Unchanged  [] Increased  [x] Decreased        SI [] Present  [x] Absent    HI  []Present  [x] Absent     Aggression:  [] yes  [x] no    Patient is [] able  [x] unable to CONTRACT FOR SAFETY     PAST MEDICAL/PSYCHIATRIC HISTORY:   Past Medical History:   Diagnosis Date    Alcohol abuse     Alcoholic hepatitis 8/3/4922    Anxiety     B[facility] [address]     Depression     [facility]     x1 only related to alcohol     Thyroid disease        FAMILY/SOCIAL HISTORY:  Family History   Problem Relation Age of Onset    Depression Mother      Social History     Socioeconomic History    Marital status:      Spouse name: Not on file    Number of children: 11    Years of education: 15    Highest education level: Not on file   Occupational History    Occupation: nurse   Tobacco Use    Smoking status: Current Every Day Smoker     Packs/day: 1.00     Years: 15.00     Pack years: 15.00     Types: Cigarettes    Smokeless tobacco: Never Used   Vaping Use    Vaping Use: Some days    Substances: Nicotine, Flavoring    Devices: Pre-filled or refillable cartridge   Substance and Sexual Activity    Alcohol use: Yes     Comment: drinks a fifth of rum per day. trying to stop drinking     Drug use: No    Sexual activity: Yes     Partners: Male     Comment: Leslie Montejo one person. \"   Other Topics Concern    Not on file   Social History Narrative    Not on file     Social Determinants of Health     Financial Resource Strain:     Difficulty of Paying Living Expenses:    Food Insecurity:     Worried About Running Out of Food in the Last Year:     [address] in the Last Year:    Transportation Needs:     Lack of Transportation (Medical):  Lack of Transportation (Non-Medical):    Physical Activity:     Days of Exercise per Week:     Minutes of Exercise per Session:    Stress:     Feeling of Stress :    Social Connections:     Frequency of Communication with Friends and Family:     Frequency of Social Gatherings with Friends and Family:     Attends Church Services:     Active Member of Clubs or Organizations:     Attends Club or Organization Meetings:     Marital Status:    Intimate Partner Violence:     Fear of Current or Ex-Partner:     Emotionally Abused:     Physically Abused:     Sexually Abused:            ROS:  [x] All negative/unchanged except if checked.  Explain positive(checked items) below:  [] Constitutional  [] Eyes  [] Ear/Nose/Mouth/Throat  [] Respiratory  [] CV  [] GI  []   [] Musculoskeletal  [] Skin/Breast  [] Neurological  [] Endocrine  [] Heme/Lymph  [] Allergic/Immunologic    Explanation:     MEDICATIONS:    Current Facility-Administered Medications:     QUEtiapine (SEROQUEL) tablet 50 mg, 50 mg, Oral, BID- 8&2, Martin Lundberg MD, 50 mg at 10/31/21 1440    QUEtiapine (SEROQUEL XR) extended release tablet 200 mg, 200 mg, Oral, Nightly, Martin Lundberg MD, 200 mg at 10/30/21 2046    traZODone (DESYREL) tablet 50 mg, 50 mg, Oral, Nightly, Martin Lundberg MD, 50 mg at 10/30/21 2046    OXcarbazepine (TRILEPTAL) tablet 300 mg, 300 mg, Oral, BID, Breanne Mancera MD, 300 mg at 10/31/21 0927    haloperidol lactate (HALDOL) injection 5 mg, 5 mg, IntraMUSCular, Q6H PRN **OR** haloperidol (HALDOL) tablet 5 mg, 5 mg, Oral, Q6H PRN, Breanne Mancera MD    hydrOXYzine (VISTARIL) capsule 50 mg, 50 mg, Oral, Q6H PRN, 50 mg at 10/31/21 1605 **OR** hydrOXYzine (VISTARIL) injection 50 mg, 50 mg, IntraMUSCular, Q6H PRN, Breanne Mancera MD    folic acid (FOLVITE) tablet 1 mg, 1 mg, Oral, Daily, Breanne Mancera MD, 1 mg at 10/31/21 5514    LORazepam (ATIVAN) tablet 0.5 mg, 0.5 mg, Oral, Q4H PRN, 0.5 mg at 10/30/21 1348 **OR** LORazepam (ATIVAN) tablet 1 mg, 1 mg, Oral, Q4H PRN, 1 mg at 10/28/21 2156 **OR** LORazepam (ATIVAN) tablet 2 mg, 2 mg, Oral, Q2H PRN **OR** LORazepam (ATIVAN) tablet 3 mg, 3 mg, Oral, Q1H PRN **OR** LORazepam (ATIVAN) injection 4 mg, 4 mg, IntraMUSCular, Q1H PRN, Breanne Mancera MD    multivitamin 1 tablet, 1 tablet, Oral, Daily, Breanne Mancera MD, 1 tablet at 10/31/21 9298    ondansetron (ZOFRAN-ODT) disintegrating tablet 4 mg, 4 mg, Oral, Q6H PRN, Breanne Mancera MD, 4 mg at 10/28/21 1954    acetaminophen (TYLENOL) tablet 650 mg, 650 mg, Oral, Q4H PRN, Breanne Mancera MD, 650 mg at 10/31/21 1153    benztropine mesylate (COGENTIN) injection 2 mg, 2 mg, IntraMUSCular, BID PRN, Breanne Mancera MD    magnesium hydroxide (MILK OF MAGNESIA) 400 MG/5ML suspension 30 mL, 30 mL, Oral, Daily PRN, Breanne Mancera MD    nicotine (NICODERM CQ) 21 MG/24HR 1 patch, 1 patch, TransDERmal, Daily, Breanne Mancera MD, 1 patch at 10/31/21 0944    thiamine tablet 100 mg, 100 mg, Oral, Daily, JERMAINE Balderas - CNS, 100 mg at 10/31/21 7865      Examination:  BP (!) 106/52   Pulse 82   Temp 98.2 °F (36.8 °C)   Resp 16   Ht 5' 2\" (1.575 m)   Wt 145 lb (65.8 kg)   LMP 10/14/2021   SpO2 99%   BMI 26.52 kg/m²   Gait - steady  Medication side effects(SE): none reported    Mental Status Examination:    Level of consciousness:  within normal limits   Appearance:  fair grooming and good hygiene  Behavior/Motor:  psychomotor retardation  Attitude toward examiner:  cooperative and good eye contact  Speech:  spontaneous, normal rate, normal volume and well articulated   Mood: anxious, constricted, decreased range and depressed  Affect:  mood congruent, anxious and depressed  Thought processes:  linear, goal directed and coherent   Thought content:  Homocidal ideation denies  Suicidal Ideation:  denies  Delusions:  no evidence of delusions  Perceptual Disturbance:  denies any perceptual disturbance  Cognition:  oriented to person, place, and time   Concentration distractible  Insight fair   Judgement fair     ASSESSMENT:   Patient symptoms are:  [x] Well controlled  [x] Improving  [] Worsening  [] No change      Diagnosis:   Active Problems:    Bipolar depression ([facility] [address]  Resolved Problems:    * No resolved hospital problems. *  Alcohol use disorder    LABS:    No results for input(s): WBC, HGB, PLT in the last 72 hours. No results for input(s): NA, K, CL, CO2, BUN, CREATININE, GLUCOSE in the last 72 hours. No results for input(s): BILITOT, ALKPHOS, AST, ALT in the last 72 hours. Lab Results   Component Value Date    LABAMPH Neg 10/28/2021    BARBSCNU Neg 10/28/2021    LABBENZ Neg 10/28/2021    LABMETH Neg 10/28/2021    OPIATESCREENURINE Neg 10/28/2021    PHENCYCLIDINESCREENURINE Neg 10/28/2021    ETOH 274 10/28/2021     Lab Results   Component Value Date    TSH 0.629 10/28/2021     No results found for: LITHIUM  Lab Results   Component Value Date    VALPROATE <7 (L) 06/04/2018       RISK ASSESSMENT:   Suicide risk: high    Treatment Plan:  Reviewed current Medications with the patient. Will increase Seroquel and prescribe Campral for MAT for alcohol dependence  Risks, benefits, side effects, drug-to-drug interactions and alternatives to treatment were discussed.   Collateral information: pending  CD evaluation pending  Encourage patient to attend group and other milieu activities.   Discharge planning discussed with the patient and treatment team.    PSYCHOTHERAPY/COUNSELING:  [x] Therapeutic interview  [x] Supportive  [] CBT  [] Ongoing  [] Other    [x] Patient continues to need, on a daily basis, active treatment furnished directly by or requiring the supervision of inpatient psychiatric personnel      Anticipated Length of stay:            Electronically signed by Jin Mars MD on 10/31/2021 at 4:52 PM

## 2021-10-31 NOTE — FLOWSHEET NOTE
Patient has isolated to room most of day sleeping. Patient was seen on phone but stated no one was answering her calls. Patient states her depression and anxiety is off the charts and does not understand why she is not on an anti-depressant. Patient states \"I am on trileptal and it does nothing for me\". Patient was encouraged to be open and honest with the doctor about how she was feeling. Patient then admitted to this nurse that she does not take her medication regularly due to drinking alcohol. This nurse explained it takes time for these medications to be therapeutic which means they have to be taken regularly. Patient voiced disappointment with self for relapsing after being sober for 14 months. She also voiced hr fear of her kids hating her because they didn't answer her calls either.

## 2021-10-31 NOTE — FLOWSHEET NOTE
Patient has been out on unit this evening. Patient has flat sad affect. She denies SI/HI and hallucinations but states her anxiety and depression has been bad. Patient does feel the seroquel has help with the intrusive thoughts.

## 2021-11-01 VITALS
WEIGHT: 145 LBS | OXYGEN SATURATION: 95 % | HEART RATE: 96 BPM | DIASTOLIC BLOOD PRESSURE: 60 MMHG | HEIGHT: 62 IN | TEMPERATURE: 98.8 F | SYSTOLIC BLOOD PRESSURE: 90 MMHG | BODY MASS INDEX: 26.68 KG/M2 | RESPIRATION RATE: 18 BRPM

## 2021-11-01 PROCEDURE — 6370000000 HC RX 637 (ALT 250 FOR IP): Performed by: PSYCHIATRY & NEUROLOGY

## 2021-11-01 PROCEDURE — 99239 HOSP IP/OBS DSCHRG MGMT >30: CPT | Performed by: PSYCHIATRY & NEUROLOGY

## 2021-11-01 PROCEDURE — 6370000000 HC RX 637 (ALT 250 FOR IP): Performed by: REGISTERED NURSE

## 2021-11-01 RX ORDER — TRAZODONE HYDROCHLORIDE 50 MG/1
50 TABLET ORAL NIGHTLY
Qty: 15 TABLET | Refills: 2 | Status: SHIPPED | OUTPATIENT
Start: 2021-11-01 | End: 2022-08-02

## 2021-11-01 RX ORDER — LANOLIN ALCOHOL/MO/W.PET/CERES
100 CREAM (GRAM) TOPICAL DAILY
Qty: 30 TABLET | Refills: 3 | Status: SHIPPED | OUTPATIENT
Start: 2021-11-02

## 2021-11-01 RX ORDER — QUETIAPINE 300 MG/1
300 TABLET, FILM COATED, EXTENDED RELEASE ORAL NIGHTLY
Qty: 15 TABLET | Refills: 2 | Status: ON HOLD | OUTPATIENT
Start: 2021-11-01 | End: 2022-08-08 | Stop reason: HOSPADM

## 2021-11-01 RX ORDER — OXCARBAZEPINE 300 MG/1
300 TABLET, FILM COATED ORAL 2 TIMES DAILY
Qty: 30 TABLET | Refills: 3 | Status: ON HOLD | OUTPATIENT
Start: 2021-11-01 | End: 2022-08-08 | Stop reason: SDUPTHER

## 2021-11-01 RX ORDER — ACAMPROSATE CALCIUM 333 MG/1
666 TABLET, DELAYED RELEASE ORAL 3 TIMES DAILY
Qty: 90 TABLET | Refills: 2 | Status: ON HOLD | OUTPATIENT
Start: 2021-11-01 | End: 2022-08-08 | Stop reason: HOSPADM

## 2021-11-01 RX ORDER — QUETIAPINE FUMARATE 50 MG/1
50 TABLET, FILM COATED ORAL 2 TIMES DAILY
Qty: 30 TABLET | Refills: 2 | Status: ON HOLD | OUTPATIENT
Start: 2021-11-01 | End: 2022-08-08 | Stop reason: HOSPADM

## 2021-11-01 RX ADMIN — OXCARBAZEPINE 300 MG: 300 TABLET, FILM COATED ORAL at 09:09

## 2021-11-01 RX ADMIN — THERA TABS 1 TABLET: TAB at 09:09

## 2021-11-01 RX ADMIN — FOLIC ACID 1 MG: 1 TABLET ORAL at 09:09

## 2021-11-01 RX ADMIN — Medication 100 MG: at 09:09

## 2021-11-01 RX ADMIN — QUETIAPINE FUMARATE 50 MG: 50 TABLET ORAL at 09:09

## 2021-11-01 RX ADMIN — ACAMPROSATE CALCIUM 666 MG: 333 TABLET, DELAYED RELEASE ORAL at 09:08

## 2021-11-01 NOTE — GROUP NOTE
Group Therapy Note    Date: 10/31/2021    Group Start Time: 1900  Group End Time: 2000  Group Topic: Recreational    [facility]    GAVI aPlmer LSW        Group Therapy Note    Attendees:13/24         Patient's Goal:  To participate in a recreational group therapy    Notes:  Patient was an active listener during recreational group therapy    Status After Intervention:  Unchanged    Participation Level: Minimal    Participation Quality: Attentive      Speech:  normal      Thought Process/Content: Logical      Affective Functioning: Flat      Mood: calm      Level of consciousness:  Alert      Response to Learning: Able to verbalize current knowledge/experience      Endings: None Reported    Modes of Intervention: Education      Discipline Responsible: /Counselor      Signature:  GAVI Palmer LSW

## 2021-11-01 NOTE — DISCHARGE SUMMARY
Hallucinations:  no     Delusions:  no     Substance Abuse History:  ETOH: yes   Marijuana: no  Opiates: no  Other Drugs: no        Past Psychiatric History:  Prior Diagnosis:  Bipolar I disorder; borderline traits, alcohlism  Psychiatrist: no  Therapist:no  Hospitalization: yes  Hx of Suicidal Attempts: yes  Hx of violence:  no  ECT: no      PAST MEDICAL/PSYCHIATRIC HISTORY:   Past Medical History:   Diagnosis Date    Alcohol abuse     Alcoholic hepatitis 9/4/7076    Anxiety     Bipolar 1 disorder (HCC)     Depression     Seizures (HCC)     x1 only related to alcohol     Thyroid disease        FAMILY/SOCIAL HISTORY:  Family History   Problem Relation Age of Onset    Depression Mother      Social History     Socioeconomic History    Marital status:      Spouse name: Not on file    Number of children: 11    Years of education: 15    Highest education level: Not on file   Occupational History    Occupation: nurse   Tobacco Use    Smoking status: Current Every Day Smoker     Packs/day: 1.00     Years: 15.00     Pack years: 15.00     Types: Cigarettes    Smokeless tobacco: Never Used   Vaping Use    Vaping Use: Some days    Substances: Nicotine, Flavoring    Devices: Pre-filled or refillable cartridge   Substance and Sexual Activity    Alcohol use: Yes     Comment: drinks a fifth of rum per day. trying to stop drinking     Drug use: No    Sexual activity: Yes     Partners: Male     Comment: Justin Mcneil one person. \"   Other Topics Concern    Not on file   Social History Narrative    Not on file     Social Determinants of Health     Financial Resource Strain:     Difficulty of Paying Living Expenses:    Food Insecurity:     Worried About Running Out of Food in the Last Year:     [address] in the Last Year:    Transportation Needs:     Lack of Transportation (Medical):      Lack of Transportation (Non-Medical):    Physical Activity:     Days of Exercise per Week:     Minutes of Exercise per Session:    Stress:     Feeling of Stress :    Social Connections:     Frequency of Communication with Friends and Family:     Frequency of Social Gatherings with Friends and Family:     Attends [employer] Services:     Active Member of Clubs or Organizations:    [facility] Attends Club or Organization Meetings:     Marital Status:    Intimate Partner Violence:     Fear of Current or Ex-Partner:     Emotionally Abused:     Physically Abused:     Sexually Abused:        MEDICATIONS:    Current Facility-Administered Medications:     QUEtiapine (SEROQUEL XR) extended release tablet 300 mg, 300 mg, Oral, Nightly, Anibal Maradiaga MD, 300 mg at 10/31/21 2105    acamprosate (CAMPRAL) tablet 666 mg, 666 mg, Oral, TID, Anibal Maradiaga MD, 666 mg at 11/01/21 0908    QUEtiapine (SEROQUEL) tablet 50 mg, 50 mg, Oral, BID- 8&2, Anibal Maradiaga MD, 50 mg at 11/01/21 0909    traZODone (DESYREL) tablet 50 mg, 50 mg, Oral, Nightly, Anibal Maradiaga MD, 50 mg at 10/31/21 2106    OXcarbazepine (TRILEPTAL) tablet 300 mg, 300 mg, Oral, BID, Aleks Esposito MD, 300 mg at 11/01/21 0909    haloperidol lactate (HALDOL) injection 5 mg, 5 mg, IntraMUSCular, Q6H PRN **OR** haloperidol (HALDOL) tablet 5 mg, 5 mg, Oral, Q6H PRN, Aleks Esposito MD    hydrOXYzine (VISTARIL) capsule 50 mg, 50 mg, Oral, Q6H PRN, 50 mg at 10/31/21 1605 **OR** hydrOXYzine (VISTARIL) injection 50 mg, 50 mg, IntraMUSCular, Q6H PRN, Aleks Esposito MD    folic acid (FOLVITE) tablet 1 mg, 1 mg, Oral, Daily, Aleks Esposito MD, 1 mg at 11/01/21 0909    LORazepam (ATIVAN) tablet 0.5 mg, 0.5 mg, Oral, Q4H PRN, 0.5 mg at 10/30/21 1348 **OR** LORazepam (ATIVAN) tablet 1 mg, 1 mg, Oral, Q4H PRN, 1 mg at 10/28/21 2156 **OR** LORazepam (ATIVAN) tablet 2 mg, 2 mg, Oral, Q2H PRN **OR** LORazepam (ATIVAN) tablet 3 mg, 3 mg, Oral, Q1H PRN **OR** LORazepam (ATIVAN) injection 4 mg, 4 mg, IntraMUSCular, Q1H PRN, Aleks Esposito MD    multivitamin 1 tablet, 1 tablet, Oral, Daily, Arik Caldwell MD, 1 tablet at 11/01/21 0909    ondansetron (ZOFRAN-ODT) disintegrating tablet 4 mg, 4 mg, Oral, Q6H PRN, Arik Caldwell MD, 4 mg at 10/28/21 1954    acetaminophen (TYLENOL) tablet 650 mg, 650 mg, Oral, Q4H PRN, Arik Caldwell MD, 650 mg at 10/31/21 1153    benztropine mesylate (COGENTIN) injection 2 mg, 2 mg, IntraMUSCular, BID PRN, Arik Caldwell MD    magnesium hydroxide (MILK OF MAGNESIA) 400 MG/5ML suspension 30 mL, 30 mL, Oral, Daily PRN, Arik Caldwell MD    nicotine (NICODERM CQ) 21 MG/24HR 1 patch, 1 patch, TransDERmal, Daily, Arik Caldwell MD, 1 patch at 11/01/21 0909    thiamine tablet 100 mg, 100 mg, Oral, Daily, Donzell Julianet, APRN - CNS, 100 mg at 11/01/21 5702    Examination:  BP 90/60   Pulse 96   Temp 98.8 °F (37.1 °C)   Resp 18   Ht 5' 2\" (1.575 m)   Wt 145 lb (65.8 kg)   LMP 10/14/2021   SpO2 95%   BMI 26.52 kg/m²   Gait - steady    HOSPITAL COURSE[de-identified]  Following admission to the hospital, patient had a complete physical exam and blood work up  Patient was monitored closely with suicide precaution  Patient was started on medication as listed below  Was encouraged to participate in group and other milieu activity  Patient started to feel better with this combination of treatment. Significant progress in the symptoms since admission. Mood better, with the score of 2/10 - bad  No AVH or paranoid thoughts  No Hopeless or worthless feeling  No active SI/HI  Appetite:  [x] Normal  [] Increased  [] Decreased    Sleep:       [x] Normal  [] Fair       [] Poor            Energy:    [x] Normal  [] Increased  [] Decreased     SI [] Present  [x] Absent  HI  []Present  [x] Absent   Aggression:  [] yes  [] no  Patient is [x] able  [] unable to CONTRACT FOR SAFETY   Medication side effects(SE):  [x] None(Psych.  Meds.) [] Other      Mental Status Examination on discharge:    Level of consciousness:  within normal limits   Appearance: well-appearing  Behavior/Motor:  no abnormalities noted  Attitude toward examiner:  attentive and good eye contact  Speech:  spontaneous, normal rate and normal volume   Mood: euthymic  Affect:  mood congruent  Thought processes:  linear   Thought content:  Suicidal Ideation:  denies suicidal ideation  Delusions:  no evidence of delusions  Perceptual Disturbance:  denies any perceptual disturbance  Cognition:  oriented to person, place, and time   Concentration intact  Memory intact  Insight good   Judgement fair   Fund of Knowledge adequate      ASSESSMENT:  Patient symptoms are:  [x] Well controlled  [x] Improving  [] Worsening  [] No change      Diagnosis:  Active Problems:    Bipolar depression ([facility] [address]  Resolved Problems:    * No resolved hospital problems. *      LABS:    No results for input(s): WBC, HGB, PLT in the last 72 hours. No results for input(s): NA, K, CL, CO2, BUN, CREATININE, GLUCOSE in the last 72 hours. No results for input(s): BILITOT, ALKPHOS, AST, ALT in the last 72 hours. Lab Results   Component Value Date    LABAMPH Neg 10/28/2021    BARBSCNU Neg 10/28/2021    LABBENZ Neg 10/28/2021    LABMETH Neg 10/28/2021    OPIATESCREENURINE Neg 10/28/2021    PHENCYCLIDINESCREENURINE Neg 10/28/2021    ETOH 274 10/28/2021     Lab Results   Component Value Date    TSH 0.629 10/28/2021     No results found for: LITHIUM  Lab Results   Component Value Date    VALPROATE <7 (L) 06/04/2018       RISK ASSESSMENT AT DISCHARGE: Ongoing risk of relapse with alcohol and depression worsening  Pt refusing to consider Rehab  Poor motivation to seek sobreity    Treatment Plan:  Reviewed current Medications with the patient. Education provided on the complaince with treatment. Risks, benefits, side effects, drug-to-drug interactions and alternatives to treatment were discussed. Encourage patient to attend outpatient follow up appointment and therapy.     Patient was advised to call the outpatient provider, visit the nearest ED or call 911 if symptoms are not manageable. Patient's family member was contacted prior to the discharge. Medication List      START taking these medications    acamprosate 333 MG tablet  Commonly known as: CAMPRAL  Take 2 tablets by mouth 3 times daily     thiamine 100 MG tablet  Take 1 tablet by mouth daily  Start taking on: November 2, 2021        CHANGE how you take these medications    * QUEtiapine 50 MG tablet  Commonly known as: SEROQUEL  Take 1 tablet by mouth 2 times daily at 0800 and 1400  What changed:   · medication strength  · how much to take  · when to take this     * QUEtiapine 300 MG extended release tablet  Commonly known as: SEROQUEL XR  Take 1 tablet by mouth nightly  What changed: You were already taking a medication with the same name, and this prescription was added. Make sure you understand how and when to take each. traZODone 50 MG tablet  Commonly known as: DESYREL  Take 1 tablet by mouth nightly  What changed:   · medication strength  · how much to take         * This list has 2 medication(s) that are the same as other medications prescribed for you. Read the directions carefully, and ask your doctor or other care provider to review them with you.             CONTINUE taking these medications    multivitamin Tabs tablet  Take 1 tablet by mouth daily     OXcarbazepine 300 MG tablet  Commonly known as: TRILEPTAL  Take 1 tablet by mouth 2 times daily           Where to Get Your Medications      These medications were sent to [facility] [id number] - [address], [place] [address]    Phone: [phone]   · acamprosate 333 MG tablet  · OXcarbazepine 300 MG tablet  · QUEtiapine 300 MG extended release tablet  · QUEtiapine 50 MG tablet  · thiamine 100 MG tablet  · traZODone 50 MG tablet           Reason for more than one antipsychotic:   [x] N/A  [] 3 failed monotherapy(drugs tried):  [] Cross over to a new antipsychotic  [] Taper to monotherapy from polypharmacy  [] Augmentation of Clozapine therapy due to treatment resistance to single therapy        TIME SPEND - 35 MINUTES TO COMPLETE THE EVALUATION, DISCHARGE SUMMARY, MEDICATION RECONCILIATION AND FOLLOW UP CARE     Signed:  Larissa Ervin MD  11/1/2021  10:01 AM

## 2021-11-01 NOTE — GROUP NOTE
Group Therapy Note    Date: 11/1/2021    Group Start Time: 1000  Group End Time: 1050  Group Topic: Psychoeducation    [facility] KEVON Tran        Group Therapy Note    Attendees: 11         Patient's Goal:  \"To go home and go to [address] meeting\"    Notes:  Patient attended the 1000 skills group. Patient was more hopeful, she was more attentive and she work fairly well in group. Status After Intervention:  Improved    Participation Level:  Active Listener    Participation Quality: Appropriate      Speech:  normal      Thought Process/Content: Logical      Affective Functioning: Congruent      Mood: calm      Level of consciousness:  Alert      Response to Learning: Able to retain information      Endings: None Reported    Modes of Intervention: Education, Socialization and Activity      Discipline Responsible: Psychoeducational Specialist      Signature:  Anita Tran

## 2021-11-01 NOTE — GROUP NOTE
Group Therapy Note    Date: 11/1/2021    Group Start Time: 1120  Group End Time: 8282  Group Topic: Psychoeducation    [facility]    Kashmir Moreland, [facility]; Joshua Lee, [facility]        Group Therapy Note    Attendees: 10/22         Patient's Goal:  Participation - psychoeducation coping with anxiety and boundaries    Notes:  Quiet but focused and attentive, being discharged today    Status After Intervention:  Improved    Participation Level:  Active Listener    Participation Quality: Appropriate and Attentive      Speech:  normal      Thought Process/Content: Logical      Affective Functioning: Congruent      Mood: euthymic      Level of consciousness:  Alert, Oriented x4 and Attentive      Response to Learning: Able to retain information, Capable of insight, Able to change behavior and Progressing to goal      Endings: None Reported    Modes of Intervention: Education, Support, Socialization, Exploration, Clarifying and Problem-solving      Discipline Responsible: /Counselor      Signature:  Joshua Lee, [facility]

## 2021-11-01 NOTE — GROUP NOTE
Group Therapy Note    Date: 10/31/2021    Group Start Time: 2100  Group End Time: 2145  Group Topic: Wrap-Up    [facility]    GAVI Rodríguez LSW        Group Therapy Note    Attendees: 12         Patient's Goal:  To participate in wrap up group discussion    Notes:  Patient was an active listener during group discussion    Status After Intervention:  Unchanged    Participation Level: Minimal    Participation Quality: Appropriate      Speech:  normal      Thought Process/Content: Logical      Affective Functioning: Flat      Mood: dysphoric and calm      Level of consciousness:  Alert      Response to Learning: Able to verbalize current knowledge/experience      Endings: None Reported    Modes of Intervention: Education      Discipline Responsible: /Counselor      Signature:  GAVI Rodríguez, MARY

## 2021-11-01 NOTE — PROGRESS NOTES
Pt escorted to lobby by staff and discharged to home via taxi, hospital issued taxi voucher given to pt. Belongings given to pt. Pt denies any current suicidal ideation, homicidal ideation or hallucinations.    Mood and affect stable

## 2022-08-02 ENCOUNTER — HOSPITAL ENCOUNTER (INPATIENT)
Age: 42
LOS: 5 days | Discharge: HOME | DRG: 753 | End: 2022-08-08
Attending: STUDENT IN AN ORGANIZED HEALTH CARE EDUCATION/TRAINING PROGRAM | Admitting: PSYCHIATRY & NEUROLOGY
Payer: MEDICAID

## 2022-08-02 DIAGNOSIS — J98.01 BRONCHOSPASM: ICD-10-CM

## 2022-08-02 DIAGNOSIS — F10.929 ACUTE ALCOHOLIC INTOXICATION WITH COMPLICATION (HCC): Primary | ICD-10-CM

## 2022-08-02 DIAGNOSIS — F31.9 BIPOLAR 1 DISORDER (HCC): ICD-10-CM

## 2022-08-02 LAB
ACETAMINOPHEN LEVEL: <5 UG/ML (ref 10–30)
ALBUMIN SERPL-MCNC: 4.5 G/DL (ref 3.5–4.6)
ALP BLD-CCNC: 64 U/L (ref 40–130)
ALT SERPL-CCNC: 65 U/L (ref 0–33)
AMPHETAMINE SCREEN, URINE: NORMAL
ANION GAP SERPL CALCULATED.3IONS-SCNC: 24 MEQ/L (ref 9–15)
AST SERPL-CCNC: 87 U/L (ref 0–35)
BACTERIA: ABNORMAL /HPF
BARBITURATE SCREEN URINE: NORMAL
BASOPHILS ABSOLUTE: 0.1 K/UL (ref 0–0.2)
BASOPHILS RELATIVE PERCENT: 1.2 %
BENZODIAZEPINE SCREEN, URINE: NORMAL
BILIRUB SERPL-MCNC: 0.7 MG/DL (ref 0.2–0.7)
BILIRUBIN URINE: NEGATIVE
BLOOD, URINE: ABNORMAL
BUN BLDV-MCNC: 12 MG/DL (ref 6–20)
CALCIUM SERPL-MCNC: 8.7 MG/DL (ref 8.5–9.9)
CANNABINOID SCREEN URINE: NORMAL
CHLORIDE BLD-SCNC: 99 MEQ/L (ref 95–107)
CHOLESTEROL, TOTAL: 218 MG/DL (ref 0–199)
CLARITY: CLEAR
CO2: 19 MEQ/L (ref 20–31)
COCAINE METABOLITE SCREEN URINE: NORMAL
COLOR: YELLOW
CREAT SERPL-MCNC: 0.59 MG/DL (ref 0.5–0.9)
EOSINOPHILS ABSOLUTE: 0.1 K/UL (ref 0–0.7)
EOSINOPHILS RELATIVE PERCENT: 1.1 %
EPITHELIAL CELLS, UA: ABNORMAL /HPF (ref 0–5)
ETHANOL PERCENT: 0.03 G/DL
ETHANOL PERCENT: 0.28 G/DL
ETHANOL: 325 MG/DL (ref 0–0.08)
ETHANOL: 33 MG/DL (ref 0–0.08)
FENTANYL SCREEN, URINE: NORMAL
GFR AFRICAN AMERICAN: >60
GFR NON-AFRICAN AMERICAN: >60
GLOBULIN: 2.9 G/DL (ref 2.3–3.5)
GLUCOSE BLD-MCNC: 76 MG/DL (ref 70–99)
GLUCOSE URINE: NEGATIVE MG/DL
HCG(URINE) PREGNANCY TEST: NEGATIVE
HCT VFR BLD CALC: 43.5 % (ref 37–47)
HDLC SERPL-MCNC: 86 MG/DL (ref 40–59)
HEMOGLOBIN: 15 G/DL (ref 12–16)
HYALINE CASTS: ABNORMAL /HPF (ref 0–5)
KETONES, URINE: 40 MG/DL
LDL CHOLESTEROL CALCULATED: 111 MG/DL (ref 0–129)
LEUKOCYTE ESTERASE, URINE: NEGATIVE
LYMPHOCYTES ABSOLUTE: 2.2 K/UL (ref 1–4.8)
LYMPHOCYTES RELATIVE PERCENT: 29.5 %
Lab: NORMAL
MCH RBC QN AUTO: 30.2 PG (ref 27–31.3)
MCHC RBC AUTO-ENTMCNC: 34.4 % (ref 33–37)
MCV RBC AUTO: 87.9 FL (ref 82–100)
METHADONE SCREEN, URINE: NORMAL
MONOCYTES ABSOLUTE: 0.4 K/UL (ref 0.2–0.8)
MONOCYTES RELATIVE PERCENT: 5.4 %
NEUTROPHILS ABSOLUTE: 4.7 K/UL (ref 1.4–6.5)
NEUTROPHILS RELATIVE PERCENT: 62.8 %
NITRITE, URINE: NEGATIVE
OPIATE SCREEN URINE: NORMAL
OXYCODONE URINE: NORMAL
PDW BLD-RTO: 13 % (ref 11.5–14.5)
PH UA: 6 (ref 5–9)
PHENCYCLIDINE SCREEN URINE: NORMAL
PLATELET # BLD: 275 K/UL (ref 130–400)
POTASSIUM SERPL-SCNC: 3.9 MEQ/L (ref 3.4–4.9)
PROPOXYPHENE SCREEN: NORMAL
PROTEIN UA: 30 MG/DL
RBC # BLD: 4.95 M/UL (ref 4.2–5.4)
RBC UA: ABNORMAL /HPF (ref 0–2)
SALICYLATE, SERUM: <0.3 MG/DL (ref 15–30)
SARS-COV-2, NAAT: NOT DETECTED
SODIUM BLD-SCNC: 142 MEQ/L (ref 135–144)
SPECIFIC GRAVITY UA: 1.01 (ref 1–1.03)
TOTAL CK: 215 U/L (ref 0–170)
TOTAL PROTEIN: 7.4 G/DL (ref 6.3–8)
TRIGL SERPL-MCNC: 103 MG/DL (ref 0–150)
TSH SERPL DL<=0.05 MIU/L-ACNC: 1.95 UIU/ML (ref 0.44–3.86)
URINE REFLEX TO CULTURE: ABNORMAL
UROBILINOGEN, URINE: 0.2 E.U./DL
WBC # BLD: 7.5 K/UL (ref 4.8–10.8)
WBC UA: ABNORMAL /HPF (ref 0–5)

## 2022-08-02 PROCEDURE — 80143 DRUG ASSAY ACETAMINOPHEN: CPT

## 2022-08-02 PROCEDURE — 85025 COMPLETE CBC W/AUTO DIFF WBC: CPT

## 2022-08-02 PROCEDURE — 94761 N-INVAS EAR/PLS OXIMETRY MLT: CPT

## 2022-08-02 PROCEDURE — 81001 URINALYSIS AUTO W/SCOPE: CPT

## 2022-08-02 PROCEDURE — 84703 CHORIONIC GONADOTROPIN ASSAY: CPT

## 2022-08-02 PROCEDURE — 36415 COLL VENOUS BLD VENIPUNCTURE: CPT

## 2022-08-02 PROCEDURE — 6370000000 HC RX 637 (ALT 250 FOR IP): Performed by: STUDENT IN AN ORGANIZED HEALTH CARE EDUCATION/TRAINING PROGRAM

## 2022-08-02 PROCEDURE — 80307 DRUG TEST PRSMV CHEM ANLYZR: CPT

## 2022-08-02 PROCEDURE — 80061 LIPID PANEL: CPT

## 2022-08-02 PROCEDURE — 82077 ASSAY SPEC XCP UR&BREATH IA: CPT

## 2022-08-02 PROCEDURE — 80179 DRUG ASSAY SALICYLATE: CPT

## 2022-08-02 PROCEDURE — 94640 AIRWAY INHALATION TREATMENT: CPT

## 2022-08-02 PROCEDURE — 87635 SARS-COV-2 COVID-19 AMP PRB: CPT

## 2022-08-02 PROCEDURE — 80053 COMPREHEN METABOLIC PANEL: CPT

## 2022-08-02 PROCEDURE — 84443 ASSAY THYROID STIM HORMONE: CPT

## 2022-08-02 PROCEDURE — 99285 EMERGENCY DEPT VISIT HI MDM: CPT

## 2022-08-02 PROCEDURE — 82550 ASSAY OF CK (CPK): CPT

## 2022-08-02 RX ORDER — HYDROXYZINE PAMOATE 50 MG/1
50 CAPSULE ORAL ONCE
Status: COMPLETED | OUTPATIENT
Start: 2022-08-02 | End: 2022-08-02

## 2022-08-02 RX ORDER — CETIRIZINE HYDROCHLORIDE 10 MG/1
10 TABLET ORAL DAILY PRN
Status: ON HOLD | COMMUNITY
End: 2022-08-08 | Stop reason: HOSPADM

## 2022-08-02 RX ORDER — IPRATROPIUM BROMIDE AND ALBUTEROL SULFATE 2.5; .5 MG/3ML; MG/3ML
1 SOLUTION RESPIRATORY (INHALATION) ONCE
Status: COMPLETED | OUTPATIENT
Start: 2022-08-02 | End: 2022-08-02

## 2022-08-02 RX ORDER — ACETAMINOPHEN 500 MG
1000 TABLET ORAL ONCE
Status: DISCONTINUED | OUTPATIENT
Start: 2022-08-02 | End: 2022-08-03

## 2022-08-02 RX ORDER — GABAPENTIN 300 MG/1
300 CAPSULE ORAL ONCE
Status: COMPLETED | OUTPATIENT
Start: 2022-08-02 | End: 2022-08-02

## 2022-08-02 RX ORDER — ACETAMINOPHEN 500 MG
1000 TABLET ORAL ONCE
Status: COMPLETED | OUTPATIENT
Start: 2022-08-02 | End: 2022-08-03

## 2022-08-02 RX ORDER — LORAZEPAM 1 MG/1
1 TABLET ORAL ONCE
Status: COMPLETED | OUTPATIENT
Start: 2022-08-02 | End: 2022-08-02

## 2022-08-02 RX ADMIN — IPRATROPIUM BROMIDE AND ALBUTEROL SULFATE 1 AMPULE: 2.5; .5 SOLUTION RESPIRATORY (INHALATION) at 08:22

## 2022-08-02 RX ADMIN — LORAZEPAM 1 MG: 1 TABLET ORAL at 15:20

## 2022-08-02 RX ADMIN — HYDROXYZINE PAMOATE 50 MG: 50 CAPSULE ORAL at 08:13

## 2022-08-02 RX ADMIN — GABAPENTIN 300 MG: 300 CAPSULE ORAL at 08:43

## 2022-08-02 ASSESSMENT — PAIN SCALES - GENERAL
PAINLEVEL_OUTOF10: 5
PAINLEVEL_OUTOF10: 5
PAINLEVEL_OUTOF10: 7

## 2022-08-02 ASSESSMENT — ENCOUNTER SYMPTOMS
SINUS PRESSURE: 0
TROUBLE SWALLOWING: 0
BACK PAIN: 0
CHEST TIGHTNESS: 0
VOMITING: 0
ABDOMINAL PAIN: 0
DIARRHEA: 0
COUGH: 0
SHORTNESS OF BREATH: 0

## 2022-08-02 ASSESSMENT — PAIN DESCRIPTION - LOCATION
LOCATION: GENERALIZED
LOCATION: LEG

## 2022-08-02 ASSESSMENT — LIFESTYLE VARIABLES
HOW MANY STANDARD DRINKS CONTAINING ALCOHOL DO YOU HAVE ON A TYPICAL DAY: 10 OR MORE
HOW MANY STANDARD DRINKS CONTAINING ALCOHOL DO YOU HAVE ON A TYPICAL DAY: 10 OR MORE

## 2022-08-02 NOTE — ED NOTES
Breakfast tray was untouched at bedside with 0% eaten. Patient remains resting with even and unlabored respirations, no distress noted.       Kvng Morales RN  08/02/22 [id number]

## 2022-08-02 NOTE — ED NOTES
Medicated with Vistaril for C/O anxiety. Refused Tylenol for C/O bilateral leg pain.      Yogi Greco RN  08/02/22 9772

## 2022-08-02 NOTE — ED NOTES
Pt to nurse stating \"I need help, I feel like crap, cant you just give me something. \" asked pt what was going on pt replied \"My mental health I need help and my body hurts. \"      Enrique Arnett RN  08/02/22 1939

## 2022-08-02 NOTE — ED TRIAGE NOTES
Reports relapse on alcohol on Wednesday 07/27/22 & drinking daily since. C/O depression & anxiety with fleeting self-harm thoughts.

## 2022-08-02 NOTE — ED NOTES
notified Patient refused Tylenol & requesting \"something else\" for C/O bilateral leg pain.      Jaclyn Mai RN  08/02/22 [id number]

## 2022-08-02 NOTE — ED PROVIDER NOTES
[facility]  eMERGENCY dEPARTMENT eNCOUnter      Pt Name: Ilana George  MRN: [id number]  Arjungfshruti 1980  Date of evaluation: 8/2/2022  Provider: Marko Guzman DO    CHIEF COMPLAINT       Chief Complaint   Patient presents with    Suicidal         HISTORY OF PRESENT ILLNESS   (Location/Symptom, Timing/Onset,Context/Setting, Quality, Duration, Modifying Factors, Severity)  Note limiting factors. Ilana George is a 38 y.o. female who presents to the emergency department with c/o suicidal ideation. Patient feels extremely depressed that she is a daily drinker. Patient had just gotten out of rehab this past week she states that she was actually kicked out of the '[address] [place] [address] Road' which is a facility where she would stay so that she would not drink. Patient states anytime she is out of rehab she resorts to drinking liquor. Patient denies any fever, chills or cough. The patient's daughter is present with her. Even though the patient can answer the questions and answer them appropriately she states \"ask her. \"    Patient has no voiced complaints of wanting to harm others. Patient has no reports of auditory or visual hallucinations. The patient states she has neuropathy to both of her legs as she would like Ultram.  ER physician explained that often they will use Neurontin. Patient states she does not want this she is concerned because it can be addictive. It was explained to the patient that Ultram is an opiate and there is currently an opiate epidemic because opiates are highly addictive and Neurontin is less likely to be addictive. Patient began becoming argumentative the patient's daughter than left. The patient is intoxicated and smells strongly of the aroma of alcohol. HPI    NursingNotes were reviewed.     REVIEW OF SYSTEMS    (2-9 systems for level 4, 10 or more for level 5)     Review of Systems   Constitutional:  Negative for activity change, appetite change, chills, fever and unexpected weight change. HENT:  Negative for drooling, ear pain, nosebleeds, sinus pressure and trouble swallowing. Respiratory:  Negative for cough, chest tightness and shortness of breath. Cardiovascular:  Negative for chest pain and leg swelling. Gastrointestinal:  Negative for abdominal pain, diarrhea and vomiting. Endocrine: Negative for polydipsia and polyphagia. Genitourinary:  Negative for dysuria, flank pain and frequency. Musculoskeletal:  Negative for back pain and myalgias. Skin:  Negative for pallor and rash. Neurological:  Negative for syncope, weakness and headaches. Hematological:  Does not bruise/bleed easily. All other systems reviewed and are negative. Except as noted above the remainder of the review of systems was reviewed and negative.        PAST MEDICAL HISTORY     Past Medical History:   Diagnosis Date    Alcohol abuse     Alcoholic hepatitis 6/8/4793    Anxiety     Bipolar 1 disorder ([facility] [address]     Depression     Seizures (HCC)     x1 only related to alcohol     Thyroid disease          SURGICALHISTORY       Past Surgical History:   Procedure Laterality Date    BREAST ENHANCEMENT SURGERY      BREAST SURGERY      TONSILLECTOMY           CURRENT MEDICATIONS       Current Discharge Medication List        CONTINUE these medications which have NOT CHANGED    Details   cetirizine (ZYRTEC) 10 MG tablet Take 10 mg by mouth daily as needed for Allergies      OXcarbazepine (TRILEPTAL) 300 MG tablet Take 1 tablet by mouth 2 times daily  Qty: 30 tablet, Refills: 3      QUEtiapine (SEROQUEL) 50 MG tablet Take 1 tablet by mouth 2 times daily at 0800 and 1400  Qty: 30 tablet, Refills: 2      QUEtiapine (SEROQUEL XR) 300 MG extended release tablet Take 1 tablet by mouth nightly  Qty: 15 tablet, Refills: 2      acamprosate (CAMPRAL) 333 MG tablet Take 2 tablets by mouth 3 times daily  Qty: 90 tablet, Refills: 2      thiamine 100 MG tablet Take 1 tablet by mouth daily  Qty: 30 tablet, Refills: 3 Multiple Vitamin (MULTIVITAMIN) TABS tablet Take 1 tablet by mouth daily  Qty: 30 tablet, Refills: 1             ALLERGIES     Patient has no known allergies. FAMILY HISTORY       Family History   Problem Relation Age of Onset    Depression Mother           SOCIAL HISTORY       Social History     Socioeconomic History    Marital status:      Spouse name: None    Number of children: 5    Years of education: 14    Highest education level: None   Occupational History    Occupation: nurse   Tobacco Use    Smoking status: Every Day     Packs/day: 1.00     Years: 15.00     Pack years: 15.00     Types: Cigarettes    Smokeless tobacco: Never   Vaping Use    Vaping Use: Every day    Substances: Nicotine, Flavoring    Devices: Pre-filled or refillable cartridge   Substance and Sexual Activity    Alcohol use: Yes     Comment: drinks a fifth of rum per day. trying to stop drinking     Drug use: No    Sexual activity: Yes     Partners: Male     Comment: [facility] one person. \"       SCREENINGS      @FLOW([id number])@      PHYSICAL EXAM    (up to 7 for level 4, 8 or more for level 5)     ED Triage Vitals [08/02/22 0726]   BP Temp Temp Source Heart Rate Resp SpO2 Height Weight   122/71 98.4 °F (36.9 °C) Oral (!) 112 18 95 % 5' 2\" (1.575 m) 145 lb (65.8 kg)       Physical Exam  Vitals and nursing note reviewed. Constitutional:       General: She is awake. She is not in acute distress. Appearance: Normal appearance. She is well-developed and normal weight. She is not ill-appearing, toxic-appearing or diaphoretic. Comments: No photophobia. No phonophobia. Smells strongly of the aroma of alcohol. HENT:      Head: Normocephalic and atraumatic. No Hinds's sign. Right Ear: External ear normal.      Left Ear: External ear normal.      Nose: Nose normal. No congestion or rhinorrhea. Mouth/Throat:      Mouth: Mucous membranes are moist.      Pharynx: Oropharynx is clear.  No oropharyngeal exudate or posterior weakness. Coordination: Coordination normal.      Deep Tendon Reflexes: Reflexes are normal and symmetric. Psychiatric:         Attention and Perception: Attention and perception normal. She is attentive. She does not perceive auditory or visual hallucinations. Mood and Affect: Mood is depressed. Affect is labile. Speech: Speech normal.         Behavior: Behavior is agitated. Behavior is cooperative. Thought Content: Thought content includes suicidal ideation. Cognition and Memory: Cognition and memory normal.         Judgment: Judgment normal.       DIAGNOSTIC RESULTS     EKG: All EKG's are interpreted by the Emergency Department Physician who either signs or Co-signsthis chart in the absence of a cardiologist.        RADIOLOGY:   Alexandre Crock such as CT, Ultrasound and MRI are read by the radiologist. Plain radiographic images are visualized and preliminarily interpreted by the emergency physician with the below findings:        Interpretation per the Radiologist below, if available at the time ofthis note:    No orders to display         ED BEDSIDE ULTRASOUND:   Performed by ED Physician - none    LABS:  Labs Reviewed   COMPREHENSIVE METABOLIC PANEL - Abnormal; Notable for the following components:       Result Value    CO2 19 (*)     Anion Gap 24 (*)     ALT 65 (*)     AST 87 (*)     All other components within normal limits   CK - Abnormal; Notable for the following components:     Total  (*)     All other components within normal limits   ACETAMINOPHEN LEVEL - Abnormal; Notable for the following components:    Acetaminophen Level <5 (*)     All other components within normal limits   URINALYSIS WITH REFLEX TO CULTURE - Abnormal; Notable for the following components:    Ketones, Urine 40 (*)     Blood, Urine LARGE (*)     Protein, UA 30 (*)     All other components within normal limits   SALICYLATE LEVEL - Abnormal; Notable for the following components: Salicylate, Serum <4.8 (*)     All other components within normal limits   LIPID PANEL - Abnormal; Notable for the following components:    Cholesterol, Total 218 (*)     HDL 86 (*)     All other components within normal limits   MICROSCOPIC URINALYSIS - Abnormal; Notable for the following components:    RBC, UA 5-10 (*)     Bacteria, UA RARE (*)     All other components within normal limits   COVID-19, RAPID   ETHANOL   CBC WITH AUTO DIFFERENTIAL   URINE DRUG SCREEN   TSH   PREGNANCY, URINE   ETHANOL       All other labs were within normal range or not returned as of this dictation. EMERGENCY DEPARTMENT COURSE and DIFFERENTIAL DIAGNOSIS/MDM:   Vitals:    Vitals:    08/03/22 1430 08/03/22 1541 08/04/22 0832 08/04/22 1815   BP: (!) 118/55 119/81 106/74 122/72   Pulse: 75 91 (!) 103 78   Resp:  18 18 20   Temp:  98.1 °F (36.7 °C) 98.3 °F (36.8 °C) 97.9 °F (36.6 °C)   TempSrc:  Oral Oral Oral   SpO2:  98% 97% 98%   Weight:       Height:           Patient medically cleared for psychiatric services. MDM  Case was reviewed with the psychiatrist.  Patient admitted to the hospital to the psychiatric service. CONSULTS:  IP CONSULT TO HOSPITALIST  IP CONSULT TO SOCIAL WORK    PROCEDURES:  Unless otherwise noted below, none     Procedures    FINAL IMPRESSION      1. Acute alcoholic intoxication with complication ([address]    2. Bronchospasm    3. Bipolar 1 disorder [facility])          DISPOSITION/PLAN   DISPOSITION Decision To Admit 08/02/2022 11:09:32 PM      PATIENT REFERRED TO:  No follow-up provider specified.     DISCHARGE MEDICATIONS:  Current Discharge Medication List             (Please note that portions of this note were completed with a voice recognition program.  Efforts were made to edit the dictations but occasionally words are mis-transcribed.)    Korin Naik DO (electronically signed)  Attending Emergency Physician          Korin Naik DO  08/04/22 7492

## 2022-08-02 NOTE — ED NOTES
Appetite good for lunch. Continues to take PO fluids well. Voices no complaints @ this time.      Kelly Ogden RN  08/02/22 1038

## 2022-08-02 NOTE — ED NOTES
Patient reports she feels nauseated and restless and feels she is dehydrated. She was educated that he vital signs reflect this and her 6 day long binge drinking would support this. She was also educated that continued use of Ativan is for ETOH withdrawal and not symptomatic dehydration. When asked if patient felt better after administration of Ativan earlier today she reports she did not. She again reports she only relapsed and binged drank for approximately 6 days and now feels \"hungover\". PO fluids encouraged, Gatorade at the patients bedside. She verbalized understanding.              Reece Lindo RN  08/02/22 [place] VICENTE Ward  08/02/22 6684

## 2022-08-02 NOTE — ED NOTES
Home medications sent to Pharmacy for storage. Patient resting quietly with eyes closed & no acute distress noted.      Barb Hoyos RN  08/02/22 0193

## 2022-08-02 NOTE — ED NOTES
Urine and COVID obtained and sent. Patient changed into psych safe clothing. Daughter at the bedside currently.       Uziel De La Rosa RN  08/02/22 2888

## 2022-08-02 NOTE — ED NOTES
Patient C/O anxiety & requesting \"something for withdrawal\". CIWA 3. Dr. Igor Hopper notified & medication order received.      Leah Blanchard RN  08/02/22 9302

## 2022-08-02 NOTE — ED NOTES
Medicated as ordered. PO fluids encouraged & taking fairly well.      Lissette Shell, VICENTE  08/02/22 9469

## 2022-08-02 NOTE — ED NOTES
Changed into [facility] clothing earlier. Skin check done with no skin breakdown noted. Contraband check done with no contraband found @ this time. Dr. Gloria Salgado @ bedside for medical exam. Danny Merhamlet obtained earlier. Tolerated procedure well. Urine specimen obtained & sent earlier. Lab @ bedside. Blood drawn. Tolerated lab draw well. Daughter @ bedside.      Meliton Davis RN  08/02/22 9181

## 2022-08-03 LAB
EKG ATRIAL RATE: 62 BPM
EKG P AXIS: 27 DEGREES
EKG P-R INTERVAL: 150 MS
EKG Q-T INTERVAL: 452 MS
EKG QRS DURATION: 80 MS
EKG QTC CALCULATION (BAZETT): 458 MS
EKG R AXIS: 59 DEGREES
EKG T AXIS: 38 DEGREES
EKG VENTRICULAR RATE: 62 BPM

## 2022-08-03 PROCEDURE — 6370000000 HC RX 637 (ALT 250 FOR IP): Performed by: STUDENT IN AN ORGANIZED HEALTH CARE EDUCATION/TRAINING PROGRAM

## 2022-08-03 PROCEDURE — 93005 ELECTROCARDIOGRAM TRACING: CPT | Performed by: PSYCHIATRY & NEUROLOGY

## 2022-08-03 PROCEDURE — 99223 1ST HOSP IP/OBS HIGH 75: CPT | Performed by: PSYCHIATRY & NEUROLOGY

## 2022-08-03 PROCEDURE — 93010 ELECTROCARDIOGRAM REPORT: CPT | Performed by: INTERNAL MEDICINE

## 2022-08-03 PROCEDURE — 6370000000 HC RX 637 (ALT 250 FOR IP): Performed by: PSYCHIATRY & NEUROLOGY

## 2022-08-03 PROCEDURE — 1240000000 HC EMOTIONAL WELLNESS R&B

## 2022-08-03 RX ORDER — TRAZODONE HYDROCHLORIDE 50 MG/1
50 TABLET ORAL NIGHTLY PRN
Status: DISCONTINUED | OUTPATIENT
Start: 2022-08-03 | End: 2022-08-08 | Stop reason: HOSPADM

## 2022-08-03 RX ORDER — MAGNESIUM HYDROXIDE/ALUMINUM HYDROXICE/SIMETHICONE 120; 1200; 1200 MG/30ML; MG/30ML; MG/30ML
30 SUSPENSION ORAL PRN
Status: DISCONTINUED | OUTPATIENT
Start: 2022-08-03 | End: 2022-08-08 | Stop reason: HOSPADM

## 2022-08-03 RX ORDER — QUETIAPINE FUMARATE 50 MG/1
100 TABLET, EXTENDED RELEASE ORAL NIGHTLY
Status: DISCONTINUED | OUTPATIENT
Start: 2022-08-03 | End: 2022-08-04

## 2022-08-03 RX ORDER — CHLORDIAZEPOXIDE HYDROCHLORIDE 25 MG/1
25 CAPSULE, GELATIN COATED ORAL 4 TIMES DAILY PRN
Status: DISCONTINUED | OUTPATIENT
Start: 2022-08-03 | End: 2022-08-08 | Stop reason: HOSPADM

## 2022-08-03 RX ORDER — HALOPERIDOL 5 MG
5 TABLET ORAL EVERY 6 HOURS PRN
Status: DISCONTINUED | OUTPATIENT
Start: 2022-08-03 | End: 2022-08-08 | Stop reason: HOSPADM

## 2022-08-03 RX ORDER — LORAZEPAM 1 MG/1
4 TABLET ORAL
Status: DISCONTINUED | OUTPATIENT
Start: 2022-08-03 | End: 2022-08-08 | Stop reason: HOSPADM

## 2022-08-03 RX ORDER — HYDROXYZINE PAMOATE 50 MG/1
50 CAPSULE ORAL EVERY 6 HOURS PRN
Status: DISCONTINUED | OUTPATIENT
Start: 2022-08-03 | End: 2022-08-08 | Stop reason: HOSPADM

## 2022-08-03 RX ORDER — FOLIC ACID 1 MG/1
1 TABLET ORAL DAILY
Status: DISCONTINUED | OUTPATIENT
Start: 2022-08-03 | End: 2022-08-08 | Stop reason: HOSPADM

## 2022-08-03 RX ORDER — HYDROXYZINE HYDROCHLORIDE 50 MG/ML
50 INJECTION, SOLUTION INTRAMUSCULAR EVERY 6 HOURS PRN
Status: DISCONTINUED | OUTPATIENT
Start: 2022-08-03 | End: 2022-08-08 | Stop reason: HOSPADM

## 2022-08-03 RX ORDER — LANOLIN ALCOHOL/MO/W.PET/CERES
100 CREAM (GRAM) TOPICAL DAILY
Status: DISCONTINUED | OUTPATIENT
Start: 2022-08-03 | End: 2022-08-08 | Stop reason: HOSPADM

## 2022-08-03 RX ORDER — CHLORDIAZEPOXIDE HYDROCHLORIDE 25 MG/1
50 CAPSULE, GELATIN COATED ORAL
Status: DISCONTINUED | OUTPATIENT
Start: 2022-08-03 | End: 2022-08-08 | Stop reason: HOSPADM

## 2022-08-03 RX ORDER — CHLORDIAZEPOXIDE HYDROCHLORIDE 25 MG/1
75 CAPSULE, GELATIN COATED ORAL
Status: DISCONTINUED | OUTPATIENT
Start: 2022-08-03 | End: 2022-08-08 | Stop reason: HOSPADM

## 2022-08-03 RX ORDER — CHLORDIAZEPOXIDE HYDROCHLORIDE 5 MG/1
10 CAPSULE, GELATIN COATED ORAL 4 TIMES DAILY PRN
Status: DISCONTINUED | OUTPATIENT
Start: 2022-08-03 | End: 2022-08-08 | Stop reason: HOSPADM

## 2022-08-03 RX ORDER — MULTIVITAMIN WITH IRON
1 TABLET ORAL DAILY
Status: DISCONTINUED | OUTPATIENT
Start: 2022-08-03 | End: 2022-08-08 | Stop reason: HOSPADM

## 2022-08-03 RX ORDER — NICOTINE 21 MG/24HR
1 PATCH, TRANSDERMAL 24 HOURS TRANSDERMAL DAILY
Status: DISCONTINUED | OUTPATIENT
Start: 2022-08-03 | End: 2022-08-08 | Stop reason: HOSPADM

## 2022-08-03 RX ORDER — OXCARBAZEPINE 150 MG/1
150 TABLET, FILM COATED ORAL 2 TIMES DAILY
Status: DISCONTINUED | OUTPATIENT
Start: 2022-08-03 | End: 2022-08-05

## 2022-08-03 RX ORDER — ACETAMINOPHEN 325 MG/1
650 TABLET ORAL EVERY 4 HOURS PRN
Status: DISCONTINUED | OUTPATIENT
Start: 2022-08-03 | End: 2022-08-08 | Stop reason: HOSPADM

## 2022-08-03 RX ORDER — HALOPERIDOL 5 MG/ML
5 INJECTION INTRAMUSCULAR EVERY 6 HOURS PRN
Status: DISCONTINUED | OUTPATIENT
Start: 2022-08-03 | End: 2022-08-08 | Stop reason: HOSPADM

## 2022-08-03 RX ORDER — BENZTROPINE MESYLATE 1 MG/ML
2 INJECTION INTRAMUSCULAR; INTRAVENOUS 2 TIMES DAILY PRN
Status: DISCONTINUED | OUTPATIENT
Start: 2022-08-03 | End: 2022-08-08 | Stop reason: HOSPADM

## 2022-08-03 RX ADMIN — OXCARBAZEPINE 150 MG: 150 TABLET, FILM COATED ORAL at 10:42

## 2022-08-03 RX ADMIN — HYDROXYZINE PAMOATE 50 MG: 50 CAPSULE ORAL at 05:29

## 2022-08-03 RX ADMIN — HALOPERIDOL 5 MG: 5 TABLET ORAL at 02:10

## 2022-08-03 RX ADMIN — ACETAMINOPHEN 1000 MG: 500 TABLET ORAL at 00:06

## 2022-08-03 RX ADMIN — Medication 100 MG: at 09:40

## 2022-08-03 RX ADMIN — HYDROXYZINE PAMOATE 50 MG: 50 CAPSULE ORAL at 11:46

## 2022-08-03 RX ADMIN — OXCARBAZEPINE 150 MG: 150 TABLET, FILM COATED ORAL at 21:10

## 2022-08-03 RX ADMIN — THERA TABS 1 TABLET: TAB at 09:40

## 2022-08-03 RX ADMIN — ACETAMINOPHEN 650 MG: 325 TABLET, FILM COATED ORAL at 09:40

## 2022-08-03 RX ADMIN — QUETIAPINE FUMARATE 100 MG: 50 TABLET, EXTENDED RELEASE ORAL at 21:09

## 2022-08-03 RX ADMIN — FOLIC ACID 1 MG: 1 TABLET ORAL at 09:39

## 2022-08-03 ASSESSMENT — SLEEP AND FATIGUE QUESTIONNAIRES
SLEEP PATTERN: DISTURBED/INTERRUPTED SLEEP
DO YOU USE A SLEEP AID: NO
DO YOU HAVE DIFFICULTY SLEEPING: YES
AVERAGE NUMBER OF SLEEP HOURS: 5

## 2022-08-03 ASSESSMENT — LIFESTYLE VARIABLES
HOW MANY STANDARD DRINKS CONTAINING ALCOHOL DO YOU HAVE ON A TYPICAL DAY: 10 OR MORE
HOW OFTEN DO YOU HAVE A DRINK CONTAINING ALCOHOL: 2-3 TIMES A WEEK

## 2022-08-03 ASSESSMENT — PAIN SCALES - GENERAL
PAINLEVEL_OUTOF10: 7
PAINLEVEL_OUTOF10: 8

## 2022-08-03 ASSESSMENT — PAIN DESCRIPTION - LOCATION: LOCATION: GENERALIZED

## 2022-08-03 ASSESSMENT — PAIN DESCRIPTION - ORIENTATION: ORIENTATION: DISTAL

## 2022-08-03 ASSESSMENT — PAIN DESCRIPTION - DESCRIPTORS: DESCRIPTORS: BURNING;DISCOMFORT;PRESSURE

## 2022-08-03 ASSESSMENT — PATIENT HEALTH QUESTIONNAIRE - PHQ9: SUM OF ALL RESPONSES TO PHQ QUESTIONS 1-9: 19

## 2022-08-03 NOTE — PROGRESS NOTES
Behavioral Services  Medicare Certification Upon Admission    I certify that this patient's inpatient psychiatric hospital admission is medically necessary for:    [x] (1) Treatment which could reasonably be expected to improve this patient's condition,       [x] (2) Or for diagnostic study;     AND     [x](2) The inpatient psychiatric services are provided while the individual is under the care of a physician and are included in the individualized plan of care.     Estimated length of stay/service 3-5    Plan for post-hospital care OP care    Electronically signed by Emanuel Dang MD on 8/3/2022 at 9:53 AM

## 2022-08-03 NOTE — PROGRESS NOTES
Patient did not attend group despite staff encouragement.   Electronically signed by Fabian Soctt on 8/3/2022 at 3:26 PM

## 2022-08-03 NOTE — CARE COORDINATION
Brief Intervention and Referral to Treatment Summary    Patient was provided PHQ-9, AUDIT-C and DAST Screening:      PHQ-9 Score: 19  AUDIT-C Score:  11  DAST Score:  0    Patients substance use is considered     Low Risk/Healthy  Moderate Risk  Harmful  X (recent relapse on alcohol 7/28. Binging since)  Dependent    Patients depression is considered:     Minimal  Mild   Moderate X   Moderately Severe  Severe    Brief Education Was Provided    Patient was receptive X   Patient was not receptive      Brief Intervention Is Provided (Only for AUDIT-C or DAST)     Patient reports readiness to decrease and/or stop use and a plan was discussed  X  Patient denies readiness to decrease and/or stop use and a plan was not discussed      Recommendations/Referrals for Brief and/or Specialized Treatment Provided to Patient     Referral was made to [facility] for sober living placement. Uds is negative. ETOH was . 285 upon arrival. Electronically signed by CATHERINE Guevara on 8/3/2022 at 2:11 PM

## 2022-08-03 NOTE — H&P
[address] [place] [address] Department of Psychiatry    History and Physical - Adult     CHIEF COMPLAINT:  Depression SI    History obtained from:  patient    Patient was seen after discussing with the treatment team and reviewing the chart      HISTORY OF PRESENT ILLNESS:      The patient is a 38 y.o. female , living alone at sober living [address] [place] [address], employed with significant past history of bipolar disorder, alcoholism    Pt was staying at [address] [place] [address] for 5 months and was sober for 5 months  Relapse with alcohol use last Thursday- no reason or precipitating factor. Was drinking all day since last Thursday until she came here. A fifth or more of liquor per day. Pt is currently going through withdrawal  Not been taking any medication since she is not allowed to take any bipolar medication over there at [facility]  H/O Seizures in the past sec to w/d    Depression Severity: Rating mood to be around 1/10 (10- good)  Quality:melancholic  Worse all day  Content: Hopeless, worthless and helpless feeling  Suicidal thoughts - want to drink and take an overdose, not want to be alive like this  Associated symptoms:  Poor concentration, anhedonia, decrease motivation  Sleep and appetite- poor    Pt has 5 children 23 to 5 y/o- they are angry at her drinking habits, not sure where she is going to live, don't know where her belongings are. The patient is not currently receiving care for the above psychiatric illness.     Medications Prior to Admission:   Medications Prior to Admission: cetirizine (ZYRTEC) 10 MG tablet, Take 10 mg by mouth daily as needed for Allergies  OXcarbazepine (TRILEPTAL) 300 MG tablet, Take 1 tablet by mouth 2 times daily (Patient not taking: Reported on 8/2/2022)  QUEtiapine (SEROQUEL) 50 MG tablet, Take 1 tablet by mouth 2 times daily at 0800 and 1400 (Patient not taking: Reported on 8/2/2022)  QUEtiapine (SEROQUEL XR) 300 MG extended release tablet, Take 1 tablet by mouth nightly (Patient not taking: Reported on 8/2/2022)  acamprosate (CAMPRAL) 333 MG tablet, Take 2 tablets by mouth 3 times daily (Patient not taking: Reported on 8/2/2022)  thiamine 100 MG tablet, Take 1 tablet by mouth daily (Patient not taking: Reported on 8/2/2022)  Multiple Vitamin (MULTIVITAMIN) TABS tablet, Take 1 tablet by mouth daily (Patient not taking: Reported on 8/2/2022)    Compliance:no    Psychiatric Review of Systems       Depression: yes     Payal or Hypomania:  Yes- mood swings, anger irritable, racing thoughts, impulsive drinking     Panic Attacks:  yes      Phobias:  no     Obsessions and Compulsions:  no     PTSD : no     Hallucinations:  no     Delusions:  no    Substance Abuse History:  ETOH: yes   Marijuana: no  Opiates: no  Other Drugs: no      Past Psychiatric History:  Prior Diagnosis:  Bipolar I disorder; borderline traits, alcohlism  Psychiatrist: no  Therapist:no  Hospitalization: yes  Hx of Suicidal Attempts: yes  Hx of violence:  no  ECT: no  Previous discontinued Psychiatric Med Trials:     Past Medical History:        Diagnosis Date    Alcohol abuse     Alcoholic hepatitis 8/6/1873    Anxiety     Bipolar 1 disorder ([facility] [address]     Depression     Seizures ([facility] [address]     x1 only related to alcohol     Thyroid disease        Past Surgical History:        Procedure Laterality Date    BREAST ENHANCEMENT SURGERY      BREAST SURGERY      TONSILLECTOMY         Allergies:   Patient has no known allergies. Family History  Family History   Problem Relation Age of Onset    Depression Mother          Social History:  Born and Raised: [place]  Describes Childhood:   supportive  Education: College  Employment: Employed full time  Relationships: single  Children:  5   Current Support:  ex      Legal Hx: none  Access to weapons?:  No      EXAMINATION:    REVIEW OF SYSTEMS:    ROS:  [x] All negative/unchanged except if checked.  Explain positive(checked items) below:  [] Constitutional  [] Eyes  [] Ear/Nose/Mouth/Throat  [] Respiratory  [] CV  [] GI  []   [] Musculoskeletal  [] Skin/Breast  [] Neurological  [] Endocrine  [] Heme/Lymph  [] Allergic/Immunologic    Explanation:     Vitals:  BP (!) 104/58 Comment: RN notified  Pulse 76   Temp 97.5 °F (36.4 °C) (Oral)   Resp 18   Ht 5' 2\" (1.575 m)   Wt 145 lb (65.8 kg)   LMP 07/30/2022   SpO2 96%   BMI 26.52 kg/m²      Neurologic Exam:   Muscle Strength & Tone: full ROM, normal  Gait: normal gait   Involuntary Movements: No    Mental Status Examination:    Level of consciousness:  within normal limits   Appearance:  ill-appearing  Behavior/Motor:  psychomotor retardation  Attitude toward examiner:  cooperative  Speech:  slow   Mood: constricted, decreased range and depressed  Affect:  blunted  Thought processes:  slow   Thought content:  Suicidal Ideation:  active  Delusions:  no evidence of delusions  Perceptual Disturbance:  denies any perceptual disturbance  Cognition:  oriented to person, place, and time   Concentration poor  Memory intact  Insight poor   Judgement poor   Fund of Knowledge limited    Mini Mental Status 30/30      DIAGNOSIS:     Bipolar depression   Alcohol use disorder- recent relapse       RISK ASSESSMENT:    SUICIDE RISK ASSESSMENT: high  HOMICIDE: low  AGITATION/VIOLENCE: low  ELOPEMENT: low    LABS: REVIEWED TODAY:  Recent Labs     08/02/22  0800   WBC 7.5   HGB 15.0        Recent Labs     08/02/22  0800      K 3.9   CL 99   CO2 19*   BUN 12   CREATININE 0.59   GLUCOSE 76     Recent Labs     08/02/22  0800   BILITOT 0.7   ALKPHOS 64   AST 87*   ALT 65*     Lab Results   Component Value Date/Time    LABAMPH Neg 08/02/2022 07:30 AM    BARBSCNU Neg 08/02/2022 07:30 AM    LABBENZ Neg 08/02/2022 07:30 AM    LABMETH Neg 08/02/2022 07:30 AM    OPIATESCREENURINE Neg 08/02/2022 07:30 AM    PHENCYCLIDINESCREENURINE Neg 08/02/2022 07:30 AM    ETOH 33 08/02/2022 06:17 PM     Lab Results   Component Value Date/Time    TSH 1.950 08/02/2022 08:00 AM     No results found for: LITHIUM  Lab Results   Component Value Date    VALPROATE <7 (L) 06/04/2018     Lab Results   Component Value Date/Time    VALPROATE <7 06/04/2018 10:02 PM    VALPROATE <2.8 03/03/2018 10:45 AM       FURTHER LABS ORDERED :      Radiology   No results found. EKG: TRACING REVIEWED    TREATMENT PLAN:    Risk Management:  close watch and suicide risk    Collateral Information:  Will obtain collateral information from the family or friends. Will obtain medical records as appropriate from out patient providers  Will consult the hospitalist for a physical exam to rule out any co-morbid physical condition. Home medication Reconciled       New Medications started during this admission :    See orders  Prn Haldol 5mg and Vistaril 50mg q6hr for extreme agitation. Trazodone as ordered for insomnia  Vistaril as ordered for anxiety  Discussed with the patient risk, benefit, alternative and common side effects for the  proposed medication treatment. Patient is consenting to the treatment.     Psychotherapy:   Encourage participation in milieu and group therapy  Individual therapy as needed          Electronically signed by Joel Zheng MD on 8/3/2022 at 9:53 AM

## 2022-08-03 NOTE — PROGRESS NOTES
Pt. presents soft spoken and forlorn. Familiar with this writer and unit program from past admissions. Slightly irritated. Feels depressed because she got kicked out of the sober living house she was in. \"My brain is crazy right now. I can't think straight. I feel delusional.\"  Vague with any details. Reports poor management of ADLs. Poor sleep and appetite. Feels depressed. \"I had it good, now everyone is mad at me. I blew my whole life away again. \" Reports poor concentration and memory. Denies AH/VH. Drinks ETOH and denies smoking marijuana or using any other drugs. \"I feel so confused and they are not giving me my medication. \"Felt suicidal prior to coming to the ER but had no attempt. Denies hi. Reports daughter brought her to the ER but she doesn't remember much prior to that. Green Team folder given and explained. Encouraged to attend all groups and unit activities.   Stressors include  1.kids  2.being an alcoholic  3.being homeless now   *shopping, exercising, spending time with my kids  Electronically signed by Tim Marin on 8/3/2022 at 1:30 PM

## 2022-08-03 NOTE — FLOWSHEET NOTE
Pt has kept to self in room. Pt declined breakfast this a.m. Reports not feeling hungry at this time. Pt reports that she has not had a b.m. and can't remember the last time, however, states, that it is common when she binge drinks and gets dehydrated. Fair eye contact. Sad, flat affect. Anxiety and depression are \"high\". Passive death wish. No plan. Contracts for safety on the unit. Pt denies HI/AVH. Continues on CIWA for withdrawal symptoms.

## 2022-08-03 NOTE — CARE COORDINATION
Provided pt with [facility] application per her request. Electronically signed by CATHERINE Guevara on 8/3/2022 at 1:35 PM

## 2022-08-03 NOTE — PROGRESS NOTES
Nutrition Assessment     Type and Reason for Visit: Positive Nutrition Screen (wt loss)    Nutrition Assessment:  Pt triggered on malnutrition admission screen for wt loss. Nutrition assessment deferred at this time, no significant wt loss per EMR review and based on stated admission wt.   Rd available for further nutritional concerns as needed via nutrition consult    Анна Willingham RD, LD

## 2022-08-03 NOTE — CARE COORDINATION
Psychosocial Assessment    Current Level of Psychosocial Functioning     Independent X  Dependent    Minimal Assist     Comments:  reports recently residing at [address] [place] [address] but relapsed on 7/28 so she was asked to leave. Reports working,     Psychosocial High Risk Factors (check all that apply)    Unable to obtain meds   Chronic illness/pain    Substance abuse  X   Lack of Family Support  X   Financial stress   Isolation   Inadequate Community Resources X  Suicide attempt(s) X   Not taking medications  X   Victim of crime   Developmental Delay  Unable to manage personal needs    Age 74 or older   Homeless  No transportation   Readmission within 30 days  Unemployment  Traumatic Event    Family/Supports identified: reports wanting to think overnight on who to involve in her care. Reapproach on 8/4/22    Sexual Orientation:  did not disclose     Patient Strengths: insight into substance use. Wanting sober living placement that allows her to take her medications     Patient Barriers: recent relapse, limited natural supports     Safety plan: completed     [facility]HC/MH history: past 3w admissions, past sober living did not allow her to continue psych meds. Needs to be relinked with outpatient  care     Plan of Care:  medication management, group/individual therapies, family meetings, psycho -education, treatment team meetings to assist with stabilization    Initial Discharge Plan:  gather collateral and discuss with tx team     Clinical Summary:  pt is a 38year old female who presented to the ER due to SI and recent relapse on alcohol. Reports relapsing on 7/28 and drinking daily since. Reports she was asked to leave her sober living due to this. No HI/AVH. Pt is tearful during assessment. Pt is currently going through withdrawal. Depressed, flat, appears anxious at times. Heavily focused on wanting to find a new placement.  Denies SI currently but suicidal upon arrival to ER.  Electronically signed by CATHERINE Haley on 8/3/2022 at 2:10 PM

## 2022-08-03 NOTE — PROGRESS NOTES
Report received from Greg Freeman [address]. Patient was 5 months sober and was feeling good about herself until about 5 days ago. She can not identify any stressors or triggers as to why she started drinking again. She admits to immediately starting to have suicidal thoughts, no plan nor intent. She reports a history of complicated withdrawals in the past with hallucinations and seizures. Her current complaint is generalized pain in her extremities that she states feels like her neuropathy. Upon assessment, patient has a flat affect and minimal eye contact. She reports feeling like a failure, worthless, hopeless and helpless. Also, she is reporting that she ruined her life by starting to drink again and that half of her family doesn't talk to her because \"I'm a raging alcoholic\". She appearance is sad and report depression. She has not been on any of her mental health medications and can not say what she is supposed to be taking at this time. She admittedly, hasn't followed up with outpatient mental health services. COVID-, TOXIC-. Admitted per Dr. Porfirio Clement. Diagnosis Bipolar, Borderline personality disorder and Substance use/dependence.

## 2022-08-03 NOTE — PROGRESS NOTES
Pt. refused to attend the 1000 skills group, despite staff encouragement.   Electronically signed by Wally Cruz on 8/3/2022 at 11:40 AM

## 2022-08-03 NOTE — FLOWSHEET NOTE
Pt continues to be in her room. Comes out for meals and to make needs known. Depression and anxiety are high at this time. Pt continues to talk about wanting to get her life back on track. Denies SI/HI/AVH. Flat, sad, worried affect. Friendly and cooperative.

## 2022-08-03 NOTE — PROGRESS NOTES
Pt. declined to attend the 0900 community meeting, despite staff encouragement.  GOAL : \" to get my meds\" Electronically signed by Brad Barber on 8/3/2022 at 9:56 AM

## 2022-08-03 NOTE — CARE COORDINATION
Left referral for [facility] on their confidential voicemail. Requested return call to confirm they received the referral and will be assigning it to someone.  Electronically signed by CATHERINE Randle on 8/3/2022 at 1:32 PM

## 2022-08-03 NOTE — CONSULTS
[facility]  MEDICINE    HISTORY AND PHYSICAL EXAM    PATIENT NAME:  Bindu Banda    MRN:  [id number]  SERVICE DATE:  8/3/2022   SERVICE TIME:  9:09 AM    Primary Care Physician: No primary care provider on file. SUBJECTIVE  CHIEF COMPLAINT:  Medically appropriate for inpatient psychiatry admission. Consult for medical H/P encounter. HPI:  This is a 38 y.o. female who presents with PMHx alcohol abuse with DT, alcoholic hepatitis, anxiety, bipolar, seizures in the setting of alcohol withdrawal, thyroid disease who presented to emergency room with c/o SI due to severe depression. Reports she was kicked out of [facility] [address] after rehab. Ethanol level 325 upon arrival. Admits to daily drinking. Patient cleared from emergency room for psychiatric care. Patient Seen, Chart, Labs, Radiologystudies, and Consults reviewed. Patient denies headache, chest pain, shortness of breath, N/V/D/C, fever/chills.       PAST MEDICAL HISTORY:    Past Medical History:   Diagnosis Date    Alcohol abuse     Alcoholic hepatitis 0/4/7849    Anxiety     Bipolar 1 disorder (HCC)     Depression     Seizures (HCC)     x1 only related to alcohol     Thyroid disease      PAST SURGICAL HISTORY:    Past Surgical History:   Procedure Laterality Date    BREAST ENHANCEMENT SURGERY      BREAST SURGERY      TONSILLECTOMY       FAMILY HISTORY:    Family History   Problem Relation Age of Onset    Depression Mother      SOCIAL HISTORY:    Social History     Socioeconomic History    Marital status:      Spouse name: Not on file    Number of children: 5    Years of education: 14    Highest education level: Not on file   Occupational History    Occupation: nurse   Tobacco Use    Smoking status: Every Day     Packs/day: 1.00     Years: 15.00     Pack years: 15.00     Types: Cigarettes    Smokeless tobacco: Never   Vaping Use    Vaping Use: Every day    Substances: Nicotine, Flavoring    Devices: Pre-filled or refillable cartridge Substance and Sexual Activity    Alcohol use: Yes     Comment: drinks a fifth of rum per day. trying to stop drinking     Drug use: No    Sexual activity: Yes     Partners: Male     Comment: Harvey Velazquez one person. \"   Other Topics Concern    Not on file   Social History Narrative    Not on file     Social Determinants of Health     Financial Resource Strain: Not on file   Food Insecurity: Not on file   Transportation Needs: Not on file   Physical Activity: Not on file   Stress: Not on file   Social Connections: Not on file   Intimate Partner Violence: Not on file   Housing Stability: Not on file     MEDICATIONS:    Current Facility-Administered Medications   Medication Dose Route Frequency Provider Last Rate Last Admin    acetaminophen (TYLENOL) tablet 650 mg  650 mg Oral Q4H PRN Angie Aponte MD        magnesium hydroxide (MILK OF MAGNESIA) 400 MG/5ML suspension 30 mL  30 mL Oral Daily PRN Angie Aponte MD        aluminum & magnesium hydroxide-simethicone (MAALOX) 200-200-20 MG/5ML suspension 30 mL  30 mL Oral PRN Angie Aponte MD        haloperidol (HALDOL) tablet 5 mg  5 mg Oral Q6H PRN Angie Aponte MD   5 mg at 08/03/22 0210    Or    haloperidol lactate (HALDOL) injection 5 mg  5 mg IntraMUSCular Q6H PRN Angie Aponte MD        benztropine mesylate (COGENTIN) injection 2 mg  2 mg IntraMUSCular BID PRN Angie Aponte MD        traZODone (DESYREL) tablet 50 mg  50 mg Oral Nightly PRN Angie Aponte MD        nicotine (NICODERM CQ) 21 MG/24HR 1 patch  1 patch TransDERmal Daily Angie Aponte MD        hydrOXYzine (VISTARIL) injection 50 mg  50 mg IntraMUSCular Q6H PRN Angie Aponte MD        Or    hydrOXYzine pamoate (VISTARIL) capsule 50 mg  50 mg Oral Q6H PRN Angie Aponte MD   50 mg at 08/03/22 0529    chlordiazePOXIDE (LIBRIUM) capsule 10 mg  10 mg Oral 4x Daily PRN Angie Aponte MD        Or    chlordiazePOXIDE (LIBRIUM) capsule 25 mg  25 mg Oral 4x Daily PRN Nela Agosto MD Laurie        Or    chlordiazePOXIDE (LIBRIUM) capsule 50 mg  50 mg Oral Q2H PRN Marylee Buckle, MD        Or    chlordiazePOXIDE (LIBRIUM) capsule 75 mg  75 mg Oral Q1H PRN Marylee Buckle, MD        Or    LORazepam (ATIVAN) tablet 4 mg  4 mg Oral Q1H PRN Marylee Buckle, MD        thiamine tablet 100 mg  100 mg Oral Daily Marylee Buckle, MD        folic acid (FOLVITE) tablet 1 mg  1 mg Oral Daily Marylee Buckle, MD        multivitamin 1 tablet  1 tablet Oral Daily Marylee Buckle, MD           ALLERGIES: Patient has no known allergies. REVIEW OF SYSTEM:   ROS as noted in HPI, 12 point ROS reviewed and otherwise negative. OBJECTIVE  PHYSICAL EXAM: BP (!) 104/58 Comment: RN notified  Pulse 76   Temp 97.5 °F (36.4 °C) (Oral)   Resp 18   Ht 5' 2\" (1.575 m)   Wt 145 lb (65.8 kg)   LMP 07/30/2022   SpO2 96%   BMI 26.52 kg/m²   CONSTITUTIONAL:  awake, alert, cooperative, no apparent distress, and appears stated age  EYES:  Lids and lashes normal, pupils conjunctiva normal  ENT:  Normocephalic, without obvious abnormality, atraumatic, sinuses nontender on palpation, external ears without lesions, oral pharynx with moist mucus membranes, tonsils without erythema or exudates, gums normal and good dentition. NECK:  Supple, symmetrical, trachea midline  LUNGS:  Clear to auscultation bilaterally, no crackles or wheezing  CARDIOVASCULAR: Regular rate and rhythm, normal S1 and S2  ABDOMEN: Normal bowel sounds, soft, non-distended, non-tender, no masses palpated, no hepatosplenomegally  MUSCULOSKELETAL:  There is no redness, warmth, or swelling of the joints. NEUROLOGIC:  Awake, alert, oriented to name, place and time. SKIN:  Warm and dry    DATA:     Diagnostic tests reviewed for today's visit:    Most recent labs and imaging results reviewed.      ASSESSMENT AND PLAN    Bipolar depression ([facility] [address]  Suicidal ideations  Patient admitted to behavorial health for evaluation and treatment     Alcohol dependence with withdrawal: CIWA protocol, seizure precautions, fall  Multivitamin, thiamine, folate. Nicotine dependence  -nicoderm patch    This is only a history and physical examination and not medical management. The patient is to contact and follow up with their primary care physician and go over any abnormal labs, imaging, findings, medical concerns, or conditions that we have and have not addressed during this encounter.     Plan of care discussed with: patient    SIGNATURE: JERMAINE Carlson NP  DATE: August 3, 2022  TIME: 9:09 AM

## 2022-08-03 NOTE — ED NOTES
Provisional Diagnosis:       Bipolar  Borderline personality disorder  Substance use/dependence    Psychosocial and Contextual Factors:       Homeless  Was living in a sober living house until relapse  Relapsed on alcohol    C-SSRS Summary:  C-SSRS Summary:    Patient: C-SSRS Suicide Screening  1) Within the past month, have you wished you were dead or wished you could go to sleep and not wake up? : Yes  2) Have you actually had any thoughts of killing yourself? : Yes  3) Have you been thinking about how you might kill yourself? : No  4) Have you had these thoughts and had some intention of acting on them? : No  5) Have you started to work out or worked out the details of how to kill yourself? Do you intend to carry out this plan? : No  6) Have you ever done anything, started to do anything, or prepared to do anything to end your life?: Yes  Did this occur within the past 3 months? : No, in 2016 tried to overdose twice. Risk of Suicide: Moderate Risk       Patient: Calm. Slow to respond. Family: None at bedside  Agency: doesn't follow up in the community, not currently on medications. Substance Abuse: 5 day binge drinking  Present Suicidal Behavior:  Verbalizes SI, no plan     Verbal: endorses thoughts of SI, does not elaborate on plan    Attempt: Denies current attempt    Past Suicidal Behavior: per patient, she has 2 previous suicide attempts both in 2016 by trying to overdose. Self-Injurious/Self-Mutilation: None noted      Violence Current or Past H/o violent outburst in ED and on the unit: none known of    Trauma Identified:    Physical Abuse: Denies  Verbal Abuse: Denies  Emotional abuse: Denies  Financial Abuse: Denies  Sexual abuse: Denies      Protective Factors:      Has 5 children        Risk Factors:    Poor impulse control  Substance abuse  Homelessness        Clinical Summary:    Patient was 5 months sober and was feeling good about herself until about 5 days ago.   She can not identify any stressors or triggers as to why she started drinking again. She admits to immediately starting to have suicidal thoughts, no plan nor intent. She reports a history of complicated withdrawals in the past with hallucinations and seizures. Her current complaint is generalized pain in her extremities that she states feels like her neuropathy. Upon assessment, patient has a flat affect and minimal eye contact. She reports feeling like a failure, worthless, hopeless and helpless. Also, she is reporting that she ruined her life by starting to drink again and that half of her family doesn't talk to her because \"I'm a raging alcoholic\". She appearance is sad and report depression. She has not been on any of her mental health medications and can not say what she is supposed to be taking at this time. She admittedly, hasn't followed up with outpatient mental health services. Level of Care Disposition:      Per Dr. Tyrone Ladd admit to 3W with standard PRN's and librium protocol.       Errol Rascon RN  08/02/22 2033       Errol Rascon RN  08/02/22 6947

## 2022-08-03 NOTE — PROGRESS NOTES
Pt is watching tv, appears calm, no tremors or sweats noted, reports vistaril is helpful.  Bp obtained wnl

## 2022-08-03 NOTE — ED NOTES
Called zone 1 left message with  to have Dr. Guillaume Sit call [facility]. Patient would like something for her generalized pain in legs and arms.       Glory Boss RN  08/02/22 1159

## 2022-08-04 PROCEDURE — 99233 SBSQ HOSP IP/OBS HIGH 50: CPT | Performed by: PSYCHIATRY & NEUROLOGY

## 2022-08-04 PROCEDURE — 1240000000 HC EMOTIONAL WELLNESS R&B

## 2022-08-04 PROCEDURE — 6370000000 HC RX 637 (ALT 250 FOR IP): Performed by: PSYCHIATRY & NEUROLOGY

## 2022-08-04 RX ORDER — QUETIAPINE FUMARATE 25 MG/1
25 TABLET, FILM COATED ORAL 2 TIMES DAILY
Status: DISCONTINUED | OUTPATIENT
Start: 2022-08-04 | End: 2022-08-08 | Stop reason: HOSPADM

## 2022-08-04 RX ORDER — QUETIAPINE FUMARATE 50 MG/1
150 TABLET, EXTENDED RELEASE ORAL NIGHTLY
Status: DISCONTINUED | OUTPATIENT
Start: 2022-08-04 | End: 2022-08-05

## 2022-08-04 RX ADMIN — ACETAMINOPHEN 650 MG: 325 TABLET, FILM COATED ORAL at 09:17

## 2022-08-04 RX ADMIN — FOLIC ACID 1 MG: 1 TABLET ORAL at 09:17

## 2022-08-04 RX ADMIN — HYDROXYZINE PAMOATE 50 MG: 50 CAPSULE ORAL at 18:43

## 2022-08-04 RX ADMIN — HYDROXYZINE PAMOATE 50 MG: 50 CAPSULE ORAL at 09:17

## 2022-08-04 RX ADMIN — QUETIAPINE FUMARATE 25 MG: 25 TABLET ORAL at 16:57

## 2022-08-04 RX ADMIN — THERA TABS 1 TABLET: TAB at 09:17

## 2022-08-04 RX ADMIN — OXCARBAZEPINE 150 MG: 150 TABLET, FILM COATED ORAL at 09:17

## 2022-08-04 RX ADMIN — OXCARBAZEPINE 150 MG: 150 TABLET, FILM COATED ORAL at 20:59

## 2022-08-04 RX ADMIN — Medication 100 MG: at 09:17

## 2022-08-04 RX ADMIN — QUETIAPINE FUMARATE 150 MG: 50 TABLET, EXTENDED RELEASE ORAL at 20:59

## 2022-08-04 ASSESSMENT — PAIN DESCRIPTION - LOCATION: LOCATION: LEG

## 2022-08-04 ASSESSMENT — PAIN SCALES - GENERAL: PAINLEVEL_OUTOF10: 6

## 2022-08-04 ASSESSMENT — PAIN DESCRIPTION - ORIENTATION: ORIENTATION: RIGHT;LEFT

## 2022-08-04 ASSESSMENT — PAIN DESCRIPTION - DESCRIPTORS: DESCRIPTORS: ACHING

## 2022-08-04 NOTE — PROGRESS NOTES
Pt out on unit, but not social with peers. Pt flat affect, voiced, \"I am waiting for Let[employer] Get Real.\" Pt voiced goal, \"positive thinking, decreased anxiety. \" Pt reports showering today. Pt reports good appetite. Pt reports poor sleep, due to trouble falling asleep and staying asleep, negative racing thoughts. Pt rates anxiety 3/10. Pt rates depression 3/10. On a scale from 1 through 10, 10 being the highest. Pt denies SI, HI and A/V hallucinations. Will continue to monitor.

## 2022-08-04 NOTE — PROGRESS NOTES
Pt given vistaril PRN per her request for anxiety 6/10 with 10 being the worst. Waiting for effects.

## 2022-08-04 NOTE — CARE COORDINATION
Spoke with [facility] from [facility] who stated that she will come up to the unit to see patient in the next few hours.

## 2022-08-04 NOTE — GROUP NOTE
Group Therapy Note    Date: 8/4/2022    Group Start Time: 1400  Group End Time: 7862  Group Topic: Nursing    [facility]    Ted Cordova RN        Group Therapy Note    Attendees: 8/18     Patient's Goal: :Learning grounding techniques, name at least one grounding technique that works for you.       Status After Intervention:  Unchanged    Participation Level: Minimal    Participation Quality: Resistant      Speech:  normal      Thought Process/Content: Logical      Affective Functioning: Congruent      Mood: anxious      Level of consciousness:  Oriented x4      Response to Learning: Able to verbalize current knowledge/experience      Endings: None Reported    Modes of Intervention: Education, Socialization, and Exploration      Discipline Responsible: Registered Nurse      Signature:  Ted Cordova RN

## 2022-08-04 NOTE — PROGRESS NOTES
Patient was initially very agitated about an intrusive roommate. Legs were bouncing and she was watchful and on the periphery. Room change is being initiated. Patient reports her own struggle which is magnified by her surroundings. Yesterday she states she was suicidal, but today those feelings are gone. No HI or hallucinations. She deals with feelings of guilt but identified that she is more tired of her journey with alcohol and all that has happened. She is hopeless and overwhelmed. She also shared many losses associated with her drinking. She is inpatient and wants to get into a 3/4 house so she can maintain her job and see her kids. She is just rebuilding a relationship with her eldest daughter, and worries how this will effect that. Also worries about her severely autistic youngest child who the oldest is trying to care for. No evidence of hallucinations or delusions.

## 2022-08-04 NOTE — GROUP NOTE
Group Therapy Note    Date: 8/4/2022    Group Start Time: 3676  Group End Time: 9867  Group Topic: Group Therapy    [facility]    [address]        Group Therapy Note    Attendees: 10/19       Patient's Goal:  Pt goal is to learn mental health stereotypes     Notes:  Pt was an active listener in discussion. Status After Intervention:  Unchanged    Participation Level:  Active Listener    Participation Quality: Appropriate and Attentive      Speech:  normal      Thought Process/Content: Logical      Affective Functioning: Congruent      Mood: euthymic      Level of consciousness:  Alert, Oriented x4, and Attentive      Response to Learning: Progressing to goal      Endings: None Reported    Modes of Intervention: Education and Support      Discipline Responsible: Behavorial Health Tech      Signature:  [address]

## 2022-08-04 NOTE — PROGRESS NOTES
Explained and gave all am meds, pt requested vistaril 50 mg for anxiety #8, reports legs psin#6,gave tylenol for pain

## 2022-08-04 NOTE — PROGRESS NOTES
Pt. refused to attend the 1000 skills group, despite staff encouragement.   Electronically signed by Araceli Valdez on 8/4/2022 at 12:32 PM

## 2022-08-04 NOTE — GROUP NOTE
Group Therapy Note    Date: 8/4/2022    Group Start Time: 6015  Group End Time: 1200  Group Topic: Cognitive Skills    [facility]    Georges Chung        Group Therapy Note    Attendees: 16/22       Patient's Goal:  To learn about coping skills    Notes:  Patient actively participated    Status After Intervention:  Improved    Participation Level: Interactive    Participation Quality: Appropriate      Speech:  normal      Thought Process/Content: Logical      Affective Functioning: Congruent      Mood: euthymic      Level of consciousness:  Alert      Response to Learning: Progressing to goal      Endings: None Reported    Modes of Intervention: Education      Discipline Responsible: [address] [facility] [employer]      Signature:  Georges Chung

## 2022-08-04 NOTE — GROUP NOTE
Group Therapy Note    Date: 8/3/2022    Group Start Time: 1945  Group End Time: 2015  Group Topic: Recreational    [facility]    Ximena Cyr RN        Group Therapy Note    Attendees: 15       Patient's Goal:  To attend group    Notes:  Good participation    Status After Intervention:  Unchanged    Participation Level:  Active Listener    Participation Quality: Appropriate      Speech:  normal      Thought Process/Content: Logical      Affective Functioning: Congruent      Mood: euthymic      Level of consciousness:  Alert      Response to Learning: Progressing to goal      Endings: None Reported    Modes of Intervention: Support      Discipline Responsible: Registered Nurse      Signature:  Ximena Cyr RN

## 2022-08-04 NOTE — PROGRESS NOTES
Pt came to this nurse expressing frustration on [facility] not seeing her yet, this nurse explained Aida Kline from [facility] stated she would be here in the next few hours, pt irritable stated she is ready to go. This nurse stated she would speak with SW for an updated time.

## 2022-08-04 NOTE — PROGRESS NOTES
Morning Community Meeting Topics    Allyssa Naik attended the morning community meeting on 8/4/22. Topics discussed today     [x] Introduction  Day of the week and date  Mask distribution  Current mask requirements  [x]Teams  Explanation of  Green and Blue team criteria  Nurses assigned to each team for today  Explanation about green and blue paper  Date  Patient's Name  Patient's Nurse  Goals  [x] Visitation  Announce the visiting hours for the day  Announce which team is allowed to have visitors for the day  Review any updated Covid 19 requirements for visitors during visitation  Vaccine Card or negative Covid test within 48 hours of visit  State Identification  Patients are reminded to alert the  at least 1 hour before visitation   [x] Unit Orientation  Coffee use  Phone location and etiquette  Shower locations  [place] and dryer location and process  Common area expectations  Staff rounds expectation  [x] Meals   Educate patient to the menu  The patient is encouraged to fill out the menu to get preferences at mealtime  The patient is educated that if they do not fill out the menu, they will get the standard tray  The coffee pot is decaf, patient encouraged to order regular coffee from menu.   Educate patient to the meal process  Patient encouraged to eat snacks provided twice daily  Snacks may stay in patient room     [x] Discharge Process  Discharge expectations  Fill out the survey after discharge   [x] Hygiene  Daily showers encouraged  Showers availability discussed   Daily dressing encouraged  Discussed wearing street clothing  Education provided on where to place linens and clothing  Linens in the hamper  personal clothing does not go into the linen hamper  [x] Group   Patient encouraged to attend group provided  Time of Group Meetings discussed  Gentle reminder that attendance is a Physician order  [x] Movement  Chair exercises completed  Stretching completed  Notes:  GOAL : \" to get another room\" Electronically signed by Norberto Orourke on 8/4/2022 at 9:55 AM

## 2022-08-04 NOTE — GROUP NOTE
Group Therapy Note    Date: 8/3/2022    Group Start Time: 2015  Group End Time: 2030  Group Topic: Wrap-Up    [facility]    Jossue Wheat RN        Group Therapy Note    Attendees: 15       Patient's Goal:  To be sober    Notes:  Progressing towards goal    Status After Intervention:  Unchanged    Participation Level:  Active Listener    Participation Quality: Appropriate      Speech:  normal      Thought Process/Content: Logical      Affective Functioning: Congruent      Mood: euthymic      Level of consciousness:  Alert      Response to Learning: Progressing to goal      Endings: None Reported    Modes of Intervention: Support      Discipline Responsible: Registered Nurse      Signature:  Jossue Wheat RN

## 2022-08-04 NOTE — PROGRESS NOTES
Moriah M[facility]. FOLLOW-UP NOTE       8/4/2022     Patient was seen and examined in person, Chart reviewed   Patient's case discussed with staff/team    Chief Complaint: Depression    Interim History:     Pt report feeling depressed  Hopeless and worthless feeling  Less intense w/d symptoms  Pt is worried about the rehab placement and she is calling places  Pt is attending groups  Appetite:   [x] Normal/Unchanged  [] Increased  [] Decreased      Sleep:       [] Normal/Unchanged  [x] Fair       [] Poor              Energy:    [x] Normal/Unchanged  [] Increased  [] Decreased        SI [] Present  [x] Absent    HI  []Present  [x] Absent     Aggression:  [] yes  [x] no    Patient is [x] able  [] unable to CONTRACT FOR SAFETY     PAST MEDICAL/PSYCHIATRIC HISTORY:   Past Medical History:   Diagnosis Date    Alcohol abuse     Alcoholic hepatitis 4/8/7189    Anxiety     Bipolar 1 disorder ([facility]     Depression     Seizures ([facility].)     x1 only related to alcohol     Thyroid disease        FAMILY/SOCIAL HISTORY:  Family History   Problem Relation Age of Onset    Depression Mother      Social History     Socioeconomic History    Marital status:      Spouse name: Not on file    Number of children: 5    Years of education: 14    Highest education level: Not on file   Occupational History    Occupation: nurse   Tobacco Use    Smoking status: Every Day     Packs/day: 1.00     Years: 15.00     Pack years: 15.00     Types: Cigarettes    Smokeless tobacco: Never   Vaping Use    Vaping Use: Every day    Substances: Nicotine, Flavoring    Devices: Pre-filled or refillable cartridge   Substance and Sexual Activity    Alcohol use: Yes     Comment: drinks a fifth of rum per day. trying to stop drinking     Drug use: No    Sexual activity: Yes     Partners: Male     Comment: Leslie Montejo one person. \"   Other Topics Concern    Not on file   Social History Narrative    Not on file     Social Determinants of mg, 10 mg, Oral, 4x Daily PRN **OR** chlordiazePOXIDE (LIBRIUM) capsule 25 mg, 25 mg, Oral, 4x Daily PRN **OR** chlordiazePOXIDE (LIBRIUM) capsule 50 mg, 50 mg, Oral, Q2H PRN **OR** chlordiazePOXIDE (LIBRIUM) capsule 75 mg, 75 mg, Oral, Q1H PRN **OR** LORazepam (ATIVAN) tablet 4 mg, 4 mg, Oral, Q1H PRN, Analisa Daily MD    thiamine tablet 100 mg, 100 mg, Oral, Daily, Analisa Daily MD, 100 mg at 64/33/25 5282    folic acid (FOLVITE) tablet 1 mg, 1 mg, Oral, Daily, Analisa Daily MD, 1 mg at 08/04/22 8116    multivitamin 1 tablet, 1 tablet, Oral, Daily, Analisa Daily MD, 1 tablet at 08/04/22 6501    OXcarbazepine (TRILEPTAL) tablet 150 mg, 150 mg, Oral, BID, Daljit Lopez MD, 150 mg at 08/04/22 8707      Examination:  /74   Pulse (!) 103   Temp 98.3 °F (36.8 °C) (Oral)   Resp 18   Ht 5' 2\" (1.575 m)   Wt 145 lb (65.8 kg)   LMP 07/30/2022   SpO2 97%   BMI 26.52 kg/m²   Gait - steady  Medication side effects(SE): no    Mental Status Examination:    Level of consciousness:  within normal limits   Appearance:  fair grooming and fair hygiene  Behavior/Motor:  psychomotor retardation  Attitude toward examiner:  attentive  Speech:  slow   Mood: depressed  Affect:  blunted  Thought processes:  slow   Thought content:  Suicidal Ideation:  passive  Cognition:  oriented to person, place, and time   Concentration intact  Insight fair   Judgement fair     ASSESSMENT:   Patient symptoms are:  [] Well controlled  [] Improving  [] Worsening  [] No change      Diagnosis:   Principal Problem:    Bipolar depression ([facility]  Resolved Problems:    * No resolved hospital problems.  *      LABS:    Recent Labs     08/02/22  0800   WBC 7.5   HGB 15.0        Recent Labs     08/02/22  0800      K 3.9   CL 99   CO2 19*   BUN 12   CREATININE 0.59   GLUCOSE 76     Recent Labs     08/02/22  0800   BILITOT 0.7   ALKPHOS 64   AST 87*   ALT 65*     Lab Results   Component Value Date/Time    LABAMPH Neg 08/02/2022 07:30 AM    BARBSCNU Neg 08/02/2022 07:30 AM    LABBENZ Neg 08/02/2022 07:30 AM    LABMETH Neg 08/02/2022 07:30 AM    OPIATESCREENURINE Neg 08/02/2022 07:30 AM    PHENCYCLIDINESCREENURINE Neg 08/02/2022 07:30 AM    ETOH 33 08/02/2022 06:17 PM     Lab Results   Component Value Date/Time    TSH 1.950 08/02/2022 08:00 AM     No results found for: LITHIUM  Lab Results   Component Value Date    VALPROATE <7 (L) 06/04/2018       RISK ASSESSMENT:     Treatment Plan:  Reviewed current Medications with the patient. Risks, benefits, side effects, drug-to-drug interactions and alternatives to treatment were discussed. Collateral information:   CD evaluation  Encourage patient to attend group and other milieu activities.   Discharge planning discussed with the patient and treatment team.    PSYCHOTHERAPY/COUNSELING:  [x] Therapeutic interview  [x] Supportive  [] CBT  [] Ongoing  [] Other    [x] Patient continues to need, on a daily basis, active treatment furnished directly by or requiring the supervision of inpatient psychiatric personnel      Anticipated Length of stay            Electronically signed by Ranjith Napier MD on 8/4/2022 at 5:44 PM

## 2022-08-05 PROCEDURE — 6370000000 HC RX 637 (ALT 250 FOR IP): Performed by: PSYCHIATRY & NEUROLOGY

## 2022-08-05 PROCEDURE — 99232 SBSQ HOSP IP/OBS MODERATE 35: CPT | Performed by: PSYCHIATRY & NEUROLOGY

## 2022-08-05 PROCEDURE — 1240000000 HC EMOTIONAL WELLNESS R&B

## 2022-08-05 RX ORDER — QUETIAPINE 200 MG/1
200 TABLET, FILM COATED, EXTENDED RELEASE ORAL NIGHTLY
Status: DISCONTINUED | OUTPATIENT
Start: 2022-08-05 | End: 2022-08-08

## 2022-08-05 RX ORDER — OXCARBAZEPINE 300 MG/1
300 TABLET, FILM COATED ORAL 2 TIMES DAILY
Status: DISCONTINUED | OUTPATIENT
Start: 2022-08-05 | End: 2022-08-08 | Stop reason: HOSPADM

## 2022-08-05 RX ADMIN — QUETIAPINE FUMARATE 25 MG: 25 TABLET ORAL at 13:50

## 2022-08-05 RX ADMIN — OXCARBAZEPINE 300 MG: 300 TABLET, FILM COATED ORAL at 20:43

## 2022-08-05 RX ADMIN — QUETIAPINE FUMARATE 200 MG: 200 TABLET, EXTENDED RELEASE ORAL at 20:43

## 2022-08-05 RX ADMIN — FOLIC ACID 1 MG: 1 TABLET ORAL at 10:00

## 2022-08-05 RX ADMIN — OXCARBAZEPINE 150 MG: 150 TABLET, FILM COATED ORAL at 09:59

## 2022-08-05 RX ADMIN — THERA TABS 1 TABLET: TAB at 10:00

## 2022-08-05 RX ADMIN — HYDROXYZINE PAMOATE 50 MG: 50 CAPSULE ORAL at 16:11

## 2022-08-05 RX ADMIN — QUETIAPINE FUMARATE 25 MG: 25 TABLET ORAL at 09:59

## 2022-08-05 RX ADMIN — ACETAMINOPHEN 650 MG: 325 TABLET, FILM COATED ORAL at 09:58

## 2022-08-05 RX ADMIN — Medication 100 MG: at 10:00

## 2022-08-05 ASSESSMENT — PAIN DESCRIPTION - DESCRIPTORS: DESCRIPTORS: ACHING

## 2022-08-05 ASSESSMENT — PAIN SCALES - GENERAL
PAINLEVEL_OUTOF10: 0
PAINLEVEL_OUTOF10: 4

## 2022-08-05 ASSESSMENT — PAIN DESCRIPTION - LOCATION: LOCATION: OTHER (COMMENT)

## 2022-08-05 NOTE — PROGRESS NOTES
Aida Kline from [facility] called and stated that Inell Less from   [address] to [place] said that the pt would have to do a 30 day restriction for any 3/4 housing found. She has not heard back from [place] yet.

## 2022-08-05 NOTE — PROGRESS NOTES
Pt did not attend 1:00 PM group despite staff encouragement to do so.   Electronically signed by Harsha Ford on 8/5/2022 at 2:14 PM

## 2022-08-05 NOTE — GROUP NOTE
Group Therapy Note    Date: 8/5/2022    Group Start Time: 1600  Group End Time: 5685  Group Topic: Healthy Living/Wellness    [facility]    Timoteo Brown        Group Therapy Note    Attendees: 8/12       Patient's Goal:  To play a jeopardy game related to self-esteem. Notes:  Pt. attended 1600 group meeting. Pt. was quiet and had to be encouraged to participate in group.     Status After Intervention:  Improved    Participation Level: Minimal    Participation Quality: Appropriate      Speech:  normal      Thought Process/Content: Logical      Affective Functioning: Blunted      Mood:  Calm and Quiet      Level of consciousness:  Alert      Response to Learning: Progressing to goal      Endings: None Reported    Modes of Intervention: Activity      Discipline Responsible: [address], [employer]      Signature:  Timoteo Brown

## 2022-08-05 NOTE — PROGRESS NOTES
Pt awaken for 10 am group, am meds explained and given, pt reports she saw [facility] and is looking forward to her plan for sobriety, gave , Seroquel 25 mg given anxiety, as pt reports not sleeping well, reminded to remove nict patch at night,tylenol offered and given for generalized pain. No tremors or sweats noted.

## 2022-08-05 NOTE — GROUP NOTE
Group Therapy Note    Date: 8/4/2022    Group Start Time: 1630  Group End Time: 1700  Group Topic: Healthy Living/Wellness    [facility]    [address] [place]        Group Therapy Note    Attendees: 8       Patient's Goal:  To learn about healthy coping skills by participating in a group activity     Notes:  Pt was attentive during group activity     Status After Intervention:  Unchanged    Participation Level:  Active Listener    Participation Quality: Appropriate and Attentive      Speech:  normal      Thought Process/Content: Logical      Affective Functioning: Congruent      Mood: euthymic      Level of consciousness:  Alert      Response to Learning: Progressing to goal      Endings: None Reported    Modes of Intervention: Education, Socialization, and Activity      Discipline Responsible: Behavorial Health Tech      Signature:  Leora Garces

## 2022-08-05 NOTE — CARE COORDINATION
Called and left a message with Kimberly Officer from [facility] that patient is being discharged on Monday.

## 2022-08-05 NOTE — PROGRESS NOTES
Social Determinants of Health     Financial Resource Strain: Not on file   Food Insecurity: Not on file   Transportation Needs: Not on file   Physical Activity: Not on file   Stress: Not on file   Social Connections: Not on file   Intimate Partner Violence: Not on file   Housing Stability: Not on file           ROS:  [x] All negative/unchanged except if checked.  Explain positive(checked items) below:  [] Constitutional  [] Eyes  [] Ear/Nose/Mouth/Throat  [] Respiratory  [] CV  [] GI  []   [] Musculoskeletal  [] Skin/Breast  [] Neurological  [] Endocrine  [] Heme/Lymph  [] Allergic/Immunologic    Explanation:     MEDICATIONS:    Current Facility-Administered Medications:     QUEtiapine (SEROQUEL XR) extended release tablet 200 mg, 200 mg, Oral, Nightly, Lamin Conde MD    OXcarbazepine (TRILEPTAL) tablet 300 mg, 300 mg, Oral, BID, Lamin Conde MD    QUEtiapine (SEROQUEL) tablet 25 mg, 25 mg, Oral, BID, Lamin Conde MD, 25 mg at 08/05/22 1350    acetaminophen (TYLENOL) tablet 650 mg, 650 mg, Oral, Q4H PRN, Selena Walker MD, 650 mg at 08/05/22 9186    magnesium hydroxide (MILK OF MAGNESIA) 400 MG/5ML suspension 30 mL, 30 mL, Oral, Daily PRN, Selena Walker MD    aluminum & magnesium hydroxide-simethicone (MAALOX) 200-200-20 MG/5ML suspension 30 mL, 30 mL, Oral, PRN, Selena Walker MD    haloperidol (HALDOL) tablet 5 mg, 5 mg, Oral, Q6H PRN, 5 mg at 08/03/22 0210 **OR** haloperidol lactate (HALDOL) injection 5 mg, 5 mg, IntraMUSCular, Q6H PRN, Selena Walker MD    benztropine mesylate (COGENTIN) injection 2 mg, 2 mg, IntraMUSCular, BID PRN, Selena Walker MD    traZODone (DESYREL) tablet 50 mg, 50 mg, Oral, Nightly PRN, Selena Walker MD    nicotine (NICODERM CQ) 21 MG/24HR 1 patch, 1 patch, TransDERmal, Daily, Selena Walker MD, 1 patch at 08/05/22 0959    hydrOXYzine (VISTARIL) injection 50 mg, 50 mg, IntraMUSCular, Q6H PRN **OR** hydrOXYzine pamoate (VISTARIL) capsule 50 mg, 50 mg, Oral, Q6H PRN, Mima Collins MD, 50 mg at 08/04/22 1843    chlordiazePOXIDE (LIBRIUM) capsule 10 mg, 10 mg, Oral, 4x Daily PRN **OR** chlordiazePOXIDE (LIBRIUM) capsule 25 mg, 25 mg, Oral, 4x Daily PRN **OR** chlordiazePOXIDE (LIBRIUM) capsule 50 mg, 50 mg, Oral, Q2H PRN **OR** chlordiazePOXIDE (LIBRIUM) capsule 75 mg, 75 mg, Oral, Q1H PRN **OR** LORazepam (ATIVAN) tablet 4 mg, 4 mg, Oral, Q1H PRN, Mima Collins MD    thiamine tablet 100 mg, 100 mg, Oral, Daily, Mima Collins MD, 100 mg at 11/81/63 6919    folic acid (FOLVITE) tablet 1 mg, 1 mg, Oral, Daily, Mima Collins MD, 1 mg at 08/05/22 1000    multivitamin 1 tablet, 1 tablet, Oral, Daily, Mima Collins MD, 1 tablet at 08/05/22 1000      Examination:  /72   Pulse 78   Temp 97.9 °F (36.6 °C) (Oral)   Resp 20   Ht 5' 2\" (1.575 m)   Wt 145 lb (65.8 kg)   LMP 07/30/2022   SpO2 98%   BMI 26.52 kg/m²   Gait - steady  Medication side effects(SE): no    Mental Status Examination:    Level of consciousness:  within normal limits   Appearance:  fair grooming and fair hygiene  Behavior/Motor:  psychomotor retardation  Attitude toward examiner:  attentive  Speech:  slow   Mood: depressed  Affect:  blunted  Thought processes:  slow   Thought content:  Suicidal Ideation:  passive  Cognition:  oriented to person, place, and time   Concentration intact  Insight fair   Judgement fair     ASSESSMENT:   Patient symptoms are:  [] Well controlled  [] Improving  [] Worsening  [] No change      Diagnosis:   Principal Problem:    Bipolar depression ([facility]  Resolved Problems:    * No resolved hospital problems. *      LABS:    No results for input(s): WBC, HGB, PLT in the last 72 hours. No results for input(s): NA, K, CL, CO2, BUN, CREATININE, GLUCOSE in the last 72 hours. No results for input(s): BILITOT, ALKPHOS, AST, ALT in the last 72 hours.     Lab Results   Component Value Date/Time    LABAMPH Neg 08/02/2022 07:30 AM    TINY Neg 08/02/2022 07:30 AM    LABBENZ Neg 08/02/2022 07:30 AM    LABMETH Neg 08/02/2022 07:30 AM    OPIATESCREENURINE Neg 08/02/2022 07:30 AM    PHENCYCLIDINESCREENURINE Neg 08/02/2022 07:30 AM    ETOH 33 08/02/2022 06:17 PM     Lab Results   Component Value Date/Time    TSH 1.950 08/02/2022 08:00 AM     No results found for: LITHIUM  Lab Results   Component Value Date    VALPROATE <7 (L) 06/04/2018       RISK ASSESSMENT:     Treatment Plan:  Reviewed current Medications with the patient. Risks, benefits, side effects, drug-to-drug interactions and alternatives to treatment were discussed. Collateral information:   CD evaluation  Encourage patient to attend group and other milieu activities.   Discharge planning discussed with the patient and treatment team.    PSYCHOTHERAPY/COUNSELING:  [x] Therapeutic interview  [x] Supportive  [] CBT  [] Ongoing  [] Other    [x] Patient continues to need, on a daily basis, active treatment furnished directly by or requiring the supervision of inpatient psychiatric personnel      Anticipated Length of stay            Electronically signed by Donta Saavedra MD on 8/5/2022 at 2:25 PM

## 2022-08-05 NOTE — PROGRESS NOTES
Pt isolative to room this morning. States she had trouble sleeping last night and states she often has a hard time getting on a good sleep schedule after a drinking episode. Pt minimizes mental health problems and states she just needs to work on her sobriety. Future oriented and focused on finding a 3/4 living situation close to her jc at [facility] for when she is discharged. Pt states she contacted a place yesterday and is hopeful to hear from them today. Denies anxiety and depression. Denies SI, HI, AVH. Pt showered this morning and is attending groups.

## 2022-08-05 NOTE — GROUP NOTE
Group Therapy Note    Date: 8/5/2022    Group Start Time: 1100  Group End Time: 36  Group Topic: Psychotherapy    [facility] KEVON Simmons, [facility]        Group Therapy Note    Attendees: 9       Patient's Goal:  learn coping skills    Notes:  patient participated    Status After Intervention:  Improved    Participation Level: Interactive    Participation Quality: Appropriate      Speech:  normal      Thought Process/Content: Logical      Affective Functioning: Congruent      Mood: anxious      Level of consciousness:  Alert      Response to Learning: Progressing to goal      Endings: None Reported    Modes of Intervention: Support      Discipline Responsible: /Counselor      Signature:  Cecy Alvarez, [facility]

## 2022-08-05 NOTE — PROGRESS NOTES
Pt. declined to attend the 0900 community meeting, despite staff encouragement.  Goal - \"Finding housing\" Electronically signed by PATRICIA Parisi on 8/5/2022 at 9:37 AM

## 2022-08-06 PROCEDURE — 6370000000 HC RX 637 (ALT 250 FOR IP): Performed by: PSYCHIATRY & NEUROLOGY

## 2022-08-06 PROCEDURE — 1240000000 HC EMOTIONAL WELLNESS R&B

## 2022-08-06 RX ADMIN — ACETAMINOPHEN 650 MG: 325 TABLET, FILM COATED ORAL at 09:06

## 2022-08-06 RX ADMIN — QUETIAPINE FUMARATE 200 MG: 200 TABLET, EXTENDED RELEASE ORAL at 21:51

## 2022-08-06 RX ADMIN — MAGNESIUM HYDROXIDE 30 ML: 400 SUSPENSION ORAL at 09:10

## 2022-08-06 RX ADMIN — QUETIAPINE FUMARATE 25 MG: 25 TABLET ORAL at 13:00

## 2022-08-06 RX ADMIN — Medication 100 MG: at 09:06

## 2022-08-06 RX ADMIN — OXCARBAZEPINE 300 MG: 300 TABLET, FILM COATED ORAL at 09:06

## 2022-08-06 RX ADMIN — THERA TABS 1 TABLET: TAB at 09:06

## 2022-08-06 RX ADMIN — HYDROXYZINE PAMOATE 50 MG: 50 CAPSULE ORAL at 13:00

## 2022-08-06 RX ADMIN — QUETIAPINE FUMARATE 25 MG: 25 TABLET ORAL at 09:06

## 2022-08-06 RX ADMIN — FOLIC ACID 1 MG: 1 TABLET ORAL at 09:06

## 2022-08-06 RX ADMIN — OXCARBAZEPINE 300 MG: 300 TABLET, FILM COATED ORAL at 21:51

## 2022-08-06 ASSESSMENT — PAIN SCALES - GENERAL
PAINLEVEL_OUTOF10: 5
PAINLEVEL_OUTOF10: 0

## 2022-08-06 ASSESSMENT — PAIN SCALES - WONG BAKER: WONGBAKER_NUMERICALRESPONSE: 0

## 2022-08-06 ASSESSMENT — PAIN DESCRIPTION - ORIENTATION: ORIENTATION: RIGHT

## 2022-08-06 ASSESSMENT — PAIN DESCRIPTION - LOCATION: LOCATION: LEG

## 2022-08-06 ASSESSMENT — PAIN DESCRIPTION - DESCRIPTORS: DESCRIPTORS: CRAMPING

## 2022-08-06 NOTE — PROGRESS NOTES
Pt medicated with PRN Vistaril per request for anxiety. Pt is d/c focused, is worried about losing her job and finding a place to live.

## 2022-08-06 NOTE — GROUP NOTE
Group Therapy Note    Date: 2022    Group Start Time: 1630  Group End Time: 1700  Group Topic: Recreational    [facility]    Goldy Brent        Group Therapy Note    Attendees:          Patient's Goal:  ***    Notes:  ***    Status After Intervention:  {Status After Intervention:954010974}    Participation Level: {Participation Level:529260533}    Participation Quality: {[facility] PARTICIPATION QUALITY:777962408}      Speech:  {[facility] CD_SPEECH:56752}      Thought Process/Content: {Thought Process/Content:854559809}      Affective Functioning: {Affective Functionin}      Mood: {Mood:148447005}      Level of consciousness:  {Level of consciousness:127963996}      Response to Learnin [facility] Responses to Learnin}      Endings: {[facility] Endings:68948}    Modes of Intervention: {[facility] Modes of Intervention:360247379}      Discipline Responsible: {[facility] Multidisciplinary:548915770}      Signature:   Goldy Kennedy

## 2022-08-06 NOTE — PROGRESS NOTES
Pt assessment completed in the day room. Pt irritable and upset. Pt rated anxiety and depression 6/10, with 10 being the highest. Pt denies AVH. Pt reports good appetite. Pt reports poor broken sleep. Pt can not fall asleep due to racing thoughts. Pt stated, \" Broken sleep is a consequence of drinking\". Pt upset reports she not getting phone calls. This writer informed pt that phones are put up during group sessions. Pt expected D/C Monday. Pt  goal \" To try and figure out [facility] and [facility]. This writer informed Clarke Potter,  about pt concerns. 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 Pt denies any further needs at this time.

## 2022-08-06 NOTE — PROGRESS NOTES
Morning Community Meeting Topics    Allyssa Adkins attended the morning community meeting on 8/6/22. Topics discussed today     [x] Introduction  Day of the week and date  Mask distribution  Current mask requirements  [x]Teams  Explanation of  Green and Blue team criteria  Nurses assigned to each team for today  Explanation about green and blue paper  Date  Patient's Name  Patient's Nurse  Goals  [x] Visitation  Announce the visiting hours for the day  Announce which team is allowed to have visitors for the day  Review any updated Covid 19 requirements for visitors during visitation  Vaccine Card or negative Covid test within 48 hours of visit  State Identification  Patients are reminded to alert the  at least 1 hour before visitation   [x] Unit Orientation  Coffee use  Phone location and etiquette  Shower locations  Ector and dryer location and process  Common area expectations  Staff rounds expectation  [x] Meals   Educate patient to the menu  The patient is encouraged to fill out the menu to get preferences at mealtime  The patient is educated that if they do not fill out the menu, they will get the standard tray  The coffee pot is decaf, patient encouraged to order regular coffee from menu.   Educate patient to the meal process  Patient encouraged to eat snacks provided twice daily  Snacks may stay in patient room     [x] Discharge Process  Discharge expectations  Fill out the survey after discharge   [x] Hygiene  Daily showers encouraged  Showers availability discussed   Daily dressing encouraged  Discussed wearing street clothing  Education provided on where to place linens and clothing  Linens in the hamper  personal clothing does not go into the linen hamper  [x] Group   Patient encouraged to attend group provided  Time of Group Meetings discussed  Gentle reminder that attendance is a Physician order  [x] Movement  Chair exercises completed  Stretching completed  Notes:  Goal - \"To get in touch with [facility]\" Electronically signed by Shira Frisamuel, [address] on 8/6/2022 at 11:52 AM

## 2022-08-06 NOTE — CARE COORDINATION
8/6/22 @ 2:10    Received a call from Lorena at [facility]. She stated that she is only using 6 of the 8 beds at [facility] at this time (due to Covid), and does not currently have a bed available for the patient. She stated that she has patient's contact information should a bed become available.

## 2022-08-06 NOTE — PROGRESS NOTES
Evening assessment completed in the day room. Pt irritated, isolative, flat. Pt stated she is concerned that she isn't being notified of calls. Pt stated she is currently homelessshe is trying to arrange housing prior to possible discharge on Monday. Pt stated several people have called her stating they've tried calling for past 2 days without getting through. 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 Pt rates anxiety and depression 8/10. T showered and attended some groups today. Pt stated she can't remember LBM and would like something to help with that. PT denies SI/HI/AVH.

## 2022-08-06 NOTE — PROGRESS NOTES
Pt reports depression is # 8/10, anxiety level is # 10/10,Pt denies SI/HI/AVH. Pt is preoccupied with her housing situationas she reports she lost her housing after she relapsed. Pt spoke with [facility] today in hopes of getting assistance,butdid not feel the lady was of much assistance to her. Pt reports sleeping only 4 hrs, as her sleep is broken. Pt reports good appetite, and did not attend groups today d/t trying to figure out housing situation. Pt showers daily. Pts' goal is to find housing.,referred pt to [facility] on Monday.

## 2022-08-06 NOTE — GROUP NOTE
Group Therapy Note    Date: 8/6/2022    Group Start Time: 1630  Group End Time: 1700  Group Topic: Recreational    [facility]    Cecy Gross        Group Therapy Note    Attendees: 14/20       Patient's Goal:  to participate in an activity, socializing and sharing with peers. Notes:  pt actively participated    Status After Intervention:  Improved    Participation Level: Active Listener and Interactive    Participation Quality: Appropriate and Attentive      Speech:  normal      Thought Process/Content: Logical      Affective Functioning: Congruent      Mood: euthymic      Level of consciousness:  Alert      Response to Learning: Progressing to goal      Endings: None Reported    Modes of Intervention: Socialization      Discipline Responsible: Behavorial Health Tech      Signature:   Cecy Gross

## 2022-08-06 NOTE — PROGRESS NOTES
Pt. refused to attend the 1000 skills group, despite staff encouragement.  Electronically signed by Peggy Arredondo, [address] on 8/6/2022 at 1:27 PM

## 2022-08-06 NOTE — CARE COORDINATION
8/6/22 @ 1:30pm      Patient states she has been working with Cindy Carr from [facility] for several days regarding placement. Writer called Cindy Carr from [facility] [phone] at the request of patient concerning bed availability . Left a voicemail to return the call.

## 2022-08-07 PROCEDURE — 1240000000 HC EMOTIONAL WELLNESS R&B

## 2022-08-07 PROCEDURE — 6370000000 HC RX 637 (ALT 250 FOR IP): Performed by: PSYCHIATRY & NEUROLOGY

## 2022-08-07 RX ADMIN — Medication 100 MG: at 08:31

## 2022-08-07 RX ADMIN — OXCARBAZEPINE 300 MG: 300 TABLET, FILM COATED ORAL at 21:21

## 2022-08-07 RX ADMIN — QUETIAPINE FUMARATE 25 MG: 25 TABLET ORAL at 14:02

## 2022-08-07 RX ADMIN — THERA TABS 1 TABLET: TAB at 08:31

## 2022-08-07 RX ADMIN — FOLIC ACID 1 MG: 1 TABLET ORAL at 08:31

## 2022-08-07 RX ADMIN — QUETIAPINE FUMARATE 200 MG: 200 TABLET, EXTENDED RELEASE ORAL at 21:21

## 2022-08-07 RX ADMIN — OXCARBAZEPINE 300 MG: 300 TABLET, FILM COATED ORAL at 08:32

## 2022-08-07 RX ADMIN — QUETIAPINE FUMARATE 25 MG: 25 TABLET ORAL at 08:31

## 2022-08-07 NOTE — PROGRESS NOTES
Patient attended wrap up group as active participant and states goal was achieved and also verbalizes understanding of educational material presented.

## 2022-08-07 NOTE — PROGRESS NOTES
Pt assessment completed in the dayroom. Pt rated anxiety 4/10 and depression 5/10, with 10 being the highest. Pt reports better sleep last night. Appetite good. Pt had a visitor, a friend from work. Pt reports attending groups. Pt goal \" To find housing\". Pt provided with towels, pants, and undergarment.

## 2022-08-07 NOTE — PROGRESS NOTES
Morning Community Meeting Topics    Allyssa Adkins attended the morning community meeting on 8/7/22. Topics discussed today     [x] Introduction  Day of the week and date  Mask distribution  Current mask requirements  [x]Teams  Explanation of  Green and Blue team criteria  Nurses assigned to each team for today  Explanation about green and blue paper  Date  Patient's Name  Patient's Nurse  Goals  [x] Visitation  Announce the visiting hours for the day  Announce which team is allowed to have visitors for the day  Review any updated Covid 19 requirements for visitors during visitation  Vaccine Card or negative Covid test within 48 hours of visit  State Identification  Patients are reminded to alert the  at least 1 hour before visitation   [x] Unit Orientation  Coffee use  Phone location and etiquette  Shower locations  [place] and dryer location and process  Common area expectations  Staff rounds expectation  [x] Meals   Educate patient to the menu  The patient is encouraged to fill out the menu to get preferences at mealtime  The patient is educated that if they do not fill out the menu, they will get the standard tray  The coffee pot is decaf, patient encouraged to order regular coffee from menu.   Educate patient to the meal process  Patient encouraged to eat snacks provided twice daily  Snacks may stay in patient room     [x] Discharge Process  Discharge expectations  Fill out the survey after discharge   [x] Hygiene  Daily showers encouraged  Showers availability discussed   Daily dressing encouraged  Discussed wearing street clothing  Education provided on where to place linens and clothing  Linens in the hamper  personal clothing does not go into the linen hamper  [x] Group   Patient encouraged to attend group provided  Time of Group Meetings discussed  Gentle reminder that attendance is a Physician order  [x] Movement  Chair exercises completed  Stretching completed  Notes:  GOAL : \" to find housing\" Electronically signed by Tay Nice on 8/7/2022 at 9:55 AM

## 2022-08-07 NOTE — GROUP NOTE
Group Therapy Note    Date: 8/7/2022    Group Start Time: 1630  Group End Time: 1700  Group Topic: Healthy Living/Wellness    [facility]    Juliet Harmon        Group Therapy Note    Attendees: 16       Patient's Goal:  To learn about sleep hygiene by participating in a game    Notes:  Pt participated in group activity    Status After Intervention:  Improved    Participation Level:  Active Listener and Interactive    Participation Quality: Appropriate and Attentive      Speech:  normal      Thought Process/Content: Logical      Affective Functioning: Congruent      Mood: euthymic      Level of consciousness:  Alert      Response to Learning: Able to verbalize current knowledge/experience      Endings: None Reported    Modes of Intervention: Education, Socialization, and Activity      Discipline Responsible: Behavorial Health Tech      Signature:  Juliet Harmon

## 2022-08-07 NOTE — GROUP NOTE
Group Therapy Note    Date: 8/7/2022    Group Start Time: 1100  Group End Time: 5038  Group Topic: Group Therapy    [facility]    PATRICIA Cordero        Group Therapy Note    Attendees: 13       Patient's Goal:  To participate in a goal oriented group. Notes:  Patient stated her goal is to find a 3/4 house to support her sobriety. Status After Intervention:  Improved    Participation Level: Active Listener and Interactive    Participation Quality: Appropriate and Sharing      Speech:  normal      Thought Process/Content: Logical      Affective Functioning: Congruent      Mood: anxious      Level of consciousness:  Alert      Response to Learning: Able to verbalize current knowledge/experience      Endings: None Reported    Modes of Intervention: Education      Discipline Responsible: /Counselor      Signature:   PATRICIA Cordero

## 2022-08-07 NOTE — PROGRESS NOTES
Pt. refused to attend the 1000 skills group, despite staff encouragement.   Electronically signed by Tay Nice on 8/7/2022 at 11:23 AM

## 2022-08-08 VITALS
TEMPERATURE: 98.1 F | SYSTOLIC BLOOD PRESSURE: 99 MMHG | HEIGHT: 62 IN | BODY MASS INDEX: 26.68 KG/M2 | RESPIRATION RATE: 18 BRPM | DIASTOLIC BLOOD PRESSURE: 57 MMHG | OXYGEN SATURATION: 98 % | HEART RATE: 98 BPM | WEIGHT: 145 LBS

## 2022-08-08 PROCEDURE — 6370000000 HC RX 637 (ALT 250 FOR IP): Performed by: PSYCHIATRY & NEUROLOGY

## 2022-08-08 PROCEDURE — 99239 HOSP IP/OBS DSCHRG MGMT >30: CPT | Performed by: PSYCHIATRY & NEUROLOGY

## 2022-08-08 RX ORDER — TRAZODONE HYDROCHLORIDE 50 MG/1
50 TABLET ORAL NIGHTLY PRN
Qty: 15 TABLET | Refills: 2 | Status: SHIPPED | OUTPATIENT
Start: 2022-08-08

## 2022-08-08 RX ORDER — OXCARBAZEPINE 300 MG/1
300 TABLET, FILM COATED ORAL 2 TIMES DAILY
Qty: 30 TABLET | Refills: 3 | Status: SHIPPED | OUTPATIENT
Start: 2022-08-08

## 2022-08-08 RX ORDER — QUETIAPINE 300 MG/1
300 TABLET, FILM COATED, EXTENDED RELEASE ORAL NIGHTLY
Qty: 15 TABLET | Refills: 3 | Status: SHIPPED | OUTPATIENT
Start: 2022-08-08

## 2022-08-08 RX ORDER — QUETIAPINE FUMARATE 25 MG/1
25 TABLET, FILM COATED ORAL 2 TIMES DAILY
Qty: 30 TABLET | Refills: 3 | Status: SHIPPED | OUTPATIENT
Start: 2022-08-08

## 2022-08-08 RX ORDER — QUETIAPINE 300 MG/1
300 TABLET, FILM COATED, EXTENDED RELEASE ORAL NIGHTLY
Status: DISCONTINUED | OUTPATIENT
Start: 2022-08-08 | End: 2022-08-08 | Stop reason: HOSPADM

## 2022-08-08 RX ADMIN — QUETIAPINE FUMARATE 25 MG: 25 TABLET ORAL at 08:32

## 2022-08-08 RX ADMIN — FOLIC ACID 1 MG: 1 TABLET ORAL at 08:32

## 2022-08-08 RX ADMIN — THERA TABS 1 TABLET: TAB at 08:32

## 2022-08-08 RX ADMIN — QUETIAPINE FUMARATE 25 MG: 25 TABLET ORAL at 13:18

## 2022-08-08 RX ADMIN — Medication 100 MG: at 08:32

## 2022-08-08 RX ADMIN — OXCARBAZEPINE 300 MG: 300 TABLET, FILM COATED ORAL at 08:32

## 2022-08-08 ASSESSMENT — LIFESTYLE VARIABLES
HOW MANY STANDARD DRINKS CONTAINING ALCOHOL DO YOU HAVE ON A TYPICAL DAY: 10 OR MORE
HOW OFTEN DO YOU HAVE A DRINK CONTAINING ALCOHOL: 2-3 TIMES A WEEK
HOW OFTEN DO YOU HAVE A DRINK CONTAINING ALCOHOL: 2-3 TIMES A WEEK
HOW MANY STANDARD DRINKS CONTAINING ALCOHOL DO YOU HAVE ON A TYPICAL DAY: 10 OR MORE

## 2022-08-08 NOTE — PROGRESS NOTES
Moriah Mata \[facility] [place] [facility]\"" 89. FOLLOW-UP NOTE     8/6/2022     Patient was seen and examined in person, Chart reviewed   Patient's case discussed with staff/team    Chief Complaint: anxiety over homelessness    Interim History:   More anxious  Has expectation that she will find housing  Wants to work  Appetite:   [x] Normal/Unchanged  [] Increased  [] Decreased      Sleep:       [] Normal/Unchanged  [x] Fair       [x] Poor              Energy:    [x] Normal/Unchanged  [] Increased  [] Decreased        SI [] Present  [x] Absent    HI  []Present  [x] Absent     Aggression:  [] yes  [x] no    Patient is [x] able  [] unable to CONTRACT FOR SAFETY     PAST MEDICAL/PSYCHIATRIC HISTORY:   Past Medical History:   Diagnosis Date    Alcohol abuse     Alcoholic hepatitis 2/3/5261    Anxiety     Bipolar 1 disorder ([facility]     Depression     Seizures ([facility] 75.)     x1 only related to alcohol     Thyroid disease        FAMILY/SOCIAL HISTORY:  Family History   Problem Relation Age of Onset    Depression Mother      Social History     Socioeconomic History    Marital status:      Spouse name: Not on file    Number of children: 5    Years of education: 14    Highest education level: Not on file   Occupational History    Occupation: nurse   Tobacco Use    Smoking status: Every Day     Packs/day: 1.00     Years: 15.00     Pack years: 15.00     Types: Cigarettes    Smokeless tobacco: Never   Vaping Use    Vaping Use: Every day    Substances: Nicotine, Flavoring    Devices: Pre-filled or refillable cartridge   Substance and Sexual Activity    Alcohol use: Yes     Comment: drinks a fifth of rum per day. trying to stop drinking     Drug use: No    Sexual activity: Yes     Partners: Male     Comment: Acqukatie Keys one person. \"   Other Topics Concern    Not on file   Social History Narrative    Not on file     Social Determinants of Health     Financial Resource Strain: Not on file   Food Insecurity: Not on file Transportation Needs: Not on file   Physical Activity: Not on file   Stress: Not on file   Social Connections: Not on file   Intimate Partner Violence: Not on file   Housing Stability: Not on file           ROS:  [x] All negative/unchanged except if checked.  Explain positive(checked items) below:  [] Constitutional  [] Eyes  [] Ear/Nose/Mouth/Throat  [] Respiratory  [] CV  [] GI  []   [] Musculoskeletal  [] Skin/Breast  [] Neurological  [] Endocrine  [] Heme/Lymph  [] Allergic/Immunologic    Explanation:     MEDICATIONS:    Current Facility-Administered Medications:     QUEtiapine (SEROQUEL XR) extended release tablet 200 mg, 200 mg, Oral, Nightly, Rosina Quiñonez MD, 200 mg at 08/07/22 2121    OXcarbazepine (TRILEPTAL) tablet 300 mg, 300 mg, Oral, BID, Rosina Quiñonez MD, 300 mg at 08/07/22 2121    QUEtiapine (SEROQUEL) tablet 25 mg, 25 mg, Oral, BID, Rosina Quiñonez MD, 25 mg at 08/07/22 1402    acetaminophen (TYLENOL) tablet 650 mg, 650 mg, Oral, Q4H PRN, Dianne Moya MD, 650 mg at 08/06/22 0906    magnesium hydroxide (MILK OF MAGNESIA) 400 MG/5ML suspension 30 mL, 30 mL, Oral, Daily PRN, Dianne Moya MD, 30 mL at 08/06/22 0910    aluminum & magnesium hydroxide-simethicone (MAALOX) 200-200-20 MG/5ML suspension 30 mL, 30 mL, Oral, PRN, Dianne Moya MD    haloperidol (HALDOL) tablet 5 mg, 5 mg, Oral, Q6H PRN, 5 mg at 08/03/22 0210 **OR** haloperidol lactate (HALDOL) injection 5 mg, 5 mg, IntraMUSCular, Q6H PRN, Dianne Moya MD    benztropine mesylate (COGENTIN) injection 2 mg, 2 mg, IntraMUSCular, BID PRN, Dianne Moya MD    traZODone (DESYREL) tablet 50 mg, 50 mg, Oral, Nightly PRN, Dianne Moya MD    nicotine (NICODERM CQ) 21 MG/24HR 1 patch, 1 patch, TransDERmal, Daily, Dianne Moya MD, 1 patch at 08/07/22 0831    hydrOXYzine (VISTARIL) injection 50 mg, 50 mg, IntraMUSCular, Q6H PRN **OR** hydrOXYzine pamoate (VISTARIL) capsule 50 mg, 50 mg, Oral, Q6H PRN, Eva Cazares MD Laurie, 50 mg at 08/06/22 1300    chlordiazePOXIDE (LIBRIUM) capsule 10 mg, 10 mg, Oral, 4x Daily PRN **OR** chlordiazePOXIDE (LIBRIUM) capsule 25 mg, 25 mg, Oral, 4x Daily PRN **OR** chlordiazePOXIDE (LIBRIUM) capsule 50 mg, 50 mg, Oral, Q2H PRN **OR** chlordiazePOXIDE (LIBRIUM) capsule 75 mg, 75 mg, Oral, Q1H PRN **OR** LORazepam (ATIVAN) tablet 4 mg, 4 mg, Oral, Q1H PRN, Dylon Forman MD    thiamine tablet 100 mg, 100 mg, Oral, Daily, Dylon Forman MD, 100 mg at 47/31/75 5244    folic acid (FOLVITE) tablet 1 mg, 1 mg, Oral, Daily, Dylon Forman MD, 1 mg at 08/07/22 0831    multivitamin 1 tablet, 1 tablet, Oral, Daily, Dylon Forman MD, 1 tablet at 08/07/22 0831      Examination:  BP 99/61   Pulse 71   Temp 97.7 °F (36.5 °C) (Oral)   Resp 16   Ht 5' 2\" (1.575 m)   Wt 145 lb (65.8 kg)   LMP 07/30/2022   SpO2 99%   BMI 26.52 kg/m²   Gait - steady  Medication side effects(SE): no    Mental Status Examination:    Level of consciousness:  within normal limits   Appearance:  fair grooming and fair hygiene  Behavior/Motor:  psychomotor retardation  Attitude toward examiner:  attentive  Speech:  slow   Mood: depressed  Affect:  blunted  Thought processes:  slow   Thought content:  Suicidal Ideation:  passive  Cognition:  oriented to person, place, and time   Concentration intact  Insight fair   Judgement fair     ASSESSMENT:   Patient symptoms are:  [] Well controlled  [] Improving  [] Worsening  [] No change      Diagnosis:   Principal Problem:    [facility] depression ([facility]  Resolved Problems:    * No resolved hospital problems. *      LABS:    No results for input(s): WBC, HGB, PLT in the last 72 hours. No results for input(s): NA, K, CL, CO2, BUN, CREATININE, GLUCOSE in the last 72 hours. No results for input(s): BILITOT, ALKPHOS, AST, ALT in the last 72 hours.     Lab Results   Component Value Date/Time    LABAMPH Neg 08/02/2022 07:30 AM    BARBSCNU Neg 08/02/2022 07:30 AM    LABBENZ Neg 08/02/2022 07:30 AM    LABMETH Neg 08/02/2022 07:30 AM    OPIATESCREENURINE Neg 08/02/2022 07:30 AM    PHENCYCLIDINESCREENURINE Neg 08/02/2022 07:30 AM    ETOH 33 08/02/2022 06:17 PM     Lab Results   Component Value Date/Time    TSH 1.950 08/02/2022 08:00 AM     No results found for: LITHIUM  Lab Results   Component Value Date    VALPROATE <7 (L) 06/04/2018       RISK ASSESSMENT:     Treatment Plan:  Reviewed current Medications with the patient. Risks, benefits, side effects, drug-to-drug interactions and alternatives to treatment were discussed. Collateral information:   CD evaluation  Encourage patient to attend group and other milieu activities.   Discharge planning discussed with the patient and treatment team.    PSYCHOTHERAPY/COUNSELING:  [x] Therapeutic interview  [x] Supportive  [] CBT  [] Ongoing  [] Other    [x] Patient continues to need, on a daily basis, active treatment furnished directly by or requiring the supervision of inpatient psychiatric personnel      Patient wants to leave on monday            Electronically signed by Esther Gilliland MD on 8/8/2022 at 7:32 AM

## 2022-08-08 NOTE — DISCHARGE INSTRUCTIONS
Keep all follow up appointments, take medications as ordered, utilize positive supports, abstain from use of alcohol and drugs. If symptoms return or you feel at risk to yourself or others, please call 911, return the nearest emergency room, or call your local crisis hotline:  [facility]: [phone] [place]: [phone]: [phone]     Due to the [address] [facility] is not currently available. For assistance with quitting smoking please go to https://smokefree.gov. A prescription for an FDA-approved tobacco cessation medication was offered at discharge and the patient refused. Someone from [address] [place] [address] [facility]. will be calling you tomorrow to follow up on your care. If you don't hear from us, give us a call! [phone].

## 2022-08-08 NOTE — GROUP NOTE
Group Therapy Note    Date: 8/7/2022    Group Start Time: 2115  Group End Time: 2125  Group Topic: Wrap-Up    [facility]    Ledy Rubin        Group Therapy Note    Attendees: 12       Patient's Goal:  \"to find housing\"    Notes:  Pt stated she is still working on her goal    Status After Intervention:  Unchanged    Participation Level:  Active Listener and Interactive    Participation Quality: Appropriate and Attentive      Speech:  normal      Thought Process/Content: Logical      Affective Functioning: Congruent      Mood: euthymic      Level of consciousness:  Alert      Response to Learning: Progressing to goal      Endings: None Reported    Modes of Intervention: Socialization      Discipline Responsible: Behavorial Health Tech      Signature:  Ledy Rubin

## 2022-08-08 NOTE — FLOWSHEET NOTE
Reviewed discharge instructions with patient. Pt. Verbalized understanding. Denies SI/HI/AVH. Waiting for cab.

## 2022-08-08 NOTE — PROGRESS NOTES
Pt. refused to attend the 1000 skills group, despite staff encouragement.   Electronically signed by Mary Rodas on 8/8/2022 at 11:22 AM

## 2022-08-08 NOTE — CARE COORDINATION
Called and left hospitals a message that patient is not [facility] because she wants to work. Patient will be discharged to a [facility] home.

## 2022-08-08 NOTE — DISCHARGE INSTR - DIET

## 2022-08-08 NOTE — PROGRESS NOTES
Transportation Needs: Not on file   Physical Activity: Not on file   Stress: Not on file   Social Connections: Not on file   Intimate Partner Violence: Not on file   Housing Stability: Not on file           ROS:  [x] All negative/unchanged except if checked.  Explain positive(checked items) below:  [] Constitutional  [] Eyes  [] Ear/Nose/Mouth/Throat  [] Respiratory  [] CV  [] GI  []   [] Musculoskeletal  [] Skin/Breast  [] Neurological  [] Endocrine  [] Heme/Lymph  [] Allergic/Immunologic    Explanation:     MEDICATIONS:    Current Facility-Administered Medications:     QUEtiapine (SEROQUEL XR) extended release tablet 200 mg, 200 mg, Oral, Nightly, Laverne Guardado MD, 200 mg at 08/07/22 2121    OXcarbazepine (TRILEPTAL) tablet 300 mg, 300 mg, Oral, BID, Laverne Guardado MD, 300 mg at 08/07/22 2121    QUEtiapine (SEROQUEL) tablet 25 mg, 25 mg, Oral, BID, Laverne Guardado MD, 25 mg at 08/07/22 1402    acetaminophen (TYLENOL) tablet 650 mg, 650 mg, Oral, Q4H PRN, Emi Longoria MD, 650 mg at 08/06/22 0906    magnesium hydroxide (MILK OF MAGNESIA) 400 MG/5ML suspension 30 mL, 30 mL, Oral, Daily PRN, Emi Longoria MD, 30 mL at 08/06/22 0910    aluminum & magnesium hydroxide-simethicone (MAALOX) 200-200-20 MG/5ML suspension 30 mL, 30 mL, Oral, PRN, Emi Longoria MD    haloperidol (HALDOL) tablet 5 mg, 5 mg, Oral, Q6H PRN, 5 mg at 08/03/22 0210 **OR** haloperidol lactate (HALDOL) injection 5 mg, 5 mg, IntraMUSCular, Q6H PRN, Emi Longoria MD    benztropine mesylate (COGENTIN) injection 2 mg, 2 mg, IntraMUSCular, BID PRN, Emi Longoria MD    traZODone (DESYREL) tablet 50 mg, 50 mg, Oral, Nightly PRN, Emi Longoria MD    nicotine (NICODERM CQ) 21 MG/24HR 1 patch, 1 patch, TransDERmal, Daily, Emi Longoria MD, 1 patch at 08/07/22 0831    hydrOXYzine (VISTARIL) injection 50 mg, 50 mg, IntraMUSCular, Q6H PRN **OR** hydrOXYzine pamoate (VISTARIL) capsule 50 mg, 50 mg, Oral, Q6H PRN, Bereket Dawkins MD Laurie, 50 mg at 08/06/22 1300    chlordiazePOXIDE (LIBRIUM) capsule 10 mg, 10 mg, Oral, 4x Daily PRN **OR** chlordiazePOXIDE (LIBRIUM) capsule 25 mg, 25 mg, Oral, 4x Daily PRN **OR** chlordiazePOXIDE (LIBRIUM) capsule 50 mg, 50 mg, Oral, Q2H PRN **OR** chlordiazePOXIDE (LIBRIUM) capsule 75 mg, 75 mg, Oral, Q1H PRN **OR** LORazepam (ATIVAN) tablet 4 mg, 4 mg, Oral, Q1H PRN, Mima Collins MD    thiamine tablet 100 mg, 100 mg, Oral, Daily, Mima Collins MD, 100 mg at 42/81/28 5450    folic acid (FOLVITE) tablet 1 mg, 1 mg, Oral, Daily, Mima Collins MD, 1 mg at 08/07/22 0831    multivitamin 1 tablet, 1 tablet, Oral, Daily, Mima Collins MD, 1 tablet at 08/07/22 0831      Examination:  BP 99/61   Pulse 71   Temp 97.7 °F (36.5 °C) (Oral)   Resp 16   Ht 5' 2\" (1.575 m)   Wt 145 lb (65.8 kg)   LMP 07/30/2022   SpO2 99%   BMI 26.52 kg/m²   Gait - steady  Medication side effects(SE): no    Mental Status Examination:    Level of consciousness:  within normal limits   Appearance:  fair grooming and fair hygiene  Behavior/Motor:  psychomotor retardation  Attitude toward examiner:  attentive  Speech:  slow   Mood: depressed  Affect:  blunted  Thought processes:  slow   Thought content:  Suicidal Ideation:  passive  Cognition:  oriented to person, place, and time   Concentration intact  Insight fair   Judgement fair     ASSESSMENT:   Patient symptoms are:  [] Well controlled  [] Improving  [] Worsening  [] No change      Diagnosis:   Principal Problem:    [facility] ([facility]  Resolved Problems:    * No resolved hospital problems. *      LABS:    No results for input(s): WBC, HGB, PLT in the last 72 hours. No results for input(s): NA, K, CL, CO2, BUN, CREATININE, GLUCOSE in the last 72 hours. No results for input(s): BILITOT, ALKPHOS, AST, ALT in the last 72 hours.     Lab Results   Component Value Date/Time    LABAMPH Neg 08/02/2022 07:30 AM    BARBSCNU Neg 08/02/2022 07:30 AM    LABBENZ Neg 08/02/2022 07:30 AM    LABMETH Neg 08/02/2022 07:30 AM    OPIATESCREENURINE Neg 08/02/2022 07:30 AM    PHENCYCLIDINESCREENURINE Neg 08/02/2022 07:30 AM    ETOH 33 08/02/2022 06:17 PM     Lab Results   Component Value Date/Time    TSH 1.950 08/02/2022 08:00 AM     No results found for: LITHIUM  Lab Results   Component Value Date    VALPROATE <7 (L) 06/04/2018       RISK ASSESSMENT:     Treatment Plan:  Reviewed current Medications with the patient. Risks, benefits, side effects, drug-to-drug interactions and alternatives to treatment were discussed. Collateral information:   CD evaluation  Encourage patient to attend group and other milieu activities.   Discharge planning discussed with the patient and treatment team.    PSYCHOTHERAPY/COUNSELING:  [x] Therapeutic interview  [x] Supportive  [] CBT  [] Ongoing  [] Other    [x] Patient continues to need, on a daily basis, active treatment furnished directly by or requiring the supervision of inpatient psychiatric personnel      Patient wants to leave on monday            Electronically signed by Shon Moreland MD on 8/8/2022 at 7:24 AM

## 2022-08-08 NOTE — GROUP NOTE
Group Therapy Note    Date: 8/7/2022    Group Start Time: 2045  Group End Time: 2115  Group Topic: Recreational    [facility]    Constantine May        Group Therapy Note    Attendees: 12       Patient's Goal:  To participate in a game of headbands with peers     Notes:  Pt participated in group activity    Status After Intervention:  Unchanged    Participation Level:  Active Listener and Interactive    Participation Quality: Appropriate and Attentive      Speech:  normal      Thought Process/Content: Logical      Affective Functioning: Congruent      Mood: euthymic      Level of consciousness:  Alert      Response to Learning: Able to verbalize current knowledge/experience      Endings: None Reported    Modes of Intervention: Support, Socialization, Activity, and Movement      Discipline Responsible: Behavorial Health Tech      Signature:  Constantine May

## 2022-08-08 NOTE — PROGRESS NOTES
Morning Community Meeting Topics    Allyssa Marrufo attended the morning community meeting on 8/8/22. Topics discussed today     [x] Introduction  Day of the week and date  Mask distribution  Current mask requirements  [x]Teams  Explanation of  Green and Blue team criteria  Nurses assigned to each team for today  Explanation about green and blue paper  Date  Patient's Name  Patient's Nurse  Goals  [x] Visitation  Announce the visiting hours for the day  Announce which team is allowed to have visitors for the day  Review any updated Covid 19 requirements for visitors during visitation  Vaccine Card or negative Covid test within 48 hours of visit  State Identification  Patients are reminded to alert the  at least 1 hour before visitation   [x] Unit Orientation  Coffee use  Phone location and etiquette  Shower locations  [place] and dryer location and process  Common area expectations  Staff rounds expectation  [x] Meals   Educate patient to the menu  The patient is encouraged to fill out the menu to get preferences at mealtime  The patient is educated that if they do not fill out the menu, they will get the standard tray  The coffee pot is decaf, patient encouraged to order regular coffee from menu.   Educate patient to the meal process  Patient encouraged to eat snacks provided twice daily  Snacks may stay in patient room     [x] Discharge Process  Discharge expectations  Fill out the survey after discharge   [x] Hygiene  Daily showers encouraged  Showers availability discussed   Daily dressing encouraged  Discussed wearing street clothing  Education provided on where to place linens and clothing  Linens in the hamper  personal clothing does not go into the linen hamper  [x] Group   Patient encouraged to attend group provided  Time of Group Meetings discussed  Gentle reminder that attendance is a Physician order  [x] Movement  Chair exercises completed  Stretching completed  Notes:  GOAL : \" to find housing\" Electronically signed by Estelita Dubois on 8/8/2022 at 9:40 AM

## 2022-08-08 NOTE — PROGRESS NOTES
Patient interviewed in the dayroom per her request.  Patient maintains good eye contact throughout interview and assessment. Patient rates anxiety 4/10 and depression 7/10 on a 10 point scale where 10 is the most.  Patient denies SI, HI, and AVH. Patient is calm an cooperative. She reports good appetite and sleep. She states lets get real was not of good help for her and she wants to get be discharges so that she may get back to working. Patient is A/O X4. She denies needs at this time. Will continue to monitor.

## 2022-08-08 NOTE — GROUP NOTE
Group Therapy Note    Date: 8/8/2022    Group Start Time: 1100  Group End Time: 2504  Group Topic: Psychotherapy    [facility]    CATHERINE Lemus        Group Therapy Note    Attendees: 14/24       Patient's Goal:  n/a    Notes:  came to group and  left due to amount of people in the group.      Status After Intervention:  Unchanged    Participation Level: None    Participation Quality: Resistant      Speech:  mute      Thought Process/Content: Logical      Affective Functioning: Flat      Mood: anxious and depressed      Level of consciousness:  Alert      Response to Learning: Progressing to goal      Endings: None Reported    Modes of Intervention: Education      Discipline Responsible: /Counselor      Signature:  CATHERINE Lemus

## 2022-08-08 NOTE — CARE COORDINATION
Group Therapy Note    Date: 8/8/2022  Start Time: 3192  End Time:  1761    Number of Participants: 8    Type of Group: Cognitive Skills    Patient's Goal:  To participate in mood management group. Notes: Patient declined to attend psychoeducation group at [phone] despite encouragement by staff.      Discipline Responsible: /Counselor    PATRICIA Blackman

## 2022-08-08 NOTE — PROGRESS NOTES
Pt given personal belongings  and left unit. Pt  picked up by cap and discharged to [facility]. Pt denies SI/HI/AVH, rates anxiety and depression d/t pt being homeless.

## 2022-08-08 NOTE — DISCHARGE SUMMARY
DISCHARGE SUMMARY      Patient ID:  Dixon Flores  [id number]  38 y.o.  1980      Admit date: 8/2/2022    Discharge date and time: 8/8/2022    Admitting Physician: Mendy Del Real MD     Discharge Physician: Dr Ashanti Tracey MD    Admission Diagnoses: Bronchospasm [J98.01]  Bipolar 1 disorder ([facility] [G43.6]  Acute alcoholic intoxication with complication ([facility] [N08.226]  Bipolar depression ([facility] [F31.9]    Admission Condition: poor    Discharged Condition: stable    Admission Circumstance: The patient is a 38 y.o. female , living alone at sober living [address] [place] [address], employed with significant past history of bipolar disorder, alcoholism     Pt was staying at [address] [place] [address] for 5 months and was sober for 5 months  Relapse with alcohol use last Thursday- no reason or precipitating factor. Was drinking all day since last Thursday until she came here. A fifth or more of liquor per day. Pt is currently going through withdrawal  Not been taking any medication since she is not allowed to take any bipolar medication over there at [facility]  H/O Seizures in the past sec to w/d     Depression Severity: Rating mood to be around 1/10 (10- good)  Quality:melancholic  Worse all day  Content: Hopeless, worthless and helpless feeling  Suicidal thoughts - want to drink and take an overdose, not want to be alive like this  Associated symptoms:  Poor concentration, anhedonia, decrease motivation  Sleep and appetite- poor     Pt has 5 children 23 to 5 y/o- they are angry at her drinking habits, not sure where she is going to live, don't know where her belongings are. The patient is not currently receiving care for the above psychiatric illness.      Medications Prior to Admission:     Prescriptions Prior to Admission   Medications Prior to Admission: cetirizine (ZYRTEC) 10 MG tablet, Take 10 mg by mouth daily as needed for Allergies  OXcarbazepine (TRILEPTAL) 300 MG tablet, Take 1 tablet by mouth 2 times daily (Patient not taking: Reported on 8/2/2022)  QUEtiapine (SEROQUEL) 50 MG tablet, Take 1 tablet by mouth 2 times daily at 0800 and 1400 (Patient not taking: Reported on 8/2/2022)  QUEtiapine (SEROQUEL XR) 300 MG extended release tablet, Take 1 tablet by mouth nightly (Patient not taking: Reported on 8/2/2022)  acamprosate (CAMPRAL) 333 MG tablet, Take 2 tablets by mouth 3 times daily (Patient not taking: Reported on 8/2/2022)  thiamine 100 MG tablet, Take 1 tablet by mouth daily (Patient not taking: Reported on 8/2/2022)  Multiple Vitamin (MULTIVITAMIN) TABS tablet, Take 1 tablet by mouth daily (Patient not taking: Reported on 8/2/2022)        Compliance:no     Psychiatric Review of Systems       Depression: yes     Payal or Hypomania:  Yes- mood swings, anger irritable, racing thoughts, impulsive drinking     Panic Attacks:  yes     Phobias:  no     Obsessions and Compulsions:  no     PTSD : no     Hallucinations:  no     Delusions:  no     Substance Abuse History:  ETOH: yes  Marijuana: no  Opiates: no  Other Drugs: no        Past Psychiatric History:  Prior Diagnosis:  Bipolar I disorder; borderline traits, alcohlism  Psychiatrist: no  Therapist:no  Hospitalization: yes  Hx of Suicidal Attempts: yes  Hx of violence:  no  ECT: no      PAST MEDICAL/PSYCHIATRIC HISTORY:   Past Medical History:   Diagnosis Date    Alcohol abuse     Alcoholic hepatitis 0/4/1790    Anxiety     Bipolar 1 disorder ([facility]     Depression     Seizures ([facility]     x1 only related to alcohol     Thyroid disease        FAMILY/SOCIAL HISTORY:  Family History   Problem Relation Age of Onset    Depression Mother      Social History     Socioeconomic History    Marital status:      Spouse name: Not on file    Number of children: 5    Years of education: 14    Highest education level: Not on file   Occupational History    Occupation: nurse   Tobacco Use    Smoking status: Every Day     Packs/day: 1.00     Years: 15.00     Pack years: 15.00     Types: Cigarettes Smokeless tobacco: Never   Vaping Use    Vaping Use: Every day    Substances: Nicotine, Flavoring    Devices: Pre-filled or refillable cartridge   Substance and Sexual Activity    Alcohol use: Yes     Comment: drinks a fifth of rum per day. trying to stop drinking     Drug use: No    Sexual activity: Yes     Partners: Male     Comment: [place] one person. \"   Other Topics Concern    Not on file   Social History Narrative    Not on file     Social Determinants of Health     Financial Resource Strain: Not on file   Food Insecurity: Not on file   Transportation Needs: Not on file   Physical Activity: Not on file   Stress: Not on file   Social Connections: Not on file   Intimate Partner Violence: Not on file   Housing Stability: Not on file       MEDICATIONS:    Current Facility-Administered Medications:     QUEtiapine (SEROQUEL XR) extended release tablet 300 mg, 300 mg, Oral, Nightly, Ted Franco MD    OXcarbazepine (TRILEPTAL) tablet 300 mg, 300 mg, Oral, BID, Ted Franco MD, 300 mg at 08/08/22 2636    QUEtiapine (SEROQUEL) tablet 25 mg, 25 mg, Oral, BID, Ted Franco MD, 25 mg at 08/08/22 6025    acetaminophen (TYLENOL) tablet 650 mg, 650 mg, Oral, Q4H PRN, Mendy Del Real MD, 650 mg at 08/06/22 0906    magnesium hydroxide (MILK OF MAGNESIA) 400 MG/5ML suspension 30 mL, 30 mL, Oral, Daily PRN, Mendy Del Real MD, 30 mL at 08/06/22 0910    aluminum & magnesium hydroxide-simethicone (MAALOX) 200-200-20 MG/5ML suspension 30 mL, 30 mL, Oral, PRN, Mendy Del Real MD    haloperidol (HALDOL) tablet 5 mg, 5 mg, Oral, Q6H PRN, 5 mg at 08/03/22 0210 **OR** haloperidol lactate (HALDOL) injection 5 mg, 5 mg, IntraMUSCular, Q6H PRN, Mendy Del Real MD    benztropine mesylate (COGENTIN) injection 2 mg, 2 mg, IntraMUSCular, BID PRN, Mendy Del Real MD    traZODone (DESYREL) tablet 50 mg, 50 mg, Oral, Nightly PRN, Mendy Del Real MD    nicotine (NICODERM CQ) 21 MG/24HR 1 patch, 1 patch, TransDERmal, Daily, Nena Yanes MD, 1 patch at 08/08/22 1418    hydrOXYzine (VISTARIL) injection 50 mg, 50 mg, IntraMUSCular, Q6H PRN **OR** hydrOXYzine pamoate (VISTARIL) capsule 50 mg, 50 mg, Oral, Q6H PRN, Nena Yanes MD, 50 mg at 08/06/22 1300    chlordiazePOXIDE (LIBRIUM) capsule 10 mg, 10 mg, Oral, 4x Daily PRN **OR** chlordiazePOXIDE (LIBRIUM) capsule 25 mg, 25 mg, Oral, 4x Daily PRN **OR** chlordiazePOXIDE (LIBRIUM) capsule 50 mg, 50 mg, Oral, Q2H PRN **OR** chlordiazePOXIDE (LIBRIUM) capsule 75 mg, 75 mg, Oral, Q1H PRN **OR** LORazepam (ATIVAN) tablet 4 mg, 4 mg, Oral, Q1H PRN, Nena Yanes MD    thiamine tablet 100 mg, 100 mg, Oral, Daily, Nena Yanes MD, 100 mg at 64/37/21 4178    folic acid (FOLVITE) tablet 1 mg, 1 mg, Oral, Daily, Nena Yanes MD, 1 mg at 08/08/22 1643    multivitamin 1 tablet, 1 tablet, Oral, Daily, Nena Yanes MD, 1 tablet at 08/08/22 0807    Examination:  BP (!) 99/57   Pulse 98   Temp 98.1 °F (36.7 °C) (Oral)   Resp 18   Ht 5' 2\" (1.575 m)   Wt 145 lb (65.8 kg)   LMP 07/30/2022   SpO2 98%   BMI 26.52 kg/m²   Gait - steady    HOSPITAL COURSE[de-identified]  Following admission to the hospital, patient had a complete physical exam and blood work up  Patient was monitored closely with suicide precaution  Patient was started on meds as listed below  Was encouraged to participate in group and other milieu activity  Patient started to feel better with this combination of treatment. Significant progress in the symptoms since admission.     Mood better, with the score of 2/10 - bad  No AVH or paranoid thoughts  No Hopeless or worthless feeling  No active SI/HI  Appetite:  [x] Normal  [] Increased  [] Decreased    Sleep:       [x] Normal  [] Fair       [] Poor            Energy:    [x] Normal  [] Increased  [] Decreased     SI [] Present  [x] Absent  HI  []Present  [x] Absent   Aggression:  [] yes  [] no  Patient is [x] able  [] unable to CONTRACT FOR SAFETY   Medication side effects(SE):  [x] None(Psych. Meds.) [] Other    Pt wanted to go to 3/4 house and she could not find any through [facility]  Wants to find it out through [place] [address]    Mental Status Examination on discharge:    Level of consciousness:  within normal limits   Appearance:  well-appearing  Behavior/Motor:  no abnormalities noted  Attitude toward examiner:  attentive and good eye contact  Speech:  spontaneous, normal rate and normal volume   Mood: euthymic  Affect:  mood congruent  Thought processes:  goal directed   Thought content:  Suicidal Ideation:  denies suicidal ideation  Cognition:  oriented to person, place, and time   Concentration intact  Memory intact  Insight good   Judgement fair   Fund of Knowledge adequate      ASSESSMENT:  Patient symptoms are:  [x] Well controlled  [x] Improving  [] Worsening  [] No change      Diagnosis:  Principal Problem:    Bipolar depression ([facility]  Resolved Problems:    * No resolved hospital problems. *      LABS:    No results for input(s): WBC, HGB, PLT in the last 72 hours. No results for input(s): NA, K, CL, CO2, BUN, CREATININE, GLUCOSE in the last 72 hours. No results for input(s): BILITOT, ALKPHOS, AST, ALT in the last 72 hours. Lab Results   Component Value Date/Time    LABAMPH Neg 08/02/2022 07:30 AM    BARBSCNU Neg 08/02/2022 07:30 AM    LABBENZ Neg 08/02/2022 07:30 AM    LABMETH Neg 08/02/2022 07:30 AM    OPIATESCREENURINE Neg 08/02/2022 07:30 AM    PHENCYCLIDINESCREENURINE Neg 08/02/2022 07:30 AM    ETOH 33 08/02/2022 06:17 PM     Lab Results   Component Value Date/Time    TSH 1.950 08/02/2022 08:00 AM     No results found for: LITHIUM  Lab Results   Component Value Date    VALPROATE <7 (L) 06/04/2018       RISK ASSESSMENT AT DISCHARGE: Low risk for suicide and homicide. Treatment Plan:  Reviewed current Medications with the patient. Education provided on the complaince with treatment.     Risks, benefits, side effects, drug-to-drug interactions and alternatives to treatment than one antipsychotic:   [x] N/A  [] 3 failed monotherapy(drugs tried):  [] Cross over to a new antipsychotic  [] Taper to monotherapy from polypharmacy  [] Augmentation of Clozapine therapy due to treatment resistance to single therapy        TIME SPEND - 35 MINUTES TO COMPLETE THE EVALUATION, DISCHARGE SUMMARY, MEDICATION RECONCILIATION AND FOLLOW UP CARE     Signed:  Tatyana Adkins MD  8/8/2022  11:19 AM

## 2022-08-08 NOTE — CARE COORDINATION
Spoke to Maximus Peralta from [facility] regarding placement. Maximus Barneston stated that patient has burned most of her bridges. She has been to [facility] and [facility] in [facility] [employer]. Willieartis Barneston stated patient wants to start working right away and all sober houses have a 60 to 90 day restriction. Patient refuses to go to these places because she cannot work right away. Maximus Barneston called back and [facility] can take her if she goes to a half way house. Maximus Barneston is unsure of how long. Carina Toribio can be reached at # [phone]. Carina Toribio would like to talk to [place]

## 2022-08-08 NOTE — PROGRESS NOTES
Pt did not attend group despite staff encouragement to do so.   Electronically signed by Kristopher Ann on 8/8/22 at 2:29 PM EDT

## 2022-08-19 NOTE — FLOWSHEET NOTE
Patient is awake at this aj resting in bed. No signs of any type of distress noted or expressed. [id number]   Patient in dayroom for breakfast and ate very little. Just returned to room. 1000  Patient laying in bed with eyes closed. 1100  Patient is now laying on back in bed with eyes closed. Respirations even and unlabored. No complaints from patient at this time. No signs or symptoms of distress noted. 1130  Patient met with doctor. 1:1 discontinued. Patient informed to let us know if she needed anything. normal...

## 2023-08-08 NOTE — PROGRESS NOTES
Group Therapy Note    Date: 7/07/2017  Start Time: 1100  End Time:  4554  Number of Participants: 6  Type of Group: Psychotherapy    Patient's Goal:  To stay sober     Notes:  Pt shared concerns related to abstaining from alcohol around others who \"glorify\" it's usage    Status After Intervention:  Improved    Participation Level:  Active Listener and Interactive    Participation Quality: Appropriate, Attentive, Sharing and Supportive      Speech:  normal      Thought Process/Content: Logical  Linear      Affective Functioning: Congruent      Mood: euthymic      Level of consciousness:  Alert, Oriented x4 and Attentive      Response to Learning: Able to verbalize current knowledge/experience, Able to verbalize/acknowledge new learning and Able to retain information      Endings: None Reported    Modes of Intervention: Support, Exploration and Problem-solving      Discipline Responsible: /Counselor      Signature:  Michelle Francisco, [facility] Hypoxia Leg swelling

## 2023-10-10 ENCOUNTER — OFFICE VISIT (OUTPATIENT)
Dept: PRIMARY CARE | Facility: CLINIC | Age: 43
End: 2023-10-10
Payer: COMMERCIAL

## 2023-10-10 ENCOUNTER — LAB (OUTPATIENT)
Dept: LAB | Facility: LAB | Age: 43
End: 2023-10-10
Payer: COMMERCIAL

## 2023-10-10 VITALS
SYSTOLIC BLOOD PRESSURE: 104 MMHG | BODY MASS INDEX: 30.81 KG/M2 | HEART RATE: 105 BPM | DIASTOLIC BLOOD PRESSURE: 66 MMHG | OXYGEN SATURATION: 99 % | WEIGHT: 173.9 LBS | RESPIRATION RATE: 18 BRPM | TEMPERATURE: 98.2 F | HEIGHT: 63 IN

## 2023-10-10 DIAGNOSIS — Z00.00 ANNUAL PHYSICAL EXAM: ICD-10-CM

## 2023-10-10 DIAGNOSIS — J30.2 SEASONAL ALLERGIES: ICD-10-CM

## 2023-10-10 DIAGNOSIS — Z12.31 ENCOUNTER FOR SCREENING MAMMOGRAM FOR MALIGNANT NEOPLASM OF BREAST: ICD-10-CM

## 2023-10-10 DIAGNOSIS — F10.20 ALCOHOLISM (MULTI): ICD-10-CM

## 2023-10-10 DIAGNOSIS — Z02.0 SCHOOL PHYSICAL EXAM: ICD-10-CM

## 2023-10-10 DIAGNOSIS — F31.9 BIPOLAR AFFECTIVE DISORDER, REMISSION STATUS UNSPECIFIED (MULTI): ICD-10-CM

## 2023-10-10 DIAGNOSIS — Z02.0 SCHOOL PHYSICAL EXAM: Primary | ICD-10-CM

## 2023-10-10 LAB
ALBUMIN SERPL BCP-MCNC: 4.1 G/DL (ref 3.4–5)
ALP SERPL-CCNC: 64 U/L (ref 33–110)
ALT SERPL W P-5'-P-CCNC: 35 U/L (ref 7–45)
ANION GAP SERPL CALC-SCNC: 16 MMOL/L (ref 10–20)
AST SERPL W P-5'-P-CCNC: 20 U/L (ref 9–39)
BASOPHILS # BLD AUTO: 0.08 X10*3/UL (ref 0–0.1)
BASOPHILS NFR BLD AUTO: 0.8 %
BILIRUB SERPL-MCNC: 0.4 MG/DL (ref 0–1.2)
BUN SERPL-MCNC: 14 MG/DL (ref 6–23)
CALCIUM SERPL-MCNC: 9.2 MG/DL (ref 8.6–10.3)
CHLORIDE SERPL-SCNC: 104 MMOL/L (ref 98–107)
CHOLEST SERPL-MCNC: 193 MG/DL (ref 0–199)
CHOLESTEROL/HDL RATIO: 4.2
CO2 SERPL-SCNC: 21 MMOL/L (ref 21–32)
CREAT SERPL-MCNC: 1.28 MG/DL (ref 0.5–1.05)
EOSINOPHIL # BLD AUTO: 0.21 X10*3/UL (ref 0–0.7)
EOSINOPHIL NFR BLD AUTO: 2.2 %
ERYTHROCYTE [DISTWIDTH] IN BLOOD BY AUTOMATED COUNT: 12.2 % (ref 11.5–14.5)
EST. AVERAGE GLUCOSE BLD GHB EST-MCNC: 103 MG/DL
GFR SERPL CREATININE-BSD FRML MDRD: 54 ML/MIN/1.73M*2
GLUCOSE SERPL-MCNC: 79 MG/DL (ref 74–99)
HBA1C MFR BLD: 5.2 %
HBV SURFACE AB SER-ACNC: <3.1 MIU/ML
HCT VFR BLD AUTO: 41 % (ref 36–46)
HCV AB SER QL: NONREACTIVE
HDLC SERPL-MCNC: 46.5 MG/DL
HGB BLD-MCNC: 13.5 G/DL (ref 12–16)
IMM GRANULOCYTES # BLD AUTO: 0.02 X10*3/UL (ref 0–0.7)
IMM GRANULOCYTES NFR BLD AUTO: 0.2 % (ref 0–0.9)
LDLC SERPL CALC-MCNC: 120 MG/DL (ref 140–190)
LYMPHOCYTES # BLD AUTO: 3.01 X10*3/UL (ref 1.2–4.8)
LYMPHOCYTES NFR BLD AUTO: 31.9 %
MCH RBC QN AUTO: 29.6 PG (ref 26–34)
MCHC RBC AUTO-ENTMCNC: 32.9 G/DL (ref 32–36)
MCV RBC AUTO: 90 FL (ref 80–100)
MONOCYTES # BLD AUTO: 0.6 X10*3/UL (ref 0.1–1)
MONOCYTES NFR BLD AUTO: 6.4 %
NEUTROPHILS # BLD AUTO: 5.51 X10*3/UL (ref 1.2–7.7)
NEUTROPHILS NFR BLD AUTO: 58.5 %
NON HDL CHOLESTEROL: 147 MG/DL (ref 0–149)
NRBC BLD-RTO: 0 /100 WBCS (ref 0–0)
PLATELET # BLD AUTO: 315 X10*3/UL (ref 150–450)
PMV BLD AUTO: 10.2 FL (ref 7.5–11.5)
POTASSIUM SERPL-SCNC: 4.3 MMOL/L (ref 3.5–5.3)
PROT SERPL-MCNC: 7.1 G/DL (ref 6.4–8.2)
RBC # BLD AUTO: 4.56 X10*6/UL (ref 4–5.2)
SODIUM SERPL-SCNC: 137 MMOL/L (ref 136–145)
TRIGL SERPL-MCNC: 133 MG/DL (ref 0–149)
TSH SERPL-ACNC: 2.6 MIU/L (ref 0.44–3.98)
VLDL: 27 MG/DL (ref 0–40)
WBC # BLD AUTO: 9.4 X10*3/UL (ref 4.4–11.3)

## 2023-10-10 PROCEDURE — 36415 COLL VENOUS BLD VENIPUNCTURE: CPT

## 2023-10-10 PROCEDURE — 99396 PREV VISIT EST AGE 40-64: CPT | Performed by: FAMILY MEDICINE

## 2023-10-10 PROCEDURE — 86735 MUMPS ANTIBODY: CPT

## 2023-10-10 PROCEDURE — 80053 COMPREHEN METABOLIC PANEL: CPT

## 2023-10-10 PROCEDURE — 86317 IMMUNOASSAY INFECTIOUS AGENT: CPT

## 2023-10-10 PROCEDURE — 86481 TB AG RESPONSE T-CELL SUSP: CPT

## 2023-10-10 PROCEDURE — 90472 IMMUNIZATION ADMIN EACH ADD: CPT | Performed by: FAMILY MEDICINE

## 2023-10-10 PROCEDURE — 86706 HEP B SURFACE ANTIBODY: CPT

## 2023-10-10 PROCEDURE — 1036F TOBACCO NON-USER: CPT | Performed by: FAMILY MEDICINE

## 2023-10-10 PROCEDURE — 90471 IMMUNIZATION ADMIN: CPT | Performed by: FAMILY MEDICINE

## 2023-10-10 PROCEDURE — 86765 RUBEOLA ANTIBODY: CPT

## 2023-10-10 PROCEDURE — 90715 TDAP VACCINE 7 YRS/> IM: CPT | Performed by: FAMILY MEDICINE

## 2023-10-10 PROCEDURE — 83036 HEMOGLOBIN GLYCOSYLATED A1C: CPT

## 2023-10-10 PROCEDURE — 84443 ASSAY THYROID STIM HORMONE: CPT

## 2023-10-10 PROCEDURE — 85025 COMPLETE CBC W/AUTO DIFF WBC: CPT

## 2023-10-10 PROCEDURE — 90682 RIV4 VACC RECOMBINANT DNA IM: CPT | Performed by: FAMILY MEDICINE

## 2023-10-10 PROCEDURE — 87389 HIV-1 AG W/HIV-1&-2 AB AG IA: CPT

## 2023-10-10 PROCEDURE — 80061 LIPID PANEL: CPT

## 2023-10-10 PROCEDURE — 86787 VARICELLA-ZOSTER ANTIBODY: CPT

## 2023-10-10 PROCEDURE — 86803 HEPATITIS C AB TEST: CPT

## 2023-10-10 RX ORDER — CETIRIZINE HYDROCHLORIDE 10 MG/1
10 TABLET ORAL DAILY
COMMUNITY

## 2023-10-10 ASSESSMENT — PATIENT HEALTH QUESTIONNAIRE - PHQ9
SUM OF ALL RESPONSES TO PHQ9 QUESTIONS 1 AND 2: 0
1. LITTLE INTEREST OR PLEASURE IN DOING THINGS: NOT AT ALL
2. FEELING DOWN, DEPRESSED OR HOPELESS: NOT AT ALL

## 2023-10-10 ASSESSMENT — PAIN SCALES - GENERAL: PAINLEVEL: 0-NO PAIN

## 2023-10-10 NOTE — ASSESSMENT & PLAN NOTE
Patient states she does not have bipolar, has alcohol addiction problem, did not reveal her alcohol to providers at her ER visit : miss diagnosed as bipolar

## 2023-10-10 NOTE — PROGRESS NOTES
Patient tolerated vaccines well. No c/o pain, dizziness or lightheadedness. No s/sx of infection noted.

## 2023-10-10 NOTE — PROGRESS NOTES
"Subjective   Patient ID: Nadya Wong is a 43 y.o. female who presents for Establish Care (New patient physicxal for school. Needs titers completed and paperwork. ).    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      Objective   /66 (BP Location: Left arm, Patient Position: Sitting, BP Cuff Size: Adult)   Pulse 105   Temp 36.8 °C (98.2 °F) (Temporal)   Resp 18   Ht 1.6 m (5' 3\")   Wt 78.9 kg (173 lb 14.4 oz)   LMP 09/12/2023 (Approximate)   SpO2 99%   BMI 30.81 kg/m²     Physical Exam  Vitals and nursing note reviewed.   Cardiovascular:      Rate and Rhythm: Normal rate and regular rhythm.   Pulmonary:      Effort: Pulmonary effort is normal.      Breath sounds: Normal breath sounds.   Musculoskeletal:      Cervical back: Neck supple.   Lymphadenopathy:      Cervical: No cervical adenopathy.   Neurological:      Mental Status: She is alert.   Psychiatric:         Mood and Affect: Mood normal.         Behavior: Behavior normal.         Thought Content: Thought content normal.         Judgment: Judgment normal.         Assessment/Plan   Diagnoses and all orders for this visit:  School physical exam  -     T-Spot TB; Future  -     Measles ([place]) Antibody, IgG; Future  -     Mumps Antibody, IgG; Future  -     Varicella zoster antibody, IgG; Future  -     Rubella antibody, IgG; Future  -     Hepatitis B surface antibody; Future  -     HIV 1/2 antibodies, rapid; Future  Annual physical exam  -     Lipid panel; Future  -     Comprehensive Metabolic Panel; Future  -     TSH; Future  -     Hemoglobin A1c; Future  -     CBC and Auto Differential; Future  -     Hepatitis C antibody; Future  Encounter for screening mammogram for malignant neoplasm of breast  -     BI mammo bilateral screening tomosynthesis; Future  Bipolar affective disorder, remission " status unspecified (CMS/[facility])  Comments:  unsure of diagnosis. patient states she was hiding her alcohol, miss diagnosed the episodes of alcohol binge and withdrawls as bipolar.  Seasonal allergies  Comments:  kory Clark  Alcoholism (CMS/[facility])  Comments:  in remission. goes to AA and counseling.  Other orders  -     Tdap vaccine, age 7 years and older  (BOOSTRIX)  -     Flu vaccine, quadrivalent, recombinant, preservative free, adult (FLUBLOK)

## 2023-10-11 LAB
HIV 1+2 AB+HIV1 P24 AG SERPL QL IA: NONREACTIVE
MEV IGG SER QL IA: POSITIVE
MUMPS IGG ANTIBODY INDEX: 1.7 IA
MUV IGG SER IA-ACNC: POSITIVE
RUBEOLA IGG ANTIBODY INDEX: 3.9 IA
RUBV IGG SERPL IA-ACNC: 0.8 IA
RUBV IGG SERPL QL IA: ABNORMAL
VARICELLA ZOSTER IGG INDEX: 6.7 IA
VZV IGG SER QL IA: POSITIVE

## 2023-10-12 LAB
NIL(NEG) CONTROL SPOT COUNT: NORMAL
PANEL A SPOT COUNT: 0
PANEL B SPOT COUNT: 0
POS CONTROL SPOT COUNT: NORMAL
T-SPOT. TB INTERPRETATION: NEGATIVE

## 2023-10-19 ENCOUNTER — LAB (OUTPATIENT)
Dept: LAB | Facility: LAB | Age: 43
End: 2023-10-19
Payer: COMMERCIAL

## 2023-10-19 DIAGNOSIS — Z02.0 SCHOOL PHYSICAL EXAM: ICD-10-CM

## 2023-10-19 LAB
RUBV IGG SERPL IA-ACNC: 0.9 IA
RUBV IGG SERPL QL IA: NORMAL

## 2023-10-19 PROCEDURE — 86317 IMMUNOASSAY INFECTIOUS AGENT: CPT

## 2023-10-19 PROCEDURE — 36415 COLL VENOUS BLD VENIPUNCTURE: CPT

## 2023-10-23 ENCOUNTER — TELEPHONE (OUTPATIENT)
Dept: PRIMARY CARE | Facility: CLINIC | Age: 43
End: 2023-10-23
Payer: COMMERCIAL

## 2023-10-23 NOTE — TELEPHONE ENCOUNTER
Called and notified patient of below message. Patient verbalized understanding. Paperwork placed at  for pickup.

## 2023-10-23 NOTE — TELEPHONE ENCOUNTER
----- Message from Conrad MONROE MD sent at 10/23/2023 12:54 PM EDT -----  Notify patient Rubella IgG is same on repeat testing ( equivocal). I recommend that she takes MMR vaccine at [facility]. Paper signed,she can come to .

## 2023-12-19 ENCOUNTER — TELEPHONE (OUTPATIENT)
Dept: PRIMARY CARE | Facility: CLINIC | Age: 43
End: 2023-12-19
Payer: COMMERCIAL

## 2023-12-19 NOTE — TELEPHONE ENCOUNTER
Called patient to notify that work physical paperwork was completed and placed at the  for . Patient thanked and verbalized understanding.     Made copy and placed into scanning to patients record.

## 2024-06-01 ENCOUNTER — APPOINTMENT (OUTPATIENT)
Dept: URGENT CARE | Facility: CLINIC | Age: 44
End: 2024-06-01
Payer: COMMERCIAL

## 2025-01-25 ENCOUNTER — APPOINTMENT (OUTPATIENT)
Dept: RADIOLOGY | Facility: HOSPITAL | Age: 45
End: 2025-01-25

## 2025-01-25 ENCOUNTER — HOSPITAL ENCOUNTER (EMERGENCY)
Facility: HOSPITAL | Age: 45
Discharge: HOME | End: 2025-01-26
Attending: EMERGENCY MEDICINE
Payer: MEDICAID

## 2025-01-25 DIAGNOSIS — S00.83XA CONTUSION OF FACE, INITIAL ENCOUNTER: ICD-10-CM

## 2025-01-25 DIAGNOSIS — S22.31XA CLOSED FRACTURE OF ONE RIB OF RIGHT SIDE, INITIAL ENCOUNTER: ICD-10-CM

## 2025-01-25 DIAGNOSIS — S01.512A LACERATION OF ORAL CAVITY, INITIAL ENCOUNTER: ICD-10-CM
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
## 2025-01-25 LAB
ALBUMIN SERPL BCP-MCNC: 4.7 G/DL (ref 3.4–5)
ALP SERPL-CCNC: 68 U/L (ref 33–110)
ALT SERPL W P-5'-P-CCNC: 32 U/L (ref 7–45)
ANION GAP SERPL CALC-SCNC: 13 MMOL/L (ref 10–20)
AST SERPL W P-5'-P-CCNC: 37 U/L (ref 9–39)
B-HCG SERPL-ACNC: <2 MIU/ML
BASOPHILS # BLD AUTO: 0.09 X10*3/UL (ref 0–0.1)
BASOPHILS NFR BLD AUTO: 0.7 %
BILIRUB SERPL-MCNC: 0.4 MG/DL (ref 0–1.2)
BUN SERPL-MCNC: 11 MG/DL (ref 6–23)
CALCIUM SERPL-MCNC: 9.1 MG/DL (ref 8.6–10.3)
CARDIAC TROPONIN I PNL SERPL HS: 3 NG/L (ref 0–13)
CHLORIDE SERPL-SCNC: 106 MMOL/L (ref 98–107)
CO2 SERPL-SCNC: 27 MMOL/L (ref 21–32)
CREAT SERPL-MCNC: 0.73 MG/DL (ref 0.5–1.05)
EGFRCR SERPLBLD CKD-EPI 2021: >90 ML/MIN/1.73M*2
EOSINOPHIL # BLD AUTO: 0.22 X10*3/UL (ref 0–0.7)
EOSINOPHIL NFR BLD AUTO: 1.7 %
ERYTHROCYTE [DISTWIDTH] IN BLOOD BY AUTOMATED COUNT: 13.2 % (ref 11.5–14.5)
GLUCOSE SERPL-MCNC: 93 MG/DL (ref 74–99)
HCT VFR BLD AUTO: 43.7 % (ref 36–46)
HGB BLD-MCNC: 14.7 G/DL (ref 12–16)
IMM GRANULOCYTES # BLD AUTO: 0.04 X10*3/UL (ref 0–0.7)
IMM GRANULOCYTES NFR BLD AUTO: 0.3 % (ref 0–0.9)
LYMPHOCYTES # BLD AUTO: 2.84 X10*3/UL (ref 1.2–4.8)
LYMPHOCYTES NFR BLD AUTO: 22.3 %
MCH RBC QN AUTO: 29.3 PG (ref 26–34)
MCHC RBC AUTO-ENTMCNC: 33.6 G/DL (ref 32–36)
MCV RBC AUTO: 87 FL (ref 80–100)
MONOCYTES # BLD AUTO: 0.51 X10*3/UL (ref 0.1–1)
MONOCYTES NFR BLD AUTO: 4 %
NEUTROPHILS # BLD AUTO: 9.01 X10*3/UL (ref 1.2–7.7)
NEUTROPHILS NFR BLD AUTO: 71 %
NRBC BLD-RTO: 0 /100 WBCS (ref 0–0)
PLATELET # BLD AUTO: 361 X10*3/UL (ref 150–450)
POTASSIUM SERPL-SCNC: 4 MMOL/L (ref 3.5–5.3)
PROT SERPL-MCNC: 8.2 G/DL (ref 6.4–8.2)
RBC # BLD AUTO: 5.02 X10*6/UL (ref 4–5.2)
SODIUM SERPL-SCNC: 142 MMOL/L (ref 136–145)
WBC # BLD AUTO: 12.7 X10*3/UL (ref 4.4–11.3)

## 2025-01-25 PROCEDURE — 2500000001 HC RX 250 WO HCPCS SELF ADMINISTERED DRUGS (ALT 637 FOR MEDICARE OP): Performed by: STUDENT IN AN ORGANIZED HEALTH CARE EDUCATION/TRAINING PROGRAM

## 2025-01-25 PROCEDURE — 84484 ASSAY OF TROPONIN QUANT: CPT

## 2025-01-25 PROCEDURE — 2500000004 HC RX 250 GENERAL PHARMACY W/ HCPCS (ALT 636 FOR OP/ED)

## 2025-01-25 PROCEDURE — 72125 CT NECK SPINE W/O DYE: CPT

## 2025-01-25 PROCEDURE — 70486 CT MAXILLOFACIAL W/O DYE: CPT

## 2025-01-25 PROCEDURE — 12011 RPR F/E/E/N/L/M 2.5 CM/<: CPT | Performed by: STUDENT IN AN ORGANIZED HEALTH CARE EDUCATION/TRAINING PROGRAM

## 2025-01-25 PROCEDURE — 99285 EMERGENCY DEPT VISIT HI MDM: CPT | Mod: 25 | Performed by: EMERGENCY MEDICINE

## 2025-01-25 PROCEDURE — 84075 ASSAY ALKALINE PHOSPHATASE: CPT

## 2025-01-25 PROCEDURE — 40830 REPAIR MOUTH LACERATION: CPT

## 2025-01-25 PROCEDURE — 72128 CT CHEST SPINE W/O DYE: CPT | Mod: RCN

## 2025-01-25 PROCEDURE — 96374 THER/PROPH/DIAG INJ IV PUSH: CPT | Mod: 59

## 2025-01-25 PROCEDURE — 72131 CT LUMBAR SPINE W/O DYE: CPT | Mod: RCN

## 2025-01-25 PROCEDURE — 76377 3D RENDER W/INTRP POSTPROCES: CPT

## 2025-01-25 PROCEDURE — 85025 COMPLETE CBC W/AUTO DIFF WBC: CPT

## 2025-01-25 PROCEDURE — 84702 CHORIONIC GONADOTROPIN TEST: CPT | Performed by: STUDENT IN AN ORGANIZED HEALTH CARE EDUCATION/TRAINING PROGRAM

## 2025-01-25 PROCEDURE — 74177 CT ABD & PELVIS W/CONTRAST: CPT

## 2025-01-25 PROCEDURE — 2500000004 HC RX 250 GENERAL PHARMACY W/ HCPCS (ALT 636 FOR OP/ED): Performed by: STUDENT IN AN ORGANIZED HEALTH CARE EDUCATION/TRAINING PROGRAM

## 2025-01-25 PROCEDURE — 99285 EMERGENCY DEPT VISIT HI MDM: CPT | Performed by: STUDENT IN AN ORGANIZED HEALTH CARE EDUCATION/TRAINING PROGRAM

## 2025-01-25 PROCEDURE — 70450 CT HEAD/BRAIN W/O DYE: CPT

## 2025-01-25 PROCEDURE — 36415 COLL VENOUS BLD VENIPUNCTURE: CPT

## 2025-01-25 RX ORDER — HYDROCODONE BITARTRATE AND ACETAMINOPHEN 5; 325 MG/1; MG/1
1 TABLET ORAL ONCE
Status: COMPLETED | OUTPATIENT
Start: 2025-01-25 | End: 2025-01-25

## 2025-01-25 RX ORDER — LORAZEPAM 2 MG/ML
1 INJECTION INTRAMUSCULAR ONCE
Status: COMPLETED | OUTPATIENT
Start: 2025-01-25 | End: 2025-01-25

## 2025-01-25 RX ORDER — LIDOCAINE HYDROCHLORIDE AND EPINEPHRINE 10; 10 UG/ML; MG/ML
10 INJECTION, SOLUTION INFILTRATION; PERINEURAL ONCE
Status: COMPLETED | OUTPATIENT
Start: 2025-01-25 | End: 2025-01-25

## 2025-01-25 RX ADMIN — HYDROCODONE BITARTRATE AND ACETAMINOPHEN 1 TABLET: 5; 325 TABLET ORAL at 23:40

## 2025-01-25 RX ADMIN — LORAZEPAM 1 MG: 2 INJECTION INTRAMUSCULAR; INTRAVENOUS at 22:42

## 2025-01-25 RX ADMIN — LIDOCAINE HYDROCHLORIDE,EPINEPHRINE BITARTRATE 10 ML: 10; .01 INJECTION, SOLUTION INFILTRATION; PERINEURAL at 22:27

## 2025-01-25 ASSESSMENT — PAIN - FUNCTIONAL ASSESSMENT: PAIN_FUNCTIONAL_ASSESSMENT: 0-10

## 2025-01-25 ASSESSMENT — LIFESTYLE VARIABLES
EVER HAD A DRINK FIRST THING IN THE MORNING TO STEADY YOUR NERVES TO GET RID OF A HANGOVER: YES
EVER FELT BAD OR GUILTY ABOUT YOUR DRINKING: YES
TOTAL SCORE: 4
HAVE PEOPLE ANNOYED YOU BY CRITICIZING YOUR DRINKING: YES
HAVE YOU EVER FELT YOU SHOULD CUT DOWN ON YOUR DRINKING: YES

## 2025-01-25 ASSESSMENT — PAIN SCALES - GENERAL
PAINLEVEL_OUTOF10: 5 - MODERATE PAIN
PAINLEVEL_OUTOF10: 5 - MODERATE PAIN

## 2025-01-25 ASSESSMENT — COLUMBIA-SUICIDE SEVERITY RATING SCALE - C-SSRS
1. IN THE PAST MONTH, HAVE YOU WISHED YOU WERE DEAD OR WISHED YOU COULD GO TO SLEEP AND NOT WAKE UP?: NO
2. HAVE YOU ACTUALLY HAD ANY THOUGHTS OF KILLING YOURSELF?: NO
6. HAVE YOU EVER DONE ANYTHING, STARTED TO DO ANYTHING, OR PREPARED TO DO ANYTHING TO END YOUR LIFE?: NO

## 2025-01-26 ENCOUNTER — APPOINTMENT (OUTPATIENT)
Dept: CARDIOLOGY | Facility: HOSPITAL | Age: 45
End: 2025-01-26

## 2025-01-26 ENCOUNTER — HOSPITAL ENCOUNTER (EMERGENCY)
Facility: HOSPITAL | Age: 45
Discharge: PSYCHIATRIC OR SUBSTANCE USE FACILITY | End: 2025-01-27
Attending: STUDENT IN AN ORGANIZED HEALTH CARE EDUCATION/TRAINING PROGRAM

## 2025-01-26 VITALS
OXYGEN SATURATION: 98 % | HEART RATE: 108 BPM | BODY MASS INDEX: 30.12 KG/M2 | TEMPERATURE: 97.9 F | WEIGHT: 170 LBS | HEIGHT: 63 IN | SYSTOLIC BLOOD PRESSURE: 137 MMHG | DIASTOLIC BLOOD PRESSURE: 76 MMHG | RESPIRATION RATE: 20 BRPM

## 2025-01-26 DIAGNOSIS — R45.851 SUICIDAL IDEATION: Primary | ICD-10-CM

## 2025-01-26 DIAGNOSIS — F10.920 ALCOHOLIC INTOXICATION WITHOUT COMPLICATION (CMS-HCC): ICD-10-CM

## 2025-01-26 LAB
ALBUMIN SERPL BCP-MCNC: 4.5 G/DL (ref 3.4–5)
ALP SERPL-CCNC: 80 U/L (ref 33–110)
ALT SERPL W P-5'-P-CCNC: 31 U/L (ref 7–45)
AMPHETAMINES UR QL SCN: ABNORMAL
ANION GAP SERPL CALCULATED.3IONS-SCNC: 17 MMOL/L (ref 10–20)
APPEARANCE UR: CLEAR
AST SERPL W P-5'-P-CCNC: 27 U/L (ref 9–39)
BARBITURATES UR QL SCN: ABNORMAL
BASOPHILS # BLD AUTO: 0.12 X10*3/UL (ref 0–0.1)
BASOPHILS NFR BLD AUTO: 0.8 %
BENZODIAZ UR QL SCN: ABNORMAL
BILIRUB SERPL-MCNC: 0.5 MG/DL (ref 0–1.2)
BILIRUB UR STRIP.AUTO-MCNC: NEGATIVE MG/DL
BUN SERPL-MCNC: 9 MG/DL (ref 6–23)
BZE UR QL SCN: ABNORMAL
CALCIUM SERPL-MCNC: 8.8 MG/DL (ref 8.6–10.3)
CANNABINOIDS UR QL SCN: ABNORMAL
CHLORIDE SERPL-SCNC: 104 MMOL/L (ref 98–107)
CO2 SERPL-SCNC: 23 MMOL/L (ref 21–32)
COLOR UR: NORMAL
CREAT SERPL-MCNC: 0.67 MG/DL (ref 0.5–1.05)
EGFRCR SERPLBLD CKD-EPI 2021: >90 ML/MIN/1.73M*2
EOSINOPHIL # BLD AUTO: 0.06 X10*3/UL (ref 0–0.7)
EOSINOPHIL NFR BLD AUTO: 0.4 %
ERYTHROCYTE [DISTWIDTH] IN BLOOD BY AUTOMATED COUNT: 13.2 % (ref 11.5–14.5)
ETHANOL SERPL-MCNC: 283 MG/DL
ETHANOL SERPL-MCNC: <10 MG/DL
FENTANYL+NORFENTANYL UR QL SCN: ABNORMAL
GLUCOSE SERPL-MCNC: 86 MG/DL (ref 74–99)
GLUCOSE UR STRIP.AUTO-MCNC: NORMAL MG/DL
HCG UR QL IA.RAPID: NEGATIVE
HCT VFR BLD AUTO: 46.4 % (ref 36–46)
HGB BLD-MCNC: 15.1 G/DL (ref 12–16)
IMM GRANULOCYTES # BLD AUTO: 0.05 X10*3/UL (ref 0–0.7)
IMM GRANULOCYTES NFR BLD AUTO: 0.3 % (ref 0–0.9)
KETONES UR STRIP.AUTO-MCNC: NEGATIVE MG/DL
LEUKOCYTE ESTERASE UR QL STRIP.AUTO: NEGATIVE
LYMPHOCYTES # BLD AUTO: 3.71 X10*3/UL (ref 1.2–4.8)
LYMPHOCYTES NFR BLD AUTO: 25.8 %
MCH RBC QN AUTO: 28.9 PG (ref 26–34)
MCHC RBC AUTO-ENTMCNC: 32.5 G/DL (ref 32–36)
MCV RBC AUTO: 89 FL (ref 80–100)
METHADONE UR QL SCN: ABNORMAL
MONOCYTES # BLD AUTO: 0.65 X10*3/UL (ref 0.1–1)
MONOCYTES NFR BLD AUTO: 4.5 %
NEUTROPHILS # BLD AUTO: 9.78 X10*3/UL (ref 1.2–7.7)
NEUTROPHILS NFR BLD AUTO: 68.2 %
NITRITE UR QL STRIP.AUTO: NEGATIVE
NRBC BLD-RTO: 0 /100 WBCS (ref 0–0)
OPIATES UR QL SCN: ABNORMAL
OXYCODONE+OXYMORPHONE UR QL SCN: ABNORMAL
PCP UR QL SCN: ABNORMAL
PH UR STRIP.AUTO: 5.5 [PH]
PLATELET # BLD AUTO: 300 X10*3/UL (ref 150–450)
POTASSIUM SERPL-SCNC: 3.5 MMOL/L (ref 3.5–5.3)
PROT SERPL-MCNC: 7.7 G/DL (ref 6.4–8.2)
PROT UR STRIP.AUTO-MCNC: NEGATIVE MG/DL
RBC # BLD AUTO: 5.23 X10*6/UL (ref 4–5.2)
RBC # UR STRIP.AUTO: NEGATIVE /UL
SODIUM SERPL-SCNC: 140 MMOL/L (ref 136–145)
SP GR UR STRIP.AUTO: 1.01
UROBILINOGEN UR STRIP.AUTO-MCNC: NORMAL MG/DL
WBC # BLD AUTO: 14.4 X10*3/UL (ref 4.4–11.3)

## 2025-01-26 PROCEDURE — 71260 CT THORAX DX C+: CPT

## 2025-01-26 PROCEDURE — 85025 COMPLETE CBC W/AUTO DIFF WBC: CPT | Performed by: STUDENT IN AN ORGANIZED HEALTH CARE EDUCATION/TRAINING PROGRAM

## 2025-01-26 PROCEDURE — 2550000001 HC RX 255 CONTRASTS: Performed by: EMERGENCY MEDICINE

## 2025-01-26 PROCEDURE — 2500000001 HC RX 250 WO HCPCS SELF ADMINISTERED DRUGS (ALT 637 FOR MEDICARE OP): Performed by: STUDENT IN AN ORGANIZED HEALTH CARE EDUCATION/TRAINING PROGRAM

## 2025-01-26 PROCEDURE — 80053 COMPREHEN METABOLIC PANEL: CPT | Performed by: STUDENT IN AN ORGANIZED HEALTH CARE EDUCATION/TRAINING PROGRAM

## 2025-01-26 PROCEDURE — 36415 COLL VENOUS BLD VENIPUNCTURE: CPT | Performed by: STUDENT IN AN ORGANIZED HEALTH CARE EDUCATION/TRAINING PROGRAM

## 2025-01-26 PROCEDURE — 81003 URINALYSIS AUTO W/O SCOPE: CPT | Performed by: STUDENT IN AN ORGANIZED HEALTH CARE EDUCATION/TRAINING PROGRAM

## 2025-01-26 PROCEDURE — 99285 EMERGENCY DEPT VISIT HI MDM: CPT | Mod: 25 | Performed by: STUDENT IN AN ORGANIZED HEALTH CARE EDUCATION/TRAINING PROGRAM

## 2025-01-26 PROCEDURE — 82077 ASSAY SPEC XCP UR&BREATH IA: CPT | Performed by: STUDENT IN AN ORGANIZED HEALTH CARE EDUCATION/TRAINING PROGRAM

## 2025-01-26 PROCEDURE — 74177 CT ABD & PELVIS W/CONTRAST: CPT | Performed by: RADIOLOGY

## 2025-01-26 PROCEDURE — 2500000004 HC RX 250 GENERAL PHARMACY W/ HCPCS (ALT 636 FOR OP/ED): Performed by: STUDENT IN AN ORGANIZED HEALTH CARE EDUCATION/TRAINING PROGRAM

## 2025-01-26 PROCEDURE — 71260 CT THORAX DX C+: CPT | Performed by: RADIOLOGY

## 2025-01-26 PROCEDURE — 96372 THER/PROPH/DIAG INJ SC/IM: CPT | Performed by: STUDENT IN AN ORGANIZED HEALTH CARE EDUCATION/TRAINING PROGRAM

## 2025-01-26 PROCEDURE — 80307 DRUG TEST PRSMV CHEM ANLYZR: CPT | Performed by: STUDENT IN AN ORGANIZED HEALTH CARE EDUCATION/TRAINING PROGRAM

## 2025-01-26 PROCEDURE — 90839 PSYTX CRISIS INITIAL 60 MIN: CPT

## 2025-01-26 PROCEDURE — 93005 ELECTROCARDIOGRAM TRACING: CPT

## 2025-01-26 PROCEDURE — 90832 PSYTX W PT 30 MINUTES: CPT

## 2025-01-26 PROCEDURE — 72131 CT LUMBAR SPINE W/O DYE: CPT | Performed by: RADIOLOGY

## 2025-01-26 PROCEDURE — 81025 URINE PREGNANCY TEST: CPT | Performed by: STUDENT IN AN ORGANIZED HEALTH CARE EDUCATION/TRAINING PROGRAM

## 2025-01-26 PROCEDURE — 82550 ASSAY OF CK (CPK): CPT | Performed by: STUDENT IN AN ORGANIZED HEALTH CARE EDUCATION/TRAINING PROGRAM

## 2025-01-26 PROCEDURE — 72128 CT CHEST SPINE W/O DYE: CPT | Performed by: RADIOLOGY

## 2025-01-26 RX ORDER — HYDROXYZINE HYDROCHLORIDE 25 MG/1
50 TABLET, FILM COATED ORAL ONCE
Status: COMPLETED | OUTPATIENT
Start: 2025-01-26 | End: 2025-01-26

## 2025-01-26 RX ORDER — NAPROXEN 500 MG/1
500 TABLET ORAL
Qty: 30 TABLET | Refills: 0 | Status: SHIPPED | OUTPATIENT
Start: 2025-01-26 | End: 2025-02-10

## 2025-01-26 RX ORDER — ACETAMINOPHEN 325 MG/1
650 TABLET ORAL ONCE
Status: COMPLETED | OUTPATIENT
Start: 2025-01-26 | End: 2025-01-26

## 2025-01-26 RX ORDER — ONDANSETRON 4 MG/1
4 TABLET, ORALLY DISINTEGRATING ORAL ONCE
Status: COMPLETED | OUTPATIENT
Start: 2025-01-26 | End: 2025-01-26

## 2025-01-26 RX ORDER — LIDOCAINE 560 MG/1
1 PATCH PERCUTANEOUS; TOPICAL; TRANSDERMAL DAILY
Qty: 14 PATCH | Refills: 0 | Status: SHIPPED | OUTPATIENT
Start: 2025-01-26 | End: 2025-02-09

## 2025-01-26 RX ORDER — LORAZEPAM 0.5 MG/1
0.5 TABLET ORAL ONCE
Status: COMPLETED | OUTPATIENT
Start: 2025-01-26 | End: 2025-01-26

## 2025-01-26 RX ORDER — IBUPROFEN 600 MG/1
600 TABLET ORAL ONCE
Status: DISCONTINUED | OUTPATIENT
Start: 2025-01-26 | End: 2025-01-27 | Stop reason: HOSPADM

## 2025-01-26 RX ORDER — KETOROLAC TROMETHAMINE 30 MG/ML
30 INJECTION, SOLUTION INTRAMUSCULAR; INTRAVENOUS ONCE
Status: COMPLETED | OUTPATIENT
Start: 2025-01-26 | End: 2025-01-26

## 2025-01-26 RX ADMIN — IOHEXOL 75 ML: 350 INJECTION, SOLUTION INTRAVENOUS at 00:26

## 2025-01-26 RX ADMIN — ONDANSETRON 4 MG: 4 TABLET, ORALLY DISINTEGRATING ORAL at 19:44

## 2025-01-26 RX ADMIN — KETOROLAC TROMETHAMINE 30 MG: 30 INJECTION, SOLUTION INTRAMUSCULAR at 19:48

## 2025-01-26 RX ADMIN — ACETAMINOPHEN 650 MG: 325 TABLET ORAL at 18:42

## 2025-01-26 RX ADMIN — HYDROXYZINE HYDROCHLORIDE 50 MG: 25 TABLET, FILM COATED ORAL at 20:04

## 2025-01-26 RX ADMIN — LORAZEPAM 0.5 MG: 0.5 TABLET ORAL at 15:27

## 2025-01-26 SDOH — HEALTH STABILITY: MENTAL HEALTH: ARE YOU HAVING THOUGHTS OF KILLING YOURSELF RIGHT NOW?: NO

## 2025-01-26 SDOH — HEALTH STABILITY: MENTAL HEALTH: ANXIETY SYMPTOMS: NO PROBLEMS REPORTED OR OBSERVED.

## 2025-01-26 SDOH — HEALTH STABILITY: MENTAL HEALTH: IN THE PAST FEW WEEKS, HAVE YOU WISHED YOU WERE DEAD?: YES

## 2025-01-26 SDOH — HEALTH STABILITY: MENTAL HEALTH: SUICIDAL BEHAVIOR (3 MONTHS): YES

## 2025-01-26 SDOH — HEALTH STABILITY: MENTAL HEALTH: WISH TO BE DEAD (PAST 1 MONTH): YES

## 2025-01-26 SDOH — HEALTH STABILITY: MENTAL HEALTH: BEHAVIORAL HEALTH(WDL): WITHIN DEFINED LIMITS

## 2025-01-26 SDOH — HEALTH STABILITY: MENTAL HEALTH: SUICIDAL BEHAVIOR (LIFETIME): YES

## 2025-01-26 SDOH — HEALTH STABILITY: MENTAL HEALTH
DEPRESSION SYMPTOMS: CHANGE IN ENERGY LEVEL;CRYING;FEELINGS OF HELPLESSNESS;FEELINGS OF HOPELESSESS;FEELINGS OF WORTHLESSNESS;ISOLATIVE;LOSS OF INTEREST

## 2025-01-26 SDOH — HEALTH STABILITY: MENTAL HEALTH: IN THE PAST FEW WEEKS, HAVE YOU FELT THAT YOU OR YOUR FAMILY WOULD BE BETTER OFF IF YOU WERE DEAD?: YES

## 2025-01-26 SDOH — HEALTH STABILITY: MENTAL HEALTH: ACTIVE SUICIDAL IDEATION WITH SPECIFIC PLAN AND INTENT (PAST 1 MONTH): NO

## 2025-01-26 SDOH — HEALTH STABILITY: MENTAL HEALTH: HAVE YOU EVER TRIED TO KILL YOURSELF?: YES

## 2025-01-26 SDOH — HEALTH STABILITY: MENTAL HEALTH: SUICIDAL BEHAVIOR (DESCRIPTION): PATIENT CONTINUES TO ENDORSE SI AND FEELINGS OF HOPELESSNESS

## 2025-01-26 SDOH — HEALTH STABILITY: MENTAL HEALTH: IN THE PAST WEEK, HAVE YOU BEEN HAVING THOUGHTS ABOUT KILLING YOURSELF?: YES

## 2025-01-26 SDOH — HEALTH STABILITY: MENTAL HEALTH: ACTIVE SUICIDAL IDEATION WITH SOME INTENT TO ACT, WITHOUT SPECIFIC PLAN (PAST 1 MONTH): YES

## 2025-01-26 SDOH — HEALTH STABILITY: MENTAL HEALTH: WHEN DID YOU TRY TO KILL YOURSELF?: "FEW YEARS BACK"

## 2025-01-26 SDOH — HEALTH STABILITY: MENTAL HEALTH: NON-SPECIFIC ACTIVE SUICIDAL THOUGHTS (PAST 1 MONTH): YES

## 2025-01-26 SDOH — HEALTH STABILITY: MENTAL HEALTH: HOW DID YOU TRY TO KILL YOURSELF?: OVERDOSE

## 2025-01-26 ASSESSMENT — PAIN SCALES - GENERAL
PAINLEVEL_OUTOF10: 0 - NO PAIN
PAINLEVEL_OUTOF10: 4
PAINLEVEL_OUTOF10: 10 - WORST POSSIBLE PAIN

## 2025-01-26 ASSESSMENT — COLUMBIA-SUICIDE SEVERITY RATING SCALE - C-SSRS
6. HAVE YOU EVER DONE ANYTHING, STARTED TO DO ANYTHING, OR PREPARED TO DO ANYTHING TO END YOUR LIFE?: YES
4. HAVE YOU HAD THESE THOUGHTS AND HAD SOME INTENTION OF ACTING ON THEM?: YES
5. HAVE YOU STARTED TO WORK OUT OR WORKED OUT THE DETAILS OF HOW TO KILL YOURSELF? DO YOU INTEND TO CARRY OUT THIS PLAN?: YES
2. HAVE YOU ACTUALLY HAD ANY THOUGHTS OF KILLING YOURSELF?: YES
1. IN THE PAST MONTH, HAVE YOU WISHED YOU WERE DEAD OR WISHED YOU COULD GO TO SLEEP AND NOT WAKE UP?: YES
6. HAVE YOU EVER DONE ANYTHING, STARTED TO DO ANYTHING, OR PREPARED TO DO ANYTHING TO END YOUR LIFE?: YES

## 2025-01-26 ASSESSMENT — LIFESTYLE VARIABLES
SUBSTANCE_ABUSE_PAST_12_MONTHS: YES
PRESCIPTION_ABUSE_PAST_12_MONTHS: NO

## 2025-01-26 ASSESSMENT — PAIN - FUNCTIONAL ASSESSMENT
PAIN_FUNCTIONAL_ASSESSMENT: 0-10
PAIN_FUNCTIONAL_ASSESSMENT: 0-10
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
## 2025-01-27 VITALS
WEIGHT: 170 LBS | HEART RATE: 82 BPM | DIASTOLIC BLOOD PRESSURE: 73 MMHG | TEMPERATURE: 97.7 F | RESPIRATION RATE: 18 BRPM | HEIGHT: 63 IN | OXYGEN SATURATION: 97 % | BODY MASS INDEX: 30.12 KG/M2 | SYSTOLIC BLOOD PRESSURE: 129 MMHG

## 2025-01-27 LAB
ATRIAL RATE: 90 BPM
CK SERPL-CCNC: 180 U/L (ref 0–215)
P AXIS: 47 DEGREES
P OFFSET: 192 MS
P ONSET: 142 MS
PR INTERVAL: 166 MS
Q ONSET: 225 MS
QRS COUNT: 15 BEATS
QRS DURATION: 72 MS
QT INTERVAL: 360 MS
QTC CALCULATION(BAZETT): 440 MS
QTC FREDERICIA: 412 MS
R AXIS: 33 DEGREES
SARS-COV-2 RNA RESP QL NAA+PROBE: NOT DETECTED
T AXIS: 35 DEGREES
T OFFSET: 405 MS
VENTRICULAR RATE: 90 BPM

## 2025-01-27 PROCEDURE — 2500000002 HC RX 250 W HCPCS SELF ADMINISTERED DRUGS (ALT 637 FOR MEDICARE OP, ALT 636 FOR OP/ED)

## 2025-01-27 PROCEDURE — 2500000004 HC RX 250 GENERAL PHARMACY W/ HCPCS (ALT 636 FOR OP/ED): Performed by: STUDENT IN AN ORGANIZED HEALTH CARE EDUCATION/TRAINING PROGRAM

## 2025-01-27 PROCEDURE — 87635 SARS-COV-2 COVID-19 AMP PRB: CPT | Performed by: STUDENT IN AN ORGANIZED HEALTH CARE EDUCATION/TRAINING PROGRAM

## 2025-01-27 PROCEDURE — 2500000001 HC RX 250 WO HCPCS SELF ADMINISTERED DRUGS (ALT 637 FOR MEDICARE OP): Performed by: STUDENT IN AN ORGANIZED HEALTH CARE EDUCATION/TRAINING PROGRAM

## 2025-01-27 PROCEDURE — 2500000001 HC RX 250 WO HCPCS SELF ADMINISTERED DRUGS (ALT 637 FOR MEDICARE OP)

## 2025-01-27 PROCEDURE — 96372 THER/PROPH/DIAG INJ SC/IM: CPT | Performed by: STUDENT IN AN ORGANIZED HEALTH CARE EDUCATION/TRAINING PROGRAM

## 2025-01-27 RX ORDER — HYDROXYZINE PAMOATE 25 MG/1
25 CAPSULE ORAL ONCE
Status: COMPLETED | OUTPATIENT
Start: 2025-01-27 | End: 2025-01-27

## 2025-01-27 RX ORDER — METHOCARBAMOL 500 MG/1
500 TABLET, FILM COATED ORAL ONCE
Status: COMPLETED | OUTPATIENT
Start: 2025-01-27 | End: 2025-01-27

## 2025-01-27 RX ORDER — LORAZEPAM 1 MG/1
1 TABLET ORAL ONCE
Status: COMPLETED | OUTPATIENT
Start: 2025-01-27 | End: 2025-01-27

## 2025-01-27 RX ORDER — KETOROLAC TROMETHAMINE 30 MG/ML
30 INJECTION, SOLUTION INTRAMUSCULAR; INTRAVENOUS ONCE
Status: COMPLETED | OUTPATIENT
Start: 2025-01-27 | End: 2025-01-27

## 2025-01-27 RX ADMIN — KETOROLAC TROMETHAMINE 30 MG: 30 INJECTION, SOLUTION INTRAMUSCULAR; INTRAVENOUS at 01:33

## 2025-01-27 RX ADMIN — HYDROXYZINE PAMOATE 25 MG: 25 CAPSULE ORAL at 09:45

## 2025-01-27 RX ADMIN — LORAZEPAM 1 MG: 1 TABLET ORAL at 01:33

## 2025-01-27 RX ADMIN — METHOCARBAMOL 500 MG: 500 TABLET ORAL at 09:45

## 2025-01-27 SDOH — HEALTH STABILITY: MENTAL HEALTH: BEHAVIORAL HEALTH(WDL): EXCEPTIONS TO WDL

## 2025-01-27 SDOH — HEALTH STABILITY: MENTAL HEALTH: BEHAVIORS/MOOD: CALM;COOPERATIVE;SLEEPING

## 2025-01-27 ASSESSMENT — PAIN - FUNCTIONAL ASSESSMENT: PAIN_FUNCTIONAL_ASSESSMENT: 0-10

## 2025-01-27 ASSESSMENT — PAIN SCALES - GENERAL
PAINLEVEL_OUTOF10: 0 - NO PAIN
PAINLEVEL_OUTOF10: 6

## 2025-01-27 NOTE — ED PROVIDER NOTES
I assumed care of this patient at shift change. Patient pending placement  Patient was accepted to [facility], patient transferred there in stable condition.    Diagnosis: Suicidal ideations  Results for orders placed or performed during the hospital encounter of 01/26/25   CBC with Differential    Collection Time: 01/26/25  1:24 PM   Result Value Ref Range    WBC 14.4 (H) 4.4 - 11.3 x10*3/uL    nRBC 0.0 0.0 - 0.0 /100 WBCs    RBC 5.23 (H) 4.00 - 5.20 x10*6/uL    Hemoglobin 15.1 12.0 - 16.0 g/dL    Hematocrit 46.4 (H) 36.0 - 46.0 %    MCV 89 80 - 100 fL    MCH 28.9 26.0 - 34.0 pg    MCHC 32.5 32.0 - 36.0 g/dL    RDW 13.2 11.5 - 14.5 %    Platelets 300 150 - 450 x10*3/uL    Neutrophils % 68.2 40.0 - 80.0 %    Immature Granulocytes %, Automated 0.3 0.0 - 0.9 %    Lymphocytes % 25.8 13.0 - 44.0 %    Monocytes % 4.5 2.0 - 10.0 %    Eosinophils % 0.4 0.0 - 6.0 %    Basophils % 0.8 0.0 - 2.0 %    Neutrophils Absolute 9.78 (H) 1.20 - 7.70 x10*3/uL    Immature Granulocytes Absolute, Automated 0.05 0.00 - 0.70 x10*3/uL    Lymphocytes Absolute 3.71 1.20 - 4.80 x10*3/uL    Monocytes Absolute 0.65 0.10 - 1.00 x10*3/uL    Eosinophils Absolute 0.06 0.00 - 0.70 x10*3/uL    Basophils Absolute 0.12 (H) 0.00 - 0.10 x10*3/uL   Comprehensive Metabolic Panel    Collection Time: 01/26/25  1:24 PM   Result Value Ref Range    Glucose 86 74 - 99 mg/dL    Sodium 140 136 - 145 mmol/L    Potassium 3.5 3.5 - 5.3 mmol/L    Chloride 104 98 - 107 mmol/L    Bicarbonate 23 21 - 32 mmol/L    Anion Gap 17 10 - 20 mmol/L    Urea Nitrogen 9 6 - 23 mg/dL    Creatinine 0.67 0.50 - 1.05 mg/dL    eGFR >90 >60 mL/min/1.73m*2    Calcium 8.8 8.6 - 10.3 mg/dL    Albumin 4.5 3.4 - 5.0 g/dL    Alkaline Phosphatase 80 33 - 110 U/L    Total Protein 7.7 6.4 - 8.2 g/dL    AST 27 9 - 39 U/L    Bilirubin, Total 0.5 0.0 - 1.2 mg/dL    ALT 31 7 - 45 U/L   Drug Screen, Urine    Collection Time: 01/26/25  1:24 PM   Result Value Ref Range    Amphetamine Screen,  Urine Presumptive Negative Presumptive Negative    Barbiturate Screen, Urine Presumptive Negative Presumptive Negative    Benzodiazepines Screen, Urine Presumptive Negative Presumptive Negative    Cannabinoid Screen, Urine Presumptive Negative Presumptive Negative    Cocaine Metabolite Screen, Urine Presumptive Negative Presumptive Negative    Fentanyl Screen, Urine Presumptive Negative Presumptive Negative    Opiate Screen, Urine Presumptive Positive (A) Presumptive Negative    Oxycodone Screen, Urine Presumptive Negative Presumptive Negative    PCP Screen, Urine Presumptive Negative Presumptive Negative    Methadone Screen, Urine Presumptive Negative Presumptive Negative   Urinalysis with Reflex Culture and Microscopic    Collection Time: 01/26/25  1:24 PM   Result Value Ref Range    Color, Urine Light-Yellow Light-Yellow, Yellow, Dark-Yellow    Appearance, Urine Clear Clear    Specific Gravity, Urine 1.015 1.005 - 1.035    pH, Urine 5.5 5.0, 5.5, 6.0, 6.5, 7.0, 7.5, 8.0    Protein, Urine NEGATIVE NEGATIVE, 10 (TRACE), 20 (TRACE) mg/dL    Glucose, Urine Normal Normal mg/dL    Blood, Urine NEGATIVE NEGATIVE    Ketones, Urine NEGATIVE NEGATIVE mg/dL    Bilirubin, Urine NEGATIVE NEGATIVE    Urobilinogen, Urine Normal Normal mg/dL    Nitrite, Urine NEGATIVE NEGATIVE    Leukocyte Esterase, Urine NEGATIVE NEGATIVE   Ethanol    Collection Time: 01/26/25  1:24 PM   Result Value Ref Range    Alcohol 283 (H) <=10 mg/dL   hCG, Urine, Qualitative    Collection Time: 01/26/25  1:24 PM   Result Value Ref Range    HCG, Urine NEGATIVE NEGATIVE   ECG 12 lead    Collection Time: 01/26/25  1:39 PM   Result Value Ref Range    Ventricular Rate 90 BPM    Atrial Rate 90 BPM    AL Interval 166 ms    QRS Duration 72 ms    QT Interval 360 ms    QTC Calculation(Bazett) 440 ms    P Axis 47 degrees    R Axis 33 degrees    T Axis 35 degrees    QRS Count 15 beats    Q Onset 225 ms    P Onset 142 ms    P Offset 192 ms    T Offset 405 ms    QTC  [place] 412 ms   Alcohol    Collection Time: 01/26/25 11:32 PM   Result Value Ref Range    Alcohol <10 <=10 mg/dL   Creatine Kinase    Collection Time: 01/26/25 11:32 PM   Result Value Ref Range    Creatine Kinase 180 0 - 215 U/L   Sars-CoV-2 PCR    Collection Time: 01/27/25  3:41 AM   Result Value Ref Range    Coronavirus 2019, PCR Not Detected Not Detected     No orders to display       Sections of this report were created using voice-to-text technology and may contain errors in translation    Luiz Hampton DO  Emergency Medicine         Luiz Hampton DO  01/27/25 2000
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
## 2025-01-27 NOTE — PROGRESS NOTES
Social Work Note    Patient approved bed board after REYNALDO called frontline services. Received authorization from  [facility]. Attempted to place at [facility] but unable due to dual diagnosis. Referring to NCFREDA

## 2025-06-03 ENCOUNTER — HOSPITAL ENCOUNTER (INPATIENT)
Age: 45
LOS: 5 days | Discharge: NURSING, REHAB OR LONG-TERM CARE | DRG: 753 | End: 2025-06-08
Admitting: PSYCHIATRY & NEUROLOGY
Payer: MEDICAID

## 2025-06-03 DIAGNOSIS — F32.A DEPRESSION, UNSPECIFIED DEPRESSION TYPE: ICD-10-CM

## 2025-06-03 DIAGNOSIS — F10.10 ALCOHOL ABUSE: ICD-10-CM

## 2025-06-03 DIAGNOSIS — R45.851 SUICIDAL IDEATION: Primary | ICD-10-CM

## 2025-06-03 LAB
ALBUMIN SERPL-MCNC: 4.2 G/DL (ref 3.5–4.6)
ALP SERPL-CCNC: 73 U/L (ref 40–130)
ALT SERPL-CCNC: 28 U/L (ref 0–33)
AMPHET UR QL SCN: NORMAL
ANION GAP SERPL CALCULATED.3IONS-SCNC: 15 MEQ/L (ref 9–15)
APAP SERPL-MCNC: <5 UG/ML (ref 10–30)
AST SERPL-CCNC: 27 U/L (ref 0–35)
BARBITURATES UR QL SCN: NORMAL
BASOPHILS # BLD: 0.1 K/UL (ref 0–0.2)
BASOPHILS NFR BLD: 0.9 %
BENZODIAZ UR QL SCN: NORMAL
BILIRUB SERPL-MCNC: <0.2 MG/DL (ref 0.2–0.7)
BILIRUB UR QL STRIP: NEGATIVE
BUN SERPL-MCNC: 12 MG/DL (ref 6–20)
CALCIUM SERPL-MCNC: 8.9 MG/DL (ref 8.5–9.9)
CANNABINOIDS UR QL SCN: NORMAL
CHLORIDE SERPL-SCNC: 103 MEQ/L (ref 95–107)
CHOLEST SERPL-MCNC: 182 MG/DL (ref 0–199)
CK SERPL-CCNC: 277 U/L (ref 0–170)
CLARITY UR: CLEAR
CO2 SERPL-SCNC: 20 MEQ/L (ref 20–31)
COCAINE UR QL SCN: NORMAL
COLOR UR: YELLOW
CREAT SERPL-MCNC: 0.72 MG/DL (ref 0.5–0.9)
DRUG SCREEN COMMENT UR-IMP: NORMAL
EOSINOPHIL # BLD: 0.1 K/UL (ref 0–0.7)
EOSINOPHIL NFR BLD: 0.8 %
ERYTHROCYTE [DISTWIDTH] IN BLOOD BY AUTOMATED COUNT: 12.6 % (ref 11.5–14.5)
ETHANOL PERCENT: 0.24 G/DL
ETHANOLAMINE SERPL-MCNC: 278 MG/DL (ref 0–0.08)
FENTANYL SCREEN, URINE: NORMAL
GLOBULIN SER CALC-MCNC: 3 G/DL (ref 2.3–3.5)
GLUCOSE SERPL-MCNC: 141 MG/DL (ref 70–99)
GLUCOSE UR STRIP-MCNC: NEGATIVE MG/DL
HCG UR QL: NEGATIVE
HCT VFR BLD AUTO: 41.4 % (ref 37–47)
HDLC SERPL-MCNC: 65 MG/DL (ref 40–59)
HGB BLD-MCNC: 14.3 G/DL (ref 12–16)
HGB UR QL STRIP: NEGATIVE
KETONES UR STRIP-MCNC: NEGATIVE MG/DL
LDLC SERPL CALC-MCNC: 84 MG/DL (ref 0–129)
LEUKOCYTE ESTERASE UR QL STRIP: NEGATIVE
LYMPHOCYTES # BLD: 3.2 K/UL (ref 1–4.8)
LYMPHOCYTES NFR BLD: 35.2 %
MCH RBC QN AUTO: 29.3 PG (ref 27–31.3)
MCHC RBC AUTO-ENTMCNC: 34.5 % (ref 33–37)
MCV RBC AUTO: 84.8 FL (ref 79.4–94.8)
METHADONE UR QL SCN: NORMAL
MONOCYTES # BLD: 0.4 K/UL (ref 0.2–0.8)
MONOCYTES NFR BLD: 4.2 %
NEUTROPHILS # BLD: 5.3 K/UL (ref 1.4–6.5)
NEUTS SEG NFR BLD: 58.6 %
NITRITE UR QL STRIP: NEGATIVE
OPIATES UR QL SCN: NORMAL
OXYCODONE UR QL SCN: NORMAL
PCP UR QL SCN: NORMAL
PH UR STRIP: 5.5 [PH] (ref 5–9)
PLATELET # BLD AUTO: 350 K/UL (ref 130–400)
POTASSIUM SERPL-SCNC: 3.7 MEQ/L (ref 3.4–4.9)
PROPOXYPH UR QL SCN: NORMAL
PROT SERPL-MCNC: 7.2 G/DL (ref 6.3–8)
PROT UR STRIP-MCNC: NEGATIVE MG/DL
RBC # BLD AUTO: 4.88 M/UL (ref 4.2–5.4)
SALICYLATES SERPL-MCNC: <0.3 MG/DL (ref 15–30)
SODIUM SERPL-SCNC: 138 MEQ/L (ref 135–144)
SP GR UR STRIP: 1.01 (ref 1–1.03)
TRIGL SERPL-MCNC: 166 MG/DL (ref 0–150)
TSH SERPL-MCNC: 1.14 UIU/ML (ref 0.44–3.86)
URINE REFLEX TO CULTURE: NORMAL
UROBILINOGEN UR STRIP-ACNC: 0.2 E.U./DL
WBC # BLD AUTO: 9 K/UL (ref 4.8–10.8)

## 2025-06-03 PROCEDURE — 93005 ELECTROCARDIOGRAM TRACING: CPT | Performed by: INTERNAL MEDICINE

## 2025-06-03 PROCEDURE — 81003 URINALYSIS AUTO W/O SCOPE: CPT

## 2025-06-03 PROCEDURE — 90791 PSYCH DIAGNOSTIC EVALUATION: CPT | Performed by: SOCIAL WORKER

## 2025-06-03 PROCEDURE — 80053 COMPREHEN METABOLIC PANEL: CPT

## 2025-06-03 PROCEDURE — 99285 EMERGENCY DEPT VISIT HI MDM: CPT

## 2025-06-03 PROCEDURE — 80307 DRUG TEST PRSMV CHEM ANLYZR: CPT

## 2025-06-03 PROCEDURE — 80061 LIPID PANEL: CPT

## 2025-06-03 PROCEDURE — 84443 ASSAY THYROID STIM HORMONE: CPT

## 2025-06-03 PROCEDURE — 80143 DRUG ASSAY ACETAMINOPHEN: CPT

## 2025-06-03 PROCEDURE — 82077 ASSAY SPEC XCP UR&BREATH IA: CPT

## 2025-06-03 PROCEDURE — 83036 HEMOGLOBIN GLYCOSYLATED A1C: CPT

## 2025-06-03 PROCEDURE — 6370000000 HC RX 637 (ALT 250 FOR IP): Performed by: INTERNAL MEDICINE

## 2025-06-03 PROCEDURE — 84703 CHORIONIC GONADOTROPIN ASSAY: CPT

## 2025-06-03 PROCEDURE — 80179 DRUG ASSAY SALICYLATE: CPT

## 2025-06-03 PROCEDURE — 82550 ASSAY OF CK (CPK): CPT

## 2025-06-03 PROCEDURE — 6370000000 HC RX 637 (ALT 250 FOR IP): Performed by: PSYCHIATRY & NEUROLOGY

## 2025-06-03 PROCEDURE — 85025 COMPLETE CBC W/AUTO DIFF WBC: CPT

## 2025-06-03 PROCEDURE — 1240000000 HC EMOTIONAL WELLNESS R&B

## 2025-06-03 PROCEDURE — 36415 COLL VENOUS BLD VENIPUNCTURE: CPT

## 2025-06-03 RX ORDER — HALOPERIDOL 5 MG/1
5 TABLET ORAL EVERY 6 HOURS PRN
Status: DISCONTINUED | OUTPATIENT
Start: 2025-06-03 | End: 2025-06-08 | Stop reason: HOSPADM

## 2025-06-03 RX ORDER — LORAZEPAM 2 MG/ML
4 INJECTION INTRAMUSCULAR
Status: DISCONTINUED | OUTPATIENT
Start: 2025-06-03 | End: 2025-06-06

## 2025-06-03 RX ORDER — BENZTROPINE MESYLATE 1 MG/ML
2 INJECTION, SOLUTION INTRAMUSCULAR; INTRAVENOUS 2 TIMES DAILY PRN
Status: DISCONTINUED | OUTPATIENT
Start: 2025-06-03 | End: 2025-06-08 | Stop reason: HOSPADM

## 2025-06-03 RX ORDER — MAGNESIUM HYDROXIDE/ALUMINUM HYDROXICE/SIMETHICONE 120; 1200; 1200 MG/30ML; MG/30ML; MG/30ML
30 SUSPENSION ORAL PRN
Status: DISCONTINUED | OUTPATIENT
Start: 2025-06-03 | End: 2025-06-08 | Stop reason: HOSPADM

## 2025-06-03 RX ORDER — HALOPERIDOL 5 MG/ML
5 INJECTION INTRAMUSCULAR EVERY 6 HOURS PRN
Status: DISCONTINUED | OUTPATIENT
Start: 2025-06-03 | End: 2025-06-08 | Stop reason: HOSPADM

## 2025-06-03 RX ORDER — ACETAMINOPHEN 325 MG/1
650 TABLET ORAL EVERY 4 HOURS PRN
Status: DISCONTINUED | OUTPATIENT
Start: 2025-06-03 | End: 2025-06-08 | Stop reason: HOSPADM

## 2025-06-03 RX ORDER — HYDROXYZINE HYDROCHLORIDE 50 MG/ML
50 INJECTION, SOLUTION INTRAMUSCULAR EVERY 6 HOURS PRN
Status: DISCONTINUED | OUTPATIENT
Start: 2025-06-03 | End: 2025-06-08 | Stop reason: HOSPADM

## 2025-06-03 RX ORDER — HYDROXYZINE PAMOATE 50 MG/1
50 CAPSULE ORAL EVERY 6 HOURS PRN
Status: DISCONTINUED | OUTPATIENT
Start: 2025-06-03 | End: 2025-06-08 | Stop reason: HOSPADM

## 2025-06-03 RX ORDER — LANOLIN ALCOHOL/MO/W.PET/CERES
100 CREAM (GRAM) TOPICAL DAILY
Status: DISCONTINUED | OUTPATIENT
Start: 2025-06-04 | End: 2025-06-08 | Stop reason: HOSPADM

## 2025-06-03 RX ORDER — CHLORDIAZEPOXIDE HYDROCHLORIDE 25 MG/1
25 CAPSULE, GELATIN COATED ORAL EVERY 4 HOURS PRN
Status: DISCONTINUED | OUTPATIENT
Start: 2025-06-03 | End: 2025-06-06

## 2025-06-03 RX ORDER — HYDROXYZINE PAMOATE 50 MG/1
50 CAPSULE ORAL ONCE
Status: COMPLETED | OUTPATIENT
Start: 2025-06-03 | End: 2025-06-03

## 2025-06-03 RX ORDER — MULTIVITAMIN WITH IRON
1 TABLET ORAL DAILY
Status: DISCONTINUED | OUTPATIENT
Start: 2025-06-04 | End: 2025-06-08 | Stop reason: HOSPADM

## 2025-06-03 RX ORDER — CHLORDIAZEPOXIDE HYDROCHLORIDE 10 MG/1
10 CAPSULE, GELATIN COATED ORAL EVERY 4 HOURS PRN
Status: DISCONTINUED | OUTPATIENT
Start: 2025-06-03 | End: 2025-06-06

## 2025-06-03 RX ORDER — CHLORDIAZEPOXIDE HYDROCHLORIDE 25 MG/1
75 CAPSULE, GELATIN COATED ORAL
Status: DISCONTINUED | OUTPATIENT
Start: 2025-06-03 | End: 2025-06-06

## 2025-06-03 RX ORDER — CHLORDIAZEPOXIDE HYDROCHLORIDE 25 MG/1
50 CAPSULE, GELATIN COATED ORAL
Status: DISCONTINUED | OUTPATIENT
Start: 2025-06-03 | End: 2025-06-06

## 2025-06-03 RX ORDER — NICOTINE 21 MG/24HR
1 PATCH, TRANSDERMAL 24 HOURS TRANSDERMAL ONCE
Status: DISCONTINUED | OUTPATIENT
Start: 2025-06-03 | End: 2025-06-04

## 2025-06-03 RX ORDER — TRAZODONE HYDROCHLORIDE 50 MG/1
50 TABLET ORAL NIGHTLY PRN
Status: DISCONTINUED | OUTPATIENT
Start: 2025-06-03 | End: 2025-06-08 | Stop reason: HOSPADM

## 2025-06-03 RX ADMIN — HYDROXYZINE PAMOATE 50 MG: 50 CAPSULE ORAL at 16:05

## 2025-06-03 RX ADMIN — TRAZODONE HYDROCHLORIDE 50 MG: 50 TABLET ORAL at 21:18

## 2025-06-03 RX ADMIN — HYDROXYZINE PAMOATE 50 MG: 50 CAPSULE ORAL at 21:18

## 2025-06-03 ASSESSMENT — LIFESTYLE VARIABLES
HOW OFTEN DO YOU HAVE A DRINK CONTAINING ALCOHOL: NEVER
HOW MANY STANDARD DRINKS CONTAINING ALCOHOL DO YOU HAVE ON A TYPICAL DAY: PATIENT DOES NOT DRINK

## 2025-06-03 ASSESSMENT — SLEEP AND FATIGUE QUESTIONNAIRES
DO YOU USE A SLEEP AID: NO
DO YOU HAVE DIFFICULTY SLEEPING: COMMENT
AVERAGE NUMBER OF SLEEP HOURS: 7

## 2025-06-03 ASSESSMENT — PAIN - FUNCTIONAL ASSESSMENT: PAIN_FUNCTIONAL_ASSESSMENT: NONE - DENIES PAIN

## 2025-06-03 NOTE — VIRTUAL HEALTH
Allyssa Snyder  [id number]  1980     Social Work Behavioral Health Crisis Assessment    06/03/25    Chief Complaint: Suicidal ideation    HPI: Patient is a 45 y.o. White (non-) female who presents for suicidal ideation. Patient presented to the ED on 06/03/25 from sober living.     Pt endorses increased depression the last two weeks but unable to identify any current stressors.  Pt reported having suicidal thoughts but denied a particular plan to this writer.  When LISW noted pt communication to nurse about possible plan pt reported, \"I just want to die it doesn't matter how.\"  Pt endorsed increased depression, feeling of hopelessness and irritability.  LISW attempted to gather additional information regarding impact of depression for patient and pt would only responded \"I don't know\" to both open and closed ended questions.      Pt presented with depressed mood,  flat affect and provided limited insight within the assessment.  She needed prompted numerous times and at times it was unclear if pt heard LISW during the assessment based pt's lack of reaction and response.  Pt presented with slow thought and confusion during the assessment.  Pt had rapid eye movement as she was looking around the room throughout the entire assessment but denied any hallucinations.      Pt is connected with a therapist at the [facility] but does not have a psychiatrist at this time.  Pt was unable to participate in any safety planning due to limited response to this writer.  Pt refused for this writer to contact any collateral as \"there is no one.\"      Collateral: Pt refused as \"there is no one\".     Past Psychiatric History:  Previous Diagnoses/symptoms: Bi-Polar, Depression  Previous suicide attempts/self-harm: overdose 2016  Inpatient psychiatric hospitalizations: yes  Current outpatient psychiatric provider: Denies  Current therapist: [facility]  Previous psychiatric medication trials: See Below  Current psychiatric

## 2025-06-03 NOTE — ED TRIAGE NOTES
A & Ox4. Skin pink warm and dry. Pt quiet speaking. States she has a plan but \"I don't want to say\" what it is. Pt denies thoughts of harming others. Poor eye contact.

## 2025-06-03 NOTE — ED NOTES
Patient accepting of medication as ordered at this time. States \"are you kidding, Vistaril, this isn't going to work, I need something to put me to sleep.\" Educated patient on the process of sober re-assessment and need to be awake on the unit. Patient verbalizes understanding. Instructed patient to attempt to take Vistaril and if not effective writer will re-consult ED provider for further orders.

## 2025-06-03 NOTE — ED NOTES
Patient at nurse's station requesting to speak with provider. Call placed to Dr. Vahid daley updated on patient request.

## 2025-06-03 NOTE — ED PROVIDER NOTES
[facility] EMERGENCY DEPARTMENT  EMERGENCY DEPARTMENT ENCOUNTER      Pt Name: Allyssa Snyder  MRN: [id number]  Birthdate 1980  Date of evaluation: 6/3/2025  Provider: Alejo Redding DO  Note Started: 6/3/25 10:23 AM EDT    CHIEF COMPLAINT       Chief Complaint   Patient presents with    Mental Health Problem     Suicidal thoughts without plan         HISTORY OF PRESENT ILLNESS   (Location/Symptom, Timing/Onset, Context/Setting, Quality, Duration, Modifying Factors, Severity)  Note limiting factors.   Allyssa Snyder is a 45 y.o. female who presents to the emergency department for suicidal ideation    Patient presented for evaluation of suicidal ideation.  Patient states over the last month her anxiety has been worsening having thoughts of hurting herself.  Patient denies any plan to harm herself.  Patient denies any thoughts of hurting body else.  Patient denies any auditory hallucinations.  Patient notes she has not been eating well.  Patient denies vomiting or diarrhea.  Denies other recent illness.    Patient notes she has been hospitalized for this in the past and then upon discharge she does not take any medications.  Patient notes she is not taking any prescribed medications other than Zyrtec at this time.          Nursing Notes were reviewed.    REVIEW OF SYSTEMS    (2-9 systems for level 4, 10 or more for level 5)     Review of Systems   Constitutional:  Negative for chills and fever.   HENT:  Negative for rhinorrhea and sore throat.    Eyes:  Negative for pain and visual disturbance.   Respiratory:  Negative for cough and shortness of breath.    Cardiovascular:  Negative for chest pain and palpitations.   Gastrointestinal:  Negative for abdominal pain, diarrhea, nausea and vomiting.   Genitourinary:  Negative for difficulty urinating and dysuria.   Musculoskeletal:  Negative for back pain and neck pain.   Skin:  Negative for rash.   Neurological:  Negative for weakness, numbness and headaches.

## 2025-06-04 LAB
EKG ATRIAL RATE: 95 BPM
EKG DIAGNOSIS: NORMAL
EKG P AXIS: 58 DEGREES
EKG P-R INTERVAL: 148 MS
EKG Q-T INTERVAL: 368 MS
EKG QRS DURATION: 72 MS
EKG QTC CALCULATION (BAZETT): 462 MS
EKG R AXIS: 47 DEGREES
EKG T AXIS: 45 DEGREES
EKG VENTRICULAR RATE: 95 BPM
ESTIMATED AVERAGE GLUCOSE: 117 MG/DL
HBA1C MFR BLD: 5.7 % (ref 4–6)

## 2025-06-04 PROCEDURE — 6370000000 HC RX 637 (ALT 250 FOR IP): Performed by: REGISTERED NURSE

## 2025-06-04 PROCEDURE — 1240000000 HC EMOTIONAL WELLNESS R&B

## 2025-06-04 PROCEDURE — 6370000000 HC RX 637 (ALT 250 FOR IP): Performed by: PSYCHIATRY & NEUROLOGY

## 2025-06-04 PROCEDURE — 93010 ELECTROCARDIOGRAM REPORT: CPT | Performed by: INTERNAL MEDICINE

## 2025-06-04 PROCEDURE — 99223 1ST HOSP IP/OBS HIGH 75: CPT | Performed by: PSYCHIATRY & NEUROLOGY

## 2025-06-04 RX ORDER — LURASIDONE HYDROCHLORIDE 20 MG/1
20 TABLET, FILM COATED ORAL
Status: DISCONTINUED | OUTPATIENT
Start: 2025-06-04 | End: 2025-06-06

## 2025-06-04 RX ORDER — OXCARBAZEPINE 150 MG/1
150 TABLET, FILM COATED ORAL 2 TIMES DAILY
Status: DISCONTINUED | OUTPATIENT
Start: 2025-06-04 | End: 2025-06-08 | Stop reason: HOSPADM

## 2025-06-04 RX ORDER — NICOTINE 21 MG/24HR
1 PATCH, TRANSDERMAL 24 HOURS TRANSDERMAL DAILY
Status: DISCONTINUED | OUTPATIENT
Start: 2025-06-04 | End: 2025-06-08 | Stop reason: HOSPADM

## 2025-06-04 RX ORDER — CETIRIZINE HYDROCHLORIDE 10 MG/1
10 TABLET ORAL DAILY
Status: DISCONTINUED | OUTPATIENT
Start: 2025-06-04 | End: 2025-06-08 | Stop reason: HOSPADM

## 2025-06-04 RX ADMIN — OXCARBAZEPINE 150 MG: 150 TABLET, FILM COATED ORAL at 10:24

## 2025-06-04 RX ADMIN — LURASIDONE HYDROCHLORIDE 20 MG: 20 TABLET, FILM COATED ORAL at 18:25

## 2025-06-04 RX ADMIN — OXCARBAZEPINE 150 MG: 150 TABLET, FILM COATED ORAL at 21:21

## 2025-06-04 RX ADMIN — CHLORDIAZEPOXIDE HYDROCHLORIDE 10 MG: 10 CAPSULE ORAL at 10:23

## 2025-06-04 RX ADMIN — CHLORDIAZEPOXIDE HYDROCHLORIDE 10 MG: 10 CAPSULE ORAL at 05:07

## 2025-06-04 RX ADMIN — HYDROXYZINE PAMOATE 50 MG: 50 CAPSULE ORAL at 11:13

## 2025-06-04 RX ADMIN — HYDROXYZINE PAMOATE 50 MG: 50 CAPSULE ORAL at 04:44

## 2025-06-04 RX ADMIN — CETIRIZINE HYDROCHLORIDE 10 MG: 10 TABLET, FILM COATED ORAL at 22:21

## 2025-06-04 RX ADMIN — ACETAMINOPHEN 650 MG: 325 TABLET ORAL at 11:10

## 2025-06-04 RX ADMIN — Medication 100 MG: at 08:49

## 2025-06-04 RX ADMIN — MULTIVITAMIN TABLET 1 TABLET: TABLET at 08:49

## 2025-06-04 RX ADMIN — TRAZODONE HYDROCHLORIDE 50 MG: 50 TABLET ORAL at 21:21

## 2025-06-04 RX ADMIN — HYDROXYZINE PAMOATE 50 MG: 50 CAPSULE ORAL at 22:21

## 2025-06-04 RX ADMIN — ACETAMINOPHEN 650 MG: 325 TABLET ORAL at 05:07

## 2025-06-04 ASSESSMENT — PAIN SCALES - GENERAL
PAINLEVEL_OUTOF10: 4
PAINLEVEL_OUTOF10: 2

## 2025-06-04 ASSESSMENT — LIFESTYLE VARIABLES
HOW MANY STANDARD DRINKS CONTAINING ALCOHOL DO YOU HAVE ON A TYPICAL DAY: PATIENT DOES NOT DRINK
HOW OFTEN DO YOU HAVE A DRINK CONTAINING ALCOHOL: NEVER

## 2025-06-04 ASSESSMENT — PAIN DESCRIPTION - ORIENTATION: ORIENTATION: LOWER;RIGHT

## 2025-06-04 ASSESSMENT — PAIN DESCRIPTION - LOCATION
LOCATION: HEAD
LOCATION: BACK

## 2025-06-04 ASSESSMENT — PAIN DESCRIPTION - DESCRIPTORS: DESCRIPTORS: ACHING

## 2025-06-04 NOTE — ED NOTES
Case discussed with Dr. Sullivan, jeyson to admit to 3W with PRN orders, and ROBIN with Librium protocol. Okay with not obtaining a new ETOH level before admitting.

## 2025-06-04 NOTE — CONSULTS
Consult Note            Date:6/4/2025        Patient Name:Allyssa Snyder     YOB: 1980     Age:45 y.o.    Reason for Consult: Evaluation recommendation for chronic disease management    Chief Complaint     Chief Complaint   Patient presents with    Mental Health Problem     Suicidal thoughts without plan          History Obtained From   patient, electronic medical record    History of Present Illness   Patient is a 45-year-old female with a past medical history of alcohol abuse, alcoholic hepatitis, anxiety, bipolar disorder seizures related to alcohol thyroid disease admitted for rehab health evaluation for thoughts of suicide ideation.  Per EMR patient reported increased anxiety over the past months and thoughts of harming herself.  She was medically cleared.  Patient denies chest pain, palpitations, lightheadedness, headache, dizziness, shortness of breath, cough, N/V/D, and changes in appetite.  Patient also denies smoking, illicit drug, and alcohol use.  Denies self-inflicted injuries or wounds.   Hospitalist consulted for evaluation and recommendations for acute and chronic disease management while receiving inpatient psych services.        Past Medical History     Past Medical History:   Diagnosis Date    Alcohol abuse     Alcoholic hepatitis (HCC) 6/6/2018    Anxiety     Bipolar 1 disorder (HCC)     Depression     Seizures (HCC)     x1 only related to alcohol     Thyroid disease         Past Surgical History     Past Surgical History:   Procedure Laterality Date    BREAST ENHANCEMENT SURGERY      BREAST SURGERY      TONSILLECTOMY          Medications     Prior to Admission medications    Medication Sig Start Date End Date Taking? Authorizing Provider   acamprosate (CAMPRAL) 333 MG tablet Take 2 tablets by mouth 3 times daily  Patient not taking: Reported on 8/2/2022 11/1/21 8/8/22  Juwan Abreu MD        lurasidone (LATUDA) tablet 20 mg, Dinner  OXcarbazepine (TRILEPTAL) tablet 150 mg,

## 2025-06-04 NOTE — H&P
[facility] - Department of Psychiatry    History and Physical - Adult     CHIEF COMPLAINT:  Depression SI    History obtained from:  patient    Patient was seen after discussing with the treatment team and reviewing the chart      HISTORY OF PRESENT ILLNESS:      The patient is a 45 y.o. female , living alone at sober living [facility], employed with significant past history of bipolar disorder, alcoholism    ER report: Pt endorses increased depression the last two weeks but unable to identify any current stressors.  Pt reported having suicidal thoughts but denied a particular plan to this writer.  When LISW noted pt communication to nurse about possible plan pt reported, \"I just want to die it doesn't matter how.\"  Pt endorsed increased depression, feeling of hopelessness and irritability.  LISW attempted to gather additional information regarding impact of depression for patient and pt would only responded \"I don't know\" to both open and closed ended questions.       Pt presented with depressed mood,  flat affect and provided limited insight within the assessment.  She needed prompted numerous times and at times it was unclear if pt heard LISW during the assessment based pt's lack of reaction and response.  Pt presented with slow thought and confusion during the assessment.  Pt had rapid eye movement as she was looking around the room throughout the entire assessment but denied any hallucinations.      During interview:  Pt was staying at [facility] for the past 4 months  Was sober for 4.5 months, relapsed 1 time on the day of admission  Was having hard time coping with stressor  Has been feeling depressed and suicidal  Depression Severity: Rating mood to be around 1/10 (10- good)  Quality:melancholic  Worse all day  Content: Hopeless, worthless and helpless feeling  Suicidal thoughts - passive thoughts, fearful that she might act on her thoughts like what she did in the past like

## 2025-06-04 NOTE — GROUP NOTE
Patient did not attend group.    Date: 6/4/2025    Group Start Time: 0935  Group End Time: 1050  Group Topic: Music Therapy    [facility] [place] [facility] Maria Elena Perdomo    Live Music Song Choices  Live Music Listening and Re-creative Singing    Patients will be given a songbook and offered the opportunity to select (a) song(s) of their choice for live music listening on [employer]. Patients will be encouraged to sing along, move along, and listen to the music.    Focus: Building Positive Experiences and Relaxation   Goals: Increase/Maintain Level of Socialization, Improve Mood, Improve/Maintain Self-Esteem, Improve/Maintain Use of Coping Skills, Increase Sensory Stimulation     Signature: Maria Elena Garcia, Licensed Professional Music Therapist (LPMT)

## 2025-06-04 NOTE — GROUP NOTE
Patient did not attend group.    Date: 6/4/2025    Group Start Time: 1217  Group End Time: 1253  Group Topic: Cognitive Skills    [facility] Maria Elena Perdomo    Cognitive Model: Automatic Thoughts  Clarifying, Education, Exploration, Support    Patients will learn about the cognitive model. Patients will practice connecting example situations to thoughts, emotions, and behaviors. Patients will determine differences between statements that are facts and statements that are opinions. Patients will identify potential triggers, take note of their \"automatic\" thoughts related to potential triggers, and come up with a way to re-frame the \"automatic\" thought individually. Patients will be encouraged to share their insights with the group    Focus: Identifying & Reframing Self-Talk  Goals: Improve/Maintain Attention to Task, Improve/Maintain Cognitive Skills, Improve/Maintain Identity Development, Improve Self-Awareness, Improve/Maintain Self-Esteem, Improve Coping Skills     Signature: Maria Elena Garcia, Licensed Professional Music Therapist (LPMT)

## 2025-06-05 PROCEDURE — 6370000000 HC RX 637 (ALT 250 FOR IP): Performed by: PSYCHIATRY & NEUROLOGY

## 2025-06-05 PROCEDURE — 99232 SBSQ HOSP IP/OBS MODERATE 35: CPT | Performed by: PSYCHIATRY & NEUROLOGY

## 2025-06-05 PROCEDURE — 6370000000 HC RX 637 (ALT 250 FOR IP): Performed by: REGISTERED NURSE

## 2025-06-05 PROCEDURE — 1240000000 HC EMOTIONAL WELLNESS R&B

## 2025-06-05 RX ADMIN — OXCARBAZEPINE 150 MG: 150 TABLET, FILM COATED ORAL at 21:16

## 2025-06-05 RX ADMIN — HYDROXYZINE PAMOATE 50 MG: 50 CAPSULE ORAL at 08:58

## 2025-06-05 RX ADMIN — HYDROXYZINE PAMOATE 50 MG: 50 CAPSULE ORAL at 14:53

## 2025-06-05 RX ADMIN — LURASIDONE HYDROCHLORIDE 20 MG: 20 TABLET, FILM COATED ORAL at 16:08

## 2025-06-05 RX ADMIN — MULTIVITAMIN TABLET 1 TABLET: TABLET at 08:59

## 2025-06-05 RX ADMIN — ACETAMINOPHEN 650 MG: 325 TABLET ORAL at 08:59

## 2025-06-05 RX ADMIN — OXCARBAZEPINE 150 MG: 150 TABLET, FILM COATED ORAL at 08:59

## 2025-06-05 RX ADMIN — ACETAMINOPHEN 650 MG: 325 TABLET ORAL at 21:15

## 2025-06-05 RX ADMIN — Medication 100 MG: at 08:59

## 2025-06-05 RX ADMIN — TRAZODONE HYDROCHLORIDE 50 MG: 50 TABLET ORAL at 21:16

## 2025-06-05 RX ADMIN — CETIRIZINE HYDROCHLORIDE 10 MG: 10 TABLET, FILM COATED ORAL at 08:58

## 2025-06-05 RX ADMIN — HYDROXYZINE PAMOATE 50 MG: 50 CAPSULE ORAL at 21:16

## 2025-06-05 ASSESSMENT — PAIN DESCRIPTION - LOCATION
LOCATION: BACK
LOCATION: BACK

## 2025-06-05 ASSESSMENT — PAIN DESCRIPTION - DESCRIPTORS
DESCRIPTORS: ACHING
DESCRIPTORS: ACHING;DISCOMFORT

## 2025-06-05 ASSESSMENT — PAIN SCALES - GENERAL: PAINLEVEL_OUTOF10: 5

## 2025-06-05 NOTE — GROUP NOTE
Patient did not attend group.    Date: 6/5/2025    Group Start Time: 1006  Group End Time: 1048  Group Topic: Music Therapy    [facility] [place] Maria Elena Perdomo    Finding Comfort and Support  Chyna Analysis/Discussion, Receptive Music Listening, Support/Validation    Patients will listen to \"Give Yourself Jason\" by Anais Mullins and discuss identified lyrics/themes/interpretations derived from the song. Patients will explore their personal connections to the song as well as what the phrase give yourself jason means to them. Patients will identify positive qualities about themselves, values they hold, challenges they've overcome, things they excel in, and how they have helped others.     Focus: Personal Growth and Strengths Identification  Goals: Improve/Maintain Identity Development, Improve/Maintain Self-Awareness, Increase Level of Socialization, Improve Mood, Improve/Maintain Self-Esteem, Improve/Maintain Use of Coping Skills     Signature: Maria Elena Garcia, Licensed Professional Music Therapist (LPMT)

## 2025-06-05 NOTE — GROUP NOTE
Group Therapy Note    Date: 6/4/2025    Group Start Time: 2100  Group End Time: 2115  Group Topic: Wrap-Up    [facility] Lindsey Betancourt        Group Therapy Note    Attendees: 11/19       Patient's Goal:  Share something positive about today.    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

## 2025-06-05 NOTE — GROUP NOTE
Patient did not attend group.    Date: 6/5/2025    Group Start Time: 1205  Group End Time: 1240  Group Topic: Music Therapy    [facility] Maria Elena Perdomo    Group Composition  Composition/Songwriting, Live Music Listening, and Re-creative Singing    Patients will listen to/engage with \"I Can See Clearly Now\" by Ryan Easton through active listening, moving, and/or singing. Patients will collaborate as a group to \"rewrite\" the song so it is more personalized to their experiences. Patients will reflect on the experience as a group.    Focus: Building Positive Experiences, Challenging Negative Self-Talk, Processing   Goals: Increase/Maintain Level of Socialization, Improve Mood, Improve/Maintain Self-Esteem, Improve/Maintain Self-Expression, Improve/Maintain Use of Coping Skills, and Promote Reality Orientation     Signature: Maria Elena Garcia, Licensed Professional Music Therapist (LPMT)

## 2025-06-06 PROCEDURE — 6370000000 HC RX 637 (ALT 250 FOR IP): Performed by: PSYCHIATRY & NEUROLOGY

## 2025-06-06 PROCEDURE — 99232 SBSQ HOSP IP/OBS MODERATE 35: CPT | Performed by: PSYCHIATRY & NEUROLOGY

## 2025-06-06 PROCEDURE — 1240000000 HC EMOTIONAL WELLNESS R&B

## 2025-06-06 PROCEDURE — 6370000000 HC RX 637 (ALT 250 FOR IP): Performed by: REGISTERED NURSE

## 2025-06-06 RX ORDER — LURASIDONE HYDROCHLORIDE 40 MG/1
40 TABLET, FILM COATED ORAL
Status: DISCONTINUED | OUTPATIENT
Start: 2025-06-06 | End: 2025-06-08 | Stop reason: HOSPADM

## 2025-06-06 RX ADMIN — OXCARBAZEPINE 150 MG: 150 TABLET, FILM COATED ORAL at 21:56

## 2025-06-06 RX ADMIN — TRAZODONE HYDROCHLORIDE 50 MG: 50 TABLET ORAL at 21:54

## 2025-06-06 RX ADMIN — HYDROXYZINE PAMOATE 50 MG: 50 CAPSULE ORAL at 21:53

## 2025-06-06 RX ADMIN — OXCARBAZEPINE 150 MG: 150 TABLET, FILM COATED ORAL at 08:32

## 2025-06-06 RX ADMIN — HYDROXYZINE PAMOATE 50 MG: 50 CAPSULE ORAL at 15:12

## 2025-06-06 RX ADMIN — ACETAMINOPHEN 650 MG: 325 TABLET ORAL at 21:53

## 2025-06-06 RX ADMIN — LURASIDONE HYDROCHLORIDE 40 MG: 40 TABLET, FILM COATED ORAL at 16:43

## 2025-06-06 RX ADMIN — HYDROXYZINE PAMOATE 50 MG: 50 CAPSULE ORAL at 08:31

## 2025-06-06 RX ADMIN — ACETAMINOPHEN 650 MG: 325 TABLET ORAL at 15:12

## 2025-06-06 RX ADMIN — MULTIVITAMIN TABLET 1 TABLET: TABLET at 08:32

## 2025-06-06 RX ADMIN — Medication 100 MG: at 08:32

## 2025-06-06 RX ADMIN — ACETAMINOPHEN 650 MG: 325 TABLET ORAL at 08:30

## 2025-06-06 RX ADMIN — CETIRIZINE HYDROCHLORIDE 10 MG: 10 TABLET, FILM COATED ORAL at 08:32

## 2025-06-06 ASSESSMENT — PAIN DESCRIPTION - DESCRIPTORS
DESCRIPTORS: ACHING
DESCRIPTORS: ACHING

## 2025-06-06 ASSESSMENT — PAIN DESCRIPTION - LOCATION
LOCATION: OTHER (COMMENT)
LOCATION: BACK

## 2025-06-06 ASSESSMENT — PAIN SCALES - WONG BAKER: WONGBAKER_NUMERICALRESPONSE: HURTS A LITTLE BIT

## 2025-06-06 ASSESSMENT — PAIN SCALES - GENERAL
PAINLEVEL_OUTOF10: 4
PAINLEVEL_OUTOF10: 3

## 2025-06-06 NOTE — GROUP NOTE
Patient did not attend group.    Date: 6/6/2025    Group Start Time: 0900  Group End Time: 1000  Group Topic: Music Therapy    [facility] [place] [facility] Maria Elena Perdomo    What do you need today, and how can music help?  Playlist Building, Receptive Music Listening    Patients will identify different songs that fall under various prompts (support/validation, motivation, energy, relaxation). Patients will be invited to select a song that aligns with something they need today. Patients will be encouraged to explain their connection to the song and the experience of listening to it in this setting.    Focus: Emotion Identification/Regulation, Validation/Support  Goals: Increase/Maintain Level of Socialization, Improve Mood, Improve/Maintain Self-Esteem, Improve/Maintain Self-Expression, and Improve/Maintain Use of Coping Skills     Signature: Maria Elena Garcia, Licensed Professional Music Therapist (LPMT)

## 2025-06-07 PROCEDURE — 99232 SBSQ HOSP IP/OBS MODERATE 35: CPT | Performed by: PSYCHIATRY & NEUROLOGY

## 2025-06-07 PROCEDURE — 6370000000 HC RX 637 (ALT 250 FOR IP): Performed by: PSYCHIATRY & NEUROLOGY

## 2025-06-07 PROCEDURE — 90833 PSYTX W PT W E/M 30 MIN: CPT | Performed by: PSYCHIATRY & NEUROLOGY

## 2025-06-07 PROCEDURE — 6370000000 HC RX 637 (ALT 250 FOR IP): Performed by: REGISTERED NURSE

## 2025-06-07 PROCEDURE — 1240000000 HC EMOTIONAL WELLNESS R&B

## 2025-06-07 RX ADMIN — MULTIVITAMIN TABLET 1 TABLET: TABLET at 09:55

## 2025-06-07 RX ADMIN — TRAZODONE HYDROCHLORIDE 50 MG: 50 TABLET ORAL at 21:02

## 2025-06-07 RX ADMIN — HYDROXYZINE PAMOATE 50 MG: 50 CAPSULE ORAL at 10:07

## 2025-06-07 RX ADMIN — ACETAMINOPHEN 650 MG: 325 TABLET ORAL at 16:24

## 2025-06-07 RX ADMIN — ACETAMINOPHEN 650 MG: 325 TABLET ORAL at 10:07

## 2025-06-07 RX ADMIN — Medication 100 MG: at 09:56

## 2025-06-07 RX ADMIN — HYDROXYZINE PAMOATE 50 MG: 50 CAPSULE ORAL at 21:02

## 2025-06-07 RX ADMIN — LURASIDONE HYDROCHLORIDE 40 MG: 40 TABLET, FILM COATED ORAL at 16:12

## 2025-06-07 RX ADMIN — OXCARBAZEPINE 150 MG: 150 TABLET, FILM COATED ORAL at 21:02

## 2025-06-07 RX ADMIN — CETIRIZINE HYDROCHLORIDE 10 MG: 10 TABLET, FILM COATED ORAL at 09:56

## 2025-06-07 RX ADMIN — OXCARBAZEPINE 150 MG: 150 TABLET, FILM COATED ORAL at 09:55

## 2025-06-07 RX ADMIN — ACETAMINOPHEN 650 MG: 325 TABLET ORAL at 21:02

## 2025-06-07 ASSESSMENT — PAIN SCALES - GENERAL
PAINLEVEL_OUTOF10: 4

## 2025-06-07 ASSESSMENT — PAIN DESCRIPTION - LOCATION
LOCATION: BACK

## 2025-06-07 ASSESSMENT — PAIN DESCRIPTION - DESCRIPTORS
DESCRIPTORS: ACHING
DESCRIPTORS: ACHING;DISCOMFORT

## 2025-06-07 NOTE — GROUP NOTE
DID NOT ATTEND  Let's Get Real                                                                      Group Therapy Note    Date: 6/7/2025    Group Start Time: 1400  Group End Time: 1500  Group Topic: Recovery    [facility] Shandra Mirza RN        Group Therapy Note    Attendees: 4/17

## 2025-06-07 NOTE — GROUP NOTE
Group Therapy Note  Patient did not attend group.       Date: 6/7/2025    Group Start Time: 0900  Group End Time: 1045  Group Topic: Psychoeducation    [facility] Tanesha Colon    Group Therapy Note    End of week check-in     Description:   Patients will be given a paper to complete during this intervention. This template includes questions about patient's weeks: how were you feeling this week, what color would describe your week, etc. Patients will be encouraged to share their findings with peers/staff.     Goals:  Improve/Maintain Attention to Task, Improve/Maintain Cognitive Skills, Improve/Maintain Identity Development, Improve/Maintain Insight / Self-Awareness, Increase/Maintain Level of Socialization, Improve/Maintain Self-Esteem, Improve/Maintain Self-Expression, Improve/Maintain Use of Coping Skills, and Promote Reality Orientation     Note Card Discussion    Description:   Patients will be told to choose one or more of the note cards provided to them, and will read the question out loud to the other peers/staff. Patient will then answer the question, and all other peers will answer the same question. Questions range from using coping skills, mood management, and personal preferences. Patients will be encouraged to discuss their answers with other peers/staff.     Goals:   Improve/Maintain Attention to Task, Improve/Maintain Cognitive Skills, Improve/Maintain Identity Development, Improve/Maintain Insight / Self-Awareness, Increase/Maintain Level of Socialization, Improve/Maintain Self-Esteem, Improve/Maintain Self-Expression, and Promote Reality Orientation          Patient did not attend group.     Signature: Tanesha Young, Psychoeducational Specialist

## 2025-06-08 VITALS
HEIGHT: 63 IN | DIASTOLIC BLOOD PRESSURE: 50 MMHG | HEART RATE: 75 BPM | WEIGHT: 172.4 LBS | SYSTOLIC BLOOD PRESSURE: 112 MMHG | RESPIRATION RATE: 16 BRPM | OXYGEN SATURATION: 96 % | TEMPERATURE: 98.1 F | BODY MASS INDEX: 30.55 KG/M2

## 2025-06-08 PROCEDURE — 6370000000 HC RX 637 (ALT 250 FOR IP): Performed by: PSYCHIATRY & NEUROLOGY

## 2025-06-08 PROCEDURE — 6370000000 HC RX 637 (ALT 250 FOR IP): Performed by: REGISTERED NURSE

## 2025-06-08 PROCEDURE — 99239 HOSP IP/OBS DSCHRG MGMT >30: CPT | Performed by: PSYCHIATRY & NEUROLOGY

## 2025-06-08 RX ORDER — MULTIVITAMIN WITH IRON
1 TABLET ORAL DAILY
Qty: 30 TABLET | Refills: 1 | Status: SHIPPED | OUTPATIENT
Start: 2025-06-08

## 2025-06-08 RX ORDER — OXCARBAZEPINE 150 MG/1
150 TABLET, FILM COATED ORAL 2 TIMES DAILY
Qty: 30 TABLET | Refills: 3 | Status: SHIPPED | OUTPATIENT
Start: 2025-06-08

## 2025-06-08 RX ORDER — LURASIDONE HYDROCHLORIDE 40 MG/1
40 TABLET, FILM COATED ORAL
Qty: 15 TABLET | Refills: 2 | Status: SHIPPED | OUTPATIENT
Start: 2025-06-08

## 2025-06-08 RX ADMIN — MULTIVITAMIN TABLET 1 TABLET: TABLET at 10:02

## 2025-06-08 RX ADMIN — OXCARBAZEPINE 150 MG: 150 TABLET, FILM COATED ORAL at 10:01

## 2025-06-08 RX ADMIN — CETIRIZINE HYDROCHLORIDE 10 MG: 10 TABLET, FILM COATED ORAL at 10:02

## 2025-06-08 RX ADMIN — Medication 100 MG: at 10:02

## 2025-06-08 NOTE — PLAN OF CARE
Problem: Risk for Elopement  Goal: Patient will not exit the unit/facility without proper excort  6/6/2025 2130 by Mahnaz Kwon RN  Outcome: Progressing  Flowsheets (Taken 6/6/2025 1013 by Pedro Andrade RN)  Nursing Interventions for Elopement Risk:   Assist with personal care needs such as toileting, eating, dressing, as needed to reduce the risk of wandering   Reduce environmental triggers  6/6/2025 1013 by Pedro Andrade RN  Outcome: Progressing  Flowsheets (Taken 6/6/2025 1013)  Nursing Interventions for Elopement Risk:   Assist with personal care needs such as toileting, eating, dressing, as needed to reduce the risk of wandering   Reduce environmental triggers     Problem: Safety - Adult  Goal: Free from fall injury  6/6/2025 2130 by Mahnaz Kwon RN  Outcome: Progressing  6/6/2025 1013 by Pedro Andrade RN  Outcome: Progressing     Problem: Anxiety  Goal: Will report anxiety at manageable levels  Description: INTERVENTIONS:1. Administer medication as ordered2. Teach and rehearse alternative coping skills3. Provide emotional support with 1:1 interaction with staff  6/6/2025 2130 by Mahnaz Kwon RN  Outcome: Progressing  Flowsheets (Taken 6/6/2025 1013 by Pedro Andrade RN)  Will report anxiety at manageable levels:   Administer medication as ordered   Teach and rehearse alternative coping skills   Provide emotional support with 1:1 interaction with staff  6/6/2025 1013 by Pedro Andrade RN  Outcome: Progressing  Flowsheets (Taken 6/6/2025 1013)  Will report anxiety at manageable levels:   Administer medication as ordered   Teach and rehearse alternative coping skills   Provide emotional support with 1:1 interaction with staff     Problem: Coping  Goal: Pt/Family able to verbalize concerns and demonstrate effective coping strategies  Description: INTERVENTIONS:1. Assist patient/family to identify coping skills, available support systems and cultural and 
Patient is alert and orient x 4, has been out on unit and social with select peers.  Patient has flat, irritable affect and is very pre-occupied with the sober living facility finding out she relapsed.  Patient states \"I will be homeless and my whole life will fall apart, I already have 4 out of my 5 daughters not speaking to me.\"  Patient acknowledges that alcoholism runs in her family and states \"they are functional alcoholics and I'm not.\"  Patient is very irritable this morning complaining about certain staff members and a couple peers.  Patient rates her anxiety at an 7 out of 10 and endorses some depression.  Patient denies SI/HI and AVH.  She reports her appetite is okay but her sleep is broken due to chronic back pain.     Problem: Risk for Elopement  Goal: Patient will not exit the unit/facility without proper excort  6/6/2025 1013 by Pedro Andrade RN  Outcome: Progressing  6/5/2025 2226 by Kathleen Hirsch RN  Outcome: Progressing     Problem: Safety - Adult  Goal: Free from fall injury  6/6/2025 1013 by Pedro Andrade RN  Outcome: Progressing  6/5/2025 2226 by Kathleen Hirsch RN  Outcome: Progressing     Problem: Anxiety  Goal: Will report anxiety at manageable levels  Description: INTERVENTIONS:1. Administer medication as ordered2. Teach and rehearse alternative coping skills3. Provide emotional support with 1:1 interaction with staff  6/6/2025 1013 by Pedro Andrade RN  Outcome: Progressing  6/5/2025 2226 by Kathleen Hirsch RN  Outcome: Progressing     Problem: Coping  Goal: Pt/Family able to verbalize concerns and demonstrate effective coping strategies  Description: INTERVENTIONS:1. Assist patient/family to identify coping skills, available support systems and cultural and spiritual values2. Provide emotional support, including active listening and acknowledgement of concerns of patient and caregivers3. Reduce environmental stimuli, as able4. Instruct patient/family in 
Pt is calm, cooperative, and friendly. Visible and social with peers. Remains preoccupied with worrying that her sober living house will find out about her relapse. She does not want to be homeless. Reports feeling very anxious about this. Rates anxiety 6/10 with 10 being the worst. Pt endorses depression. Reports she experiences passive SI at times but it is not as bad today. Reports okay sleep and states appetite is slightly decreased. Pt denies any signs or symptoms of withdrawal. She is hopeful for D/C Sunday because she is staring a new job on Tuesday and would like one day in between. Pt denies HI/AVH.     Problem: Risk for Elopement  Goal: Patient will not exit the unit/facility without proper excort  6/5/2025 2226 by Kathleen Hirsch RN  Outcome: Progressing  6/5/2025 1732 by Domonique Dyer, RN  Outcome: Progressing  Flowsheets (Taken 6/5/2025 1732)  Nursing Interventions for Elopement Risk:   Assist with personal care needs such as toileting, eating, dressing, as needed to reduce the risk of wandering   Collaborate with family members/caregivers to mitigate the elopement risk     Problem: Safety - Adult  Goal: Free from fall injury  6/5/2025 2226 by Kathleen Hirsch RN  Outcome: Progressing  6/5/2025 1732 by Domonique Dyer, RN  Outcome: Progressing  Flowsheets (Taken 6/5/2025 1732)  Free From Fall Injury:   Instruct family/caregiver on patient safety   Based on caregiver fall risk screen, instruct family/caregiver to ask for assistance with transferring infant if caregiver noted to have fall risk factors     Problem: Anxiety  Goal: Will report anxiety at manageable levels  Description: INTERVENTIONS:1. Administer medication as ordered2. Teach and rehearse alternative coping skills3. Provide emotional support with 1:1 interaction with staff  6/5/2025 2226 by Kathleen Hirsch RN  Outcome: Progressing  6/5/2025 1732 by Domonique Dyer, RN  Outcome: Progressing  Flowsheets (Taken 6/5/2025 
Pt up on unit this evening watching TV.  Pt stays off to the side with minimal interaction.  When asked about groups pt stated \"I've been in groups for years, in rehab I am in groups all the time.\"  Pt said her depression is \"situational and has more hope.\"  Pt said her anxiety is 1/2 way better.  Pt denies wanting to hurt herself or others, denies AVH.  Will con't to monitor.  
Provide emotional support, presence and reassurance4. Assess for possible suicidal thoughts or ideation. If patient expresses suicidal thoughts or statements do not leave alone, initiate Suicide Precautions, move to a room close to the nursing station and obtain sitter5. Initiate consults as appropriate with Mental Health Professional, Spiritual Care, Psychosocial CNS, and consider a recommendation to the [facility] for a Psychiatric Consultation  Outcome: Progressing     
have no detox symptoms and will verbalize plan for changing drug-related behavior  Description: INTERVENTIONS:1. Administer medication as ordered2. Monitor physical status3. Provide emotional support with 1:1 interaction with staff4. Encourage  recovery focused treatment   Outcome: Progressing   Pt oou watching TV with peers. Affect reactive,sad, worrisome. Pt denies SI,HI,A/V/T hallucinations. Contracts for safety on the unit. Pt admits to anxiety and depression related to her relapse and fearful she will be kicked out of the sober living facility. Endorses improved appetite this afternoon. Pt did not shower this PM.  Denies ETOH W/D SX.  
symptoms on level of functioning, self care needs and offer support as indicated   Assess patient/family knowledge of depression, impact on illness and need for teaching     Problem: Drug Abuse/Detox  Goal: Will have no detox symptoms and will verbalize plan for changing drug-related behavior  Description: INTERVENTIONS:1. Administer medication as ordered2. Monitor physical status3. Provide emotional support with 1:1 interaction with staff4. Encourage  recovery focused treatment   Outcome: Progressing  Flowsheets (Taken 6/5/2025 7727)  Will have no detox symptoms and will verbalize plan for changing drug-related behavior:   Administer medication as ordered   Provide emotional support with 1:1 interaction with staff

## 2025-06-08 NOTE — PROGRESS NOTES
[facility]  BEHAVIORAL HEALTH FOLLOW-UP NOTE       6/6/2025     Patient was seen and examined in person, Chart reviewed   Patient's case discussed with staff/team    Chief Complaint: Depression    Interim History:     Pt report feeling less depressed  Irritable on and off  Mood swings less intense  Denies active SI or HI  No paranoid thoughts  Slept better last night- disturbed     Appetite:   [x] Normal/Unchanged  [] Increased  [] Decreased      Sleep:       [] Normal/Unchanged  [x] Fair       [] Poor              Energy:    [x] Normal/Unchanged  [] Increased  [] Decreased        SI [] Present  [x] Absent    HI  []Present  [x] Absent     Aggression:  [] yes  [x] no    Patient is [x] able  [] unable to CONTRACT FOR SAFETY     PAST MEDICAL/PSYCHIATRIC HISTORY:   Past Medical History:   Diagnosis Date    Alcohol abuse     Alcoholic hepatitis (HCC) 6/6/2018    Anxiety     Bipolar 1 disorder (HCC)     Depression     Seizures (HCC)     x1 only related to alcohol     Thyroid disease        FAMILY/SOCIAL HISTORY:  Family History   Problem Relation Age of Onset    Depression Mother      Social History     Socioeconomic History    Marital status:      Spouse name: Not on file    Number of children: 5    Years of education: 14    Highest education level: Not on file   Occupational History    Occupation: nurse   Tobacco Use    Smoking status: Every Day     Current packs/day: 1.00     Average packs/day: 1 pack/day for 15.0 years (15.0 ttl pk-yrs)     Types: Cigarettes    Smokeless tobacco: Never   Vaping Use    Vaping status: Every Day    Substances: Nicotine, Flavoring    Devices: Pre-filled or refillable cartridge   Substance and Sexual Activity    Alcohol use: Yes     Comment: drinks a fifth of rum per day. trying to stop drinking     Drug use: No    Sexual activity: Yes     Partners: Male     Comment: \"with one person.\"   Other Topics Concern    Not on file   Social History Narrative    Not on 
          [facility]  BEHAVIORAL HEALTH FOLLOW-UP NOTE       6/5/2025     Patient was seen and examined in person, Chart reviewed   Patient's case discussed with staff/team    Chief Complaint: Depression    Interim History:     Pt report feeling depressed  Hopeless and worthless feeling  No active w/d symptoms  Pt is worried about the rehab place finding about her one time relapse and losing her place  Pt is attending groups    Appetite:   [x] Normal/Unchanged  [] Increased  [] Decreased      Sleep:       [] Normal/Unchanged  [x] Fair       [] Poor              Energy:    [x] Normal/Unchanged  [] Increased  [] Decreased        SI [] Present  [x] Absent    HI  []Present  [x] Absent     Aggression:  [] yes  [x] no    Patient is [x] able  [] unable to CONTRACT FOR SAFETY     PAST MEDICAL/PSYCHIATRIC HISTORY:   Past Medical History:   Diagnosis Date    Alcohol abuse     Alcoholic hepatitis (HCC) 6/6/2018    Anxiety     Bipolar 1 disorder (HCC)     Depression     Seizures (HCC)     x1 only related to alcohol     Thyroid disease        FAMILY/SOCIAL HISTORY:  Family History   Problem Relation Age of Onset    Depression Mother      Social History     Socioeconomic History    Marital status:      Spouse name: Not on file    Number of children: 5    Years of education: 14    Highest education level: Not on file   Occupational History    Occupation: nurse   Tobacco Use    Smoking status: Every Day     Current packs/day: 1.00     Average packs/day: 1 pack/day for 15.0 years (15.0 ttl pk-yrs)     Types: Cigarettes    Smokeless tobacco: Never   Vaping Use    Vaping status: Every Day    Substances: Nicotine, Flavoring    Devices: Pre-filled or refillable cartridge   Substance and Sexual Activity    Alcohol use: Yes     Comment: drinks a fifth of rum per day. trying to stop drinking     Drug use: No    Sexual activity: Yes     Partners: Male     Comment: \"with one person.\"   Other Topics Concern    Not on file 
      Behavioral Services  Medicare Certification Upon Admission    I certify that this patient's inpatient psychiatric hospital admission is medically necessary for:    [x] (1) Treatment which could reasonably be expected to improve this patient's condition,       [x] (2) Or for diagnostic study;     AND     [x](2) The inpatient psychiatric services are provided while the individual is under the care of a physician and are included in the individualized plan of care.    Estimated length of stay/service 3-5    Plan for post-hospital care OP care    Electronically signed by MEENAKSHI BAZAN MD on 6/4/2025 at 9:39 AM      
Gave vistaril 50 mg for generalized anxiety and tylenol for back pain as requested. Pt has been watching tv most of the day. No tremors or sweats noted.  
Gave vistaril 50mg for generalized anxiety and tylenol for generalized body pain that pt states is from the mattress and the chairs are to hard.   
PRN vistaril administered upon request for complaints of 4/10 anxiety.   PRN Tylenol administered upon request for complaints of 4/10 back pain.  Electronically signed by Rabia Hinkle LPN on 6/7/2025 at 10:09 AM      
Pt admits to current SI with no plan or intent. Contracts for safety on the unit. Pt denies HI,A/V/T hallucinations.  Pt reports SA about 9yrs ago by OD on seroquel and ETOH.  Pt reports resides in a sober living house and has been sober for the past 4 months. Pt stated,'' fear is going through me! I cannot think and my thoughts are racing. If they find out I relapsed,I will be kicked out and homeless.'' Pt declined to complete admission assessment,consents deferred. Pt requesting medication for sleep and anxiety. Unit paperwork with HIPAA Code and Pt Rights given with explanation. Medicated with vistaril 50mg po and desyrel 50mg po at 2118 for sleep and anxiety.   
Pt arrived on unit at 2104 via wheelchair. Pt aware of unit milieu,tour of room given. This writer and Allyssa JOE completed skin and contraband check,both negative.  
Pt expressed the vistaril works well for her anxiety but expressed worry due to the fact that she can't use vistaril at rehab.   
Pt is sleeping resp even unlabored  
Pt is sleeping resp even unlabored, vistaril 50 that pt requested and librium 10 given at lunch time appears to have been  effective.  
Pt reports she only drank the day of admission and has not drank before, no tremors or sweats noted, gave tylenol for back pain and vistaril for generalized anxiety,   
Pt resting with eyes closed during rounds.   
Pt up at nurses station asking for something for anxiety 9/10.  Noted slight tremble when drinking water.  V/s obtained.  Reports small headache, reports shaking d/t anxiety.  Nervous about sober living house finding out about relapse.  Accepting of prn librium per ciwa protocol and tylenol for h/a.    
Report per Lisa AVINA RN,Pt endorses increased depression the last two weeks but unable to identify any current stressors.  Pt reported having suicidal thoughts but denied a particular plan to this writer.  When [facility] noted pt communication to nurse about possible plan pt reported, \"I just want to die it doesn't matter how.\"  Pt endorsed increased depression, feeling of hopelessness and irritability. Denies HI,A/V hallucinations.   Pt presented with depressed mood,  flat affect and provided limited insight within the assessment.      Pt is connected with a therapist at the [facility] but does not have a psychiatrist at this time.   ETOH .244, TOXIC -,PREG-,GLUCOSE 141. Pt being admitted per Dr Sullivan, Dx Major Depressive Disorder,Recurrent Severe involuntarily admit.  
Reviewed discharge instructions with pt. Denied SI/HI/AVH.  
Spiritual Health History and Assessment/Progress Note  [facility]    Loneliness/Social Isolation,  ,  , Follow up     Name: Allyssa Snyder MRN: [id number]    Age: 45 y.o.     Sex: female   Language: English   Episcopalian: None   Bipolar depression (HCC)     Date: 6/5/2025            Total Time Calculated: 15 min              Spiritual Assessment continued in [facility]        Referral/Consult From: Rounding   Encounter Overview/Reason: Loneliness/Social Isolation  Service Provided For: Patient     visited patient for a social isolation visit. Patient feeling well supported and has activities that she enjoys. Patient declined offer of further follow-ups.    Carolynn, Belief, Meaning:   Patient has beliefs or practices that help with coping during difficult times  Family/Friends No family/friends present      Importance and Influence:  Patient has spiritual/personal beliefs that influence decisions regarding their health  Family/Friends No family/friends present    Community:  Patient expresses feelings of isolation: disconnected from family/friends  Family/Friends No family/friends present    Assessment and Plan of Care:     Patient Interventions include: Affirmed coping skills/support systems  Family/Friends Interventions include: No family/friends present    Patient Plan of Care: Spiritual Care available upon further referral  Family/Friends Plan of Care: No family/friends present    Electronically signed by Chaplain Greyson on 6/5/2025 at 4:32 PM   
Spiritual Support Group Note    Number of Participants in Group: 8                  Time: [id number]    Goal:  Relief from isolation and loneliness               Carolynn Sharing              Self-understanding and gain insight                Acceptance and belonging             Recognize they are not alone                  Socialization              Empowerment         Encouragement    Topic:  [x] Spiritual Wellness and Self Care                  [] Hope                     [x] Connecting with Divine/Others        [] Thankfulness and Gratitude               [x]  Meaningfulness and Purpose               [] Forgiveness               [] Peace               [] Connect to Community      [] Other    Participation Level:   [x] Active Listener   [] Minimal   [] Monopolizing   [x] Interactive   [] No Participation   []  Other:     Attention:   [x] Alert   [] Distractible   [] Drowsy   [] Poor   [] Other:    Manner:   [x] Cooperative   [] Suspicious   [] Withdrawn   [] Guarded   [] Irritable   [] Inhospitable   [] Other:     Others Comments from Group:    
\"CREATININE\", \"GLUCOSE\" in the last 72 hours.    No results for input(s): \"BILITOT\", \"ALKPHOS\", \"AST\", \"ALT\" in the last 72 hours.    Lab Results   Component Value Date/Time    BARBSCNU Neg 06/03/2025 10:19 AM    LABBENZ Neg 06/03/2025 10:19 AM    LABMETH Neg 06/03/2025 10:19 AM    ETOH 278 06/03/2025 11:22 AM     Lab Results   Component Value Date/Time    TSH 1.140 06/03/2025 11:22 AM    TSH 1.780 10/07/2021 02:30 AM     No results found for: \"LITHIUM\"  Lab Results   Component Value Date    VALPROATE <7 (L) 06/04/2018         Treatment Plan:  Reviewed current Medications with the patient.   Risks, benefits, side effects, drug-to-drug interactions and alternatives to treatment were discussed.  Collateral information:   CD evaluation  Encourage patient to attend group and other milieu activities.  Discharge planning discussed with the patient and treatment team.    PSYCHOTHERAPY/COUNSELING:  [x] Therapeutic interview  [x] Supportive  [] CBT  [] Ongoing  [] Other    Patient was seen 1:1 for 20 minutes, other than E&M time spent, focusing on      - coping skills techniques     - Anxiety management techniques discussed including deep breathing exercise and PMR     - discussing patients strength and weakness      - Motivational interviewing to assess the stage of change and assessing patient readiness to quit substance use.     - Focusing on negative cognition and maladaptive thoughts, which is feeding and maintaining the depression symptoms      [x] Patient continues to need, on a daily basis, active treatment furnished directly by or requiring the supervision of inpatient psychiatric personnel      Anticipated Length of stay- Sunday            Electronically signed by MEENAKSHI BAZAN MD on 6/7/2025 at 9:05 AM

## 2025-06-08 NOTE — TRANSITION OF CARE
have a Medical Advance Directive? No  Does the patient have a Psychiatric Advance Directive? No  If the patient does not have a surrogate or Medical Advance Directive AND Psychiatric Advance Directive, the patient was offered information on these advance directives Patient declined to complete    Patient Instructions: Please continue all medications until otherwise directed by physician.  Please [facility] all [facility] with [facility].    Tobacco Cessation Discharge Plan:   Is the patient a tobacco user  and needs referral for tobacco cessation? Yes  Patient referred to the following for tobacco cessation with an appointment? Patient refused  Patient was offered medication to assist with tobacco cessation at discharge? Patient refused    Alcohol/Substance Abuse Discharge Plan:   Does the patient have a history of substance/alcohol abuse and requires a referral for treatment? Yes  Patient referred to the following for substance/alcohol abuse treatment with an appointment? Yes, going to [facility]".  Patient was offered medication to assist with substance/alcohol abuse cessation at discharge? Yes      Patient discharged to: Inpatient facility; Transition record discussed with patient/caregiver and provided this record in hard copy or electronically  . Discharged to [facility]"" with [facility] with [facility].

## 2025-06-08 NOTE — DISCHARGE INSTRUCTIONS
Someone from [facility] will be calling you tomorrow to follow up on your care. If you don't hear from us, give us a call! [phone].    Keep all follow up appointments, take medications as ordered, utilize positive supports, abstain from use of alcohol and drugs. If symptoms return or you feel at risk to yourself or others, please call 911, return the nearest emergency room, or call your local crisis hotline:  [facility]: [phone]  [facility]: [phone]  [facility]: [phone]     For assistance with quitting smoking please go to https://smokefree.gov. A prescription for an FDA-approved tobacco cessation medication was offered at discharge and the patient refused.

## 2025-06-08 NOTE — DISCHARGE INSTR - DIET

## 2025-06-08 NOTE — CARE COORDINATION
Leisure Assessment  June 4, 2025 /  1310  pm    Appearance: Appears as stated age and Drowsy  Current Mental Status: Calm and Cooperative  Affect/Mood: Constricted/ Quiet  Thought Content/Processes: Vague  Insight/Judgement: Fair insight  Speech: normal rate and normal volume  Delusions/Hallucinations: none observed/reported    Admit Status:  Involuntary     Patient identified her leisure interest was shopping. Patient expressed that she has a strong support system, feels comfortable expressing her emotions, and spends most of her time with others through attending AA and being in sober living. Patient reported having increased depression, and \"not wanting to live\" prior to hospitalization. Patient explained that this is related to her children not speaking to her for the past few months. Patient would like to focus on medication management, and described a benefit of this hospitalization was \"some time away to think.\" Patient identified her strength was being \"kind.\"    Recommendations: Decrease Impulsivity/Impulsive Behaviors, Improve/Maintain Coping Skills Development, Improve/Maintain Emotion Regulation Skills/Mood, Improve/Maintain Expressive Communication/Self-Expression, Improve/Maintain Insight/Self-Awareness, and Improve/Maintain Participation in Healthy Leisure Interests    Signature: Maria Elena Garcia, Licensed Professional Music Therapist (LPMT)  
Setting and Family Circumstances: Legal Status:  [x] Involuntary   [] Voluntary  [] Guardian  [] Payee:  [] Other (Specify):    Collateral Contact Identified  Name: none  Relationship   Number:   Pertinent Collateral Information: None     Access to Lethal Means per Patient and/or Collateral Contact: [] Reported  [x] Denies         Initial Discharge Plan:  [] Home:   [] Shelter:  [] Crisis Unit:  [x] Substance Abuse Rehab:[facility]  [] Nursing Facility:  [] Other (Specify):    Follow up Community Services:  referral to Centers  Ability to access services including transportation: yes    Safety Plan reviewed, updated or completed:  updated      Patient stated that she has been feeing depressed lately and has been  stuffing\" things which build up. Sobriety date was 1-25-25 until morning of admit when she relapsed. Patient was not been taking psych meds and has a hard time with her emotion- feeling irritable a lot. Would like to start back on psych meds and feel more stable with moods. Denies current SI, passive death wish before admit.         
the morning meeting.     Documentation completed by: Gladis JANE LPAT

## 2025-06-08 NOTE — DISCHARGE SUMMARY
to treatment were discussed.    Encourage patient to attend outpatient follow up appointment and therapy.    Patient was advised to call the outpatient provider, visit the nearest ED or call 911 if symptoms are not manageable.     Patient's family member was contacted prior to the discharge.         Medication List        START taking these medications      lurasidone 40 MG Tabs tablet  Commonly known as: LATUDA  Take 1 tablet by mouth Daily with supper     OXcarbazepine 150 MG tablet  Commonly known as: TRILEPTAL  Take 1 tablet by mouth 2 times daily            CONTINUE taking these medications      multivitamin Tabs tablet  Take 1 tablet by mouth daily            STOP taking these medications      acamprosate 333 MG tablet  Commonly known as: CAMPRAL               Where to Get Your Medications        These medications were sent to [id number] - [place], [place] - [address] [facility] -  [phone] - [phone]  [address] [place] [id number]      Phone: [phone]   lurasidone 40 MG Tabs tablet  multivitamin Tabs tablet  OXcarbazepine 150 MG tablet           Reason for more than one antipsychotic:   [x] N/A  [] 3 failed monotherapy(drugs tried):  [] Cross over to a new antipsychotic  [] Taper to monotherapy from polypharmacy  [] Augmentation of Clozapine therapy due to treatment resistance to single therapy        TIME SPEND - 35 MINUTES TO COMPLETE THE EVALUATION, DISCHARGE SUMMARY, MEDICATION RECONCILIATION AND FOLLOW UP CARE     Signed:  MEENAKSHI BAZAN MD  6/8/2025  9:06 AM